# Patient Record
Sex: FEMALE | Race: WHITE | NOT HISPANIC OR LATINO | Employment: OTHER | URBAN - METROPOLITAN AREA
[De-identification: names, ages, dates, MRNs, and addresses within clinical notes are randomized per-mention and may not be internally consistent; named-entity substitution may affect disease eponyms.]

---

## 2018-02-12 ENCOUNTER — TRANSCRIBE ORDERS (OUTPATIENT)
Dept: ADMINISTRATIVE | Facility: HOSPITAL | Age: 73
End: 2018-02-12

## 2018-02-12 DIAGNOSIS — Z87.891 PERSONAL HISTORY OF TOBACCO USE, PRESENTING HAZARDS TO HEALTH: Primary | ICD-10-CM

## 2018-02-19 ENCOUNTER — HOSPITAL ENCOUNTER (OUTPATIENT)
Dept: CT IMAGING | Facility: HOSPITAL | Age: 73
Discharge: HOME | End: 2018-02-19
Admitting: NURSE PRACTITIONER

## 2018-02-19 DIAGNOSIS — Z87.891 PERSONAL HISTORY OF TOBACCO USE, PRESENTING HAZARDS TO HEALTH: ICD-10-CM

## 2018-02-19 PROCEDURE — G0297 LDCT FOR LUNG CA SCREEN: HCPCS

## 2023-08-24 ENCOUNTER — APPOINTMENT (OUTPATIENT)
Dept: GENERAL RADIOLOGY | Facility: HOSPITAL | Age: 78
DRG: 191 | End: 2023-08-24
Payer: MEDICARE

## 2023-08-24 ENCOUNTER — HOSPITAL ENCOUNTER (INPATIENT)
Facility: HOSPITAL | Age: 78
LOS: 3 days | Discharge: HOME | DRG: 191 | End: 2023-08-31
Attending: EMERGENCY MEDICINE | Admitting: INTERNAL MEDICINE
Payer: MEDICARE

## 2023-08-24 DIAGNOSIS — J44.1 ACUTE EXACERBATION OF CHRONIC OBSTRUCTIVE PULMONARY DISEASE (COPD): Primary | ICD-10-CM

## 2023-08-24 DIAGNOSIS — Z86.79 HISTORY OF ATRIAL FIBRILLATION: ICD-10-CM

## 2023-08-24 DIAGNOSIS — J96.21 ACUTE ON CHRONIC RESPIRATORY FAILURE WITH HYPOXIA: ICD-10-CM

## 2023-08-24 DIAGNOSIS — J44.1 COPD EXACERBATION: ICD-10-CM

## 2023-08-24 PROBLEM — I95.9 HYPOTENSION: Status: ACTIVE | Noted: 2023-08-24

## 2023-08-24 PROBLEM — R06.00 DYSPNEA: Status: ACTIVE | Noted: 2023-08-24

## 2023-08-24 PROBLEM — R79.89 ELEVATED SERUM CREATININE: Status: ACTIVE | Noted: 2023-08-24

## 2023-08-24 LAB
ALBUMIN SERPL-MCNC: 3.5 G/DL (ref 3.5–5.2)
ALBUMIN/GLOB SERPL: 1.2 G/DL
ALP SERPL-CCNC: 95 U/L (ref 39–117)
ALT SERPL W P-5'-P-CCNC: 10 U/L (ref 1–33)
ANION GAP SERPL CALCULATED.3IONS-SCNC: 10 MMOL/L (ref 5–15)
AST SERPL-CCNC: 16 U/L (ref 1–32)
B PARAPERT DNA SPEC QL NAA+PROBE: NOT DETECTED
B PERT DNA SPEC QL NAA+PROBE: NOT DETECTED
BASOPHILS # BLD AUTO: 0.04 10*3/MM3 (ref 0–0.2)
BASOPHILS NFR BLD AUTO: 0.5 % (ref 0–1.5)
BILIRUB SERPL-MCNC: 0.6 MG/DL (ref 0–1.2)
BUN SERPL-MCNC: 24 MG/DL (ref 8–23)
BUN/CREAT SERPL: 17.4 (ref 7–25)
C PNEUM DNA NPH QL NAA+NON-PROBE: NOT DETECTED
CALCIUM SPEC-SCNC: 9.1 MG/DL (ref 8.6–10.5)
CHLORIDE SERPL-SCNC: 100 MMOL/L (ref 98–107)
CO2 SERPL-SCNC: 28 MMOL/L (ref 22–29)
CREAT SERPL-MCNC: 1.38 MG/DL (ref 0.57–1)
D DIMER PPP FEU-MCNC: <0.27 MCGFEU/ML (ref 0–0.75)
D-LACTATE SERPL-SCNC: 1.2 MMOL/L (ref 0.5–2)
D-LACTATE SERPL-SCNC: 2.7 MMOL/L (ref 0.5–2)
DEPRECATED RDW RBC AUTO: 61.1 FL (ref 37–54)
EGFRCR SERPLBLD CKD-EPI 2021: 40 ML/MIN/1.73
EOSINOPHIL # BLD AUTO: 0.14 10*3/MM3 (ref 0–0.4)
EOSINOPHIL NFR BLD AUTO: 1.8 % (ref 0.3–6.2)
ERYTHROCYTE [DISTWIDTH] IN BLOOD BY AUTOMATED COUNT: 15.7 % (ref 12.3–15.4)
FLUAV SUBTYP SPEC NAA+PROBE: NOT DETECTED
FLUBV RNA ISLT QL NAA+PROBE: NOT DETECTED
GEN 5 2HR TROPONIN T REFLEX: 24 NG/L
GLOBULIN UR ELPH-MCNC: 3 GM/DL
GLUCOSE SERPL-MCNC: 168 MG/DL (ref 65–99)
HADV DNA SPEC NAA+PROBE: NOT DETECTED
HCOV 229E RNA SPEC QL NAA+PROBE: NOT DETECTED
HCOV HKU1 RNA SPEC QL NAA+PROBE: NOT DETECTED
HCOV NL63 RNA SPEC QL NAA+PROBE: NOT DETECTED
HCOV OC43 RNA SPEC QL NAA+PROBE: NOT DETECTED
HCT VFR BLD AUTO: 47.7 % (ref 34–46.6)
HGB BLD-MCNC: 13.6 G/DL (ref 12–15.9)
HMPV RNA NPH QL NAA+NON-PROBE: NOT DETECTED
HOLD SPECIMEN: NORMAL
HPIV1 RNA ISLT QL NAA+PROBE: NOT DETECTED
HPIV2 RNA SPEC QL NAA+PROBE: NOT DETECTED
HPIV3 RNA NPH QL NAA+PROBE: NOT DETECTED
HPIV4 P GENE NPH QL NAA+PROBE: NOT DETECTED
IMM GRANULOCYTES # BLD AUTO: 0.02 10*3/MM3 (ref 0–0.05)
IMM GRANULOCYTES NFR BLD AUTO: 0.3 % (ref 0–0.5)
LYMPHOCYTES # BLD AUTO: 1.4 10*3/MM3 (ref 0.7–3.1)
LYMPHOCYTES NFR BLD AUTO: 18 % (ref 19.6–45.3)
M PNEUMO IGG SER IA-ACNC: NOT DETECTED
MCH RBC QN AUTO: 30.2 PG (ref 26.6–33)
MCHC RBC AUTO-ENTMCNC: 28.5 G/DL (ref 31.5–35.7)
MCV RBC AUTO: 105.8 FL (ref 79–97)
MONOCYTES # BLD AUTO: 0.51 10*3/MM3 (ref 0.1–0.9)
MONOCYTES NFR BLD AUTO: 6.6 % (ref 5–12)
NEUTROPHILS NFR BLD AUTO: 5.65 10*3/MM3 (ref 1.7–7)
NEUTROPHILS NFR BLD AUTO: 72.8 % (ref 42.7–76)
NRBC BLD AUTO-RTO: 0 /100 WBC (ref 0–0.2)
NT-PROBNP SERPL-MCNC: 8005 PG/ML (ref 0–1800)
PLATELET # BLD AUTO: 229 10*3/MM3 (ref 140–450)
PMV BLD AUTO: 10.1 FL (ref 6–12)
POTASSIUM SERPL-SCNC: 4.5 MMOL/L (ref 3.5–5.2)
PROCALCITONIN SERPL-MCNC: 0.06 NG/ML (ref 0–0.25)
PROT SERPL-MCNC: 6.5 G/DL (ref 6–8.5)
RBC # BLD AUTO: 4.51 10*6/MM3 (ref 3.77–5.28)
RHINOVIRUS RNA SPEC NAA+PROBE: NOT DETECTED
RSV RNA NPH QL NAA+NON-PROBE: NOT DETECTED
SARS-COV-2 RNA NPH QL NAA+NON-PROBE: NOT DETECTED
SODIUM SERPL-SCNC: 138 MMOL/L (ref 136–145)
TROPONIN T DELTA: 2 NG/L
TROPONIN T SERPL HS-MCNC: 22 NG/L
TROPONIN T SERPL HS-MCNC: 24 NG/L
WBC NRBC COR # BLD: 7.76 10*3/MM3 (ref 3.4–10.8)
WHOLE BLOOD HOLD COAG: NORMAL
WHOLE BLOOD HOLD SPECIMEN: NORMAL

## 2023-08-24 PROCEDURE — 83605 ASSAY OF LACTIC ACID: CPT | Performed by: EMERGENCY MEDICINE

## 2023-08-24 PROCEDURE — 85025 COMPLETE CBC W/AUTO DIFF WBC: CPT | Performed by: EMERGENCY MEDICINE

## 2023-08-24 PROCEDURE — 93005 ELECTROCARDIOGRAM TRACING: CPT | Performed by: EMERGENCY MEDICINE

## 2023-08-24 PROCEDURE — 87040 BLOOD CULTURE FOR BACTERIA: CPT | Performed by: EMERGENCY MEDICINE

## 2023-08-24 PROCEDURE — 0202U NFCT DS 22 TRGT SARS-COV-2: CPT | Performed by: INTERNAL MEDICINE

## 2023-08-24 PROCEDURE — 83880 ASSAY OF NATRIURETIC PEPTIDE: CPT | Performed by: EMERGENCY MEDICINE

## 2023-08-24 PROCEDURE — G0378 HOSPITAL OBSERVATION PER HR: HCPCS

## 2023-08-24 PROCEDURE — 99285 EMERGENCY DEPT VISIT HI MDM: CPT

## 2023-08-24 PROCEDURE — 99223 1ST HOSP IP/OBS HIGH 75: CPT | Performed by: INTERNAL MEDICINE

## 2023-08-24 PROCEDURE — 80053 COMPREHEN METABOLIC PANEL: CPT | Performed by: EMERGENCY MEDICINE

## 2023-08-24 PROCEDURE — 94640 AIRWAY INHALATION TREATMENT: CPT

## 2023-08-24 PROCEDURE — 36415 COLL VENOUS BLD VENIPUNCTURE: CPT

## 2023-08-24 PROCEDURE — 84145 PROCALCITONIN (PCT): CPT | Performed by: EMERGENCY MEDICINE

## 2023-08-24 PROCEDURE — 84484 ASSAY OF TROPONIN QUANT: CPT | Performed by: EMERGENCY MEDICINE

## 2023-08-24 PROCEDURE — 85379 FIBRIN DEGRADATION QUANT: CPT | Performed by: EMERGENCY MEDICINE

## 2023-08-24 PROCEDURE — 84484 ASSAY OF TROPONIN QUANT: CPT | Performed by: NURSE PRACTITIONER

## 2023-08-24 PROCEDURE — 71045 X-RAY EXAM CHEST 1 VIEW: CPT

## 2023-08-24 RX ORDER — SODIUM CHLORIDE, SODIUM LACTATE, POTASSIUM CHLORIDE, CALCIUM CHLORIDE 600; 310; 30; 20 MG/100ML; MG/100ML; MG/100ML; MG/100ML
75 INJECTION, SOLUTION INTRAVENOUS CONTINUOUS
Status: ACTIVE | OUTPATIENT
Start: 2023-08-24 | End: 2023-08-25

## 2023-08-24 RX ORDER — SODIUM CHLORIDE 0.9 % (FLUSH) 0.9 %
10 SYRINGE (ML) INJECTION AS NEEDED
Status: DISCONTINUED | OUTPATIENT
Start: 2023-08-24 | End: 2023-08-31 | Stop reason: HOSPADM

## 2023-08-24 RX ORDER — IPRATROPIUM BROMIDE AND ALBUTEROL SULFATE 2.5; .5 MG/3ML; MG/3ML
3 SOLUTION RESPIRATORY (INHALATION) ONCE
Status: COMPLETED | OUTPATIENT
Start: 2023-08-24 | End: 2023-08-24

## 2023-08-24 RX ORDER — ONDANSETRON 4 MG/1
4 TABLET, FILM COATED ORAL EVERY 6 HOURS PRN
Status: DISCONTINUED | OUTPATIENT
Start: 2023-08-24 | End: 2023-08-31 | Stop reason: HOSPADM

## 2023-08-24 RX ORDER — IPRATROPIUM BROMIDE AND ALBUTEROL SULFATE 2.5; .5 MG/3ML; MG/3ML
3 SOLUTION RESPIRATORY (INHALATION)
Status: CANCELLED | OUTPATIENT
Start: 2023-08-24

## 2023-08-24 RX ORDER — DOXYCYCLINE 100 MG/1
100 CAPSULE ORAL EVERY 12 HOURS SCHEDULED
Status: COMPLETED | OUTPATIENT
Start: 2023-08-25 | End: 2023-08-29

## 2023-08-24 RX ORDER — SODIUM CHLORIDE 9 MG/ML
40 INJECTION, SOLUTION INTRAVENOUS AS NEEDED
Status: DISCONTINUED | OUTPATIENT
Start: 2023-08-24 | End: 2023-08-31 | Stop reason: HOSPADM

## 2023-08-24 RX ORDER — PREDNISONE 20 MG/1
40 TABLET ORAL
Status: DISCONTINUED | OUTPATIENT
Start: 2023-08-25 | End: 2023-08-28

## 2023-08-24 RX ORDER — DOXYCYCLINE 100 MG/1
100 CAPSULE ORAL EVERY 12 HOURS SCHEDULED
Status: DISCONTINUED | OUTPATIENT
Start: 2023-08-24 | End: 2023-08-24

## 2023-08-24 RX ORDER — HYDROCODONE POLISTIREX AND CHLORPHENIRAMINE POLISTIREX 10; 8 MG/5ML; MG/5ML
2.5 SUSPENSION, EXTENDED RELEASE ORAL EVERY 12 HOURS PRN
Status: DISCONTINUED | OUTPATIENT
Start: 2023-08-24 | End: 2023-08-31 | Stop reason: HOSPADM

## 2023-08-24 RX ORDER — IPRATROPIUM BROMIDE AND ALBUTEROL SULFATE 2.5; .5 MG/3ML; MG/3ML
3 SOLUTION RESPIRATORY (INHALATION)
Status: DISCONTINUED | OUTPATIENT
Start: 2023-08-24 | End: 2023-08-31 | Stop reason: HOSPADM

## 2023-08-24 RX ORDER — SODIUM CHLORIDE 9 MG/ML
75 INJECTION, SOLUTION INTRAVENOUS CONTINUOUS
Status: DISCONTINUED | OUTPATIENT
Start: 2023-08-24 | End: 2023-08-25 | Stop reason: SDUPTHER

## 2023-08-24 RX ORDER — BISACODYL 5 MG/1
5 TABLET, DELAYED RELEASE ORAL DAILY PRN
Status: DISCONTINUED | OUTPATIENT
Start: 2023-08-24 | End: 2023-08-31 | Stop reason: HOSPADM

## 2023-08-24 RX ORDER — HEPARIN SODIUM 5000 [USP'U]/ML
5000 INJECTION, SOLUTION INTRAVENOUS; SUBCUTANEOUS EVERY 12 HOURS SCHEDULED
Status: DISCONTINUED | OUTPATIENT
Start: 2023-08-24 | End: 2023-08-25

## 2023-08-24 RX ORDER — DOXYCYCLINE 100 MG/1
100 CAPSULE ORAL ONCE
Status: COMPLETED | OUTPATIENT
Start: 2023-08-24 | End: 2023-08-24

## 2023-08-24 RX ORDER — ACETAMINOPHEN 325 MG/1
650 TABLET ORAL EVERY 4 HOURS PRN
Status: DISCONTINUED | OUTPATIENT
Start: 2023-08-24 | End: 2023-08-31 | Stop reason: HOSPADM

## 2023-08-24 RX ORDER — BENZONATATE 100 MG/1
100 CAPSULE ORAL 3 TIMES DAILY PRN
Status: DISCONTINUED | OUTPATIENT
Start: 2023-08-24 | End: 2023-08-31 | Stop reason: HOSPADM

## 2023-08-24 RX ORDER — SODIUM CHLORIDE 0.9 % (FLUSH) 0.9 %
10 SYRINGE (ML) INJECTION EVERY 12 HOURS SCHEDULED
Status: DISCONTINUED | OUTPATIENT
Start: 2023-08-24 | End: 2023-08-31 | Stop reason: HOSPADM

## 2023-08-24 RX ORDER — AMOXICILLIN 250 MG
2 CAPSULE ORAL 2 TIMES DAILY
Status: DISCONTINUED | OUTPATIENT
Start: 2023-08-24 | End: 2023-08-31 | Stop reason: HOSPADM

## 2023-08-24 RX ORDER — BISACODYL 10 MG
10 SUPPOSITORY, RECTAL RECTAL DAILY PRN
Status: DISCONTINUED | OUTPATIENT
Start: 2023-08-24 | End: 2023-08-31 | Stop reason: HOSPADM

## 2023-08-24 RX ORDER — IPRATROPIUM BROMIDE AND ALBUTEROL SULFATE 2.5; .5 MG/3ML; MG/3ML
3 SOLUTION RESPIRATORY (INHALATION) EVERY 4 HOURS PRN
Status: DISCONTINUED | OUTPATIENT
Start: 2023-08-24 | End: 2023-08-31 | Stop reason: HOSPADM

## 2023-08-24 RX ORDER — POLYETHYLENE GLYCOL 3350 17 G/17G
17 POWDER, FOR SOLUTION ORAL DAILY PRN
Status: DISCONTINUED | OUTPATIENT
Start: 2023-08-24 | End: 2023-08-31 | Stop reason: HOSPADM

## 2023-08-24 RX ADMIN — SODIUM CHLORIDE 75 ML/HR: 9 INJECTION, SOLUTION INTRAVENOUS at 23:41

## 2023-08-24 RX ADMIN — Medication 10 ML: at 23:41

## 2023-08-24 RX ADMIN — SODIUM CHLORIDE 1000 ML: 9 INJECTION, SOLUTION INTRAVENOUS at 18:00

## 2023-08-24 RX ADMIN — SENNOSIDES AND DOCUSATE SODIUM 2 TABLET: 8.6; 5 TABLET ORAL at 23:41

## 2023-08-24 RX ADMIN — IPRATROPIUM BROMIDE AND ALBUTEROL SULFATE 3 ML: .5; 3 SOLUTION RESPIRATORY (INHALATION) at 17:58

## 2023-08-24 RX ADMIN — DOXYCYCLINE 100 MG: 100 CAPSULE ORAL at 20:33

## 2023-08-24 NOTE — H&P
[facility] Medicine Services  HISTORY AND PHYSICAL    Patient Name: Tessa Bradley  : 1945  MRN: [id number]  Primary Care Physician: Sunni Santoro MD  Date of admission: 2023      Subjective   Subjective     Chief Complaint:  Dyspnea, hypoxia    HPI:  Tessa Bradley is a 77 y.o. female with history of HLD, HTN, COPD who presents from home due to dyspnea and hypoxia noted initially at PCP office, however patient refused to come to hospital at the time of that visit. Patient notes that she has had ongoing symptoms for a few weeks including malaise, fatigue, dyspnea, fevers (subjective). [place] has not felt well since that time. Patient reports that she saw PCP today, was hypoxic in the office and was advised to come to ED. Family ultimately brought her to hospital today for evaluation and here she is currently requiring 4L NC.    Patient was noted to undergo further w/u in ED with labs- notable include lactate 2.7, Cr 1.4. CXR noting no acute process, D-dimer negative. Patient will be admitted       Review of Systems   Gen- No fevers, chills  CV- No chest pain, palpitations  Resp- as above  GI- No N/V/D, abd pain      Personal History     Past Medical History:   Diagnosis Date    Cancer              Past Surgical History:   Procedure Laterality Date    ABDOMINAL SURGERY      HYSTERECTOMY      OOPHORECTOMY         Family History: family history includes Breast cancer (age of onset: 67) in her mother.     Social History:    Social History     Social History Narrative    Not on file       Medications:  Available home medication information reviewed.  (Not in a hospital admission)      Allergies   Allergen Reactions    Penicillins Anaphylaxis       Objective   Objective     Vital Signs:   Temp:  [98.4 °F (36.9 °C)] 98.4 °F (36.9 °C)  Heart Rate:  [56-99] 99  Resp:  [32] 32  BP: (88-99)/(46-62) 96/62  Flow (L/min):  [3] 3        Physical Exam   GEN: NAD, resting in bed, awake  HEENT: on room air, atraumatic, normocephalic, MM dry   NECK: supple, no masses  RESP: on 4L, slight wheezing heard, good effort   CV: on tele, sinus rhythm  PSYCH: normal affect, appropriate  NEURO: awake, alert, no focal deficits noted  MSK: no edema noted  SKIN: no rashes noted       Result Review:  I have personally reviewed the results from the time of this admission to 8/24/2023 19:52 EDT and agree with these findings:  [x]  Laboratory list / accordion  [x]  Microbiology  [x]  Radiology  [x]  EKG/Telemetry   []  Cardiology/Vascular   []  Pathology  [x]  Old records  []  Other:  Most notable findings include:     Labs imaging        LAB RESULTS:      Lab 08/24/23  1650   WBC 7.76   HEMOGLOBIN 13.6   HEMATOCRIT 47.7*   PLATELETS 229   NEUTROS ABS 5.65   IMMATURE GRANS (ABS) 0.02   LYMPHS ABS 1.40   MONOS ABS 0.51   EOS ABS 0.14   .8*   PROCALCITONIN 0.06   LACTATE 2.7*   D DIMER QUANT <0.27         Lab 08/24/23  1650   SODIUM 138   POTASSIUM 4.5   CHLORIDE 100   CO2 28.0   ANION GAP 10.0   BUN 24*   CREATININE 1.38*   EGFR 40.0*   GLUCOSE 168*   CALCIUM 9.1         Lab 08/24/23  1650   TOTAL PROTEIN 6.5   ALBUMIN 3.5   GLOBULIN 3.0   ALT (SGPT) 10   AST (SGOT) 16   BILIRUBIN 0.6   ALK PHOS 95         Lab 08/24/23  1650   PROBNP 8,005.0*   HSTROP T 24*                     Microbiology Results (last 10 days)       ** No results found for the last 240 hours. **            XR Chest 1 View    Result Date: 8/24/2023  XR CHEST 1 VW Date of Exam: 8/24/2023 4:55 PM EDT Indication: SOA triage protocol Comparison: CT chest without contrast 2/19/2018 Findings: Heart size top normal. Lungs are without consolidation. No pneumothorax or pleural effusion. Osseous structures intact.     Impression: Impression: No acute process. Electronically Signed: Jose G Martinez MD  8/24/2023 5:29 PM EDT  Workstation ID: [id number]         Assessment & Plan   Assessment & Plan     Active  Hospital Problems    Diagnosis  POA    **COPD exacerbation [J44.1]  Yes    Elevated serum creatinine [R79.89]  Yes    Hypotension [I95.9]  Yes    Dyspnea [R06.00]  Yes       Tessa Bradley is a 77 y.o. female with history of HLD, HTN, COPD who presents from home due to dyspnea and hypoxia noted initially at PCP office, however patient refused to come to hospital at the time of that visit. Patient notes that she has had ongoing symptoms for a few weeks including malaise, fatigue, dyspnea, fevers (subjective). [place] has not felt well since that time. Patient reports that she saw PCP today, was hypoxic in the office and was advised to come to ED. Family ultimately brought her to hospital today for evaluation and here she is currently requiring 4L NC.    Patient was noted to undergo further w/u in ED with labs- notable include lactate 2.7, Cr 1.4. CXR noting no acute process, D-dimer negative. Patient will be admitted     COPD exacerbation  Acute hypoxic RF  Dyspnea  --suspect possible viral etiology-- will send resp PCR  --continue treatment for COPD exac- doxy/prednisone ordered , will order nebs as well  --symptomatic tx for cough- tussinex/tessalon pearls  --labs in AM   --low threshold for CT imaging, noting D-dimer negative so CTA not pursued initially     Elevated proBNP  Elevated trop  --will order ECHO for AM    Lactic acidosis  Dehydration  HEATHER  --will give fluids tonight and trend labs for AM  --suspect 2/2 dehydration and poor PO intake       HTN  HLD  --will place pharmacy consult as patient unsure of her meds and none listed in computer, will resume as appropriate     DVT prophylaxis:  heparin given heather      CODE STATUS:    Code Status and Medical Interventions:   Ordered at: 08/24/23 1950     Code Status (Patient has no pulse and is not breathing):    CPR (Attempt to Resuscitate)     Medical Interventions (Patient has pulse or is breathing):    Full  Support       Expected Discharge 1-3 days  Expected discharge date/ time has not been documented.     Melinda Garza MD  08/24/23

## 2023-08-24 NOTE — Clinical Note
Level of Care: Telemetry [5]   Diagnosis: COPD exacerbation [759958]   Admitting Physician: HONORIO MAHAJAN [[id number]]   Attending Physician: HONORIO MAHAJAN [[id number]]

## 2023-08-24 NOTE — ED PROVIDER NOTES
[facility]    EMERGENCY DEPARTMENT ENCOUNTER      Pt Name: Tessa Bradley  MRN: [id number]  YOB: 1945  Date of evaluation: 8/24/2023  Provider: Anup Mantilla MD    CHIEF COMPLAINT       Chief Complaint   Patient presents with    Shortness of Breath         HISTORY OF PRESENT ILLNESS   Tessa Bradley is a 77 y.o. female who presents to the emergency department with progressively worsening productive cough and shortness of breath over the course of the last several weeks.  Patient has history of COPD but denies any cardiac history department or fibrillation for which she is on Eliquis and endorses compliance.  No history of VTE but does report a recent travel history to [place] from which she returned last week.  She denies any chest pain or discomfort as well as any fever or chills.      Nursing notes were reviewed.    REVIEW OF SYSTEMS     ROS:  A chief complaint appropriate review of systems was completed and is negative except as noted in the HPI.      PAST MEDICAL HISTORY     Past Medical History:   Diagnosis Date    Cancer     COPD (chronic obstructive pulmonary disease)     Hyperlipidemia     Hypertension          SURGICAL HISTORY       Past Surgical History:   Procedure Laterality Date    ABDOMINAL SURGERY      HYSTERECTOMY      OOPHORECTOMY           CURRENT MEDICATIONS       Current Facility-Administered Medications:     acetaminophen (TYLENOL) tablet 650 mg, 650 mg, Oral, Q4H PRN, Melinda Garza MD, 650 mg at 08/29/23 2026    apixaban (ELIQUIS) tablet 5 mg, 5 mg, Oral, Q12H, Gabrielle Azul APRN, 5 mg at 08/30/23 2054    atorvastatin (LIPITOR) tablet 80 mg, 80 mg, Oral, Nightly, Gabrielle Azul APRN, 80 mg at 08/30/23 2054    benzonatate (TESSALON) capsule 100 mg, 100 mg, Oral, TID PRN, Melinda Garza MD, 100 mg at 08/29/23 0942    sennosides-docusate (PERICOLACE) 8.6-50 MG per tablet 2 tablet, 2 tablet, Oral, BID, 2 tablet at 08/30/23 2054 **AND** polyethylene glycol (MIRALAX)  packet 17 g, 17 g, Oral, Daily PRN **AND** bisacodyl (DULCOLAX) EC tablet 5 mg, 5 mg, Oral, Daily PRN **AND** bisacodyl (DULCOLAX) suppository 10 mg, 10 mg, Rectal, Daily PRN, Melinda Garza MD    budesonide (PULMICORT) nebulizer solution 0.5 mg, 0.5 mg, Nebulization, Daily - RT, Xena Carroll APRN, 0.5 mg at 08/30/23 0834    Calcium Replacement - Follow Nurse / BPA Driven Protocol, , Does not apply, PRN, Melinda Garza MD    cyclobenzaprine (FLEXERIL) tablet 5 mg, 5 mg, Oral, Q8H PRN, Henny, Elaine G, DO, 5 mg at 08/30/23 2053    fluticasone (FLONASE) 50 MCG/ACT nasal spray 2 spray, 2 spray, Each Nare, Daily, Mayne, Casie M, PA-C, 2 spray at 08/30/23 0919    gabapentin (NEURONTIN) capsule 300 mg, 300 mg, Oral, Nightly, Henny, Elaine G, DO, 300 mg at 08/30/23 2054    guaiFENesin (MUCINEX) 12 hr tablet 1,200 mg, 1,200 mg, Oral, Q12H, Eliezer Miller MD, 1,200 mg at 08/30/23 2054    Hydrocod Juancarlos-Chlorphe Juancarlos ER (TUSSIONEX PENNKINETIC) 10-8 MG/5ML ER suspension 2.5 mL, 2.5 mL, Oral, Q12H PRN, Melinda Garza MD, 2.5 mL at 08/30/23 2152    ipratropium-albuterol (DUO-NEB) nebulizer solution 3 mL, 3 mL, Nebulization, Q4H PRN, Heidy Martinez APRN    ipratropium-albuterol (DUO-NEB) nebulizer solution 3 mL, 3 mL, Nebulization, 4x Daily - RT, Melinda Garza MD, 3 mL at 08/30/23 2006    Magnesium Standard Dose Replacement - Follow Nurse / BPA Driven Protocol, , Does not apply, PRN, Neo Garzaah M, MD    ondansetron (ZOFRAN) tablet 4 mg, 4 mg, Oral, Q6H PRGreg DE LA CRUZ Sarah M, MD    Phosphorus Replacement - Follow Nurse / BPA Driven Protocol, , Does not apply, Greg NOONAN Sarah M, MD    Potassium Replacement - Follow Nurse / BPA Driven Protocol, , Does not apply, Greg NOONAN Sarah M, MD    predniSONE (DELTASONE) tablet 40 mg, 40 mg, Oral, Daily, 40 mg at 08/30/23 0918 **FOLLOWED BY** [START ON 9/2/2023] predniSONE (DELTASONE) tablet 30 mg, 30 mg, Oral, Daily **FOLLOWED BY** [START ON 9/5/2023]  predniSONE (DELTASONE) tablet 20 mg, 20 mg, Oral, Daily **FOLLOWED BY** [START ON 9/8/2023] predniSONE (DELTASONE) tablet 10 mg, 10 mg, Oral, Daily, Artem Zhao MD    sodium chloride 0.9 % flush 10 mL, 10 mL, Intravenous, PRN, Anup Mantilla MD    sodium chloride 0.9 % flush 10 mL, 10 mL, Intravenous, Q12H, Melinda Garza MD, 10 mL at 08/30/23 2054    sodium chloride 0.9 % flush 10 mL, 10 mL, Intravenous, PRN, Melinda Garza MD    sodium chloride 0.9 % infusion 40 mL, 40 mL, Intravenous, PRN, Melinda Garza MD    tiotropium (SPIRIVA RESPIMAT) 2.5 mcg/act aerosol solution inhaler, 2 puff, Inhalation, Daily - RT, Eliezer Miller MD, 2 puff at 08/30/23 0834    ALLERGIES     Penicillins    FAMILY HISTORY       Family History   Problem Relation Age of Onset    Breast cancer Mother 67          SOCIAL HISTORY       Social History     Socioeconomic History    Marital status:    Tobacco Use    Smoking status: Every Day     Packs/day: 1.00     Years: 40.00     Pack years: 40.00     Types: Cigarettes    Smokeless tobacco: Never   Vaping Use    Vaping Use: Unknown   Substance and Sexual Activity    Alcohol use: Not Currently    Drug use: Never    Sexual activity: Defer         PHYSICAL EXAM    (up to 7 for level 4, 8 or more for level 5)     Vitals:    08/30/23 1613 08/30/23 1800 08/30/23 2000 08/30/23 2006   BP:   113/54    BP Location:   Left arm    Patient Position:   Lying    Pulse: 89 97 99 93   Resp: 16  18 16   Temp:   98.1 °F (36.7 °C)    TempSrc:   Oral    SpO2: 96%  98%    Weight:       Height:           General: Awake, alert, no acute distress.  HEENT: Conjunctivae normal.  Neck: Trachea midline.  Cardiac: Heart regular rate, rhythm, no murmurs, rubs, or gallops  Lungs: Moderate diffuse wheezing.  No focal findings.  Chest wall: There is no tenderness to palpation over the chest wall or over ribs  Abdomen: Abdomen is soft, nontender, nondistended. There are no firm or pulsatile masses, no  rebound rigidity or guarding.   Musculoskeletal: No deformity.  Neuro: Alert and oriented x 4.  Dermatology: Skin is warm and dry  Psych: Mentation is grossly normal, cognition is grossly normal. Affect is appropriate.        DIAGNOSTIC RESULTS     EKG: All EKGs are interpreted by the Emergency Department Physician who either signs or Co-signs this chart in the absence of a cardiologist.    ECG 12 Lead ED Triage Standing Order; SOA   Final Result   Test Reason : ED Triage Standing Order~   Blood Pressure :   */*   mmHG   Vent. Rate : 102 BPM     Atrial Rate : 102 BPM      P-R Int : 128 ms          QRS Dur :  90 ms       QT Int : 328 ms       P-R-T Axes :  82 112 -33 degrees      QTc Int : 427 ms      Sinus tachycardia with premature atrial complexes   Right axis deviation   Pulmonary disease pattern   Cannot rule out Inferior infarct , age undetermined   T wave abnormality, consider anterolateral ischemia   Abnormal ECG   No previous ECGs available   Confirmed by EFRAIN PIZARRO ([id number]) on 8/27/2023 11:13:32 PM      Referred By: EDMD           Confirmed By: EFRAIN PIZARRO      SCANNED - TELEMETRY     Final Result      SCANNED - TELEMETRY     Final Result      SCANNED - TELEMETRY     Final Result      SCANNED - TELEMETRY     Final Result      SCANNED - TELEMETRY     Final Result      SCANNED - TELEMETRY     Final Result      SCANNED - TELEMETRY     Final Result            RADIOLOGY:   [x] Radiologist's Report Reviewed:  CT Angiogram Chest   Final Result   Impression:      1. No evidence of pulmonary embolism      2. Borderline size of the heart      3. A 6 mm semisolid nodule in the right upper lobe      Indeterminate part solid pulmonary nodule measuring 6 mm. For a part solid nodule >=6 mm, recommend CT at 3-6 months to confirm persistence. If unchanged and a solid component remains <6 mm, annual CT should be performed for 5 years. A persistent part    solid nodule with a solid component >=6 mm is highly suspicious.       4. Minimal bibasilar atelectasis            Electronically Signed: Yash Herrmann MD     8/28/2023 10:56 PM EDT     Workstation ID: [id number]      XR Chest 1 View   Final Result   Impression:      1. Stable mild pulmonary venous hypertension.      2. Minimal new left effusion or atelectasis.         Electronically Signed: Jamison Rueda MD     8/28/2023 1:43 PM EDT     Workstation ID: [id number]      XR Chest 1 View   Final Result   Impression:   No acute process.         Electronically Signed: Jose G Martinez MD     8/24/2023 5:29 PM EDT     Workstation ID: [id number]          I ordered and independently reviewed the above noted radiographic studies.        LABS:    I have reviewed and interpreted all of the currently available lab results from this visit (if applicable):  Results for orders placed or performed during the hospital encounter of 08/24/23   Blood Culture - Blood, Arm, Right    Specimen: Arm, Right; Blood   Result Value Ref Range    Blood Culture No growth at 5 days    Blood Culture - Blood, Arm, Left    Specimen: Arm, Left; Blood   Result Value Ref Range    Blood Culture No growth at 5 days    Respiratory Panel PCR w/COVID-19(SARS-CoV-2) [facility] In-House, NP Swab in UTM/VTM, 3-4 HR TAT - Swab, Nasopharynx    Specimen: Nasopharynx; Swab   Result Value Ref Range    ADENOVIRUS, PCR Not Detected Not Detected    Coronavirus 229E Not Detected Not Detected    Coronavirus HKU1 Not Detected Not Detected    Coronavirus NL63 Not Detected Not Detected    Coronavirus OC43 Not Detected Not Detected    COVID19 Not Detected Not Detected - Ref. Range    Human Metapneumovirus Not Detected Not Detected    Human Rhinovirus/Enterovirus Not Detected Not Detected    Influenza A PCR Not Detected Not Detected    Influenza B PCR Not Detected Not Detected    Parainfluenza Virus 1 Not Detected Not Detected    Parainfluenza Virus 2 Not Detected Not Detected    Parainfluenza Virus 3 Not Detected Not Detected     Parainfluenza Virus 4 Not Detected Not Detected    RSV, PCR Not Detected Not Detected    Bordetella pertussis pcr Not Detected Not Detected    Bordetella parapertussis PCR Not Detected Not Detected    Chlamydophila pneumoniae PCR Not Detected Not Detected    Mycoplasma pneumo by PCR Not Detected Not Detected   Comprehensive Metabolic Panel    Specimen: Blood   Result Value Ref Range    Glucose 168 (H) 65 - 99 mg/dL    BUN 24 (H) 8 - 23 mg/dL    Creatinine 1.38 (H) 0.57 - 1.00 mg/dL    Sodium 138 136 - 145 mmol/L    Potassium 4.5 3.5 - 5.2 mmol/L    Chloride 100 98 - 107 mmol/L    CO2 28.0 22.0 - 29.0 mmol/L    Calcium 9.1 8.6 - 10.5 mg/dL    Total Protein 6.5 6.0 - 8.5 g/dL    Albumin 3.5 3.5 - 5.2 g/dL    ALT (SGPT) 10 1 - 33 U/L    AST (SGOT) 16 1 - 32 U/L    Alkaline Phosphatase 95 39 - 117 U/L    Total Bilirubin 0.6 0.0 - 1.2 mg/dL    Globulin 3.0 gm/dL    A/G Ratio 1.2 g/dL    BUN/Creatinine Ratio 17.4 7.0 - 25.0    Anion Gap 10.0 5.0 - 15.0 mmol/L    eGFR 40.0 (L) >60.0 mL/min/1.73   BNP    Specimen: Blood   Result Value Ref Range    proBNP 8,005.0 (H) 0.0 - 1,800.0 pg/mL   Single High Sensitivity Troponin T    Specimen: Blood   Result Value Ref Range    HS Troponin T 24 (H) <10 ng/L   CBC Auto Differential    Specimen: Blood   Result Value Ref Range    WBC 7.76 3.40 - 10.80 10*3/mm3    RBC 4.51 3.77 - 5.28 10*6/mm3    Hemoglobin 13.6 12.0 - 15.9 g/dL    Hematocrit 47.7 (H) 34.0 - 46.6 %    .8 (H) 79.0 - 97.0 fL    MCH 30.2 26.6 - 33.0 pg    MCHC 28.5 (L) 31.5 - 35.7 g/dL    RDW 15.7 (H) 12.3 - 15.4 %    RDW-SD 61.1 (H) 37.0 - 54.0 fl    MPV 10.1 6.0 - 12.0 fL    Platelets 229 140 - 450 10*3/mm3    Neutrophil % 72.8 42.7 - 76.0 %    Lymphocyte % 18.0 (L) 19.6 - 45.3 %    Monocyte % 6.6 5.0 - 12.0 %    Eosinophil % 1.8 0.3 - 6.2 %    Basophil % 0.5 0.0 - 1.5 %    Immature Grans % 0.3 0.0 - 0.5 %    Neutrophils, Absolute 5.65 1.70 - 7.00 10*3/mm3    Lymphocytes, Absolute 1.40 0.70 - 3.10 10*3/mm3     Monocytes, Absolute 0.51 0.10 - 0.90 10*3/mm3    Eosinophils, Absolute 0.14 0.00 - 0.40 10*3/mm3    Basophils, Absolute 0.04 0.00 - 0.20 10*3/mm3    Immature Grans, Absolute 0.02 0.00 - 0.05 10*3/mm3    nRBC 0.0 0.0 - 0.2 /100 WBC   Lactic Acid, Plasma    Specimen: Blood   Result Value Ref Range    Lactate 2.7 (C) 0.5 - 2.0 mmol/L   Procalcitonin    Specimen: Blood   Result Value Ref Range    Procalcitonin 0.06 0.00 - 0.25 ng/mL   D-dimer, Quantitative    Specimen: Blood   Result Value Ref Range    D-Dimer, Quantitative <0.27 0.00 - 0.75 MCGFEU/mL   STAT Lactic Acid, Reflex    Specimen: Blood   Result Value Ref Range    Lactate 1.2 0.5 - 2.0 mmol/L   High Sensitivity Troponin T    Specimen: Blood   Result Value Ref Range    HS Troponin T 22 (H) <10 ng/L   High Sensitivity Troponin T 2Hr    Specimen: Blood   Result Value Ref Range    HS Troponin T 24 (H) <10 ng/L    Troponin T Delta 2 >=-4 - <+4 ng/L   CBC Auto Differential    Specimen: Blood   Result Value Ref Range    WBC 7.48 3.40 - 10.80 10*3/mm3    RBC 4.13 3.77 - 5.28 10*6/mm3    Hemoglobin 12.5 12.0 - 15.9 g/dL    Hematocrit 43.6 34.0 - 46.6 %    .6 (H) 79.0 - 97.0 fL    MCH 30.3 26.6 - 33.0 pg    MCHC 28.7 (L) 31.5 - 35.7 g/dL    RDW 15.7 (H) 12.3 - 15.4 %    RDW-SD 61.3 (H) 37.0 - 54.0 fl    MPV 10.1 6.0 - 12.0 fL    Platelets 200 140 - 450 10*3/mm3    Neutrophil % 62.6 42.7 - 76.0 %    Lymphocyte % 23.5 19.6 - 45.3 %    Monocyte % 9.4 5.0 - 12.0 %    Eosinophil % 3.6 0.3 - 6.2 %    Basophil % 0.5 0.0 - 1.5 %    Immature Grans % 0.4 0.0 - 0.5 %    Neutrophils, Absolute 4.68 1.70 - 7.00 10*3/mm3    Lymphocytes, Absolute 1.76 0.70 - 3.10 10*3/mm3    Monocytes, Absolute 0.70 0.10 - 0.90 10*3/mm3    Eosinophils, Absolute 0.27 0.00 - 0.40 10*3/mm3    Basophils, Absolute 0.04 0.00 - 0.20 10*3/mm3    Immature Grans, Absolute 0.03 0.00 - 0.05 10*3/mm3    nRBC 0.0 0.0 - 0.2 /100 WBC   Comprehensive Metabolic Panel    Specimen: Blood   Result Value Ref Range     Glucose 113 (H) 65 - 99 mg/dL    BUN 26 (H) 8 - 23 mg/dL    Creatinine 1.44 (H) 0.57 - 1.00 mg/dL    Sodium 146 (H) 136 - 145 mmol/L    Potassium 5.0 3.5 - 5.2 mmol/L    Chloride 106 98 - 107 mmol/L    CO2 35.0 (H) 22.0 - 29.0 mmol/L    Calcium 8.6 8.6 - 10.5 mg/dL    Total Protein 5.6 (L) 6.0 - 8.5 g/dL    Albumin 3.4 (L) 3.5 - 5.2 g/dL    ALT (SGPT) 11 1 - 33 U/L    AST (SGOT) 18 1 - 32 U/L    Alkaline Phosphatase 92 39 - 117 U/L    Total Bilirubin 0.4 0.0 - 1.2 mg/dL    Globulin 2.2 gm/dL    A/G Ratio 1.5 g/dL    BUN/Creatinine Ratio 18.1 7.0 - 25.0    Anion Gap 5.0 5.0 - 15.0 mmol/L    eGFR 38.0 (L) >60.0 mL/min/1.73   Basic Metabolic Panel    Specimen: Blood   Result Value Ref Range    Glucose 292 (H) 65 - 99 mg/dL    BUN 22 8 - 23 mg/dL    Creatinine 1.06 (H) 0.57 - 1.00 mg/dL    Sodium 144 136 - 145 mmol/L    Potassium 5.4 (H) 3.5 - 5.2 mmol/L    Chloride 105 98 - 107 mmol/L    CO2 33.0 (H) 22.0 - 29.0 mmol/L    Calcium 8.7 8.6 - 10.5 mg/dL    BUN/Creatinine Ratio 20.8 7.0 - 25.0    Anion Gap 6.0 5.0 - 15.0 mmol/L    eGFR 54.9 (L) >60.0 mL/min/1.73   Basic Metabolic Panel    Specimen: Blood   Result Value Ref Range    Glucose 94 65 - 99 mg/dL    BUN 20 8 - 23 mg/dL    Creatinine 0.92 0.57 - 1.00 mg/dL    Sodium 143 136 - 145 mmol/L    Potassium 4.8 3.5 - 5.2 mmol/L    Chloride 106 98 - 107 mmol/L    CO2 33.0 (H) 22.0 - 29.0 mmol/L    Calcium 8.8 8.6 - 10.5 mg/dL    BUN/Creatinine Ratio 21.7 7.0 - 25.0    Anion Gap 4.0 (L) 5.0 - 15.0 mmol/L    eGFR 65.1 >60.0 mL/min/1.73   CBC Auto Differential    Specimen: Blood   Result Value Ref Range    WBC 6.87 3.40 - 10.80 10*3/mm3    RBC 3.92 3.77 - 5.28 10*6/mm3    Hemoglobin 11.8 (L) 12.0 - 15.9 g/dL    Hematocrit 41.0 34.0 - 46.6 %    .6 (H) 79.0 - 97.0 fL    MCH 30.1 26.6 - 33.0 pg    MCHC 28.8 (L) 31.5 - 35.7 g/dL    RDW 15.0 12.3 - 15.4 %    RDW-SD 58.4 (H) 37.0 - 54.0 fl    MPV 9.9 6.0 - 12.0 fL    Platelets 176 140 - 450 10*3/mm3    Neutrophil % 70.5 42.7  - 76.0 %    Lymphocyte % 19.8 19.6 - 45.3 %    Monocyte % 8.6 5.0 - 12.0 %    Eosinophil % 0.4 0.3 - 6.2 %    Basophil % 0.3 0.0 - 1.5 %    Immature Grans % 0.4 0.0 - 0.5 %    Neutrophils, Absolute 4.84 1.70 - 7.00 10*3/mm3    Lymphocytes, Absolute 1.36 0.70 - 3.10 10*3/mm3    Monocytes, Absolute 0.59 0.10 - 0.90 10*3/mm3    Eosinophils, Absolute 0.03 0.00 - 0.40 10*3/mm3    Basophils, Absolute 0.02 0.00 - 0.20 10*3/mm3    Immature Grans, Absolute 0.03 0.00 - 0.05 10*3/mm3    nRBC 0.0 0.0 - 0.2 /100 WBC   Basic Metabolic Panel    Specimen: Blood   Result Value Ref Range    Glucose 84 65 - 99 mg/dL    BUN 23 8 - 23 mg/dL    Creatinine 0.97 0.57 - 1.00 mg/dL    Sodium 142 136 - 145 mmol/L    Potassium 4.8 3.5 - 5.2 mmol/L    Chloride 103 98 - 107 mmol/L    CO2 35.0 (H) 22.0 - 29.0 mmol/L    Calcium 9.2 8.6 - 10.5 mg/dL    BUN/Creatinine Ratio 23.7 7.0 - 25.0    Anion Gap 4.0 (L) 5.0 - 15.0 mmol/L    eGFR 61.1 >60.0 mL/min/1.73   CBC (No Diff)    Specimen: Blood   Result Value Ref Range    WBC 8.62 3.40 - 10.80 10*3/mm3    RBC 3.94 3.77 - 5.28 10*6/mm3    Hemoglobin 12.0 12.0 - 15.9 g/dL    Hematocrit 40.7 34.0 - 46.6 %    .3 (H) 79.0 - 97.0 fL    MCH 30.5 26.6 - 33.0 pg    MCHC 29.5 (L) 31.5 - 35.7 g/dL    RDW 14.9 12.3 - 15.4 %    RDW-SD 57.1 (H) 37.0 - 54.0 fl    MPV 9.4 6.0 - 12.0 fL    Platelets 185 140 - 450 10*3/mm3   Blood Gas, Arterial -With Co-Ox Panel: Yes    Specimen: Arm, Right; Arterial Blood   Result Value Ref Range    Site Right Radial     Sabas's Test Positive     pH, Arterial 7.358 7.350 - 7.450 pH units    pCO2, Arterial 64.5 (H) 35.0 - 45.0 mm Hg    pO2, Arterial 80.7 (L) 83.0 - 108.0 mm Hg    HCO3, Arterial 36.2 (H) 20.0 - 26.0 mmol/L    Base Excess, Arterial 8.5 (H) 0.0 - 2.0 mmol/L    Hemoglobin, Blood Gas 12.9 (L) 14 - 18 g/dL    Hematocrit, Blood Gas 39.5 38.0 - 51.0 %    Oxyhemoglobin 94.4 94 - 99 %    Methemoglobin 0.60 0.00 - 1.50 %    Carboxyhemoglobin 1.6 0 - 2 %    Barometric  Pressure for Blood Gas      Modality HFNC     FIO2 50 %    pH, Temp Corrected      pCO2, Temperature Corrected      pO2, Temperature Corrected     Basic Metabolic Panel    Specimen: Blood   Result Value Ref Range    Glucose 136 (H) 65 - 99 mg/dL    BUN 52 (H) 8 - 23 mg/dL    Creatinine 1.40 (H) 0.57 - 1.00 mg/dL    Sodium 144 136 - 145 mmol/L    Potassium 5.0 3.5 - 5.2 mmol/L    Chloride 99 98 - 107 mmol/L    CO2 42.0 (H) 22.0 - 29.0 mmol/L    Calcium 9.0 8.6 - 10.5 mg/dL    BUN/Creatinine Ratio 37.1 (H) 7.0 - 25.0    Anion Gap 3.0 (L) 5.0 - 15.0 mmol/L    eGFR 39.3 (L) >60.0 mL/min/1.73   Magnesium    Specimen: Blood   Result Value Ref Range    Magnesium 2.0 1.6 - 2.4 mg/dL   CBC Auto Differential    Specimen: Blood   Result Value Ref Range    WBC 9.03 3.40 - 10.80 10*3/mm3    RBC 4.60 3.77 - 5.28 10*6/mm3    Hemoglobin 13.7 12.0 - 15.9 g/dL    Hematocrit 46.0 34.0 - 46.6 %    .0 (H) 79.0 - 97.0 fL    MCH 29.8 26.6 - 33.0 pg    MCHC 29.8 (L) 31.5 - 35.7 g/dL    RDW 14.8 12.3 - 15.4 %    RDW-SD 54.7 (H) 37.0 - 54.0 fl    MPV 10.2 6.0 - 12.0 fL    Platelets 254 140 - 450 10*3/mm3    Neutrophil % 82.9 (H) 42.7 - 76.0 %    Lymphocyte % 11.3 (L) 19.6 - 45.3 %    Monocyte % 5.2 5.0 - 12.0 %    Eosinophil % 0.0 (L) 0.3 - 6.2 %    Basophil % 0.2 0.0 - 1.5 %    Immature Grans % 0.4 0.0 - 0.5 %    Neutrophils, Absolute 7.48 (H) 1.70 - 7.00 10*3/mm3    Lymphocytes, Absolute 1.02 0.70 - 3.10 10*3/mm3    Monocytes, Absolute 0.47 0.10 - 0.90 10*3/mm3    Eosinophils, Absolute 0.00 0.00 - 0.40 10*3/mm3    Basophils, Absolute 0.02 0.00 - 0.20 10*3/mm3    Immature Grans, Absolute 0.04 0.00 - 0.05 10*3/mm3    nRBC 0.0 0.0 - 0.2 /100 WBC   POC Glucose Once    Specimen: Blood   Result Value Ref Range    Glucose 264 (H) 70 - 130 mg/dL   ECG 12 Lead ED Triage Standing Order; SOA   Result Value Ref Range    QT Interval 328 ms    QTC Interval 427 ms   Adult Transthoracic Echo Complete W/ Cont if Necessary Per Protocol   Result Value  Ref Range    LVIDd 3.8 cm    LVIDs 2.30 cm    IVSd 1.10 cm    LVPWd 1.10 cm    FS 39.5 %    IVS/LVPW 1.00 cm    ESV(cubed) 12.2 ml    EDV(cubed) 54.9 ml    LVOT area 2.8 cm2    LV mass(C)d 134.7 grams    LVOT diam 1.90 cm    EDV(MOD-sp2) 51.6 ml    EDV(MOD-sp4) 142.0 ml    ESV(MOD-sp2) 29.2 ml    ESV(MOD-sp4) 60.9 ml    SV(MOD-sp2) 22.4 ml    SV(MOD-sp4) 81.1 ml    EF(MOD-sp2) 43.4 %    EF(MOD-sp4) 57.1 %    MV E max emery 106.0 cm/sec    MV A max emery 94.9 cm/sec    MV E/A 1.12     Med Peak E' Emery 7.9 cm/sec    Lat Peak E' Emery 13.2 cm/sec    Avg E/e' ratio 10.05     SV(LVOT) 52.7 ml    RV Base 3.7 cm    RV Mid 2.9 cm    RV Length 8.0 cm    TAPSE (>1.6) 1.69 cm    RV S' 15.7 cm/sec    LA dimension (2D)  3.1 cm    LV V1 max 111.0 cm/sec    LV V1 max PG 4.9 mmHg    LV V1 mean PG 3.0 mmHg    LV V1 VTI 18.6 cm    Ao pk emery 267.2 cm/sec    Ao max PG 28.6 mmHg    Ao mean PG 15.8 mmHg    Ao V2 VTI 44.0 cm    ELHAM(I,D) 1.20 cm2    MV max PG 5.7 mmHg    MV mean PG 3.0 mmHg    MV V2 VTI 28.5 cm    MV P1/2t 54.2 msec    MVA(P1/2t) 4.1 cm2    MVA(VTI) 1.85 cm2    MV dec slope 713.0 cm/sec2    PA acc time 0.13 sec    Ao root diam 3.2 cm   Green Top (Gel)   Result Value Ref Range    Extra Tube Hold for add-ons.    Lavender Top   Result Value Ref Range    Extra Tube hold for add-on    Gold Top - SST   Result Value Ref Range    Extra Tube Hold for add-ons.    Gray Top   Result Value Ref Range    Extra Tube Hold for add-ons.    Light Blue Top   Result Value Ref Range    Extra Tube Hold for add-ons.         If labs were ordered, I independently reviewed the results and considered them in treating the patient.      EMERGENCY DEPARTMENT COURSE and DIFFERENTIAL DIAGNOSIS/MDM:   Vitals:  AS OF 23:57 EDT    BP - 113/54  HR - 93  TEMP - 98.1 °F (36.7 °C) (Oral)  O2 SATS - 98%        Discussion below represents my analysis of pertinent findings related to patient's condition, differential diagnosis, treatment plan and final  disposition.      Differential diagnosis:  The differential diagnosis associated with the patient's presentation includes: copd, chf, pe, acs, dysrhythmia, pna, ptx      Independent interpretations (ECG/rhythm strip/X-ray/US/CT scan): I indpendently interpreted the pt CXR and cardiac monitor - no infiltrate, NSR.      Patient's care impacted by:   [] Diabetes   [] Hypertension   [] Coronary Artery Disease   [] Cancer   [x] Other: COPD    Care significantly affected by Social Determinants of Health (housing and economic circumstances, unemployment)    [] Yes     [x] No   If yes, Patient's care significantly limited by  Social Determinants of Health including:    [] Inadequate housing    [] Low income    [] Alcoholism and drug addiction in family    [] Problems related to primary support group    [] Unemployment    [] Problems related to employment    [] Other Social Determinants of Health:       Consideration of admission/observation vs discharge: Pt w/ acute on chronic resp failure and requires admission for further magagement      I considered prescription management with:    [] Pain medication:   [] Antiviral:   [x] Antibiotic: Pt started on abx   [] Other:    ED Course:    ED Course as of 08/30/23 2357   Thu Aug 24, 2023   1806 I spoke with hospitalist Dr. Garza.  Discussed patient history, presentation, work-up.  Accepts patient for admission. [NS]   Fri Aug 25, 2023   [id number] This patient presents with likely COPD exacerbation.  No evidence of pneumonia and patient has no significant leukocytosis.  D-dimer is negative in the setting of low risk, ruling out pulmonary embolism.  Remainder of labs are unremarkable. [NS]      ED Course User Index  [NS] Anup Mantilla MD             CRITICAL CARE TIME    Approximately 35 minutes of discontinuous critical care time was provided to this patient by myself absent of any time spent performing procedures.  Patient presents critically ill with acute on chronic  respiratory failure 2/2 copd exacerbation placing the CV, resp, neuro systems at risk requiring the following interventions: supplemental oxygen, ABX, nebulizer therapy, interpretation of labs/ecg/imaging, freq reassessment, coordination of admission with the following response: resolutionof hypoxia.  Patient at high risk of deterioration and possibly death without these interventions.      FINAL IMPRESSION      1. Acute exacerbation of chronic obstructive pulmonary disease (COPD)    2. Acute on chronic respiratory failure with hypoxia    3. History of atrial fibrillation          DISPOSITION/PLAN     ED Disposition       ED Disposition   Decision to Admit    Condition   --    Comment   Level of Care: Telemetry [5]   Diagnosis: COPD exacerbation [862160]   Admitting Physician: HONORIO MAHAJAN [[id number]]   Attending Physician: HONORIO MAHAJAN [[id number]]                   Comment: Please note this report has been produced using speech recognition software.      Anup Mantilla MD  Attending Emergency Physician             Anup Mantilla MD  08/30/23 2619

## 2023-08-25 ENCOUNTER — APPOINTMENT (OUTPATIENT)
Dept: CARDIOLOGY | Facility: HOSPITAL | Age: 78
DRG: 191 | End: 2023-08-25
Payer: MEDICARE

## 2023-08-25 LAB
ALBUMIN SERPL-MCNC: 3.4 G/DL (ref 3.5–5.2)
ALBUMIN/GLOB SERPL: 1.5 G/DL
ALP SERPL-CCNC: 92 U/L (ref 39–117)
ALT SERPL W P-5'-P-CCNC: 11 U/L (ref 1–33)
ANION GAP SERPL CALCULATED.3IONS-SCNC: 5 MMOL/L (ref 5–15)
ANION GAP SERPL CALCULATED.3IONS-SCNC: 6 MMOL/L (ref 5–15)
AST SERPL-CCNC: 18 U/L (ref 1–32)
BASOPHILS # BLD AUTO: 0.04 10*3/MM3 (ref 0–0.2)
BASOPHILS NFR BLD AUTO: 0.5 % (ref 0–1.5)
BH CV ECHO MEAS - AO MAX PG: 28.6 MMHG
BH CV ECHO MEAS - AO MEAN PG: 15.8 MMHG
BH CV ECHO MEAS - AO ROOT DIAM: 3.2 CM
BH CV ECHO MEAS - AO V2 MAX: 267.2 CM/SEC
BH CV ECHO MEAS - AO V2 VTI: 44 CM
BH CV ECHO MEAS - AVA(I,D): 1.2 CM2
BH CV ECHO MEAS - EDV(CUBED): 54.9 ML
BH CV ECHO MEAS - EDV(MOD-SP2): 51.6 ML
BH CV ECHO MEAS - EDV(MOD-SP4): 142 ML
BH CV ECHO MEAS - EF(MOD-SP2): 43.4 %
BH CV ECHO MEAS - EF(MOD-SP4): 57.1 %
BH CV ECHO MEAS - ESV(CUBED): 12.2 ML
BH CV ECHO MEAS - ESV(MOD-SP2): 29.2 ML
BH CV ECHO MEAS - ESV(MOD-SP4): 60.9 ML
BH CV ECHO MEAS - FS: 39.5 %
BH CV ECHO MEAS - IVS/LVPW: 1 CM
BH CV ECHO MEAS - IVSD: 1.1 CM
BH CV ECHO MEAS - LA DIMENSION: 3.1 CM
BH CV ECHO MEAS - LAT PEAK E' VEL: 13.2 CM/SEC
BH CV ECHO MEAS - LV MASS(C)D: 134.7 GRAMS
BH CV ECHO MEAS - LV MAX PG: 4.9 MMHG
BH CV ECHO MEAS - LV MEAN PG: 3 MMHG
BH CV ECHO MEAS - LV V1 MAX: 111 CM/SEC
BH CV ECHO MEAS - LV V1 VTI: 18.6 CM
BH CV ECHO MEAS - LVIDD: 3.8 CM
BH CV ECHO MEAS - LVIDS: 2.3 CM
BH CV ECHO MEAS - LVOT AREA: 2.8 CM2
BH CV ECHO MEAS - LVOT DIAM: 1.9 CM
BH CV ECHO MEAS - LVPWD: 1.1 CM
BH CV ECHO MEAS - MED PEAK E' VEL: 7.9 CM/SEC
BH CV ECHO MEAS - MV A MAX VEL: 94.9 CM/SEC
BH CV ECHO MEAS - MV DEC SLOPE: 713 CM/SEC2
BH CV ECHO MEAS - MV E MAX VEL: 106 CM/SEC
BH CV ECHO MEAS - MV E/A: 1.12
BH CV ECHO MEAS - MV MAX PG: 5.7 MMHG
BH CV ECHO MEAS - MV MEAN PG: 3 MMHG
BH CV ECHO MEAS - MV P1/2T: 54.2 MSEC
BH CV ECHO MEAS - MV V2 VTI: 28.5 CM
BH CV ECHO MEAS - MVA(P1/2T): 4.1 CM2
BH CV ECHO MEAS - MVA(VTI): 1.85 CM2
BH CV ECHO MEAS - PA ACC TIME: 0.13 SEC
BH CV ECHO MEAS - SV(LVOT): 52.7 ML
BH CV ECHO MEAS - SV(MOD-SP2): 22.4 ML
BH CV ECHO MEAS - SV(MOD-SP4): 81.1 ML
BH CV ECHO MEAS - TAPSE (>1.6): 1.69 CM
BH CV ECHO MEASUREMENTS AVERAGE E/E' RATIO: 10.05
BH CV XLRA - RV BASE: 3.7 CM
BH CV XLRA - RV LENGTH: 8 CM
BH CV XLRA - RV MID: 2.9 CM
BH CV XLRA - TDI S': 15.7 CM/SEC
BILIRUB SERPL-MCNC: 0.4 MG/DL (ref 0–1.2)
BUN SERPL-MCNC: 22 MG/DL (ref 8–23)
BUN SERPL-MCNC: 26 MG/DL (ref 8–23)
BUN/CREAT SERPL: 18.1 (ref 7–25)
BUN/CREAT SERPL: 20.8 (ref 7–25)
CALCIUM SPEC-SCNC: 8.6 MG/DL (ref 8.6–10.5)
CALCIUM SPEC-SCNC: 8.7 MG/DL (ref 8.6–10.5)
CHLORIDE SERPL-SCNC: 105 MMOL/L (ref 98–107)
CHLORIDE SERPL-SCNC: 106 MMOL/L (ref 98–107)
CO2 SERPL-SCNC: 33 MMOL/L (ref 22–29)
CO2 SERPL-SCNC: 35 MMOL/L (ref 22–29)
CREAT SERPL-MCNC: 1.06 MG/DL (ref 0.57–1)
CREAT SERPL-MCNC: 1.44 MG/DL (ref 0.57–1)
DEPRECATED RDW RBC AUTO: 61.3 FL (ref 37–54)
EGFRCR SERPLBLD CKD-EPI 2021: 38 ML/MIN/1.73
EGFRCR SERPLBLD CKD-EPI 2021: 54.9 ML/MIN/1.73
EOSINOPHIL # BLD AUTO: 0.27 10*3/MM3 (ref 0–0.4)
EOSINOPHIL NFR BLD AUTO: 3.6 % (ref 0.3–6.2)
ERYTHROCYTE [DISTWIDTH] IN BLOOD BY AUTOMATED COUNT: 15.7 % (ref 12.3–15.4)
GLOBULIN UR ELPH-MCNC: 2.2 GM/DL
GLUCOSE BLDC GLUCOMTR-MCNC: 264 MG/DL (ref 70–130)
GLUCOSE SERPL-MCNC: 113 MG/DL (ref 65–99)
GLUCOSE SERPL-MCNC: 292 MG/DL (ref 65–99)
HCT VFR BLD AUTO: 43.6 % (ref 34–46.6)
HGB BLD-MCNC: 12.5 G/DL (ref 12–15.9)
IMM GRANULOCYTES # BLD AUTO: 0.03 10*3/MM3 (ref 0–0.05)
IMM GRANULOCYTES NFR BLD AUTO: 0.4 % (ref 0–0.5)
LYMPHOCYTES # BLD AUTO: 1.76 10*3/MM3 (ref 0.7–3.1)
LYMPHOCYTES NFR BLD AUTO: 23.5 % (ref 19.6–45.3)
MCH RBC QN AUTO: 30.3 PG (ref 26.6–33)
MCHC RBC AUTO-ENTMCNC: 28.7 G/DL (ref 31.5–35.7)
MCV RBC AUTO: 105.6 FL (ref 79–97)
MONOCYTES # BLD AUTO: 0.7 10*3/MM3 (ref 0.1–0.9)
MONOCYTES NFR BLD AUTO: 9.4 % (ref 5–12)
NEUTROPHILS NFR BLD AUTO: 4.68 10*3/MM3 (ref 1.7–7)
NEUTROPHILS NFR BLD AUTO: 62.6 % (ref 42.7–76)
NRBC BLD AUTO-RTO: 0 /100 WBC (ref 0–0.2)
PLATELET # BLD AUTO: 200 10*3/MM3 (ref 140–450)
PMV BLD AUTO: 10.1 FL (ref 6–12)
POTASSIUM SERPL-SCNC: 5 MMOL/L (ref 3.5–5.2)
POTASSIUM SERPL-SCNC: 5.4 MMOL/L (ref 3.5–5.2)
PROT SERPL-MCNC: 5.6 G/DL (ref 6–8.5)
RBC # BLD AUTO: 4.13 10*6/MM3 (ref 3.77–5.28)
SODIUM SERPL-SCNC: 144 MMOL/L (ref 136–145)
SODIUM SERPL-SCNC: 146 MMOL/L (ref 136–145)
WBC NRBC COR # BLD: 7.48 10*3/MM3 (ref 3.4–10.8)

## 2023-08-25 PROCEDURE — 25010000002 HEPARIN (PORCINE) PER 1000 UNITS: Performed by: NURSE PRACTITIONER

## 2023-08-25 PROCEDURE — 93306 TTE W/DOPPLER COMPLETE: CPT | Performed by: INTERNAL MEDICINE

## 2023-08-25 PROCEDURE — 93306 TTE W/DOPPLER COMPLETE: CPT

## 2023-08-25 PROCEDURE — G0378 HOSPITAL OBSERVATION PER HR: HCPCS

## 2023-08-25 PROCEDURE — 63710000001 PREDNISONE PER 1 MG: Performed by: NURSE PRACTITIONER

## 2023-08-25 PROCEDURE — 99231 SBSQ HOSP IP/OBS SF/LOW 25: CPT | Performed by: NURSE PRACTITIONER

## 2023-08-25 PROCEDURE — 82948 REAGENT STRIP/BLOOD GLUCOSE: CPT

## 2023-08-25 PROCEDURE — 80053 COMPREHEN METABOLIC PANEL: CPT | Performed by: INTERNAL MEDICINE

## 2023-08-25 PROCEDURE — 94799 UNLISTED PULMONARY SVC/PX: CPT

## 2023-08-25 PROCEDURE — 25010000002 SULFUR HEXAFLUORIDE MICROSPH 60.7-25 MG RECONSTITUTED SUSPENSION: Performed by: INTERNAL MEDICINE

## 2023-08-25 PROCEDURE — 85025 COMPLETE CBC W/AUTO DIFF WBC: CPT | Performed by: NURSE PRACTITIONER

## 2023-08-25 PROCEDURE — 94761 N-INVAS EAR/PLS OXIMETRY MLT: CPT

## 2023-08-25 PROCEDURE — 94664 DEMO&/EVAL PT USE INHALER: CPT

## 2023-08-25 RX ORDER — CYCLOBENZAPRINE HCL 10 MG
5 TABLET ORAL EVERY 8 HOURS PRN
Status: DISCONTINUED | OUTPATIENT
Start: 2023-08-25 | End: 2023-08-31 | Stop reason: HOSPADM

## 2023-08-25 RX ORDER — TRAZODONE HYDROCHLORIDE 100 MG/1
100 TABLET ORAL NIGHTLY
COMMUNITY

## 2023-08-25 RX ORDER — LORATADINE 10 MG/1
10 TABLET ORAL DAILY
COMMUNITY

## 2023-08-25 RX ORDER — ALBUTEROL SULFATE 90 UG/1
2 AEROSOL, METERED RESPIRATORY (INHALATION) EVERY 4 HOURS PRN
COMMUNITY

## 2023-08-25 RX ORDER — ATORVASTATIN CALCIUM 80 MG/1
80 TABLET, FILM COATED ORAL NIGHTLY
COMMUNITY

## 2023-08-25 RX ORDER — CYCLOBENZAPRINE HCL 5 MG
5 TABLET ORAL 3 TIMES DAILY PRN
COMMUNITY

## 2023-08-25 RX ORDER — GABAPENTIN 300 MG/1
300 CAPSULE ORAL NIGHTLY
COMMUNITY

## 2023-08-25 RX ORDER — ATORVASTATIN CALCIUM 40 MG/1
80 TABLET, FILM COATED ORAL NIGHTLY
Status: DISCONTINUED | OUTPATIENT
Start: 2023-08-25 | End: 2023-08-31 | Stop reason: HOSPADM

## 2023-08-25 RX ORDER — GABAPENTIN 300 MG/1
300 CAPSULE ORAL NIGHTLY
Status: DISCONTINUED | OUTPATIENT
Start: 2023-08-25 | End: 2023-08-31 | Stop reason: HOSPADM

## 2023-08-25 RX ADMIN — Medication 10 ML: at 09:50

## 2023-08-25 RX ADMIN — GABAPENTIN 300 MG: 300 CAPSULE ORAL at 22:28

## 2023-08-25 RX ADMIN — APIXABAN 5 MG: 5 TABLET, FILM COATED ORAL at 21:08

## 2023-08-25 RX ADMIN — SODIUM CHLORIDE, POTASSIUM CHLORIDE, SODIUM LACTATE AND CALCIUM CHLORIDE 75 ML/HR: 600; 310; 30; 20 INJECTION, SOLUTION INTRAVENOUS at 00:31

## 2023-08-25 RX ADMIN — CYCLOBENZAPRINE 5 MG: 10 TABLET, FILM COATED ORAL at 22:28

## 2023-08-25 RX ADMIN — IPRATROPIUM BROMIDE AND ALBUTEROL SULFATE 3 ML: 2.5; .5 SOLUTION RESPIRATORY (INHALATION) at 13:07

## 2023-08-25 RX ADMIN — ATORVASTATIN CALCIUM 80 MG: 40 TABLET, FILM COATED ORAL at 21:07

## 2023-08-25 RX ADMIN — SODIUM CHLORIDE, POTASSIUM CHLORIDE, SODIUM LACTATE AND CALCIUM CHLORIDE 75 ML/HR: 600; 310; 30; 20 INJECTION, SOLUTION INTRAVENOUS at 14:36

## 2023-08-25 RX ADMIN — SENNOSIDES AND DOCUSATE SODIUM 2 TABLET: 8.6; 5 TABLET ORAL at 21:08

## 2023-08-25 RX ADMIN — IPRATROPIUM BROMIDE AND ALBUTEROL SULFATE 3 ML: 2.5; .5 SOLUTION RESPIRATORY (INHALATION) at 17:03

## 2023-08-25 RX ADMIN — HEPARIN SODIUM 5000 UNITS: 5000 INJECTION INTRAVENOUS; SUBCUTANEOUS at 09:49

## 2023-08-25 RX ADMIN — DOXYCYCLINE 100 MG: 100 CAPSULE ORAL at 09:49

## 2023-08-25 RX ADMIN — SULFUR HEXAFLUORIDE 3 ML: KIT at 11:03

## 2023-08-25 RX ADMIN — IPRATROPIUM BROMIDE AND ALBUTEROL SULFATE 3 ML: 2.5; .5 SOLUTION RESPIRATORY (INHALATION) at 08:18

## 2023-08-25 RX ADMIN — DOXYCYCLINE 100 MG: 100 CAPSULE ORAL at 21:07

## 2023-08-25 RX ADMIN — IPRATROPIUM BROMIDE AND ALBUTEROL SULFATE 3 ML: 2.5; .5 SOLUTION RESPIRATORY (INHALATION) at 20:20

## 2023-08-25 RX ADMIN — SENNOSIDES AND DOCUSATE SODIUM 2 TABLET: 8.6; 5 TABLET ORAL at 09:49

## 2023-08-25 RX ADMIN — Medication 10 ML: at 21:08

## 2023-08-25 RX ADMIN — PREDNISONE 40 MG: 20 TABLET ORAL at 09:49

## 2023-08-25 NOTE — PLAN OF CARE
Goal Outcome Evaluation:              Outcome Evaluation: pt on 4L NC. NSR on tele. alert and oriented X4. No acute changes this shift.

## 2023-08-25 NOTE — CASE MANAGEMENT/SOCIAL WORK
Discharge Planning Assessment  [facility]     Patient Name: Tessa Bradley  MRN: [id number]  Today's Date: 8/25/2023    Admit Date: 8/24/2023    Plan: home   Discharge Needs Assessment       Row Name 08/25/23 1346       Living Environment    People in Home spouse    Name(s) of People in Home spouse Rianna [phone]    Current Living Arrangements home    Potentially Unsafe Housing Conditions none    Primary Care Provided by self    Provides Primary Care For no one    Family Caregiver if Needed spouse    Quality of Family Relationships helpful;involved;supportive    Able to Return to Prior Arrangements yes    Living Arrangement Comments plan home       Resource/Environmental Concerns    Transportation Concerns none       Transition Planning    Patient/Family Anticipates Transition to home with family    Patient/Family Anticipated Services at Transition none    Transportation Anticipated family or friend will provide       Discharge Needs Assessment    Equipment Currently Used at Home cane, straight;walker, rolling    Concerns to be Addressed no discharge needs identified    Anticipated Changes Related to Illness none    Equipment Needed After Discharge cane, straight;walker, rolling    Discharge Coordination/Progress plan is home                   Discharge Plan       Row Name 08/25/23 0945       Plan    Plan home    Patient/Family in Agreement with Plan yes    Plan Comments I was unable to talk with patient so I called her spouse Rianna to complete IDP. Patient lives with Rianna in [place]. Independent, uses a straight cane and RW when needed. No home 02 but on 4L here. No HH or OP PT. Plan is home at time of discharge and has transportation.    Final Discharge Disposition Code 01 - home or self-care                  Continued Care and Services - Admitted Since 8/24/2023    Coordination has not been started for this encounter.       Expected Discharge Date and Time       Expected Discharge Date Expected  Discharge Time    Aug 28, 2023            Demographic Summary       Row Name 08/25/23 1344       General Information    Admission Type observation    Referral Source admission list    Preferred Language English       Contact Information    Permission Granted to Share Info With     Contact Information Obtained for     Contact Information Comments PCP: Sunni Santoro, confirmed                   Functional Status       Row Name 08/25/23 1345       Functional Status    Usual Activity Tolerance moderate    Current Activity Tolerance fair       Functional Status, IADL    Medications independent    Meal Preparation assistive person    Housekeeping assistive person    Laundry assistive person    Shopping assistive person    IADL Comments has coverage for meds       Employment/    Employment Status retired                   Psychosocial    No documentation.                  Abuse/Neglect    No documentation.                  Legal    No documentation.                  Substance Abuse    No documentation.                  Patient Forms    No documentation.                     Isabell Amaya RN

## 2023-08-25 NOTE — PROGRESS NOTES
[facility] Medicine Services  PROGRESS NOTE    Patient Name: Tessa Bradley  : 1945  MRN: [id number]    Date of Admission: 2023  Length of Stay: 0  Primary Care Physician: Sunni Santoro MD    Subjective   Subjective     CC:  Shortness of breath    HPI:  Pt states shortness of breath is much better today. Pt reports hx of Afib that resulted 2 years ago after a procedure. She states she was cardioverted twice but still has PAF. She is on elqiuis BID.     Review of Systems  Gen- No fevers, chills  CV- No chest pain, palpitations  Resp- (+) cough, (+) dyspnea with exertion but improved.   GI- No N/V/D, abd pain      Objective   Objective     Vital Signs:   Temp:  [97.9 °F (36.6 °C)-98.8 °F (37.1 °C)] 98.8 °F (37.1 °C)  Heart Rate:  [] 97  Resp:  [16-32] 18  BP: ()/(46-67) 115/50        Physical Exam:  Constitutional: Awake, alert, NAD  HENT: NCAT, mucous membranes moist  Respiratory: scattered wheezing, nonlabored   Cardiovascular: tachy, no murmurs, rubs, or gallops  Gastrointestinal: Positive bowel sounds, soft, nontender, nondistended  Musculoskeletal: No bilateral ankle edema  Psychiatric: Appropriate affect, cooperative  Neurologic: Oriented x 3, strength symmetric in all extremities, Cranial Nerves grossly intact to confrontation, speech clear  Skin: No rashes      Results Reviewed:    Results from last 7 days   Lab Units 23  03423  1650   WBC 10*3/mm3 7.48 7.76   HEMOGLOBIN g/dL 12.5 13.6   HEMATOCRIT % 43.6 47.7*   PLATELETS 10*3/mm3 200 229   PROCALCITONIN ng/mL  --  0.06     Results from last 7 days   Lab Units 23  03423  1650   SODIUM mmol/L 146*  --   --  138   POTASSIUM mmol/L 5.0  --   --  4.5   CHLORIDE mmol/L 106  --   --  100   CO2 mmol/L 35.0*  --   --  28.0   BUN mg/dL 26*  --   --  24*   CREATININE mg/dL 1.44*  --   --  1.38*   GLUCOSE mg/dL 113*  --   --  168*   CALCIUM mg/dL 8.6  --    --  9.1   ALT (SGPT) U/L 11  --   --  10   AST (SGOT) U/L 18  --   --  16   HSTROP T ng/L  --  24* 22* 24*   PROBNP pg/mL  --   --   --  8,005.0*     Estimated Creatinine Clearance: 32.7 mL/min (A) (by C-G formula based on SCr of 1.44 mg/dL (H)).    Microbiology Results Abnormal       Procedure Component Value - Date/Time    Respiratory Panel PCR w/COVID-19(SARS-CoV-2) PENNY/CLEVE/ALICE/PAD/COR/MAD/ELYSSA In-House, NP Swab in UTM/VTM, 3-4 HR TAT - Swab, Nasopharynx [[id number]]  (Normal) Collected: 08/24/23 2028    Lab Status: Final result Specimen: Swab from Nasopharynx Updated: 08/24/23 2127     ADENOVIRUS, PCR Not Detected     Coronavirus 229E Not Detected     Coronavirus HKU1 Not Detected     Coronavirus NL63 Not Detected     Coronavirus OC43 Not Detected     COVID19 Not Detected     Human Metapneumovirus Not Detected     Human Rhinovirus/Enterovirus Not Detected     Influenza A PCR Not Detected     Influenza B PCR Not Detected     Parainfluenza Virus 1 Not Detected     Parainfluenza Virus 2 Not Detected     Parainfluenza Virus 3 Not Detected     Parainfluenza Virus 4 Not Detected     RSV, PCR Not Detected     Bordetella pertussis pcr Not Detected     Bordetella parapertussis PCR Not Detected     Chlamydophila pneumoniae PCR Not Detected     Mycoplasma pneumo by PCR Not Detected    Narrative:      In the setting of a positive respiratory panel with a viral infection PLUS a negative procalcitonin without other underlying concern for bacterial infection, consider observing off antibiotics or discontinuation of antibiotics and continue supportive care. If the respiratory panel is positive for atypical bacterial infection (Bordetella pertussis, Chlamydophila pneumoniae, or Mycoplasma pneumoniae), consider antibiotic de-escalation to target atypical bacterial infection.            Imaging Results (Last 24 Hours)       Procedure Component Value Units Date/Time    XR Chest 1 View [[id number]] Collected: 08/24/23 1728     Updated:  08/24/23 1732    Narrative:      XR CHEST 1 VW    Date of Exam: 8/24/2023 4:55 PM EDT    Indication: SOA triage protocol    Comparison: CT chest without contrast 2/19/2018    Findings:  Heart size top normal. Lungs are without consolidation. No pneumothorax or pleural effusion. Osseous structures intact.      Impression:      Impression:  No acute process.      Electronically Signed: Jose G Martinez MD    8/24/2023 5:29 PM EDT    Workstation ID: [id number]            Results for orders placed during the hospital encounter of 08/24/23    Adult Transthoracic Echo Complete W/ Cont if Necessary Per Protocol    Interpretation Summary    Left ventricular systolic function is normal. Left ventricular ejection fraction appears to be 56 - 60%.    Left ventricular diastolic function was normal.    Mild aortic valve stenosis is present. Aortic valve area is 1.2 cm2.    Peak velocity of the flow distal to the aortic valve is 267.2 cm/s. Aortic valve mean pressure gradient is 16 mmHg.      I have reviewed the medications:  Scheduled Meds:dexAMETHasone, 10 mg, Intravenous, Once  doxycycline, 100 mg, Oral, Q12H  heparin (porcine), 5,000 Units, Subcutaneous, Q12H  ipratropium-albuterol, 3 mL, Nebulization, 4x Daily - RT  predniSONE, 40 mg, Oral, Daily With Breakfast  senna-docusate sodium, 2 tablet, Oral, BID  sodium chloride, 10 mL, Intravenous, Q12H      Continuous Infusions:lactated ringers, 75 mL/hr, Last Rate: 75 mL/hr (08/25/23 1436)      PRN Meds:.  acetaminophen    benzonatate    senna-docusate sodium **AND** polyethylene glycol **AND** bisacodyl **AND** bisacodyl    Calcium Replacement - Follow Nurse / BPA Driven Protocol    Hydrocod Juancarlos-Chlorphe Juancarlos ER    ipratropium-albuterol    Magnesium Standard Dose Replacement - Follow Nurse / BPA Driven Protocol    ondansetron    Phosphorus Replacement - Follow Nurse / BPA Driven Protocol    Potassium Replacement - Follow Nurse / BPA Driven Protocol    sodium chloride    sodium  chloride    sodium chloride      Assessment & Plan   Assessment / Plan     Active Hospital Problems    Diagnosis  POA    **COPD exacerbation [J44.1]  Yes    Elevated serum creatinine [R79.89]  Yes    Hypotension [I95.9]  Yes    Dyspnea [R06.00]  Yes      Resolved Hospital Problems   No resolved problems to display.        Brief Hospital Course to date:  Tessa Bradley is a 77 y.o. female HTN, HLD, Afib (eliquis), COPD presenting with dyspnea and hypoxia noted at PCP office. Pt refused to come to the hospital at the time but presented later after family brought her wearing 4L NC. Pt received doxy/steroid therapy      Plan:  COPD exacerbation  Acute hypoxic RF  Dyspnea  - Resp PCR neg  - doxy,prednisone  - negs  - D-dimer neg    Elevated Pro BNP  Elevated trop   - ECHO LV 56-60%, diastolic function normal. Mild aortic valve stenosis  - Elevated trop, neg delta: EKG ST with PAC, right axis dev, pulmonary disease pattern. T wave abn, consider anterolateral ischemia. Pt denies chest pain    Lactic acidosis  Dehydration  HEATHER  - IV fluid resuscitation   - avoid nephrotoxic meds    HTN/HLD  - atorvastatin, BP stable off meds    DVT Prophylaxis:  SQH    Disposition: I expect the patient to be discharged 08/28/2023.    CODE STATUS:   Code Status and Medical Interventions:   Ordered at: 08/24/23 1950     Code Status (Patient has no pulse and is not breathing):    CPR (Attempt to Resuscitate)     Medical Interventions (Patient has pulse or is breathing):    Full Support         Electronically signed by ALIZA Maxwell, 08/25/23, 16:15 EDT.

## 2023-08-26 LAB
ANION GAP SERPL CALCULATED.3IONS-SCNC: 4 MMOL/L (ref 5–15)
BASOPHILS # BLD AUTO: 0.02 10*3/MM3 (ref 0–0.2)
BASOPHILS NFR BLD AUTO: 0.3 % (ref 0–1.5)
BUN SERPL-MCNC: 20 MG/DL (ref 8–23)
BUN/CREAT SERPL: 21.7 (ref 7–25)
CALCIUM SPEC-SCNC: 8.8 MG/DL (ref 8.6–10.5)
CHLORIDE SERPL-SCNC: 106 MMOL/L (ref 98–107)
CO2 SERPL-SCNC: 33 MMOL/L (ref 22–29)
CREAT SERPL-MCNC: 0.92 MG/DL (ref 0.57–1)
DEPRECATED RDW RBC AUTO: 58.4 FL (ref 37–54)
EGFRCR SERPLBLD CKD-EPI 2021: 65.1 ML/MIN/1.73
EOSINOPHIL # BLD AUTO: 0.03 10*3/MM3 (ref 0–0.4)
EOSINOPHIL NFR BLD AUTO: 0.4 % (ref 0.3–6.2)
ERYTHROCYTE [DISTWIDTH] IN BLOOD BY AUTOMATED COUNT: 15 % (ref 12.3–15.4)
GLUCOSE SERPL-MCNC: 94 MG/DL (ref 65–99)
HCT VFR BLD AUTO: 41 % (ref 34–46.6)
HGB BLD-MCNC: 11.8 G/DL (ref 12–15.9)
IMM GRANULOCYTES # BLD AUTO: 0.03 10*3/MM3 (ref 0–0.05)
IMM GRANULOCYTES NFR BLD AUTO: 0.4 % (ref 0–0.5)
LYMPHOCYTES # BLD AUTO: 1.36 10*3/MM3 (ref 0.7–3.1)
LYMPHOCYTES NFR BLD AUTO: 19.8 % (ref 19.6–45.3)
MCH RBC QN AUTO: 30.1 PG (ref 26.6–33)
MCHC RBC AUTO-ENTMCNC: 28.8 G/DL (ref 31.5–35.7)
MCV RBC AUTO: 104.6 FL (ref 79–97)
MONOCYTES # BLD AUTO: 0.59 10*3/MM3 (ref 0.1–0.9)
MONOCYTES NFR BLD AUTO: 8.6 % (ref 5–12)
NEUTROPHILS NFR BLD AUTO: 4.84 10*3/MM3 (ref 1.7–7)
NEUTROPHILS NFR BLD AUTO: 70.5 % (ref 42.7–76)
NRBC BLD AUTO-RTO: 0 /100 WBC (ref 0–0.2)
PLATELET # BLD AUTO: 176 10*3/MM3 (ref 140–450)
PMV BLD AUTO: 9.9 FL (ref 6–12)
POTASSIUM SERPL-SCNC: 4.8 MMOL/L (ref 3.5–5.2)
RBC # BLD AUTO: 3.92 10*6/MM3 (ref 3.77–5.28)
SODIUM SERPL-SCNC: 143 MMOL/L (ref 136–145)
WBC NRBC COR # BLD: 6.87 10*3/MM3 (ref 3.4–10.8)

## 2023-08-26 PROCEDURE — 85025 COMPLETE CBC W/AUTO DIFF WBC: CPT | Performed by: NURSE PRACTITIONER

## 2023-08-26 PROCEDURE — 94664 DEMO&/EVAL PT USE INHALER: CPT

## 2023-08-26 PROCEDURE — 63710000001 PREDNISONE PER 1 MG: Performed by: NURSE PRACTITIONER

## 2023-08-26 PROCEDURE — 94799 UNLISTED PULMONARY SVC/PX: CPT

## 2023-08-26 PROCEDURE — G0378 HOSPITAL OBSERVATION PER HR: HCPCS

## 2023-08-26 PROCEDURE — 94761 N-INVAS EAR/PLS OXIMETRY MLT: CPT

## 2023-08-26 PROCEDURE — 99233 SBSQ HOSP IP/OBS HIGH 50: CPT | Performed by: PHYSICIAN ASSISTANT

## 2023-08-26 PROCEDURE — 80048 BASIC METABOLIC PNL TOTAL CA: CPT | Performed by: NURSE PRACTITIONER

## 2023-08-26 RX ORDER — FLUTICASONE PROPIONATE 50 MCG
2 SPRAY, SUSPENSION (ML) NASAL DAILY
Status: DISCONTINUED | OUTPATIENT
Start: 2023-08-26 | End: 2023-08-31 | Stop reason: HOSPADM

## 2023-08-26 RX ORDER — GUAIFENESIN 600 MG/1
600 TABLET, EXTENDED RELEASE ORAL EVERY 12 HOURS SCHEDULED
Status: DISCONTINUED | OUTPATIENT
Start: 2023-08-26 | End: 2023-08-28

## 2023-08-26 RX ADMIN — Medication 10 ML: at 08:44

## 2023-08-26 RX ADMIN — SENNOSIDES AND DOCUSATE SODIUM 2 TABLET: 8.6; 5 TABLET ORAL at 21:49

## 2023-08-26 RX ADMIN — SENNOSIDES AND DOCUSATE SODIUM 2 TABLET: 8.6; 5 TABLET ORAL at 08:43

## 2023-08-26 RX ADMIN — BENZONATATE 100 MG: 100 CAPSULE ORAL at 23:41

## 2023-08-26 RX ADMIN — IPRATROPIUM BROMIDE AND ALBUTEROL SULFATE 3 ML: 2.5; .5 SOLUTION RESPIRATORY (INHALATION) at 16:55

## 2023-08-26 RX ADMIN — APIXABAN 5 MG: 5 TABLET, FILM COATED ORAL at 21:49

## 2023-08-26 RX ADMIN — IPRATROPIUM BROMIDE AND ALBUTEROL SULFATE 3 ML: 2.5; .5 SOLUTION RESPIRATORY (INHALATION) at 12:21

## 2023-08-26 RX ADMIN — GUAIFENESIN 600 MG: 600 TABLET, EXTENDED RELEASE ORAL at 11:10

## 2023-08-26 RX ADMIN — IPRATROPIUM BROMIDE AND ALBUTEROL SULFATE 3 ML: 2.5; .5 SOLUTION RESPIRATORY (INHALATION) at 20:14

## 2023-08-26 RX ADMIN — DOXYCYCLINE 100 MG: 100 CAPSULE ORAL at 21:49

## 2023-08-26 RX ADMIN — ATORVASTATIN CALCIUM 80 MG: 40 TABLET, FILM COATED ORAL at 21:49

## 2023-08-26 RX ADMIN — APIXABAN 5 MG: 5 TABLET, FILM COATED ORAL at 08:43

## 2023-08-26 RX ADMIN — IPRATROPIUM BROMIDE AND ALBUTEROL SULFATE 3 ML: 2.5; .5 SOLUTION RESPIRATORY (INHALATION) at 08:10

## 2023-08-26 RX ADMIN — DOXYCYCLINE 100 MG: 100 CAPSULE ORAL at 08:43

## 2023-08-26 RX ADMIN — GUAIFENESIN 600 MG: 600 TABLET, EXTENDED RELEASE ORAL at 21:49

## 2023-08-26 RX ADMIN — FLUTICASONE PROPIONATE 2 SPRAY: 50 SPRAY, METERED NASAL at 12:17

## 2023-08-26 RX ADMIN — Medication 10 ML: at 21:49

## 2023-08-26 RX ADMIN — GABAPENTIN 300 MG: 300 CAPSULE ORAL at 21:49

## 2023-08-26 RX ADMIN — PREDNISONE 40 MG: 20 TABLET ORAL at 08:43

## 2023-08-26 NOTE — PLAN OF CARE
Goal Outcome Evaluation:  Plan of Care Reviewed With: patient        Progress: no change  Outcome Evaluation: Patient remains on 4L NC. SOA with activity. Patient noted to desaturate to 78-79% with ambulation while on 4L NC. Patient able to recover to over 90% quickly at rest. Patient alert and oriented this shift. Forgetful at times. Patient c/o back pain and cramping in hands and legs. States she take neurotin and flexaril. Patient requested home dose of neurotin, flexaril and trazadone.Education on sedation medications provided. APRN notified of request. Flexaril and neurotin. ordered. medication given and patient able to rest.

## 2023-08-26 NOTE — PROGRESS NOTES
[facility] Medicine Services  PROGRESS NOTE    Patient Name: Tessa Bradley  : 1945  MRN: [id number]    Date of Admission: 2023  Length of Stay: 0  Primary Care Physician: Sunni Santoro MD    Subjective   Subjective     CC:  Shortness of breath    HPI:  Pt states shortness of breath about the same as yesterday, some product white sputum, some pleuritic chest discomfort with cough. Nebs seem to be helping.   Good UOP, no NV, reports good tolerance of dite    Review of Systems  Gen- No fevers, chills  CV- No chest pain, palpitations  Resp- (+) cough, (+) dyspnea with exertion   GI- No N/V/D, abd pain      Objective   Objective     Vital Signs:   Temp:  [97 °F (36.1 °C)-98.8 °F (37.1 °C)] 97.7 °F (36.5 °C)  Heart Rate:  [] 85  Resp:  [16-20] 20  BP: (107-131)/(50-65) 129/65        Physical Exam:  Constitutional: Awake, alert, NAD  HENT: NCAT, mucous membranes moist  Respiratory: scattered expiratory wheezing, nonlabored ,satting in mid 90s on 4L  Cardiovascular:RRR no murmurs, rubs, or gallops  Gastrointestinal: Positive bowel sounds, soft, nontender, nondistended  Musculoskeletal: No bilateral ankle edema  Psychiatric: Appropriate affect, cooperative  Neurologic: Oriented x 3, strength symmetric in all extremities, Cranial Nerves grossly intact to confrontation, speech clear  Skin: No rashes      Results Reviewed:    Results from last 7 days   Lab Units 23  04223  1650   WBC 10*3/mm3 6.87 7.48 7.76   HEMOGLOBIN g/dL 11.8* 12.5 13.6   HEMATOCRIT % 41.0 43.6 47.7*   PLATELETS 10*3/mm3 176 200 229   PROCALCITONIN ng/mL  --   --  0.06       Results from last 7 days   Lab Units 23  0421 23  1742 23  0342 239 23  1650   SODIUM mmol/L 143 144 146*  --   --  138   POTASSIUM mmol/L 4.8 5.4* 5.0  --   --  4.5   CHLORIDE mmol/L 106 105 106  --   --  100   CO2 mmol/L 33.0* 33.0* 35.0*  --   --  28.0    BUN mg/dL 20 22 26*  --   --  24*   CREATININE mg/dL 0.92 1.06* 1.44*  --   --  1.38*   GLUCOSE mg/dL 94 292* 113*  --   --  168*   CALCIUM mg/dL 8.8 8.7 8.6  --   --  9.1   ALT (SGPT) U/L  --   --  11  --   --  10   AST (SGOT) U/L  --   --  18  --   --  16   HSTROP T ng/L  --   --   --  24* 22* 24*   PROBNP pg/mL  --   --   --   --   --  8,005.0*       Estimated Creatinine Clearance: 51.1 mL/min (by C-G formula based on SCr of 0.92 mg/dL).    Microbiology Results Abnormal       Procedure Component Value - Date/Time    Blood Culture - Blood, Arm, Right [[id number]]  (Normal) Collected: 08/24/23 1645    Lab Status: Preliminary result Specimen: Blood from Arm, Right Updated: 08/25/23 1715     Blood Culture No growth at 24 hours    Blood Culture - Blood, Arm, Left [[id number]]  (Normal) Collected: 08/24/23 1645    Lab Status: Preliminary result Specimen: Blood from Arm, Left Updated: 08/25/23 1715     Blood Culture No growth at 24 hours    Respiratory Panel PCR w/COVID-19(SARS-CoV-2) [facility]ENNY/CLEVE/ALICE/PAD/COR/MAD/ELYSSA In-House, NP Swab in UTM/VTM, 3-4 HR TAT - Swab, Nasopharynx [[id number]]  (Normal) Collected: 08/24/23 2028    Lab Status: Final result Specimen: Swab from Nasopharynx Updated: 08/24/23 2127     ADENOVIRUS, PCR Not Detected     Coronavirus 229E Not Detected     Coronavirus HKU1 Not Detected     Coronavirus NL63 Not Detected     Coronavirus OC43 Not Detected     COVID19 Not Detected     Human Metapneumovirus Not Detected     Human Rhinovirus/Enterovirus Not Detected     Influenza A PCR Not Detected     Influenza B PCR Not Detected     Parainfluenza Virus 1 Not Detected     Parainfluenza Virus 2 Not Detected     Parainfluenza Virus 3 Not Detected     Parainfluenza Virus 4 Not Detected     RSV, PCR Not Detected     Bordetella pertussis pcr Not Detected     Bordetella parapertussis PCR Not Detected     Chlamydophila pneumoniae PCR Not Detected     Mycoplasma pneumo by PCR Not Detected    Narrative:      In the  setting of a positive respiratory panel with a viral infection PLUS a negative procalcitonin without other underlying concern for bacterial infection, consider observing off antibiotics or discontinuation of antibiotics and continue supportive care. If the respiratory panel is positive for atypical bacterial infection (Bordetella pertussis, Chlamydophila pneumoniae, or Mycoplasma pneumoniae), consider antibiotic de-escalation to target atypical bacterial infection.            Imaging Results (Last 24 Hours)       ** No results found for the last 24 hours. **            Results for orders placed during the hospital encounter of 08/24/23    Adult Transthoracic Echo Complete W/ Cont if Necessary Per Protocol    Interpretation Summary    Left ventricular systolic function is normal. Left ventricular ejection fraction appears to be 56 - 60%.    Left ventricular diastolic function was normal.    Mild aortic valve stenosis is present. Aortic valve area is 1.2 cm2.    Peak velocity of the flow distal to the aortic valve is 267.2 cm/s. Aortic valve mean pressure gradient is 16 mmHg.      I have reviewed the medications:  Scheduled Meds:apixaban, 5 mg, Oral, Q12H  atorvastatin, 80 mg, Oral, Nightly  dexAMETHasone, 10 mg, Intravenous, Once  doxycycline, 100 mg, Oral, Q12H  gabapentin, 300 mg, Oral, Nightly  ipratropium-albuterol, 3 mL, Nebulization, 4x Daily - RT  predniSONE, 40 mg, Oral, Daily With Breakfast  senna-docusate sodium, 2 tablet, Oral, BID  sodium chloride, 10 mL, Intravenous, Q12H      Continuous Infusions:     PRN Meds:.  acetaminophen    benzonatate    senna-docusate sodium **AND** polyethylene glycol **AND** bisacodyl **AND** bisacodyl    Calcium Replacement - Follow Nurse / BPA Driven Protocol    cyclobenzaprine    Hydrocod Juancarlos-Chlorphe Juancarlos ER    ipratropium-albuterol    Magnesium Standard Dose Replacement - Follow Nurse / BPA Driven Protocol    ondansetron    Phosphorus Replacement - Follow Nurse / BPA  Driven Protocol    Potassium Replacement - Follow Nurse / BPA Driven Protocol    sodium chloride    sodium chloride    sodium chloride      Assessment & Plan   Assessment / Plan     Active Hospital Problems    Diagnosis  POA    **COPD exacerbation [J44.1]  Yes    Elevated serum creatinine [R79.89]  Yes    Hypotension [I95.9]  Yes    Dyspnea [R06.00]  Yes      Resolved Hospital Problems   No resolved problems to display.        Brief Hospital Course to date:  Tessa Bradley is a 77 y.o. 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 Pt refused to come to the hospital at the time but presented later after family brought her wearing 4L NC. Pt received doxy/steroid therapy      Plan:  COPD exacerbation  Acute hypoxic RF  Dyspnea  - Resp PCR neg  - doxy BID,prednisone 40mg daily x 5 starting8/25; duonebs QID/prn, mucinex  - D-dimer neg  -NC O2 on 4L satting in 90s, wean as tolerates  -normal procal. BCX x 2 NGTD  -CXR -no acute process  -start incentive spirometry    Ongoing tobacco use  -rec smoke cessation     Elevated Pro BNP  Elevated trop   - ECHO LV 56-60%, diastolic function normal. Mild aortic valve stenosis  - Elevated trop, neg delta: EKG ST with PAC, right axis dev, pulmonary disease pattern. T wave abn, consider anterolateral ischemia. Pt denies chest pain    Lactic acidosis  Dehydration  HEATHER  - IV fluid resuscitation   - avoid nephrotoxic meds    HTN/HLD  - atorvastatin, BP stable off meds    DVT Prophylaxis:  SQH    Disposition: I expect the patient to be discharged 08/28/2023.    CODE STATUS:   Code Status and Medical Interventions:   Ordered at: 08/24/23 1950     Code Status (Patient has no pulse and is not  breathing):    CPR (Attempt to Resuscitate)     Medical Interventions (Patient has pulse or is breathing):    Full Support         Electronically signed by Casie M Mayne, PA-C, 08/26/23, 08:53 EDT.

## 2023-08-26 NOTE — PLAN OF CARE
Goal Outcome Evaluation:              Outcome Evaluation: Pt on 3L N. SOB with activity but able to recover to 90% at rest. NSR on tele. alert and oriented X 4. No acute changes this shift.

## 2023-08-27 LAB
ANION GAP SERPL CALCULATED.3IONS-SCNC: 4 MMOL/L (ref 5–15)
BUN SERPL-MCNC: 23 MG/DL (ref 8–23)
BUN/CREAT SERPL: 23.7 (ref 7–25)
CALCIUM SPEC-SCNC: 9.2 MG/DL (ref 8.6–10.5)
CHLORIDE SERPL-SCNC: 103 MMOL/L (ref 98–107)
CO2 SERPL-SCNC: 35 MMOL/L (ref 22–29)
CREAT SERPL-MCNC: 0.97 MG/DL (ref 0.57–1)
DEPRECATED RDW RBC AUTO: 57.1 FL (ref 37–54)
EGFRCR SERPLBLD CKD-EPI 2021: 61.1 ML/MIN/1.73
ERYTHROCYTE [DISTWIDTH] IN BLOOD BY AUTOMATED COUNT: 14.9 % (ref 12.3–15.4)
GLUCOSE SERPL-MCNC: 84 MG/DL (ref 65–99)
HCT VFR BLD AUTO: 40.7 % (ref 34–46.6)
HGB BLD-MCNC: 12 G/DL (ref 12–15.9)
MCH RBC QN AUTO: 30.5 PG (ref 26.6–33)
MCHC RBC AUTO-ENTMCNC: 29.5 G/DL (ref 31.5–35.7)
MCV RBC AUTO: 103.3 FL (ref 79–97)
PLATELET # BLD AUTO: 185 10*3/MM3 (ref 140–450)
PMV BLD AUTO: 9.4 FL (ref 6–12)
POTASSIUM SERPL-SCNC: 4.8 MMOL/L (ref 3.5–5.2)
QT INTERVAL: 328 MS
QTC INTERVAL: 427 MS
RBC # BLD AUTO: 3.94 10*6/MM3 (ref 3.77–5.28)
SODIUM SERPL-SCNC: 142 MMOL/L (ref 136–145)
WBC NRBC COR # BLD: 8.62 10*3/MM3 (ref 3.4–10.8)

## 2023-08-27 PROCEDURE — 94799 UNLISTED PULMONARY SVC/PX: CPT

## 2023-08-27 PROCEDURE — 63710000001 PREDNISONE PER 1 MG: Performed by: NURSE PRACTITIONER

## 2023-08-27 PROCEDURE — 94664 DEMO&/EVAL PT USE INHALER: CPT

## 2023-08-27 PROCEDURE — 99232 SBSQ HOSP IP/OBS MODERATE 35: CPT | Performed by: NURSE PRACTITIONER

## 2023-08-27 PROCEDURE — 80048 BASIC METABOLIC PNL TOTAL CA: CPT | Performed by: PHYSICIAN ASSISTANT

## 2023-08-27 PROCEDURE — 85027 COMPLETE CBC AUTOMATED: CPT | Performed by: PHYSICIAN ASSISTANT

## 2023-08-27 PROCEDURE — G0378 HOSPITAL OBSERVATION PER HR: HCPCS

## 2023-08-27 RX ADMIN — GUAIFENESIN 600 MG: 600 TABLET, EXTENDED RELEASE ORAL at 21:24

## 2023-08-27 RX ADMIN — APIXABAN 5 MG: 5 TABLET, FILM COATED ORAL at 09:30

## 2023-08-27 RX ADMIN — IPRATROPIUM BROMIDE AND ALBUTEROL SULFATE 3 ML: 2.5; .5 SOLUTION RESPIRATORY (INHALATION) at 13:52

## 2023-08-27 RX ADMIN — APIXABAN 5 MG: 5 TABLET, FILM COATED ORAL at 21:24

## 2023-08-27 RX ADMIN — DOXYCYCLINE 100 MG: 100 CAPSULE ORAL at 09:30

## 2023-08-27 RX ADMIN — ATORVASTATIN CALCIUM 80 MG: 40 TABLET, FILM COATED ORAL at 21:24

## 2023-08-27 RX ADMIN — FLUTICASONE PROPIONATE 2 SPRAY: 50 SPRAY, METERED NASAL at 09:31

## 2023-08-27 RX ADMIN — PREDNISONE 40 MG: 20 TABLET ORAL at 09:30

## 2023-08-27 RX ADMIN — DOXYCYCLINE 100 MG: 100 CAPSULE ORAL at 21:24

## 2023-08-27 RX ADMIN — GUAIFENESIN 600 MG: 600 TABLET, EXTENDED RELEASE ORAL at 09:30

## 2023-08-27 RX ADMIN — GABAPENTIN 300 MG: 300 CAPSULE ORAL at 21:24

## 2023-08-27 RX ADMIN — IPRATROPIUM BROMIDE AND ALBUTEROL SULFATE 3 ML: 2.5; .5 SOLUTION RESPIRATORY (INHALATION) at 20:14

## 2023-08-27 RX ADMIN — Medication 10 ML: at 21:24

## 2023-08-27 RX ADMIN — Medication 10 ML: at 09:31

## 2023-08-27 RX ADMIN — SENNOSIDES AND DOCUSATE SODIUM 2 TABLET: 8.6; 5 TABLET ORAL at 09:30

## 2023-08-27 RX ADMIN — SENNOSIDES AND DOCUSATE SODIUM 2 TABLET: 8.6; 5 TABLET ORAL at 21:24

## 2023-08-27 RX ADMIN — IPRATROPIUM BROMIDE AND ALBUTEROL SULFATE 3 ML: 2.5; .5 SOLUTION RESPIRATORY (INHALATION) at 08:25

## 2023-08-27 NOTE — PLAN OF CARE
Goal Outcome Evaluation:  Plan of Care Reviewed With: patient        Progress: no change  Outcome Evaluation: Remains on 3L/NC. Attempted to wean O2 but unsuccesful. SR/ ST with activity. Denies CP. No resp distress noted. other VS stable. No concerns at this time. Will cont current POC

## 2023-08-27 NOTE — PROGRESS NOTES
[facility] Medicine Services  PROGRESS NOTE    Patient Name: Tessa Bradley  : 1945  MRN: [id number]    Date of Admission: 2023  Length of Stay: 0  Primary Care Physician: Sunni Santoro MD    Subjective   Subjective     CC:  Shortness of breath    HPI:  Patient seen this morning. Sitting up in bed. States she is using IS some, but it's boring. Multiple questions addressed about COPD diagnosis. Currently on 4LNC, Room air at baseline    Review of Systems  Gen- No fevers, chills  CV- No chest pain, palpitations  Resp- (+) cough, (+) dyspnea with exertion   GI- No N/V/D, abd pain      Objective   Objective     Vital Signs:   Temp:  [96.4 °F (35.8 °C)-99.7 °F (37.6 °C)] 96.4 °F (35.8 °C)  Heart Rate:  [] 88  Resp:  [16-20] 20  BP: (106-131)/(52-70) 115/64        Physical Exam:  Constitutional: Awake, alert, NAD  HENT: NCAT, mucous membranes moist  Respiratory: diffuse expiratory wheezing, nonlabored ,satting in low 90s on 4L  Cardiovascular:RRR   Gastrointestinal: Positive bowel sounds, soft, nontender, nondistended  Musculoskeletal: No bilateral ankle edema  Psychiatric: Appropriate affect, cooperative  Neurologic: Oriented x 3, CASAS, speech clear  Skin: No rashes      Results Reviewed:    Results from last 7 days   Lab Units 23  0353 23  0421 23  03423  1650   WBC 10*3/mm3 8.62 6.87 7.48 7.76   HEMOGLOBIN g/dL 12.0 11.8* 12.5 13.6   HEMATOCRIT % 40.7 41.0 43.6 47.7*   PLATELETS 10*3/mm3 185 176 200 229   PROCALCITONIN ng/mL  --   --   --  0.06       Results from last 7 days   Lab Units 23  0353 23  0421 23  1742 23  0342 23  1650   SODIUM mmol/L 142 143 144 146*  --   --  138   POTASSIUM mmol/L 4.8 4.8 5.4* 5.0  --   --  4.5   CHLORIDE mmol/L 103 106 105 106  --   --  100   CO2 mmol/L 35.0* 33.0* 33.0* 35.0*  --   --  28.0   BUN mg/dL 23 20 22 26*  --   --  24*   CREATININE mg/dL 0.97  0.92 1.06* 1.44*  --   --  1.38*   GLUCOSE mg/dL 84 94 292* 113*  --   --  168*   CALCIUM mg/dL 9.2 8.8 8.7 8.6  --   --  9.1   ALT (SGPT) U/L  --   --   --  11  --   --  10   AST (SGOT) U/L  --   --   --  18  --   --  16   HSTROP T ng/L  --   --   --   --  24* 22* 24*   PROBNP pg/mL  --   --   --   --   --   --  8,005.0*       Estimated Creatinine Clearance: 48.5 mL/min (by C-G formula based on SCr of 0.97 mg/dL).    Microbiology Results Abnormal       Procedure Component Value - Date/Time    Blood Culture - Blood, Arm, Right [[id number]]  (Normal) Collected: 08/24/23 1645    Lab Status: Preliminary result Specimen: Blood from Arm, Right Updated: 08/26/23 1716     Blood Culture No growth at 2 days    Blood Culture - Blood, Arm, Left [[id number]]  (Normal) Collected: 08/24/23 1645    Lab Status: Preliminary result Specimen: Blood from Arm, Left Updated: 08/26/23 1716     Blood Culture No growth at 2 days    Respiratory Panel PCR w/COVID-19(SARS-CoV-2) [facility]/ALICE/PAD/[facility]/M[facility]/ELYSSA In-House, NP Swab in UTM/VTM, 3-4 HR TAT - Swab, Nasopharynx [[id number]]  (Normal) Collected: 08/24/23 2028    Lab Status: Final result Specimen: Swab from Nasopharynx Updated: 08/24/23 2127     ADENOVIRUS, PCR Not Detected     Coronavirus 229E Not Detected     Coronavirus HKU1 Not Detected     Coronavirus NL63 Not Detected     Coronavirus OC43 Not Detected     COVID19 Not Detected     Human Metapneumovirus Not Detected     Human Rhinovirus/Enterovirus Not Detected     Influenza A PCR Not Detected     Influenza B PCR Not Detected     Parainfluenza Virus 1 Not Detected     Parainfluenza Virus 2 Not Detected     Parainfluenza Virus 3 Not Detected     Parainfluenza Virus 4 Not Detected     RSV, PCR Not Detected     Bordetella pertussis pcr Not Detected     Bordetella parapertussis PCR Not Detected     Chlamydophila pneumoniae PCR Not Detected     Mycoplasma pneumo by PCR Not Detected    Narrative:      In the setting of a positive respiratory  panel with a viral infection PLUS a negative procalcitonin without other underlying concern for bacterial infection, consider observing off antibiotics or discontinuation of antibiotics and continue supportive care. If the respiratory panel is positive for atypical bacterial infection (Bordetella pertussis, Chlamydophila pneumoniae, or Mycoplasma pneumoniae), consider antibiotic de-escalation to target atypical bacterial infection.            Imaging Results (Last 24 Hours)       ** No results found for the last 24 hours. **            Results for orders placed during the hospital encounter of 08/24/23    Adult Transthoracic Echo Complete W/ Cont if Necessary Per Protocol    Interpretation Summary    Left ventricular systolic function is normal. Left ventricular ejection fraction appears to be 56 - 60%.    Left ventricular diastolic function was normal.    Mild aortic valve stenosis is present. Aortic valve area is 1.2 cm2.    Peak velocity of the flow distal to the aortic valve is 267.2 cm/s. Aortic valve mean pressure gradient is 16 mmHg.      I have reviewed the medications:  Scheduled Meds:apixaban, 5 mg, Oral, Q12H  atorvastatin, 80 mg, Oral, Nightly  dexAMETHasone, 10 mg, Intravenous, Once  doxycycline, 100 mg, Oral, Q12H  fluticasone, 2 spray, Each Nare, Daily  gabapentin, 300 mg, Oral, Nightly  guaiFENesin, 600 mg, Oral, Q12H  ipratropium-albuterol, 3 mL, Nebulization, 4x Daily - RT  predniSONE, 40 mg, Oral, Daily With Breakfast  senna-docusate sodium, 2 tablet, Oral, BID  sodium chloride, 10 mL, Intravenous, Q12H      Continuous Infusions:     PRN Meds:.  acetaminophen    benzonatate    senna-docusate sodium **AND** polyethylene glycol **AND** bisacodyl **AND** bisacodyl    Calcium Replacement - Follow Nurse / BPA Driven Protocol    cyclobenzaprine    Hydrocod Juancarlos-Chlorphe Juancarlos ER    ipratropium-albuterol    Magnesium Standard Dose Replacement - Follow Nurse / BPA Driven Protocol    ondansetron     Phosphorus Replacement - Follow Nurse / BPA Driven Protocol    Potassium Replacement - Follow Nurse / BPA Driven Protocol    sodium chloride    sodium chloride    sodium chloride      Assessment & Plan   Assessment / Plan     Active Hospital Problems    Diagnosis  POA    **COPD exacerbation [J44.1]  Yes    Elevated serum creatinine [R79.89]  Yes    Hypotension [I95.9]  Yes    Dyspnea [R06.00]  Yes      Resolved Hospital Problems   No resolved problems to display.        Brief Hospital Course to date:  Tessa Bradley is a 77 y.o. 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 Pt refused to come to the hospital at the time but presented later after family brought her wearing 4L NC. Pt received doxy/steroid therapy    This patient's problems and plans were partially entered by my partner and updated as appropriate by me 08/27/23.      Plan:  COPD exacerbation  Acute hypoxic respiratory failure  Dyspnea  - Resp PCR neg  - doxy BID,prednisone 40mg daily x 5 starting 8/25; duonebs QID/prn, mucinex  - D-dimer neg  -NC O2 on 4L satting in 90s, wean as tolerates- encourage ambulation and IS  -normal procal. BCX x 2 NGTD  -CXR -no acute process    Ongoing tobacco use  -rec smoke cessation; advised again today    Elevated Pro BNP  Elevated trop   - ECHO LV 56-60%, diastolic function normal. Mild aortic valve stenosis  - Elevated trop, neg delta: EKG ST with PAC, right axis dev, pulmonary disease pattern. T wave abn, consider anterolateral ischemia. Pt denies chest pain    Lactic acidosis  Dehydration  HEATHER  - IV fluid resuscitation completed  - avoid nephrotoxic meds    HTN/HLD  - atorvastatin, BP stable off meds    DVT Prophylaxis:   [facility]    Disposition: I expect the patient to be discharged 08/28/2023. When requiring 2L or less O2    CODE STATUS:   Code Status and Medical Interventions:   Ordered at: 08/24/23 1950     Code Status (Patient has no pulse and is not breathing):    CPR (Attempt to Resuscitate)     Medical Interventions (Patient has pulse or is breathing):    Full Support         Electronically signed by ALIZA Posada, 08/27/23, 12:07 EDT.

## 2023-08-27 NOTE — PLAN OF CARE
Goal Outcome Evaluation:  Plan of Care Reviewed With: patient        Progress: no change  Outcome Evaluation: Pt remained on 3L NC during the shift. Pt feel alseep and was mouth breathing sdatting 88%. Pt placed on 6L NC during the mean time. Pt remains NSR on the monitor. No pain noted during shift.

## 2023-08-28 ENCOUNTER — APPOINTMENT (OUTPATIENT)
Dept: GENERAL RADIOLOGY | Facility: HOSPITAL | Age: 78
DRG: 191 | End: 2023-08-28
Payer: MEDICARE

## 2023-08-28 ENCOUNTER — APPOINTMENT (OUTPATIENT)
Dept: CT IMAGING | Facility: HOSPITAL | Age: 78
DRG: 191 | End: 2023-08-28
Payer: MEDICARE

## 2023-08-28 PROBLEM — G47.33 OSA (OBSTRUCTIVE SLEEP APNEA): Status: ACTIVE | Noted: 2023-08-28

## 2023-08-28 LAB
ARTERIAL PATENCY WRIST A: POSITIVE
ATMOSPHERIC PRESS: ABNORMAL MM[HG]
BASE EXCESS BLDA CALC-SCNC: 8.5 MMOL/L (ref 0–2)
BDY SITE: ABNORMAL
COHGB MFR BLD: 1.6 % (ref 0–2)
HCO3 BLDA-SCNC: 36.2 MMOL/L (ref 20–26)
HCT VFR BLD CALC: 39.5 % (ref 38–51)
HGB BLDA-MCNC: 12.9 G/DL (ref 14–18)
INHALED O2 CONCENTRATION: 50 %
METHGB BLD QL: 0.6 % (ref 0–1.5)
MODALITY: ABNORMAL
OXYHGB MFR BLDV: 94.4 % (ref 94–99)
PCO2 BLDA: 64.5 MM HG (ref 35–45)
PCO2 TEMP ADJ BLD: ABNORMAL MM[HG]
PH BLDA: 7.36 PH UNITS (ref 7.35–7.45)
PH, TEMP CORRECTED: ABNORMAL
PO2 BLDA: 80.7 MM HG (ref 83–108)
PO2 TEMP ADJ BLD: ABNORMAL MM[HG]

## 2023-08-28 PROCEDURE — 82375 ASSAY CARBOXYHB QUANT: CPT | Performed by: INTERNAL MEDICINE

## 2023-08-28 PROCEDURE — 71045 X-RAY EXAM CHEST 1 VIEW: CPT

## 2023-08-28 PROCEDURE — 94664 DEMO&/EVAL PT USE INHALER: CPT

## 2023-08-28 PROCEDURE — 71275 CT ANGIOGRAPHY CHEST: CPT

## 2023-08-28 PROCEDURE — 94761 N-INVAS EAR/PLS OXIMETRY MLT: CPT

## 2023-08-28 PROCEDURE — 94799 UNLISTED PULMONARY SVC/PX: CPT

## 2023-08-28 PROCEDURE — 25010000002 METHYLPREDNISOLONE PER 125 MG: Performed by: INTERNAL MEDICINE

## 2023-08-28 PROCEDURE — 25510000001 IOPAMIDOL PER 1 ML: Performed by: INTERNAL MEDICINE

## 2023-08-28 PROCEDURE — 99232 SBSQ HOSP IP/OBS MODERATE 35: CPT | Performed by: NURSE PRACTITIONER

## 2023-08-28 PROCEDURE — 36600 WITHDRAWAL OF ARTERIAL BLOOD: CPT | Performed by: INTERNAL MEDICINE

## 2023-08-28 PROCEDURE — 82805 BLOOD GASES W/O2 SATURATION: CPT | Performed by: INTERNAL MEDICINE

## 2023-08-28 PROCEDURE — 25010000002 FUROSEMIDE PER 20 MG: Performed by: NURSE PRACTITIONER

## 2023-08-28 PROCEDURE — 63710000001 PREDNISONE PER 1 MG: Performed by: NURSE PRACTITIONER

## 2023-08-28 PROCEDURE — 83050 HGB METHEMOGLOBIN QUAN: CPT | Performed by: INTERNAL MEDICINE

## 2023-08-28 RX ORDER — BUDESONIDE 0.5 MG/2ML
0.5 INHALANT ORAL
Status: DISCONTINUED | OUTPATIENT
Start: 2023-08-28 | End: 2023-08-31 | Stop reason: HOSPADM

## 2023-08-28 RX ORDER — METHYLPREDNISOLONE SODIUM SUCCINATE 125 MG/2ML
60 INJECTION, POWDER, LYOPHILIZED, FOR SOLUTION INTRAMUSCULAR; INTRAVENOUS EVERY 12 HOURS
Status: DISCONTINUED | OUTPATIENT
Start: 2023-08-28 | End: 2023-08-28

## 2023-08-28 RX ORDER — METHYLPREDNISOLONE SODIUM SUCCINATE 125 MG/2ML
60 INJECTION, POWDER, LYOPHILIZED, FOR SOLUTION INTRAMUSCULAR; INTRAVENOUS EVERY 12 HOURS SCHEDULED
Status: COMPLETED | OUTPATIENT
Start: 2023-08-28 | End: 2023-08-29

## 2023-08-28 RX ORDER — FUROSEMIDE 10 MG/ML
40 INJECTION INTRAMUSCULAR; INTRAVENOUS ONCE
Status: COMPLETED | OUTPATIENT
Start: 2023-08-28 | End: 2023-08-28

## 2023-08-28 RX ORDER — GUAIFENESIN AND DEXTROMETHORPHAN HYDROBROMIDE 600; 30 MG/1; MG/1
2 TABLET, EXTENDED RELEASE ORAL 2 TIMES DAILY PRN
Status: DISCONTINUED | OUTPATIENT
Start: 2023-08-28 | End: 2023-08-29

## 2023-08-28 RX ADMIN — APIXABAN 5 MG: 5 TABLET, FILM COATED ORAL at 20:03

## 2023-08-28 RX ADMIN — METHYLPREDNISOLONE SODIUM SUCCINATE 60 MG: 125 INJECTION INTRAMUSCULAR; INTRAVENOUS at 22:47

## 2023-08-28 RX ADMIN — ATORVASTATIN CALCIUM 80 MG: 40 TABLET, FILM COATED ORAL at 20:02

## 2023-08-28 RX ADMIN — IPRATROPIUM BROMIDE AND ALBUTEROL SULFATE 3 ML: 2.5; .5 SOLUTION RESPIRATORY (INHALATION) at 18:47

## 2023-08-28 RX ADMIN — FLUTICASONE PROPIONATE 2 SPRAY: 50 SPRAY, METERED NASAL at 08:59

## 2023-08-28 RX ADMIN — PREDNISONE 40 MG: 20 TABLET ORAL at 08:58

## 2023-08-28 RX ADMIN — BENZONATATE 100 MG: 100 CAPSULE ORAL at 09:03

## 2023-08-28 RX ADMIN — GUAIFENESIN AND DEXTROMETHORPHAN HYDROBROMIDE 2 TABLET: 30; 600 TABLET, EXTENDED RELEASE ORAL at 13:45

## 2023-08-28 RX ADMIN — IPRATROPIUM BROMIDE AND ALBUTEROL SULFATE 3 ML: 2.5; .5 SOLUTION RESPIRATORY (INHALATION) at 16:43

## 2023-08-28 RX ADMIN — HYDROCODONE POLISTIREX AND CHLORPHENIRAMINE POLISTIREX 2.5 ML: 10; 8 SUSPENSION, EXTENDED RELEASE ORAL at 10:25

## 2023-08-28 RX ADMIN — FUROSEMIDE 40 MG: 10 INJECTION, SOLUTION INTRAMUSCULAR; INTRAVENOUS at 18:31

## 2023-08-28 RX ADMIN — METHYLPREDNISOLONE SODIUM SUCCINATE 60 MG: 125 INJECTION INTRAMUSCULAR; INTRAVENOUS at 13:45

## 2023-08-28 RX ADMIN — IPRATROPIUM BROMIDE AND ALBUTEROL SULFATE 3 ML: 2.5; .5 SOLUTION RESPIRATORY (INHALATION) at 07:37

## 2023-08-28 RX ADMIN — SENNOSIDES AND DOCUSATE SODIUM 2 TABLET: 8.6; 5 TABLET ORAL at 20:03

## 2023-08-28 RX ADMIN — BENZONATATE 100 MG: 100 CAPSULE ORAL at 21:31

## 2023-08-28 RX ADMIN — GABAPENTIN 300 MG: 300 CAPSULE ORAL at 20:02

## 2023-08-28 RX ADMIN — APIXABAN 5 MG: 5 TABLET, FILM COATED ORAL at 08:59

## 2023-08-28 RX ADMIN — Medication 10 ML: at 08:59

## 2023-08-28 RX ADMIN — DOXYCYCLINE 100 MG: 100 CAPSULE ORAL at 08:59

## 2023-08-28 RX ADMIN — GUAIFENESIN 600 MG: 600 TABLET, EXTENDED RELEASE ORAL at 08:58

## 2023-08-28 RX ADMIN — HYDROCODONE POLISTIREX AND CHLORPHENIRAMINE POLISTIREX 2.5 ML: 10; 8 SUSPENSION, EXTENDED RELEASE ORAL at 22:47

## 2023-08-28 RX ADMIN — DOXYCYCLINE 100 MG: 100 CAPSULE ORAL at 20:02

## 2023-08-28 RX ADMIN — Medication 10 ML: at 20:03

## 2023-08-28 RX ADMIN — IOPAMIDOL 100 ML: 755 INJECTION, SOLUTION INTRAVENOUS at 22:39

## 2023-08-28 RX ADMIN — IPRATROPIUM BROMIDE AND ALBUTEROL SULFATE 3 ML: 2.5; .5 SOLUTION RESPIRATORY (INHALATION) at 12:39

## 2023-08-28 RX ADMIN — SENNOSIDES AND DOCUSATE SODIUM 2 TABLET: 8.6; 5 TABLET ORAL at 08:58

## 2023-08-28 RX ADMIN — BUDESONIDE 0.5 MG: 0.5 INHALANT RESPIRATORY (INHALATION) at 12:39

## 2023-08-28 NOTE — PLAN OF CARE
Goal Outcome Evaluation:  Plan of Care Reviewed With: patient        Progress: no change  Outcome Evaluation: Remains on 3LNC. Attempted to wean to 2L this shift but pt desatted. Denies CP. Monitor SR/ST. No c/o pain or discomfort. Slept on and off. VSS. No concerns at this time.

## 2023-08-28 NOTE — CONSULTS
Referring Provider: ALIZA Martinez  Reason for Consultation: AECOPD    Subjective .   Education: NN spoke with pt at BS.  Pt alert and able to answer questions appropriately.  Pt O2 sat 97% on  6 L with two extensions currently, no home O2 use.  Pt reports the ability to ambulate  ~8 stairsteps at baseline before experiencing SOB.  Pt states use of rescue medication 14  times weekly, relief of SOB within ~2 mins.  Patient is up to date on COVID, flu and PNA vaccines.  She is a current smoker.  Cessation encouraged.  Pt reports no issues at this time with medications or transportation for appointments.  Pt reports no previous hx of formal COPD teaching, no understanding of action plan, or MI.  Stop light report, instructions for accessing iT[employer] and list of educational videos given to pt.  1800QUITNOW reference sheet discussed and given to patient at BS.  COPD education beganin the form of explanation, handouts, and videos.  No new concerns or questions voiced at this time.  NN will continue to follow as needed.     Age: 77 y.o.  Sex: female  Smoker Status: current, ~40 pack years  Pulmonologist: NADEGE  FEV1 (PFT): NA  Home O2: RA    Objective     SpO2 SpO2: 91 % (08/28/23 1441)  Device Device (Oxygen Therapy): nasal cannula (08/28/23 1308)  Flow Flow (L/min): 6 (08/28/23 1308)  Incentive Spirometer    IS Predicted Level (mL)     Number of Repetitions     Level Incentive Spirometer (mL)    Patient Tolerance     Inhaler Treatment Status    Treatment Route        Home Medications:  Medications Prior to Admission   Medication Sig Dispense Refill Last Dose    albuterol sulfate  (90 Base) MCG/ACT inhaler Inhale 2 puffs Every 4 (Four) Hours As Needed for Wheezing.       apixaban (ELIQUIS) 5 MG tablet tablet Take 1 tablet by mouth Every 12 (Twelve) Hours.       atorvastatin (LIPITOR) 80 MG tablet Take 1 tablet by mouth Every Night.       gabapentin (NEURONTIN) 300 MG capsule Take 1 capsule by mouth Every Night.        loratadine (CLARITIN) 10 MG tablet Take 1 tablet by mouth Daily.       Mirabegron ER (MYRBETRIQ) 50 MG tablet sustained-release 24 hour 24 hr tablet Take 50 mg by mouth Daily.       traZODone (DESYREL) 100 MG tablet Take 1 tablet by mouth Every Night.       cyclobenzaprine (FLEXERIL) 5 MG tablet Take 1 tablet by mouth 3 (Three) Times a Day As Needed for Muscle Spasms.          Discussion: Per current GOLD Standards, please consider:  No LAMA/LABA/ICS in place, Outpatient PFT, Rehab as appropriate, Palliative Care consult, NRT at [place], Annual LDCT per current screening guidelines (age 50-80 years old, smoking history of 20 pack years or more or has quit within past 15 years)           Mary Jones RN

## 2023-08-28 NOTE — PLAN OF CARE
Goal Outcome Evaluation:  Plan of Care Reviewed With: patient        Progress: declining  Outcome Evaluation: Pt on 2L NC at the beginning of shift dropped to mid 80s placed on 6L NC. Pt coughed a couple of times and O2 sats came up. Pt given tessalon pearls and tussinex. Pt placed back on 3L NC. O2 dropped to the mid 70s around 1300. Pt placed on 6L NC she wouldn't come up above 85%. I had her cough to see if that would fix the problem as it did earlier. After that she dropped to the low 70s and wouldn't come up. Pt place on a non-rebreather to get her sats up. Pt placed back the 6L NC and O2 dropped to the high 80s. IV steroids and flutter value given and a chest x-ray done. Pt was asymptomatic at the time and lungs remained clear. Pt NC down to 4L. Pt remains NSR on the monitor. No pain noted.     Addendum: Pt had to be placed back on 6L NC due to desating. Pt O2 between 85% and 90% on the 6L NC. HFNC, IV lasix x1 dose, and pulmonology consult ordered. HFNC setting 35L 55%.

## 2023-08-28 NOTE — PROGRESS NOTES
[facility] Medicine Services  PROGRESS NOTE    Patient Name: Tessa Bradley  : 1945  MRN: [id number]    Date of Admission: 2023  Length of Stay: 0  Primary Care Physician: Sunni Santoro MD    Subjective   Subjective     CC:  Shortness of breath    HPI:  Patient up in bed, increased to 6LNC today, turned down to 4L and maintains sat in low 90s. Reports productive cough, less wheezing.     Review of Systems  Gen- No fevers, chills  CV- No chest pain, palpitations  Resp- (+) cough, (+) dyspnea with exertion   GI- No N/V/D, abd pain      Objective   Objective     Vital Signs:   Temp:  [97.4 °F (36.3 °C)-99.1 °F (37.3 °C)] 98.2 °F (36.8 °C)  Heart Rate:  [] 73  Resp:  [16-20] 16  BP: ()/(55-61) 116/55        Physical Exam:  Constitutional: Awake, alert, NAD  HENT: NCAT, mucous membranes moist  Respiratory: few scattered wheezes, nonlabored ,satting in low 90s on 4L  Cardiovascular:RRR   Gastrointestinal: Positive bowel sounds, soft, nontender, nondistended  Musculoskeletal: No bilateral ankle edema  Psychiatric: Appropriate affect, cooperative  Neurologic: Oriented x 3, CASAS, speech clear  Skin: No rashes      Results Reviewed:    Results from last 7 days   Lab Units 23  0353 23  04223  03423  1650   WBC 10*3/mm3 8.62 6.87 7.48 7.76   HEMOGLOBIN g/dL 12.0 11.8* 12.5 13.6   HEMATOCRIT % 40.7 41.0 43.6 47.7*   PLATELETS 10*3/mm3 185 176 200 229   PROCALCITONIN ng/mL  --   --   --  0.06     Results from last 7 days   Lab Units 23  0353 23  0421 23  1742 23  0342 23  1650   SODIUM mmol/L 142 143 144 146*  --   --  138   POTASSIUM mmol/L 4.8 4.8 5.4* 5.0  --   --  4.5   CHLORIDE mmol/L 103 106 105 106  --   --  100   CO2 mmol/L 35.0* 33.0* 33.0* 35.0*  --   --  28.0   BUN mg/dL 23 20 22 26*  --   --  24*   CREATININE mg/dL 0.97 0.92 1.06* 1.44*  --   --  1.38*   GLUCOSE mg/dL 84 94  292* 113*  --   --  168*   CALCIUM mg/dL 9.2 8.8 8.7 8.6  --   --  9.1   ALT (SGPT) U/L  --   --   --  11  --   --  10   AST (SGOT) U/L  --   --   --  18  --   --  16   HSTROP T ng/L  --   --   --   --  24* 22* 24*   PROBNP pg/mL  --   --   --   --   --   --  8,005.0*     Estimated Creatinine Clearance: 48.5 mL/min (by C-G formula based on SCr of 0.97 mg/dL).    Microbiology Results Abnormal       Procedure Component Value - Date/Time    Blood Culture - Blood, Arm, Right [[id number]]  (Normal) Collected: 08/24/23 1645    Lab Status: Preliminary result Specimen: Blood from Arm, Right Updated: 08/27/23 1716     Blood Culture No growth at 3 days    Blood Culture - Blood, Arm, Left [[id number]]  (Normal) Collected: 08/24/23 1645    Lab Status: Preliminary result Specimen: Blood from Arm, Left Updated: 08/27/23 1716     Blood Culture No growth at 3 days    Respiratory Panel PCR w/COVID-19(SARS-CoV-2) [facility]/ALICE/[facility]/M[facility]/ELYSSA In-House, NP Swab in UTM/VTM, 3-4 HR TAT - Swab, Nasopharynx [[id number]]  (Normal) Collected: 08/24/23 2028    Lab Status: Final result Specimen: Swab from Nasopharynx Updated: 08/24/23 2127     ADENOVIRUS, PCR Not Detected     Coronavirus 229E Not Detected     Coronavirus HKU1 Not Detected     Coronavirus NL63 Not Detected     Coronavirus OC43 Not Detected     COVID19 Not Detected     Human Metapneumovirus Not Detected     Human Rhinovirus/Enterovirus Not Detected     Influenza A PCR Not Detected     Influenza B PCR Not Detected     Parainfluenza Virus 1 Not Detected     Parainfluenza Virus 2 Not Detected     Parainfluenza Virus 3 Not Detected     Parainfluenza Virus 4 Not Detected     RSV, PCR Not Detected     Bordetella pertussis pcr Not Detected     Bordetella parapertussis PCR Not Detected     Chlamydophila pneumoniae PCR Not Detected     Mycoplasma pneumo by PCR Not Detected    Narrative:      In the setting of a positive respiratory panel with a viral infection PLUS a negative procalcitonin  without other underlying concern for bacterial infection, consider observing off antibiotics or discontinuation of antibiotics and continue supportive care. If the respiratory panel is positive for atypical bacterial infection (Bordetella pertussis, Chlamydophila pneumoniae, or Mycoplasma pneumoniae), consider antibiotic de-escalation to target atypical bacterial infection.            Imaging Results (Last 24 Hours)       ** No results found for the last 24 hours. **            Results for orders placed during the hospital encounter of 08/24/23    Adult Transthoracic Echo Complete W/ Cont if Necessary Per Protocol    Interpretation Summary    Left ventricular systolic function is normal. Left ventricular ejection fraction appears to be 56 - 60%.    Left ventricular diastolic function was normal.    Mild aortic valve stenosis is present. Aortic valve area is 1.2 cm2.    Peak velocity of the flow distal to the aortic valve is 267.2 cm/s. Aortic valve mean pressure gradient is 16 mmHg.      I have reviewed the medications:  Scheduled Meds:apixaban, 5 mg, Oral, Q12H  atorvastatin, 80 mg, Oral, Nightly  budesonide, 0.5 mg, Nebulization, Daily - RT  dexAMETHasone, 10 mg, Intravenous, Once  doxycycline, 100 mg, Oral, Q12H  fluticasone, 2 spray, Each Nare, Daily  gabapentin, 300 mg, Oral, Nightly  ipratropium-albuterol, 3 mL, Nebulization, 4x Daily - RT  predniSONE, 40 mg, Oral, Daily With Breakfast  senna-docusate sodium, 2 tablet, Oral, BID  sodium chloride, 10 mL, Intravenous, Q12H      Continuous Infusions:     PRN Meds:.  acetaminophen    benzonatate    senna-docusate sodium **AND** polyethylene glycol **AND** bisacodyl **AND** bisacodyl    Calcium Replacement - Follow Nurse / BPA Driven Protocol    cyclobenzaprine    guaifenesin-dextromethorphan    Hydrocod Juancarlos-Chlorphe Juancarlos ER    ipratropium-albuterol    Magnesium Standard Dose Replacement - Follow Nurse / BPA Driven Protocol    ondansetron    Phosphorus Replacement  - Follow Nurse / BPA Driven Protocol    Potassium Replacement - Follow Nurse / BPA Driven Protocol    sodium chloride    sodium chloride    sodium chloride      Assessment & Plan   Assessment / Plan     Active Hospital Problems    Diagnosis  POA    **COPD exacerbation [J44.1]  Yes    MAKAYLA (obstructive sleep apnea) [G47.33]  Unknown    Elevated serum creatinine [R79.89]  Yes    Hypotension [I95.9]  Yes    Dyspnea [R06.00]  Yes      Resolved Hospital Problems   No resolved problems to display.        Brief Hospital Course to date:  Tessa Bradley is a 77 y.o. 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 Pt refused to come to the hospital at the time but presented later after family brought her wearing 4L NC. Pt received doxy/steroid therapy    This patient's problems and plans were partially entered by my partner and updated as appropriate by me 08/28/23.      Plan:  COPD exacerbation  Acute hypoxic respiratory failure  Dyspnea  MAKAYLA- noncompliant with CPAP  - Resp PCR neg  - doxy BID,prednisone 40mg daily x 5 starting 8/25; duonebs QID/prn, mucinex DM, tessalon  - add pulmicort and OPEP  - D-dimer neg  -NC O2 on 4L satting in 90s, wean as tolerates- encourage ambulation and IS/ OPEP  -normal procal. BCX x 2 NGTD  -CXR -no acute process  -seems increased O2 needs with rest/ sleep. She is intolerant to CPAP at home and will likely need long term nocturnal oxygen. Plan to DC once daytime O2 reqs 2L or less    Ongoing tobacco use  -rec smoke cessation; advised again today    Elevated Pro BNP  Elevated trop   - ECHO LV 56-60%, diastolic function normal. Mild aortic valve stenosis  - Elevated trop, neg delta: EKG ST with PAC, right  axis dev, pulmonary disease pattern. T wave abn, consider anterolateral ischemia. Pt denies chest pain    Lactic acidosis  Dehydration  HEATHER  - IV fluid resuscitation completed  - avoid nephrotoxic meds    HTN/HLD  - atorvastatin, BP stable off meds    DVT Prophylaxis:  SQH    Disposition: I expect the patient to be discharged 08/29/2023. When requiring 2L or less O2    CODE STATUS:   Code Status and Medical Interventions:   Ordered at: 08/24/23 1950     Code Status (Patient has no pulse and is not breathing):    CPR (Attempt to Resuscitate)     Medical Interventions (Patient has pulse or is breathing):    Full Support         Electronically signed by ALIZA Posada, 08/28/23, 11:22 EDT.

## 2023-08-28 NOTE — CASE MANAGEMENT/SOCIAL WORK
Continued Stay Note  [facility]     Patient Name: Tessa Bradley  MRN: [id number]  Today's Date: 8/28/2023    Admit Date: 8/24/2023    Plan: Home   Discharge Plan       Row Name 08/28/23 1258       Plan    Plan Home    Patient/Family in Agreement with Plan yes    Plan Comments Spoke with patient at bedside. Patient is currently on oxygen here. Will likely need at discharge. Discharge plan is home with private car. CM will follow.    Final Discharge Disposition Code 01 - home or self-care                   Discharge Codes    No documentation.                 Expected Discharge Date and Time       Expected Discharge Date Expected Discharge Time    Aug 28, 2023               Love Armstrong RN

## 2023-08-29 LAB
BACTERIA SPEC AEROBE CULT: NORMAL
BACTERIA SPEC AEROBE CULT: NORMAL

## 2023-08-29 PROCEDURE — 25010000002 METHYLPREDNISOLONE PER 125 MG: Performed by: INTERNAL MEDICINE

## 2023-08-29 PROCEDURE — 94664 DEMO&/EVAL PT USE INHALER: CPT

## 2023-08-29 PROCEDURE — 94761 N-INVAS EAR/PLS OXIMETRY MLT: CPT

## 2023-08-29 PROCEDURE — 99232 SBSQ HOSP IP/OBS MODERATE 35: CPT | Performed by: INTERNAL MEDICINE

## 2023-08-29 PROCEDURE — 94799 UNLISTED PULMONARY SVC/PX: CPT

## 2023-08-29 RX ORDER — SODIUM CHLORIDE FOR INHALATION 7 %
4 VIAL, NEBULIZER (ML) INHALATION ONCE
Status: COMPLETED | OUTPATIENT
Start: 2023-08-29 | End: 2023-08-29

## 2023-08-29 RX ORDER — PREDNISONE 20 MG/1
40 TABLET ORAL DAILY
Status: DISCONTINUED | OUTPATIENT
Start: 2023-08-30 | End: 2023-08-31 | Stop reason: HOSPADM

## 2023-08-29 RX ORDER — PREDNISONE 10 MG/1
10 TABLET ORAL DAILY
Status: DISCONTINUED | OUTPATIENT
Start: 2023-09-08 | End: 2023-08-31 | Stop reason: HOSPADM

## 2023-08-29 RX ORDER — TORSEMIDE 20 MG/1
40 TABLET ORAL ONCE
Status: COMPLETED | OUTPATIENT
Start: 2023-08-29 | End: 2023-08-29

## 2023-08-29 RX ORDER — PREDNISONE 20 MG/1
20 TABLET ORAL DAILY
Status: DISCONTINUED | OUTPATIENT
Start: 2023-09-05 | End: 2023-08-31 | Stop reason: HOSPADM

## 2023-08-29 RX ORDER — GUAIFENESIN 600 MG/1
1200 TABLET, EXTENDED RELEASE ORAL EVERY 12 HOURS SCHEDULED
Status: DISCONTINUED | OUTPATIENT
Start: 2023-08-29 | End: 2023-08-31 | Stop reason: HOSPADM

## 2023-08-29 RX ADMIN — SENNOSIDES AND DOCUSATE SODIUM 2 TABLET: 8.6; 5 TABLET ORAL at 20:21

## 2023-08-29 RX ADMIN — FLUTICASONE PROPIONATE 2 SPRAY: 50 SPRAY, METERED NASAL at 09:42

## 2023-08-29 RX ADMIN — METHYLPREDNISOLONE SODIUM SUCCINATE 60 MG: 125 INJECTION INTRAMUSCULAR; INTRAVENOUS at 09:42

## 2023-08-29 RX ADMIN — BUDESONIDE 0.5 MG: 0.5 INHALANT RESPIRATORY (INHALATION) at 09:29

## 2023-08-29 RX ADMIN — APIXABAN 5 MG: 5 TABLET, FILM COATED ORAL at 09:42

## 2023-08-29 RX ADMIN — DOXYCYCLINE 100 MG: 100 CAPSULE ORAL at 09:42

## 2023-08-29 RX ADMIN — BENZONATATE 100 MG: 100 CAPSULE ORAL at 09:42

## 2023-08-29 RX ADMIN — GABAPENTIN 300 MG: 300 CAPSULE ORAL at 20:21

## 2023-08-29 RX ADMIN — ACETAMINOPHEN 650 MG: 325 TABLET ORAL at 20:26

## 2023-08-29 RX ADMIN — IPRATROPIUM BROMIDE AND ALBUTEROL SULFATE 3 ML: 2.5; .5 SOLUTION RESPIRATORY (INHALATION) at 16:32

## 2023-08-29 RX ADMIN — GUAIFENESIN 1200 MG: 600 TABLET, EXTENDED RELEASE ORAL at 20:25

## 2023-08-29 RX ADMIN — DOXYCYCLINE 100 MG: 100 CAPSULE ORAL at 20:21

## 2023-08-29 RX ADMIN — IPRATROPIUM BROMIDE AND ALBUTEROL SULFATE 3 ML: 2.5; .5 SOLUTION RESPIRATORY (INHALATION) at 20:28

## 2023-08-29 RX ADMIN — APIXABAN 5 MG: 5 TABLET, FILM COATED ORAL at 20:21

## 2023-08-29 RX ADMIN — SODIUM CHLORIDE SOLN NEBU 7% 4 ML: 7 NEBU SOLN at 16:32

## 2023-08-29 RX ADMIN — ATORVASTATIN CALCIUM 80 MG: 40 TABLET, FILM COATED ORAL at 20:21

## 2023-08-29 RX ADMIN — Medication 10 ML: at 20:21

## 2023-08-29 RX ADMIN — TORSEMIDE 40 MG: 20 TABLET ORAL at 14:13

## 2023-08-29 RX ADMIN — GUAIFENESIN 1200 MG: 600 TABLET, EXTENDED RELEASE ORAL at 14:13

## 2023-08-29 RX ADMIN — SENNOSIDES AND DOCUSATE SODIUM 2 TABLET: 8.6; 5 TABLET ORAL at 09:42

## 2023-08-29 RX ADMIN — HYDROCODONE POLISTIREX AND CHLORPHENIRAMINE POLISTIREX 2.5 ML: 10; 8 SUSPENSION, EXTENDED RELEASE ORAL at 14:13

## 2023-08-29 RX ADMIN — IPRATROPIUM BROMIDE AND ALBUTEROL SULFATE 3 ML: 2.5; .5 SOLUTION RESPIRATORY (INHALATION) at 09:29

## 2023-08-29 RX ADMIN — Medication 10 ML: at 09:41

## 2023-08-29 RX ADMIN — METHYLPREDNISOLONE SODIUM SUCCINATE 60 MG: 125 INJECTION INTRAMUSCULAR; INTRAVENOUS at 22:26

## 2023-08-29 NOTE — PLAN OF CARE
Goal Outcome Evaluation:  Plan of Care Reviewed With: patient  Progress: no change  Outcome Evaluation: Patient is A&Ox4. NSR on tele. VSS. Currently on 35L/50% HFNC. No complaints of pain overnight. Good UO. CTA completed. Continue plan of care.

## 2023-08-29 NOTE — PLAN OF CARE
Goal Outcome Evaluation:  Plan of Care Reviewed With: patient        Progress: improving  Outcome Evaluation: VSS. Pt's O2 requirements down this shift and is on 3L NC. No signs of SOA or desats this shift. Pt stated this morning that after everything we did yesterday she is feeling like she is breathing better. Pt was able to cough up some mucus today. Pt remains NSR on the monitor. No pain noted.

## 2023-08-29 NOTE — PROGRESS NOTES
NN spoke with pt at BS.  Pt alert and able to answer questions appropriately. Pt O2 sat  93% on  43% HFNC currently, no home O2 use. Pulmonary consulted.  Deep breathing exercises encouraged. No new concerns or questions voiced at this time.  NN will continue to follow as needed.       Per current GOLD Standards, please consider:  No LAMA/LABA/ICS in place, Outpatient PFT, Rehab as appropriate, Palliative Care consult, NRT at [place], Annual LDCT per current screening guidelines (age 50-80 years old, smoking history of 20 pack years or more or has quit within past 15 years)

## 2023-08-29 NOTE — PROGRESS NOTES
[facility] Medicine Services  PROGRESS NOTE    Patient Name: Tessa Bradley  : 1945  MRN: [id number]    Date of Admission: 2023  Length of Stay: 1  Primary Care Physician: Sunni Santoro MD    Subjective   Subjective     CC:  Shortness of breath    HPI:  Patient is sitting comfortably in the recliner bedside.  Reported that her shortness of breath is markedly improved.  Oxygen requirement is still high at 7 L/min, satting 95%.  Having some dry cough.  She feels that there is a lot of phlegm that is so thick that she cannot expectorate.  Denies any chest pain.  Family at bedside and updated    Review of Systems  Gen- No fevers, chills  CV- No chest pain, palpitations  Resp- (+) cough, (+) dyspnea with exertion   GI- No N/V/D, abd pain      Objective   Objective     Vital Signs:   Temp:  [97.9 °F (36.6 °C)-98.9 °F (37.2 °C)] 98.9 °F (37.2 °C)  Heart Rate:  [] 77  Resp:  [17-20] 18  BP: (104-123)/(44-77) 111/55        Physical Exam:  General: Frail and chronically ill looking.  Comfortable and conversant  Head: Atraumatic and normocephalic  Eyes: No Icterus. No pallor  Ears:  Ears appear intact with no abnormalities noted  Throat: No oral lesions, no thrush  Neck: Supple, trachea midline  Lungs: Globally diminished air entry bilateral lung fields.  No wheezing or crackles  Heart:  Normal S1 and S2, no murmur, no gallop, No JVD, no lower extremity swelling  Abdomen:  Soft, no tenderness, no organomegaly, normal bowel sounds, no organomegaly  Extremities: pulses equal bilaterally  Skin: No bleeding, bruising or rash, normal skin turgor and elasticity  Neurologic: Cranial nerves appear intact with no evidence of facial asymmetry, normal motor and sensory functions in all 4 extremities  Psych: Alert and oriented x 3, normal mood      Results Reviewed:    Results from last 7 days   Lab Units 23  0353 23  0421 23  0342 23  1650   WBC 10*3/mm3 8.62 6.87  7.48 7.76   HEMOGLOBIN g/dL 12.0 11.8* 12.5 13.6   HEMATOCRIT % 40.7 41.0 43.6 47.7*   PLATELETS 10*3/mm3 185 176 200 229   PROCALCITONIN ng/mL  --   --   --  0.06       Results from last 7 days   Lab Units 08/27/23  0353 08/26/23  0421 08/25/23  1742 08/25/23  0342 08/24/23  2239 08/24/23 2023 08/24/23  1650   SODIUM mmol/L 142 143 144 146*  --   --  138   POTASSIUM mmol/L 4.8 4.8 5.4* 5.0  --   --  4.5   CHLORIDE mmol/L 103 106 105 106  --   --  100   CO2 mmol/L 35.0* 33.0* 33.0* 35.0*  --   --  28.0   BUN mg/dL 23 20 22 26*  --   --  24*   CREATININE mg/dL 0.97 0.92 1.06* 1.44*  --   --  1.38*   GLUCOSE mg/dL 84 94 292* 113*  --   --  168*   CALCIUM mg/dL 9.2 8.8 8.7 8.6  --   --  9.1   ALT (SGPT) U/L  --   --   --  11  --   --  10   AST (SGOT) U/L  --   --   --  18  --   --  16   HSTROP T ng/L  --   --   --   --  24* 22* 24*   PROBNP pg/mL  --   --   --   --   --   --  8,005.0*       Estimated Creatinine Clearance: 48.5 mL/min (by C-G formula based on SCr of 0.97 mg/dL).    Microbiology Results Abnormal       Procedure Component Value - Date/Time    Blood Culture - Blood, Arm, Right [[id number]]  (Normal) Collected: 08/24/23 1645    Lab Status: Preliminary result Specimen: Blood from Arm, Right Updated: 08/28/23 1715     Blood Culture No growth at 4 days    Blood Culture - Blood, Arm, Left [[id number]]  (Normal) Collected: 08/24/23 1645    Lab Status: Preliminary result Specimen: Blood from Arm, Left Updated: 08/28/23 1715     Blood Culture No growth at 4 days    Respiratory Panel PCR w/COVID-19(SARS-CoV-2) PENNY/CLEVE/ALICE/PAD/COR/MAD/ELYSSA In-House, NP Swab in UTM/VTM, 3-4 HR TAT - Swab, Nasopharynx [[id number]]  (Normal) Collected: 08/24/23 2028    Lab Status: Final result Specimen: Swab from Nasopharynx Updated: 08/24/23 2127     ADENOVIRUS, PCR Not Detected     Coronavirus 229E Not Detected     Coronavirus HKU1 Not Detected     Coronavirus NL63 Not Detected     Coronavirus OC43 Not Detected     COVID19 Not Detected      Human Metapneumovirus Not Detected     Human Rhinovirus/Enterovirus Not Detected     Influenza A PCR Not Detected     Influenza B PCR Not Detected     Parainfluenza Virus 1 Not Detected     Parainfluenza Virus 2 Not Detected     Parainfluenza Virus 3 Not Detected     Parainfluenza Virus 4 Not Detected     RSV, PCR Not Detected     Bordetella pertussis pcr Not Detected     Bordetella parapertussis PCR Not Detected     Chlamydophila pneumoniae PCR Not Detected     Mycoplasma pneumo by PCR Not Detected    Narrative:      In the setting of a positive respiratory panel with a viral infection PLUS a negative procalcitonin without other underlying concern for bacterial infection, consider observing off antibiotics or discontinuation of antibiotics and continue supportive care. If the respiratory panel is positive for atypical bacterial infection (Bordetella pertussis, Chlamydophila pneumoniae, or Mycoplasma pneumoniae), consider antibiotic de-escalation to target atypical bacterial infection.            Imaging Results (Last 24 Hours)       Procedure Component Value Units Date/Time    CT Angiogram Chest [[id number]] Collected: 08/28/23 2244     Updated: 08/28/23 2259    Narrative:      CT ANGIOGRAM CHEST    Date of Exam: 8/28/2023 10:30 PM EDT    Indication: hypoxia.    Comparison: None available.    Technique: CTA of the chest was performed before and after the uneventful intravenous administration of 75 mL Isovue 370. Reconstructed coronal and sagittal images were also obtained. In addition, a 3-D volume rendered image was created for   interpretation. Automated exposure control and iterative reconstruction methods were used.      Findings:  Pulmonary arteries:    Lungs and Pleura: A 6 mm semisolid nodule in the right upper lobe (image 28 series 5). There is minimal basilar atelectasis. The lungs are otherwise clear. There is a trace right pleural effusion  Mediastinum/Clari: No mediastinal or hilar  lymphadenopathy.    Lymph nodes: No axillary or supraclavicular adenopathy.    Cardiovascular: The heart is borderline in size. The pericardium is normal. The aorta and its arch branch vessels are unremarkable.      Upper Abdomen: The upper abdominal contents are unremarkable.    Bones and Soft Tissue: No suspicious osseous lesion.      Impression:      Impression:    1. No evidence of pulmonary embolism    2. Borderline size of the heart    3. A 6 mm semisolid nodule in the right upper lobe    Indeterminate part solid pulmonary nodule measuring 6 mm. For a part solid nodule >=6 mm, recommend CT at 3-6 months to confirm persistence. If unchanged and a solid component remains <6 mm, annual CT should be performed for 5 years. A persistent part   solid nodule with a solid component >=6 mm is highly suspicious.    4. Minimal bibasilar atelectasis        Electronically Signed: Yash Herrmann MD    8/28/2023 10:56 PM EDT    Workstation ID: [id number]            Results for orders placed during the hospital encounter of 08/24/23    Adult Transthoracic Echo Complete W/ Cont if Necessary Per Protocol    Interpretation Summary    Left ventricular systolic function is normal. Left ventricular ejection fraction appears to be 56 - 60%.    Left ventricular diastolic function was normal.    Mild aortic valve stenosis is present. Aortic valve area is 1.2 cm2.    Peak velocity of the flow distal to the aortic valve is 267.2 cm/s. Aortic valve mean pressure gradient is 16 mmHg.      I have reviewed the medications:  Scheduled Meds:apixaban, 5 mg, Oral, Q12H  atorvastatin, 80 mg, Oral, Nightly  budesonide, 0.5 mg, Nebulization, Daily - RT  doxycycline, 100 mg, Oral, Q12H  fluticasone, 2 spray, Each Nare, Daily  gabapentin, 300 mg, Oral, Nightly  guaiFENesin, 1,200 mg, Oral, Q12H  ipratropium-albuterol, 3 mL, Nebulization, 4x Daily - RT  methylPREDNISolone sodium succinate, 60 mg, Intravenous, Q12H  senna-docusate sodium, 2 tablet,  Oral, BID  sodium chloride, 10 mL, Intravenous, Q12H  sodium chloride, 4 mL, Nebulization, Once  tiotropium bromide monohydrate, 2 puff, Inhalation, Daily - RT  torsemide, 40 mg, Oral, Once      Continuous Infusions:     PRN Meds:.  acetaminophen    benzonatate    senna-docusate sodium **AND** polyethylene glycol **AND** bisacodyl **AND** bisacodyl    Calcium Replacement - Follow Nurse / BPA Driven Protocol    cyclobenzaprine    Hydrocod Juancarlos-Chlorphe Juancarlos ER    ipratropium-albuterol    Magnesium Standard Dose Replacement - Follow Nurse / BPA Driven Protocol    ondansetron    Phosphorus Replacement - Follow Nurse / BPA Driven Protocol    Potassium Replacement - Follow Nurse / BPA Driven Protocol    sodium chloride    sodium chloride    sodium chloride      Assessment & Plan   Assessment / Plan     Active Hospital Problems    Diagnosis  POA    **COPD exacerbation [J44.1]  Yes    MAKAYLA (obstructive sleep apnea) [G47.33]  Unknown    Elevated serum creatinine [R79.89]  Yes    Hypotension [I95.9]  Yes    Dyspnea [R06.00]  Yes      Resolved Hospital Problems   No resolved problems to display.        Brief Hospital Course to date:  Tessa Bradley is a 77 y.o. 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 Pt refused to come to the hospital at the time but presented later after family brought her wearing 4L NC. Pt received doxy/steroid therapy    Assessment and plan:  Acute severe COPD exacerbation  Acute hypoxic respiratory insufficiency  MAKAYLA- noncompliant with CPAP  Patient is known to have COPD/emphysema, not on home oxygen and continues to smoke.  She does not follow with pulmonary service.  Also known to have obstructive sleep  apnea but not compliant with CPAP  Here with acute severe COPD exacerbation with acute hypoxia requiring relatively high oxygen dose at 7 L/min  CTA chest with no evidence of PE or significant pneumonia but does show bilateral atelectasis basally  For now, we will continue doxycycline, IV Solu-Medrol, DuoNebs and scheduled dose Mucinex  Will start/give 1 dose of hypertonic saline nebulizer treatment  Encourage incentive spirometry and flutter valve  Continue Pulmicort.  We will add Spiriva  Gentle diuresis with torsemide  Pulmonary team consulted by 19.  Appreciate the help    Ongoing tobacco use  Counseled    Elevated trop   No chest pain or ischemic changes noted on EKG  Elevated troponin likely demand ischemia  Echo is normal  Defer any ischemic work-up    Essential hypertension  Continue atorvastatin, BP stable off meds    DVT Prophylaxis:  SQH    Disposition: I expect the patient to be discharged 08/29/2023. When requiring 2L or less O2    CODE STATUS:   Code Status and Medical Interventions:   Ordered at: 08/24/23 1950     Code Status (Patient has no pulse and is not breathing):    CPR (Attempt to Resuscitate)     Medical Interventions (Patient has pulse or is breathing):    Full Support         Electronically signed by Elieezr Miller MD, 08/29/23, 14:13 EDT.    Copied text in this note has been reviewed and is accurate as of 08/29/23.

## 2023-08-29 NOTE — CONSULTS
INPATIENT PULMONARY SERVICE   PROGRESS NOTE       Chief complaint: Acute hypoxic respiratory failure    History of Present Illness: Patient Mirna is a 77-year-old female with past medical history of hypertension, hyperlipidemia, atrial fibrillation status post ablation x2 and COPD.  Patient was admitted on 8/24/2023 with acute hypoxic respiratory failure from a presumed COPD exacerbation.  Patient notes chronic dyspnea which worsened 2-3x weeks prior to hospitalization.  Review of systems also positive for fatigue, subjective fevers, malaise and productive cough.  She feels overall improved throughout hospitalization but there was an episode early this morning where oxygen saturations dropped and she was placed on high flow nasal cannula transiently.  Pulmonary team was consulted by medicine team given this acute change.  Patient currently on 3L nasal cannula with oxygen saturations in the mid 90s.    PMH: She  has a past medical history of Cancer, COPD (chronic obstructive pulmonary disease), Hyperlipidemia, and Hypertension.   PSxH: She  has a past surgical history that includes Abdominal surgery; Oophorectomy; and Hysterectomy.     Allergies: She is allergic to penicillins.   FH: Her family history includes Breast cancer (age of onset: 67) in her mother.   SH: She  reports that she has been smoking cigarettes. She has a 40.00 pack-year smoking history. She has never used smokeless tobacco. She reports that she does not currently use alcohol. She reports that she does not use drugs.     The patient's relevant past medical, surgical and social history were reviewed and updated in Epic as appropriate.      Medications:  No current facility-administered medications on file prior to encounter.     Current Outpatient Medications on File Prior to Encounter   Medication Sig    albuterol sulfate  (90 Base) MCG/ACT inhaler Inhale 2 puffs Every 4 (Four) Hours As Needed for Wheezing.    apixaban (ELIQUIS) 5 MG tablet  tablet Take 1 tablet by mouth Every 12 (Twelve) Hours.    atorvastatin (LIPITOR) 80 MG tablet Take 1 tablet by mouth Every Night.    gabapentin (NEURONTIN) 300 MG capsule Take 1 capsule by mouth Every Night.    loratadine (CLARITIN) 10 MG tablet Take 1 tablet by mouth Daily.    Mirabegron ER (MYRBETRIQ) 50 MG tablet sustained-release 24 hour 24 hr tablet Take 50 mg by mouth Daily.    traZODone (DESYREL) 100 MG tablet Take 1 tablet by mouth Every Night.    cyclobenzaprine (FLEXERIL) 5 MG tablet Take 1 tablet by mouth 3 (Three) Times a Day As Needed for Muscle Spasms.      Review of Systems: Fourteen point review of system performed and negative except for that mentioned in HPI.     Exam:  Vitals:    08/29/23 1100   BP: 111/55   Pulse: 77   Resp: 18   Temp: 98.9 °F (37.2 °C)   SpO2: 100%     General: The patient appears in no acute distress. Alert, cooperative and interactive.  Neck: Trachea midline, No masses, No JVD.  Chest: Clear to auscultation bilaterally, No wheezing, rhonchi, or rales. Normal work of breathing. Equal chest rise.  Cardiac: Regular rhythm, normal rate, S1S2 auscultated. No murmurs, rubs or gallops.   Extremities: No lower extremity edema. No clubbing or cyanosis.  Skin: No rashes, open wounds, or bruising. Warm, dry, well-perfused.  Neuro: Motor power grossly intact bilaterally. Speech fluid and fluent. Thought process coherent.  Psych: Alert and oriented x3. Mood stable.    Results Reviewed:  - I reviewed the patient's new laboratory and imaging results.   - I independently reviewed the patient's new images.    CTA Chest (8/28/2023):   Radiology Impression:   1. No evidence of pulmonary embolism   2. Borderline size of the heart  3. A 6 mm semisolid nodule in the right upper lobe. Indeterminate part solid pulmonary nodule measuring 6 mm. For a part solid nodule >=6 mm, recommend CT at 3-6 months to confirm persistence. If unchanged and a solid component remains <6 mm, annual CT should be  performed for 5 years. A persistent part  solid nodule with a solid component >=6 mm is highly suspicious  4. Minimal bibasilar atelectasis     Assessment/Plan:    #Acute hypoxic respiratory failure secondary to exacerbation of underlying COPD    -Unclear etiology of acute decompensation earlier today; however, feel that patient is overall improving and given history of obstructive lung disease will likely just take more time.  At time of evaluation today she was placed on 3 L nasal cannula with appropriate oxygen saturations.  No wheezing, crackles, labored respirations etc. on physical exam. it is very likely that patient will need supplemental oxygen at hospital discharge (as she has needed this in the past following prior hospitalizations for exacerbation of underlying obstructive lung disease)  -Personally reviewed CT chest from 8/28/2023.  No PE.  Small, subcentimeter (6 mm) semisolid nodule.  Some evidence of air trapping  -Agree with continued treatment of COPD exacerbation with empiric antibiotics and steroids.  Tomorrow (8/30) day x7 of antimicrobial therapy and feel that discontinuation after this is appropriate.  Based on clinical symptoms and imaging do not feel that there is a large component of a superimposed, bacterial pneumonia.  Patient currently on IV steroids (Solu-Medrol 60 mg every 12 hours) and de-escalated to prednisone with start date on 8/30.  Wrote prolonged taper which should be continued even after hospital discharge  -Patient previously followed with outpatient pulmonary at the [facility] but wishes to stay within the [facility] system so will need follow-up approximately 4-6x weeks after discharge to assess improvement, need for ongoing oxygen therapy and baseline COPD management.  Aforementioned pulmonary nodule should be evaluated with CT chest without contrast in 6 months    -- Lloyd Zhao MD  Pulmonary/Critical Care    [x] Inpatient Pulmonary Consult Service

## 2023-08-30 LAB
ANION GAP SERPL CALCULATED.3IONS-SCNC: 3 MMOL/L (ref 5–15)
BASOPHILS # BLD AUTO: 0.02 10*3/MM3 (ref 0–0.2)
BASOPHILS NFR BLD AUTO: 0.2 % (ref 0–1.5)
BUN SERPL-MCNC: 52 MG/DL (ref 8–23)
BUN/CREAT SERPL: 37.1 (ref 7–25)
CALCIUM SPEC-SCNC: 9 MG/DL (ref 8.6–10.5)
CHLORIDE SERPL-SCNC: 99 MMOL/L (ref 98–107)
CO2 SERPL-SCNC: 42 MMOL/L (ref 22–29)
CREAT SERPL-MCNC: 1.4 MG/DL (ref 0.57–1)
DEPRECATED RDW RBC AUTO: 54.7 FL (ref 37–54)
EGFRCR SERPLBLD CKD-EPI 2021: 39.3 ML/MIN/1.73
EOSINOPHIL # BLD AUTO: 0 10*3/MM3 (ref 0–0.4)
EOSINOPHIL NFR BLD AUTO: 0 % (ref 0.3–6.2)
ERYTHROCYTE [DISTWIDTH] IN BLOOD BY AUTOMATED COUNT: 14.8 % (ref 12.3–15.4)
GLUCOSE SERPL-MCNC: 136 MG/DL (ref 65–99)
HCT VFR BLD AUTO: 46 % (ref 34–46.6)
HGB BLD-MCNC: 13.7 G/DL (ref 12–15.9)
IMM GRANULOCYTES # BLD AUTO: 0.04 10*3/MM3 (ref 0–0.05)
IMM GRANULOCYTES NFR BLD AUTO: 0.4 % (ref 0–0.5)
LYMPHOCYTES # BLD AUTO: 1.02 10*3/MM3 (ref 0.7–3.1)
LYMPHOCYTES NFR BLD AUTO: 11.3 % (ref 19.6–45.3)
MAGNESIUM SERPL-MCNC: 2 MG/DL (ref 1.6–2.4)
MCH RBC QN AUTO: 29.8 PG (ref 26.6–33)
MCHC RBC AUTO-ENTMCNC: 29.8 G/DL (ref 31.5–35.7)
MCV RBC AUTO: 100 FL (ref 79–97)
MONOCYTES # BLD AUTO: 0.47 10*3/MM3 (ref 0.1–0.9)
MONOCYTES NFR BLD AUTO: 5.2 % (ref 5–12)
NEUTROPHILS NFR BLD AUTO: 7.48 10*3/MM3 (ref 1.7–7)
NEUTROPHILS NFR BLD AUTO: 82.9 % (ref 42.7–76)
NRBC BLD AUTO-RTO: 0 /100 WBC (ref 0–0.2)
PLATELET # BLD AUTO: 254 10*3/MM3 (ref 140–450)
PMV BLD AUTO: 10.2 FL (ref 6–12)
POTASSIUM SERPL-SCNC: 5 MMOL/L (ref 3.5–5.2)
RBC # BLD AUTO: 4.6 10*6/MM3 (ref 3.77–5.28)
SODIUM SERPL-SCNC: 144 MMOL/L (ref 136–145)
WBC NRBC COR # BLD: 9.03 10*3/MM3 (ref 3.4–10.8)

## 2023-08-30 PROCEDURE — 63710000001 PREDNISONE PER 1 MG: Performed by: INTERNAL MEDICINE

## 2023-08-30 PROCEDURE — 94761 N-INVAS EAR/PLS OXIMETRY MLT: CPT

## 2023-08-30 PROCEDURE — 94799 UNLISTED PULMONARY SVC/PX: CPT

## 2023-08-30 PROCEDURE — 83735 ASSAY OF MAGNESIUM: CPT | Performed by: INTERNAL MEDICINE

## 2023-08-30 PROCEDURE — 85025 COMPLETE CBC W/AUTO DIFF WBC: CPT | Performed by: INTERNAL MEDICINE

## 2023-08-30 PROCEDURE — 99232 SBSQ HOSP IP/OBS MODERATE 35: CPT | Performed by: INTERNAL MEDICINE

## 2023-08-30 PROCEDURE — 80048 BASIC METABOLIC PNL TOTAL CA: CPT | Performed by: INTERNAL MEDICINE

## 2023-08-30 PROCEDURE — 94664 DEMO&/EVAL PT USE INHALER: CPT

## 2023-08-30 RX ADMIN — APIXABAN 5 MG: 5 TABLET, FILM COATED ORAL at 09:18

## 2023-08-30 RX ADMIN — IPRATROPIUM BROMIDE AND ALBUTEROL SULFATE 3 ML: 2.5; .5 SOLUTION RESPIRATORY (INHALATION) at 16:13

## 2023-08-30 RX ADMIN — IPRATROPIUM BROMIDE AND ALBUTEROL SULFATE 3 ML: 2.5; .5 SOLUTION RESPIRATORY (INHALATION) at 20:06

## 2023-08-30 RX ADMIN — GABAPENTIN 300 MG: 300 CAPSULE ORAL at 20:54

## 2023-08-30 RX ADMIN — IPRATROPIUM BROMIDE AND ALBUTEROL SULFATE 3 ML: 2.5; .5 SOLUTION RESPIRATORY (INHALATION) at 08:34

## 2023-08-30 RX ADMIN — IPRATROPIUM BROMIDE AND ALBUTEROL SULFATE 3 ML: 2.5; .5 SOLUTION RESPIRATORY (INHALATION) at 13:11

## 2023-08-30 RX ADMIN — TIOTROPIUM BROMIDE INHALATION SPRAY 2 PUFF: 3.12 SPRAY, METERED RESPIRATORY (INHALATION) at 08:34

## 2023-08-30 RX ADMIN — APIXABAN 5 MG: 5 TABLET, FILM COATED ORAL at 20:54

## 2023-08-30 RX ADMIN — Medication 10 ML: at 09:19

## 2023-08-30 RX ADMIN — FLUTICASONE PROPIONATE 2 SPRAY: 50 SPRAY, METERED NASAL at 09:19

## 2023-08-30 RX ADMIN — CYCLOBENZAPRINE 5 MG: 10 TABLET, FILM COATED ORAL at 20:53

## 2023-08-30 RX ADMIN — GUAIFENESIN 1200 MG: 600 TABLET, EXTENDED RELEASE ORAL at 09:18

## 2023-08-30 RX ADMIN — PREDNISONE 40 MG: 20 TABLET ORAL at 09:18

## 2023-08-30 RX ADMIN — Medication 10 ML: at 20:54

## 2023-08-30 RX ADMIN — HYDROCODONE POLISTIREX AND CHLORPHENIRAMINE POLISTIREX 2.5 ML: 10; 8 SUSPENSION, EXTENDED RELEASE ORAL at 21:52

## 2023-08-30 RX ADMIN — ATORVASTATIN CALCIUM 80 MG: 40 TABLET, FILM COATED ORAL at 20:54

## 2023-08-30 RX ADMIN — BUDESONIDE 0.5 MG: 0.5 INHALANT RESPIRATORY (INHALATION) at 08:34

## 2023-08-30 RX ADMIN — GUAIFENESIN 1200 MG: 600 TABLET, EXTENDED RELEASE ORAL at 20:54

## 2023-08-30 RX ADMIN — SENNOSIDES AND DOCUSATE SODIUM 2 TABLET: 8.6; 5 TABLET ORAL at 20:54

## 2023-08-30 NOTE — CASE MANAGEMENT/SOCIAL WORK
Continued Stay Note  [facility]     Patient Name: Tessa Bradley  MRN: [id number]  Today's Date: 8/30/2023    Admit Date: 8/24/2023    Plan: Home w/Home Health   Discharge Plan       Row Name 08/30/23 1336       Plan    Plan Home w/Home Health    Patient/Family in Agreement with Plan yes    Plan Comments Spoke with patient in room.  Her plan is home with [facility] for PT, OT and skilled nursing.  Orders in EPIC.  Patient would like to use [employer] company in [place] if oxygen is need at discharge.  Patient currently wearing O2@1L.  Will call referral to [facility] if needed.  CM will continue to follow and notify home health when patient discharges.    Final Discharge Disposition Code 06 - home with home health care                   Discharge Codes    No documentation.                 Expected Discharge Date and Time       Expected Discharge Date Expected Discharge Time    Aug 31, 2023               Nakia Yin RN

## 2023-08-30 NOTE — PROGRESS NOTES
NN spoke with pt at BS.  Pt alert and able to answer questions appropriately. Pt O2 sat 92% on 3  L currently, no home O2 use. COPD action plan reviewed. Deep breathing exercises encouraged. No new concerns or questions voiced at this time.  NN will continue to follow as needed.         Per current [employer] Standards, please consider:  No LAMA/LABA/ICS in place, Outpatient PFT, Rehab as appropriate, Palliative Care consult, NRT at [place], Annual LDCT per current screening guidelines (age 50-80 years old, smoking history of 20 pack years or more or has quit within past 15 years)        Patient has been scheduled for a hospital follow up with [facility] for 9/27/2023 @ 1 pm with ALIZA Mendoza.  Outpatient PFT scheduled for this date as well.

## 2023-08-30 NOTE — PLAN OF CARE
Goal Outcome Evaluation:  Plan of Care Reviewed With: patient  Progress: improving  Outcome Evaluation: Patient's VSS. On 3L nasal cannula throughout the night. NSR on tele. No complaints overnight. Continue plan of care.

## 2023-08-30 NOTE — PLAN OF CARE
Goal Outcome Evaluation:  Plan of Care Reviewed With: patient        Progress: no change  Outcome Evaluation: VSS. Pt currently on 3LNC. Titrated up to 4-5LNC during shift due to SOB. Respiratory notified. NSR on tele. No other complaints at this time.          [facility]

## 2023-08-30 NOTE — PROGRESS NOTES
[facility] Medicine Services  PROGRESS NOTE    Patient Name: Tessa Bradley  : 1945  MRN: [id number]    Date of Admission: 2023  Length of Stay: 2  Primary Care Physician: Sunni Santoro MD    Subjective   Subjective     CC:  Shortness of breath    HPI:  Patient is sitting comfortably in the recliner bedside.  Feeling much better today.  Shortness of breath much improved.  Ox requirements down to 2 L/min.  Diuresed well yesterday    Review of Systems  Gen- No fevers, chills  CV- No chest pain, palpitations  Resp- (+) cough, (+) dyspnea with exertion   GI- No N/V/D, abd pain      Objective   Objective     Vital Signs:   Temp:  [97.5 °F (36.4 °C)-98.9 °F (37.2 °C)] 97.5 °F (36.4 °C)  Heart Rate:  [] 65  Resp:  [16-20] 16  BP: (100-131)/(55-62) 111/60        Physical Exam:  General: Frail and chronically ill looking.  Comfortable and conversant  Head: Atraumatic and normocephalic  Eyes: No Icterus. No pallor  Ears:  Ears appear intact with no abnormalities noted  Throat: No oral lesions, no thrush  Neck: Supple, trachea midline  Lungs: Globally diminished air entry bilateral lung fields.  No wheezing or crackles  Heart:  Normal S1 and S2, no murmur, no gallop, No JVD, no lower extremity swelling  Abdomen:  Soft, no tenderness, no organomegaly, normal bowel sounds, no organomegaly  Extremities: pulses equal bilaterally  Skin: No bleeding, bruising or rash, normal skin turgor and elasticity  Neurologic: Cranial nerves appear intact with no evidence of facial asymmetry, normal motor and sensory functions in all 4 extremities  Psych: Alert and oriented x 3, normal mood      Results Reviewed:    Results from last 7 days   Lab Units 23  0353 23  0421 23  0342 23  1650   WBC 10*3/mm3 8.62 6.87 7.48 7.76   HEMOGLOBIN g/dL 12.0 11.8* 12.5 13.6   HEMATOCRIT % 40.7 41.0 43.6 47.7*   PLATELETS 10*3/mm3 185 176 200 229   PROCALCITONIN ng/mL  --   --   --  0.06        Results from last 7 days   Lab Units 08/27/23  0353 08/26/23  0421 08/25/23  1742 08/25/23  0342 08/24/23  2239 08/24/23 2023 08/24/23  1650   SODIUM mmol/L 142 143 144 146*  --   --  138   POTASSIUM mmol/L 4.8 4.8 5.4* 5.0  --   --  4.5   CHLORIDE mmol/L 103 106 105 106  --   --  100   CO2 mmol/L 35.0* 33.0* 33.0* 35.0*  --   --  28.0   BUN mg/dL 23 20 22 26*  --   --  24*   CREATININE mg/dL 0.97 0.92 1.06* 1.44*  --   --  1.38*   GLUCOSE mg/dL 84 94 292* 113*  --   --  168*   CALCIUM mg/dL 9.2 8.8 8.7 8.6  --   --  9.1   ALT (SGPT) U/L  --   --   --  11  --   --  10   AST (SGOT) U/L  --   --   --  18  --   --  16   HSTROP T ng/L  --   --   --   --  24* 22* 24*   PROBNP pg/mL  --   --   --   --   --   --  8,005.0*       Estimated Creatinine Clearance: 48.5 mL/min (by C-G formula based on SCr of 0.97 mg/dL).    Microbiology Results Abnormal       Procedure Component Value - Date/Time    Blood Culture - Blood, Arm, Right [[id number]]  (Normal) Collected: 08/24/23 1645    Lab Status: Final result Specimen: Blood from Arm, Right Updated: 08/29/23 1715     Blood Culture No growth at 5 days    Blood Culture - Blood, Arm, Left [[id number]]  (Normal) Collected: 08/24/23 1645    Lab Status: Final result Specimen: Blood from Arm, Left Updated: 08/29/23 1715     Blood Culture No growth at 5 days    Respiratory Panel PCR w/COVID-19(SARS-CoV-2) PENNY/CLEVE/ALICE/PAD/COR/MAD/ELYSSA In-House, NP Swab in U[facility], 3-4 HR TAT - Swab, Nasopharynx [[id number]]  (Normal) Collected: 08/24/23 2028    Lab Status: Final result Specimen: Swab from Nasopharynx Updated: 08/24/23 2127     ADENOVIRUS, PCR Not Detected     Coronavirus 229E Not Detected     Coronavirus HKU1 Not Detected     Coronavirus NL63 Not Detected     Coronavirus OC43 Not Detected     COVID19 Not Detected     Human Metapneumovirus Not Detected     Human Rhinovirus/Enterovirus Not Detected     Influenza A PCR Not Detected     Influenza B PCR Not Detected     Parainfluenza Virus 1  Not Detected     Parainfluenza Virus 2 Not Detected     Parainfluenza Virus 3 Not Detected     Parainfluenza Virus 4 Not Detected     RSV, PCR Not Detected     Bordetella pertussis pcr Not Detected     Bordetella parapertussis PCR Not Detected     Chlamydophila pneumoniae PCR Not Detected     Mycoplasma pneumo by PCR Not Detected    Narrative:      In the setting of a positive respiratory panel with a viral infection PLUS a negative procalcitonin without other underlying concern for bacterial infection, consider observing off antibiotics or discontinuation of antibiotics and continue supportive care. If the respiratory panel is positive for atypical bacterial infection (Bordetella pertussis, Chlamydophila pneumoniae, or Mycoplasma pneumoniae), consider antibiotic de-escalation to target atypical bacterial infection.            Imaging Results (Last 24 Hours)       ** No results found for the last 24 hours. **            Results for orders placed during the hospital encounter of 08/24/23    Adult Transthoracic Echo Complete W/ Cont if Necessary Per Protocol    Interpretation Summary    Left ventricular systolic function is normal. Left ventricular ejection fraction appears to be 56 - 60%.    Left ventricular diastolic function was normal.    Mild aortic valve stenosis is present. Aortic valve area is 1.2 cm2.    Peak velocity of the flow distal to the aortic valve is 267.2 cm/s. Aortic valve mean pressure gradient is 16 mmHg.      I have reviewed the medications:  Scheduled Meds:apixaban, 5 mg, Oral, Q12H  atorvastatin, 80 mg, Oral, Nightly  budesonide, 0.5 mg, Nebulization, Daily - RT  fluticasone, 2 spray, Each Nare, Daily  gabapentin, 300 mg, Oral, Nightly  guaiFENesin, 1,200 mg, Oral, Q12H  ipratropium-albuterol, 3 mL, Nebulization, 4x Daily - RT  predniSONE, 40 mg, Oral, Daily   Followed by  [START ON 9/2/2023] predniSONE, 30 mg, Oral, Daily   Followed by  [START ON 9/5/2023] predniSONE, 20 mg, Oral, Daily    Followed by  [START ON 9/8/2023] predniSONE, 10 mg, Oral, Daily  senna-docusate sodium, 2 tablet, Oral, BID  sodium chloride, 10 mL, Intravenous, Q12H  tiotropium bromide monohydrate, 2 puff, Inhalation, Daily - RT      Continuous Infusions:     PRN Meds:.  acetaminophen    benzonatate    senna-docusate sodium **AND** polyethylene glycol **AND** bisacodyl **AND** bisacodyl    Calcium Replacement - Follow Nurse / BPA Driven Protocol    cyclobenzaprine    Hydrocod Juancarlos-Chlorphe Juancarlos ER    ipratropium-albuterol    Magnesium Standard Dose Replacement - Follow Nurse / BPA Driven Protocol    ondansetron    Phosphorus Replacement - Follow Nurse / BPA Driven Protocol    Potassium Replacement - Follow Nurse / BPA Driven Protocol    sodium chloride    sodium chloride    sodium chloride      Assessment & Plan   Assessment / Plan     Active Hospital Problems    Diagnosis  POA    **COPD exacerbation [J44.1]  Yes    MAKAYLA (obstructive sleep apnea) [G47.33]  Unknown    Elevated serum creatinine [R79.89]  Yes    Hypotension [I95.9]  Yes    Dyspnea [R06.00]  Yes      Resolved Hospital Problems   No resolved problems to display.        Brief Hospital Course to date:  Tessa Bradley is a 77 y.o. 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 Pt refused to come to the hospital at the time but presented later after family brought her wearing 4L NC. Pt received doxy/steroid therapy    Assessment and plan:  Acute severe COPD exacerbation  Acute hypoxic respiratory insufficiency  MAKAYLA- noncompliant with CPAP  Patient is known to have COPD/emphysema, not on home oxygen and continues to smoke.  She does not follow with pulmonary service.  Also known to  have obstructive sleep apnea but not compliant with CPAP  Here with acute severe COPD exacerbation with acute hypoxia requiring relatively high oxygen dose at 7 L/min.  No oxygen requirement improved and currently requiring 2 to 3 L/min.  Might need to go home with oxygen  CTA chest with no evidence of PE or significant pneumonia but does show bilateral atelectasis basally  For now, we will continue doxycycline, steroids, DuoNebs and scheduled dose Mucinex  Continue to encourage incentive spirometry and flutter valve  Continue Pulmicort and Spiriva  As needed diuresis   Pulmonary team consulted following.  Appreciate the help    Ongoing tobacco use  Counseled    Elevated trop   No chest pain or ischemic changes noted on EKG  Elevated troponin likely demand ischemia  Echo is normal  Defer any ischemic work-up    Essential hypertension  Continue atorvastatin, BP stable off meds    DVT Prophylaxis:  SQH    Disposition: I expect the patient to be discharged 08/29/2023. When requiring 2L or less O2    CODE STATUS:   Code Status and Medical Interventions:   Ordered at: 08/24/23 1950     Code Status (Patient has no pulse and is not breathing):    CPR (Attempt to Resuscitate)     Medical Interventions (Patient has pulse or is breathing):    Full Support         Electronically signed by Eliezer Miller MD, 08/30/23, 06:16 EDT.    Copied text in this note has been reviewed and is accurate as of 08/30/23.

## 2023-08-31 ENCOUNTER — READMISSION MANAGEMENT (OUTPATIENT)
Dept: CALL CENTER | Facility: HOSPITAL | Age: 78
End: 2023-08-31
Payer: MEDICARE

## 2023-08-31 VITALS
RESPIRATION RATE: 18 BRPM | BODY MASS INDEX: 26.8 KG/M2 | DIASTOLIC BLOOD PRESSURE: 63 MMHG | TEMPERATURE: 97.2 F | SYSTOLIC BLOOD PRESSURE: 106 MMHG | WEIGHT: 157 LBS | HEART RATE: 87 BPM | HEIGHT: 64 IN | OXYGEN SATURATION: 92 %

## 2023-08-31 PROCEDURE — 99239 HOSP IP/OBS DSCHRG MGMT >30: CPT | Performed by: INTERNAL MEDICINE

## 2023-08-31 PROCEDURE — 63710000001 PREDNISONE PER 1 MG: Performed by: INTERNAL MEDICINE

## 2023-08-31 RX ORDER — BUDESONIDE, GLYCOPYRROLATE, AND FORMOTEROL FUMARATE 160; 9; 4.8 UG/1; UG/1; UG/1
2 AEROSOL, METERED RESPIRATORY (INHALATION) 2 TIMES DAILY
Status: ON HOLD
Start: 2023-08-31 | End: 2023-09-08 | Stop reason: SDUPTHER

## 2023-08-31 RX ORDER — PREDNISONE 20 MG/1
20 TABLET ORAL DAILY
Qty: 3 TABLET | Refills: 0 | Status: SHIPPED | OUTPATIENT
Start: 2023-09-05 | End: 2023-09-08 | Stop reason: HOSPADM

## 2023-08-31 RX ORDER — TORSEMIDE 10 MG/1
10 TABLET ORAL DAILY
Qty: 90 TABLET | Refills: 0 | Status: SHIPPED | OUTPATIENT
Start: 2023-08-31 | End: 2023-09-08 | Stop reason: HOSPADM

## 2023-08-31 RX ORDER — PREDNISONE 10 MG/1
10 TABLET ORAL DAILY
Qty: 3 TABLET | Refills: 0 | Status: SHIPPED | OUTPATIENT
Start: 2023-09-08 | End: 2023-09-08 | Stop reason: HOSPADM

## 2023-08-31 RX ORDER — PREDNISONE 10 MG/1
30 TABLET ORAL DAILY
Qty: 9 TABLET | Refills: 0 | Status: SHIPPED | OUTPATIENT
Start: 2023-09-02 | End: 2023-09-05

## 2023-08-31 RX ORDER — PREDNISONE 20 MG/1
40 TABLET ORAL DAILY
Qty: 4 TABLET | Refills: 0 | Status: SHIPPED | OUTPATIENT
Start: 2023-08-31 | End: 2023-09-02

## 2023-08-31 RX ORDER — GUAIFENESIN 600 MG/1
1200 TABLET, EXTENDED RELEASE ORAL EVERY 12 HOURS SCHEDULED
Qty: 28 TABLET | Refills: 0 | Status: ON HOLD | OUTPATIENT
Start: 2023-08-31 | End: 2023-09-08

## 2023-08-31 RX ADMIN — SENNOSIDES AND DOCUSATE SODIUM 2 TABLET: 8.6; 5 TABLET ORAL at 09:04

## 2023-08-31 RX ADMIN — Medication 10 ML: at 09:04

## 2023-08-31 RX ADMIN — APIXABAN 5 MG: 5 TABLET, FILM COATED ORAL at 09:04

## 2023-08-31 RX ADMIN — FLUTICASONE PROPIONATE 2 SPRAY: 50 SPRAY, METERED NASAL at 09:04

## 2023-08-31 RX ADMIN — PREDNISONE 40 MG: 20 TABLET ORAL at 09:04

## 2023-08-31 RX ADMIN — GUAIFENESIN 1200 MG: 600 TABLET, EXTENDED RELEASE ORAL at 09:04

## 2023-08-31 NOTE — DISCHARGE INSTR - APPOINTMENTS
Follow up with Sunni Santoro MD  Monday September 11, 2023  9:50 AM    [address] [id number]

## 2023-08-31 NOTE — PLAN OF CARE
Goal Outcome Evaluation:         Patient remains on 2.5 liters per minute nasal cannula oxygen and sats are 90's when resting and 89-90% when up doing any activity. Denies pain, states general soreness.

## 2023-08-31 NOTE — PROGRESS NOTES
NN spoke with pt at .  Pt alert and able to answer questions appropriately. Pt O2 on RA currently, no home O2 use. COPD action plan reviewed. Deep breathing exercises encouraged. No new concerns or questions voiced at this time.  NN will continue to follow as needed.       Per current GOLD Standards, please consider:  No LAMA/LABA/ICS in place, Outpatient PFT, Rehab as appropriate, Palliative Care consult, NRT at [place], Annual LDCT per current screening guidelines (age 50-80 years old, smoking history of 20 pack years or more or has quit within past 15 years)          Patient has been scheduled for a hospital follow up with [facility] for 9/27/2023 @ 1 pm with ALIZA Mendoza.  Outpatient PFT scheduled for this date as well.

## 2023-08-31 NOTE — CASE MANAGEMENT/SOCIAL WORK
Case Management Discharge Note      Final Note: Patients plan is home with [facility] for PT/OT/Nursing.  Notified Sindy that patient was discharging.  Patient currently on room air, no other needs noted.         Selected Continued Care - Discharged on 8/31/2023 Admission date: 8/24/2023 - Discharge disposition: Home or Self Care      Destination    No services have been selected for the patient.                Durable Medical Equipment    No services have been selected for the patient.                Dialysis/Infusion    No services have been selected for the patient.                Home Medical Care       Service Provider Selected Services Address Phone Fax Patient Preferred    [facility] [address] [place], [place] [id number] [phone] --              Therapy    No services have been selected for the patient.                Community Resources    No services have been selected for the patient.                Community & DME    No services have been selected for the patient.                         Final Discharge Disposition Code: 06 - home with home health care

## 2023-08-31 NOTE — DISCHARGE SUMMARY
[facility] Medicine Services  DISCHARGE SUMMARY    Patient Name: Tessa Bradley  : 1945  MRN: [id number]    Date of Admission: 2023  4:48 PM  Date of Discharge: 2023  Primary Care Physician: Sunni Santoro MD    Consults       Date and Time Order Name Status Description    2023 12:32 AM Inpatient Pulmonology Consult Completed             Hospital Course     Presenting Problem:     Active Hospital Problems    Diagnosis  POA    **COPD exacerbation [J44.1]  Yes    MAKAYLA (obstructive sleep apnea) [G47.33]  Unknown    Elevated serum creatinine [R79.89]  Yes    Hypotension [I95.9]  Yes    Dyspnea [R06.00]  Yes      Resolved Hospital Problems   No resolved problems to display.          Hospital Course:  Tessa Bradley is a 77 y.o. female HTN, HLD, Afib (eliquis-cardioverted twice), COPD ongoing tobacco use, history of stage IB nodular sclerosing Hodgkins disease diagnosed in 2010  s/p radiation to the left neck and chemo, arthritis, obstructive sleep apnea, intolerant to CPAP who presented to the hospital with acute severe COPD exacerbation with acute hypoxic respiratory insufficiency.  She is not using oxygen at home but required oxygen during this hospitalization.  Treated with IV steroids, DuoNebs, prospective antibiotic therapy and improved.  Seen by pulmonary and was discharged on tapered dose steroids and close follow-up with pulmonary team as outpatient.  On day of discharge, her oxygen saturation was 92% on room air at the end of 6-minute walk test and did not qualify for home O2    Acute severe COPD exacerbation  Acute hypoxic respiratory insufficiency  MAKAYLA- noncompliant with CPAP  Patient is known to have COPD/emphysema, not on home oxygen and continues to smoke.  She does not follow with pulmonary service.  Also known to have obstructive sleep apnea but not compliant with CPAP  Her COPD exacerbation was severe for her to require oxygen supplementation 7  L/min that was gradually weaned off with medical management of her COPD  And day of discharge, her ox requirement was room air, satting 92% at end of 6-minute walk test.  Did not qualify for home O2  CTA chest with no evidence of PE or significant pneumonia but does show bilateral atelectasis basally  For now, we will continue doxycycline, steroids, DuoNebs and scheduled dose Mucinex  Continue to encourage incentive spirometry and flutter valve  She is using Breztri inhaler that was prescribed to her by her family doctor plus rescue inhaler that I kept  She was seen by pulmonary service during this hospitalization and recommended discharge on taper dose steroids  She does not have any outpatient pulmonary follow-up as outpatient.  Given referral to see pulmonary service with [facility] after discharge     Ongoing tobacco use  Counseled     Elevated trop   No chest pain or ischemic changes noted on EKG  Elevated troponin likely demand ischemia  Echo is normal  Defer any ischemic work-up     Essential hypertension  Continue atorvastatin, BP stable off meds       Discharge Follow Up Recommendations for outpatient labs/diagnostics:  PCP in 1 week  Pulmonary referral placed in epic    Day of Discharge     HPI:   I have seen and evaluated the patient this morning.  Comfortable in bed.  Shortness of breath much improved.  Minimal wheezing and minimal cough.  Counseled again about tobacco use.  6-minute walk test revealed that she does not qualify for home O2 with oxygen saturation 92% on room air at end of 6-minute walk test    Review of Systems  General : no fevers, chills  CVS: No chest pain, palpitations  Respiratory: Minimal cough, improved dyspnea  GI: No N/V/D, abd pain  10 point review of systems is negative except for what is mentioned in HPI    Vital Signs:   Temp:  [97.2 °F (36.2 °C)-98.2 °F (36.8 °C)] 97.2 °F (36.2 °C)  Heart Rate:  [66-99] 87  Resp:  [16-18] 18  BP: ()/(53-84) 106/63  Flow (L/min):  [1-4]  2.5      Physical Exam:  General: Comfortable, not in distress, conversant and cooperative  Head: Atraumatic and normocephalic  Eyes: No Icterus. No pallor  Ears:  Ears appear intact with no abnormalities noted  Throat: No oral lesions, no thrush  Neck: Supple, trachea midline  Lungs: Diminished air entry both lung fields with scattered minimal wheeze.  No crackles  Heart:  Normal S1 and S2, no murmur, no gallop, No JVD, no lower extremity swelling  Abdomen:  Soft, no tenderness, no organomegaly, normal bowel sounds, no organomegaly  Extremities: pulses equal bilaterally  Skin: No bleeding, bruising or rash, normal skin turgor and elasticity  Neurologic: Cranial nerves appear intact with no evidence of facial asymmetry, normal motor and sensory functions in all 4 extremities  Psych: Alert and oriented x 3, normal mood    Pertinent  and/or Most Recent Results     LAB RESULTS:      Lab 08/30/23  1039 08/27/23  0353 08/26/23  0421 08/25/23 0342 08/24/23 2023 08/24/23  1650   WBC 9.03 8.62 6.87 7.48  --  7.76   HEMOGLOBIN 13.7 12.0 11.8* 12.5  --  13.6   HEMATOCRIT 46.0 40.7 41.0 43.6  --  47.7*   PLATELETS 254 185 176 200  --  229   NEUTROS ABS 7.48*  --  4.84 4.68  --  5.65   IMMATURE GRANS (ABS) 0.04  --  0.03 0.03  --  0.02   LYMPHS ABS 1.02  --  1.36 1.76  --  1.40   MONOS ABS 0.47  --  0.59 0.70  --  0.51   EOS ABS 0.00  --  0.03 0.27  --  0.14   .0* 103.3* 104.6* 105.6*  --  105.8*   PROCALCITONIN  --   --   --   --   --  0.06   LACTATE  --   --   --   --  1.2 2.7*   D DIMER QUANT  --   --   --   --   --  <0.27         Lab 08/30/23  1151 08/27/23  0353 08/26/23  0421 08/25/23 1742 08/25/23 0342   SODIUM 144 142 143 144 146*   POTASSIUM 5.0 4.8 4.8 5.4* 5.0   CHLORIDE 99 103 106 105 106   CO2 42.0* 35.0* 33.0* 33.0* 35.0*   ANION GAP 3.0* 4.0* 4.0* 6.0 5.0   BUN 52* 23 20 22 26*   CREATININE 1.40* 0.97 0.92 1.06* 1.44*   EGFR 39.3* 61.1 65.1 54.9* 38.0*   GLUCOSE 136* 84 94 292* 113*   CALCIUM 9.0 9.2  8.8 8.7 8.6   MAGNESIUM 2.0  --   --   --   --          Lab 08/25/23  0342 08/24/23  1650   TOTAL PROTEIN 5.6* 6.5   ALBUMIN 3.4* 3.5   GLOBULIN 2.2 3.0   ALT (SGPT) 11 10   AST (SGOT) 18 16   BILIRUBIN 0.4 0.6   ALK PHOS 92 95         Lab 08/24/23  2239 08/24/23 2023 08/24/23  1650   PROBNP  --   --  8,005.0*   HSTROP T 24* 22* 24*                 Lab 08/28/23  1902   PH, ARTERIAL 7.358   PCO2, ARTERIAL 64.5*   PO2 ART 80.7*   FIO2 50   HCO3 ART 36.2*   BASE EXCESS ART 8.5*   CARBOXYHEMOGLOBIN 1.6     Brief Urine Lab Results       None          Microbiology Results (last 10 days)       Procedure Component Value - Date/Time    Respiratory Panel PCR w/COVID-19(SARS-CoV-2) [facility] In-House, NP Swab in UTM/VTM, 3-4 HR TAT - Swab, Nasopharynx [[id number]]  (Normal) Collected: 08/24/23 2028    Lab Status: Final result Specimen: Swab from Nasopharynx Updated: 08/24/23 2127     ADENOVIRUS, PCR Not Detected     Coronavirus 229E Not Detected     Coronavirus HKU1 Not Detected     Coronavirus NL63 Not Detected     Coronavirus OC43 Not Detected     COVID19 Not Detected     Human Metapneumovirus Not Detected     Human Rhinovirus/Enterovirus Not Detected     Influenza A PCR Not Detected     Influenza B PCR Not Detected     Parainfluenza Virus 1 Not Detected     Parainfluenza Virus 2 Not Detected     Parainfluenza Virus 3 Not Detected     Parainfluenza Virus 4 Not Detected     RSV, PCR Not Detected     Bordetella pertussis pcr Not Detected     Bordetella parapertussis PCR Not Detected     Chlamydophila pneumoniae PCR Not Detected     Mycoplasma pneumo by PCR Not Detected    Narrative:      In the setting of a positive respiratory panel with a viral infection PLUS a negative procalcitonin without other underlying concern for bacterial infection, consider observing off antibiotics or discontinuation of antibiotics and continue supportive care. If the respiratory panel is positive for atypical bacterial infection  (Bordetella pertussis, Chlamydophila pneumoniae, or Mycoplasma pneumoniae), consider antibiotic de-escalation to target atypical bacterial infection.    Blood Culture - Blood, Arm, Right [[id number]]  (Normal) Collected: 08/24/23 1645    Lab Status: Final result Specimen: Blood from Arm, Right Updated: 08/29/23 1715     Blood Culture No growth at 5 days    Blood Culture - Blood, Arm, Left [[id number]]  (Normal) Collected: 08/24/23 1645    Lab Status: Final result Specimen: Blood from Arm, Left Updated: 08/29/23 1715     Blood Culture No growth at 5 days            Adult Transthoracic Echo Complete W/ Cont if Necessary Per Protocol    Result Date: 8/25/2023    Left ventricular systolic function is normal. Left ventricular ejection fraction appears to be 56 - 60%.   Left ventricular diastolic function was normal.   Mild aortic valve stenosis is present. Aortic valve area is 1.2 cm2.   Peak velocity of the flow distal to the aortic valve is 267.2 cm/s. Aortic valve mean pressure gradient is 16 mmHg.     CT Angiogram Chest    Result Date: 8/28/2023  CT ANGIOGRAM CHEST Date of Exam: 8/28/2023 10:30 PM EDT Indication: hypoxia. Comparison: None available. Technique: CTA of the chest was performed before and after the uneventful intravenous administration of 75 mL Isovue 370. Reconstructed coronal and sagittal images were also obtained. In addition, a 3-D volume rendered image was created for interpretation. Automated exposure control and iterative reconstruction methods were used. Findings: Pulmonary arteries: Lungs and Pleura: A 6 mm semisolid nodule in the right upper lobe (image 28 series 5). There is minimal basilar atelectasis. The lungs are otherwise clear. There is a trace right pleural effusion Mediastinum/Clari: No mediastinal or hilar lymphadenopathy. Lymph nodes: No axillary or supraclavicular adenopathy. Cardiovascular: The heart is borderline in size. The pericardium is normal. The aorta and its arch branch  vessels are unremarkable.  Upper Abdomen: The upper abdominal contents are unremarkable. Bones and Soft Tissue: No suspicious osseous lesion.     Impression: 1. No evidence of pulmonary embolism 2. Borderline size of the heart 3. A 6 mm semisolid nodule in the right upper lobe Indeterminate part solid pulmonary nodule measuring 6 mm. For a part solid nodule >=6 mm, recommend CT at 3-6 months to confirm persistence. If unchanged and a solid component remains <6 mm, annual CT should be performed for 5 years. A persistent part solid nodule with a solid component >=6 mm is highly suspicious. 4. Minimal bibasilar atelectasis Electronically Signed: Yash Herrmann MD  8/28/2023 10:56 PM EDT  Workstation ID: [id number]    XR Chest 1 View    Result Date: 8/28/2023  XR CHEST 1 VW Date of Exam: 8/28/2023 1:15 PM EDT Indication: hypoxia Comparison: 8/24/2023 Findings: Heart shadow is in the upper range of normal size. Vasculature is mildly cephalized. Mild chronic-appearing interstitial lung changes appear similar to the prior study. There is minimal new left effusion and atelectasis. No pneumothorax is seen.     Impression: 1. Stable mild pulmonary venous hypertension. 2. Minimal new left effusion or atelectasis. Electronically Signed: Jamison Rueda MD  8/28/2023 1:43 PM EDT  Workstation ID: [id number]    XR Chest 1 View    Result Date: 8/24/2023  XR CHEST 1 VW Date of Exam: 8/24/2023 4:55 PM EDT Indication: SOA triage protocol Comparison: CT chest without contrast 2/19/2018 Findings: Heart size top normal. Lungs are without consolidation. No pneumothorax or pleural effusion. Osseous structures intact.     Impression: No acute process. Electronically Signed: Jose G Martinez MD  8/24/2023 5:29 PM EDT  Workstation ID: [id number]             Results for orders placed during the hospital encounter of 08/24/23    Adult Transthoracic Echo Complete W/ Cont if Necessary Per Protocol    Interpretation Summary    Left ventricular systolic function  is normal. Left ventricular ejection fraction appears to be 56 - 60%.    Left ventricular diastolic function was normal.    Mild aortic valve stenosis is present. Aortic valve area is 1.2 cm2.    Peak velocity of the flow distal to the aortic valve is 267.2 cm/s. Aortic valve mean pressure gradient is 16 mmHg.      Plan for Follow-up of Pending Labs/Results:     Discharge Details        Discharge Medications        New Medications        Instructions Start Date   Breztri Aerosphere 160-9-4.8 MCG/ACT aerosol inhaler  Generic drug: Budeson-Glycopyrrol-Formoterol   2 puffs, Inhalation, 2 Times Daily      guaiFENesin 600 MG 12 hr tablet  Commonly known as: MUCINEX   1,200 mg, Oral, Every 12 Hours Scheduled      predniSONE 20 MG tablet  Commonly known as: DELTASONE   40 mg, Oral, Daily      predniSONE 10 MG tablet  Commonly known as: DELTASONE   30 mg, Oral, Daily   Start Date: September 2, 2023     predniSONE 20 MG tablet  Commonly known as: DELTASONE   20 mg, Oral, Daily   Start Date: September 5, 2023     predniSONE 10 MG tablet  Commonly known as: DELTASONE   10 mg, Oral, Daily   Start Date: September 8, 2023     torsemide 10 MG tablet  Commonly known as: DEMADEX   10 mg, Oral, Daily             Continue These Medications        Instructions Start Date   albuterol sulfate  (90 Base) MCG/ACT inhaler  Commonly known as: PROVENTIL HFA;VENTOLIN HFA;PROAIR HFA   2 puffs, Inhalation, Every 4 Hours PRN      apixaban 5 MG tablet tablet  Commonly known as: ELIQUIS   5 mg, Oral, Every 12 Hours Scheduled      atorvastatin 80 MG tablet  Commonly known as: LIPITOR   80 mg, Oral, Nightly      cyclobenzaprine 5 MG tablet  Commonly known as: FLEXERIL   5 mg, Oral, 3 Times Daily PRN      gabapentin 300 MG capsule  Commonly known as: NEURONTIN   300 mg, Oral, Nightly      loratadine 10 MG tablet  Commonly known as: CLARITIN   10 mg, Oral, Daily      Mirabegron ER 50 MG tablet sustained-release 24 hour 24 hr tablet  Commonly  known as: MYRBETRIQ   50 mg, Oral, Daily      traZODone 100 MG tablet  Commonly known as: DESYREL   100 mg, Oral, Nightly               Allergies   Allergen Reactions    Penicillins Anaphylaxis         Discharge Disposition:  Home or Self Care    Diet:  Hospital:  Diet Order   Procedures    Diet: Regular/House Diet; Texture: Regular Texture (IDDSI 7); Fluid Consistency: Thin (IDDSI 0)       Activity:  Activity Instructions       Activity as Tolerated              Restrictions or Other Recommendations:  None       CODE STATUS:    Code Status and Medical Interventions:   Ordered at: 08/24/23 1950     Code Status (Patient has no pulse and is not breathing):    CPR (Attempt to Resuscitate)     Medical Interventions (Patient has pulse or is breathing):    Full Support       Future Appointments   Date Time Provider Department Center   9/27/2023  1:00 PM [facility], PFT LAB 2 [facility]   9/27/2023  1:30 PM Bhavya Delgadillo APRN [facility]       Additional Instructions for the Follow-ups that You Need to Schedule       Ambulatory Referral to Home Health   As directed      Face to Face Visit Date: 8/30/2023   Follow-up provider for Plan of Care?: I treated the patient in an acute care facility and will not continue treatment after discharge.   Follow-up provider: DANIEL CORTEZ [[id number]]   Reason/Clinical Findings: COPD   Describe mobility limitations that make leaving home difficult: impaired functional mobility, balance, gait and endurance   Nursing/Therapeutic Services Requested: Skilled Nursing Physical Therapy Occupational Therapy   Skilled nursing orders: O2 instruction COPD management   PT orders: Gait Training Therapeutic exercise Home safety assessment Strengthening   Weight Bearing Status: As Tolerated   Occupational orders: Activities of daily living Energy conservation Strengthening Home safety assessment   Frequency: 1 Week 1                      Eliezer Miller,  MD  08/31/23      Time Spent on Discharge:  I spent 40 minutes on this discharge activity which included: face-to-face encounter with the patient, reviewing the data in the system, coordination of the care with the nursing staff as well as consultants, documentation, and entering orders.

## 2023-09-01 NOTE — OUTREACH NOTE
Prep Survey      Flowsheet Row Responses   [facility] patient discharged from? [facility]   Is LACE score < 7 ? No   Eligibility Readm Mgmt   Discharge diagnosis Acute severe COPD exacerbation, Acute hypoxic resp insufficiency   Does the patient have one of the following disease processes/diagnoses(primary or secondary)? COPD   Does the patient have Home health ordered? Yes   What is the Home health agency?  [facility]   Is there a DME ordered? No   Prep survey completed? Yes            Ambreen LI - Registered Nurse

## 2023-09-05 ENCOUNTER — READMISSION MANAGEMENT (OUTPATIENT)
Dept: CALL CENTER | Facility: HOSPITAL | Age: 78
End: 2023-09-05
Payer: MEDICARE

## 2023-09-05 NOTE — OUTREACH NOTE
COPD/PN Week 1 Survey      Flowsheet Row Responses   [facility] patient discharged from? [facility]   Does the patient have one of the following disease processes/diagnoses(primary or secondary)? COPD   Week 1 attempt successful? Yes   Call start time 0949   Call end time 1009   Discharge diagnosis Acute severe COPD exacerbation, Acute hypoxic resp insufficiency   Meds reviewed with patient/caregiver? Yes   Is the patient having any side effects they believe may be caused by any medication additions or changes? Yes   Side effects comments  discussed side effects of steroids---pt taking taper dose   Does the patient have all medications ordered at discharge? Yes   Is the patient taking all medications as directed (includes completed medication regime)? Yes   Medication comments Reports addition of torsemide has been beneficial--wt post disharge 157#, 161# and 159# today   Comments regarding appointments PFT 9/27/23@100pm and then appt at 130pm   Does the patient have a primary care provider?  Yes   Does the patient have an appointment with their PCP or specialist within 7 days of discharge? No   Comments regarding PCP 9/11/23@950am   Nursing Interventions Verified appointment date/time/provider   Has the patient kept scheduled appointments due by today? N/A   What is the Home health agency?  [facility]    Has home health visited the patient within 72 hours of discharge? Yes   Home health comments Pt reports she is using her cane as feels off balance,  she plans on discussing concerns with  and P.T. today.  She has been treated for back spasms and takes flexeril for tx   Pulse Ox monitoring Intermittent   Pulse Ox device source Patient   O2 Sat comments Pt uses watch to monitor sats and reports have been 96% on room air   O2 Sat: education provided Sat levels, When to seek care   Psychosocial issues? No   Did the patient receive a copy of their discharge instructions? Yes   Nursing interventions Reviewed  instructions with patient   What is the patient's perception of their health status since discharge? Improving   Nursing Interventions Nurse provided patient education  [Pt has rescue MDI for use and continues to use IS and flutter valve which she says have been very helpful in her recovery]   If the patient is a current smoker, are they able to teach back resources for cessation? Smoking cessation medications  [Pt has decreased to 2 cigarettes a day--encouraged to discuss medication for cravings at f/u appt]   Is the patient/caregiver able to teach back the hierarchy of who to call/visit for symptoms/problems? PCP, Specialist, Home health nurse, Urgent Care, ED, 911 Yes   Additional teach back comments Pt has c/o intermittent dizziness--reports her BP has been without issues, she will discuss with  nurse today, uses DME for stability.   Is the patient able to teach back COPD zones? Yes   Nursing interventions Education provided on various zones   Patient reports what zone on this call? Green Zone   Green Zone Reports doing well, Breathing without shortness of breath, Usual activity and exercise level, Usual amounts of cough and phlegm/mucous, Slept well last night, Appetite is good   Green Zone interventions: Take daily medications, Continue regular exercise/diet plan, At all times avoid cigarette smoking, vaping and inhaled irritants   Week 1 call completed? Yes   Call end time 1009            QASIM OLSON - Registered Nurse

## 2023-09-06 ENCOUNTER — APPOINTMENT (OUTPATIENT)
Dept: GENERAL RADIOLOGY | Facility: HOSPITAL | Age: 78
End: 2023-09-06
Payer: MEDICARE

## 2023-09-06 PROCEDURE — 93005 ELECTROCARDIOGRAM TRACING: CPT

## 2023-09-06 PROCEDURE — 93005 ELECTROCARDIOGRAM TRACING: CPT | Performed by: EMERGENCY MEDICINE

## 2023-09-06 PROCEDURE — 99284 EMERGENCY DEPT VISIT MOD MDM: CPT

## 2023-09-06 PROCEDURE — 71045 X-RAY EXAM CHEST 1 VIEW: CPT

## 2023-09-06 RX ORDER — SODIUM CHLORIDE 0.9 % (FLUSH) 0.9 %
10 SYRINGE (ML) INJECTION AS NEEDED
Status: DISCONTINUED | OUTPATIENT
Start: 2023-09-06 | End: 2023-09-08 | Stop reason: HOSPADM

## 2023-09-07 ENCOUNTER — HOSPITAL ENCOUNTER (OUTPATIENT)
Facility: HOSPITAL | Age: 78
Setting detail: OBSERVATION
LOS: 1 days | Discharge: HOME | End: 2023-09-08
Attending: EMERGENCY MEDICINE | Admitting: FAMILY MEDICINE
Payer: MEDICARE

## 2023-09-07 ENCOUNTER — READMISSION MANAGEMENT (OUTPATIENT)
Dept: CALL CENTER | Facility: HOSPITAL | Age: 78
End: 2023-09-07
Payer: MEDICARE

## 2023-09-07 DIAGNOSIS — G47.33 OSA (OBSTRUCTIVE SLEEP APNEA): ICD-10-CM

## 2023-09-07 DIAGNOSIS — J44.1 COPD EXACERBATION: ICD-10-CM

## 2023-09-07 DIAGNOSIS — Z86.79 HISTORY OF HYPERTENSION: ICD-10-CM

## 2023-09-07 DIAGNOSIS — M19.90 ARTHRITIS: ICD-10-CM

## 2023-09-07 DIAGNOSIS — Z86.39 HISTORY OF HYPERLIPIDEMIA: ICD-10-CM

## 2023-09-07 DIAGNOSIS — J96.21 ACUTE ON CHRONIC RESPIRATORY FAILURE WITH HYPOXIA: Primary | ICD-10-CM

## 2023-09-07 DIAGNOSIS — Z87.09 HISTORY OF COPD: ICD-10-CM

## 2023-09-07 PROBLEM — C81.90 HODGKIN DISEASE: Status: ACTIVE | Noted: 2023-09-07

## 2023-09-07 PROBLEM — D72.829 LEUKOCYTOSIS: Status: ACTIVE | Noted: 2023-09-07

## 2023-09-07 PROBLEM — I10 HTN (HYPERTENSION): Status: ACTIVE | Noted: 2023-09-07

## 2023-09-07 PROBLEM — I48.91 A-FIB: Status: ACTIVE | Noted: 2023-09-07

## 2023-09-07 PROBLEM — E78.5 HLD (HYPERLIPIDEMIA): Status: ACTIVE | Noted: 2023-09-07

## 2023-09-07 PROBLEM — N17.9 AKI (ACUTE KIDNEY INJURY): Status: ACTIVE | Noted: 2023-09-07

## 2023-09-07 LAB
ALBUMIN SERPL-MCNC: 4.1 G/DL (ref 3.5–5.2)
ALBUMIN/GLOB SERPL: 1.5 G/DL
ALP SERPL-CCNC: 92 U/L (ref 39–117)
ALT SERPL W P-5'-P-CCNC: 29 U/L (ref 1–33)
ANION GAP SERPL CALCULATED.3IONS-SCNC: 12 MMOL/L (ref 5–15)
ANION GAP SERPL CALCULATED.3IONS-SCNC: 6 MMOL/L (ref 5–15)
ARTERIAL PATENCY WRIST A: ABNORMAL
AST SERPL-CCNC: 27 U/L (ref 1–32)
ATMOSPHERIC PRESS: ABNORMAL MM[HG]
B PARAPERT DNA SPEC QL NAA+PROBE: NOT DETECTED
B PERT DNA SPEC QL NAA+PROBE: NOT DETECTED
BASE EXCESS BLDA CALC-SCNC: 9.5 MMOL/L (ref 0–2)
BASOPHILS # BLD AUTO: 0.04 10*3/MM3 (ref 0–0.2)
BASOPHILS # BLD AUTO: 0.05 10*3/MM3 (ref 0–0.2)
BASOPHILS NFR BLD AUTO: 0.2 % (ref 0–1.5)
BASOPHILS NFR BLD AUTO: 0.3 % (ref 0–1.5)
BDY SITE: ABNORMAL
BILIRUB SERPL-MCNC: 0.2 MG/DL (ref 0–1.2)
BILIRUB UR QL STRIP: NEGATIVE
BODY TEMPERATURE: 37 C
BUN SERPL-MCNC: 39 MG/DL (ref 8–23)
BUN SERPL-MCNC: 40 MG/DL (ref 8–23)
BUN/CREAT SERPL: 25.8 (ref 7–25)
BUN/CREAT SERPL: 26.7 (ref 7–25)
C PNEUM DNA NPH QL NAA+NON-PROBE: NOT DETECTED
CALCIUM SPEC-SCNC: 9 MG/DL (ref 8.6–10.5)
CALCIUM SPEC-SCNC: 9.8 MG/DL (ref 8.6–10.5)
CHLORIDE SERPL-SCNC: 103 MMOL/L (ref 98–107)
CHLORIDE SERPL-SCNC: 98 MMOL/L (ref 98–107)
CLARITY UR: CLEAR
CO2 BLDA-SCNC: 38 MMOL/L (ref 22–33)
CO2 SERPL-SCNC: 30 MMOL/L (ref 22–29)
CO2 SERPL-SCNC: 36 MMOL/L (ref 22–29)
COHGB MFR BLD: 2.4 % (ref 0–2)
COLOR UR: YELLOW
CREAT SERPL-MCNC: 1.5 MG/DL (ref 0.57–1)
CREAT SERPL-MCNC: 1.51 MG/DL (ref 0.57–1)
D-LACTATE SERPL-SCNC: 1 MMOL/L (ref 0.5–2)
DEPRECATED RDW RBC AUTO: 55.8 FL (ref 37–54)
DEPRECATED RDW RBC AUTO: 55.8 FL (ref 37–54)
EGFRCR SERPLBLD CKD-EPI 2021: 35.9 ML/MIN/1.73
EGFRCR SERPLBLD CKD-EPI 2021: 36.2 ML/MIN/1.73
EOSINOPHIL # BLD AUTO: 0.09 10*3/MM3 (ref 0–0.4)
EOSINOPHIL # BLD AUTO: 0.24 10*3/MM3 (ref 0–0.4)
EOSINOPHIL NFR BLD AUTO: 0.5 % (ref 0.3–6.2)
EOSINOPHIL NFR BLD AUTO: 1.5 % (ref 0.3–6.2)
EPAP: 0
ERYTHROCYTE [DISTWIDTH] IN BLOOD BY AUTOMATED COUNT: 14.9 % (ref 12.3–15.4)
ERYTHROCYTE [DISTWIDTH] IN BLOOD BY AUTOMATED COUNT: 15.1 % (ref 12.3–15.4)
FLUAV SUBTYP SPEC NAA+PROBE: NOT DETECTED
FLUBV RNA ISLT QL NAA+PROBE: NOT DETECTED
GLOBULIN UR ELPH-MCNC: 2.8 GM/DL
GLUCOSE SERPL-MCNC: 91 MG/DL (ref 65–99)
GLUCOSE SERPL-MCNC: 96 MG/DL (ref 65–99)
GLUCOSE UR STRIP-MCNC: NEGATIVE MG/DL
HADV DNA SPEC NAA+PROBE: NOT DETECTED
HCO3 BLDA-SCNC: 36.2 MMOL/L (ref 20–26)
HCOV 229E RNA SPEC QL NAA+PROBE: NOT DETECTED
HCOV HKU1 RNA SPEC QL NAA+PROBE: NOT DETECTED
HCOV NL63 RNA SPEC QL NAA+PROBE: NOT DETECTED
HCOV OC43 RNA SPEC QL NAA+PROBE: NOT DETECTED
HCT VFR BLD AUTO: 44.7 % (ref 34–46.6)
HCT VFR BLD AUTO: 45.8 % (ref 34–46.6)
HCT VFR BLD CALC: 39 % (ref 38–51)
HGB BLD-MCNC: 13.2 G/DL (ref 12–15.9)
HGB BLD-MCNC: 13.7 G/DL (ref 12–15.9)
HGB BLDA-MCNC: 12.7 G/DL (ref 14–18)
HGB UR QL STRIP.AUTO: NEGATIVE
HMPV RNA NPH QL NAA+NON-PROBE: NOT DETECTED
HOLD SPECIMEN: NORMAL
HPIV1 RNA ISLT QL NAA+PROBE: NOT DETECTED
HPIV2 RNA SPEC QL NAA+PROBE: NOT DETECTED
HPIV3 RNA NPH QL NAA+PROBE: NOT DETECTED
HPIV4 P GENE NPH QL NAA+PROBE: NOT DETECTED
IMM GRANULOCYTES # BLD AUTO: 0.24 10*3/MM3 (ref 0–0.05)
IMM GRANULOCYTES # BLD AUTO: 0.26 10*3/MM3 (ref 0–0.05)
IMM GRANULOCYTES NFR BLD AUTO: 1.5 % (ref 0–0.5)
IMM GRANULOCYTES NFR BLD AUTO: 1.5 % (ref 0–0.5)
INHALED O2 CONCENTRATION: 21 %
IPAP: 0
KETONES UR QL STRIP: NEGATIVE
LEUKOCYTE ESTERASE UR QL STRIP.AUTO: NEGATIVE
LYMPHOCYTES # BLD AUTO: 2.83 10*3/MM3 (ref 0.7–3.1)
LYMPHOCYTES # BLD AUTO: 3.26 10*3/MM3 (ref 0.7–3.1)
LYMPHOCYTES NFR BLD AUTO: 15.9 % (ref 19.6–45.3)
LYMPHOCYTES NFR BLD AUTO: 20.9 % (ref 19.6–45.3)
M PNEUMO IGG SER IA-ACNC: NOT DETECTED
MAGNESIUM SERPL-MCNC: 2.5 MG/DL (ref 1.6–2.4)
MAGNESIUM SERPL-MCNC: 2.6 MG/DL (ref 1.6–2.4)
MCH RBC QN AUTO: 30 PG (ref 26.6–33)
MCH RBC QN AUTO: 30.2 PG (ref 26.6–33)
MCHC RBC AUTO-ENTMCNC: 29.5 G/DL (ref 31.5–35.7)
MCHC RBC AUTO-ENTMCNC: 29.9 G/DL (ref 31.5–35.7)
MCV RBC AUTO: 100.9 FL (ref 79–97)
MCV RBC AUTO: 101.6 FL (ref 79–97)
METHGB BLD QL: 0.4 % (ref 0–1.5)
MODALITY: ABNORMAL
MONOCYTES # BLD AUTO: 1.01 10*3/MM3 (ref 0.1–0.9)
MONOCYTES # BLD AUTO: 1.15 10*3/MM3 (ref 0.1–0.9)
MONOCYTES NFR BLD AUTO: 6.5 % (ref 5–12)
MONOCYTES NFR BLD AUTO: 6.5 % (ref 5–12)
NEUTROPHILS NFR BLD AUTO: 10.77 10*3/MM3 (ref 1.7–7)
NEUTROPHILS NFR BLD AUTO: 13.38 10*3/MM3 (ref 1.7–7)
NEUTROPHILS NFR BLD AUTO: 69.3 % (ref 42.7–76)
NEUTROPHILS NFR BLD AUTO: 75.4 % (ref 42.7–76)
NITRITE UR QL STRIP: NEGATIVE
NRBC BLD AUTO-RTO: 0 /100 WBC (ref 0–0.2)
NRBC BLD AUTO-RTO: 0 /100 WBC (ref 0–0.2)
NT-PROBNP SERPL-MCNC: 701 PG/ML (ref 0–1800)
OXYHGB MFR BLDV: 86.3 % (ref 94–99)
PAW @ PEAK INSP FLOW SETTING VENT: 0 CMH2O
PCO2 BLDA: 57.9 MM HG (ref 35–45)
PCO2 TEMP ADJ BLD: 57.9 MM HG (ref 35–45)
PH BLDA: 7.41 PH UNITS (ref 7.35–7.45)
PH UR STRIP.AUTO: 5.5 [PH] (ref 5–8)
PH, TEMP CORRECTED: 7.41 PH UNITS
PLATELET # BLD AUTO: 270 10*3/MM3 (ref 140–450)
PLATELET # BLD AUTO: 278 10*3/MM3 (ref 140–450)
PMV BLD AUTO: 9.7 FL (ref 6–12)
PMV BLD AUTO: 9.8 FL (ref 6–12)
PO2 BLDA: 57.2 MM HG (ref 83–108)
PO2 TEMP ADJ BLD: 57.2 MM HG (ref 83–108)
POTASSIUM SERPL-SCNC: 5.2 MMOL/L (ref 3.5–5.2)
POTASSIUM SERPL-SCNC: 5.5 MMOL/L (ref 3.5–5.2)
PROCALCITONIN SERPL-MCNC: 0.09 NG/ML (ref 0–0.25)
PROT SERPL-MCNC: 6.9 G/DL (ref 6–8.5)
PROT UR QL STRIP: ABNORMAL
RBC # BLD AUTO: 4.4 10*6/MM3 (ref 3.77–5.28)
RBC # BLD AUTO: 4.54 10*6/MM3 (ref 3.77–5.28)
RHINOVIRUS RNA SPEC NAA+PROBE: NOT DETECTED
RSV RNA NPH QL NAA+NON-PROBE: NOT DETECTED
SARS-COV-2 RNA NPH QL NAA+NON-PROBE: NOT DETECTED
SODIUM SERPL-SCNC: 140 MMOL/L (ref 136–145)
SODIUM SERPL-SCNC: 145 MMOL/L (ref 136–145)
SP GR UR STRIP: 1.02 (ref 1–1.03)
TOTAL RATE: 0 BREATHS/MINUTE
TROPONIN T SERPL HS-MCNC: 18 NG/L
UROBILINOGEN UR QL STRIP: ABNORMAL
WBC NRBC COR # BLD: 15.57 10*3/MM3 (ref 3.4–10.8)
WBC NRBC COR # BLD: 17.75 10*3/MM3 (ref 3.4–10.8)
WHOLE BLOOD HOLD COAG: NORMAL
WHOLE BLOOD HOLD SPECIMEN: NORMAL

## 2023-09-07 PROCEDURE — 0202U NFCT DS 22 TRGT SARS-COV-2: CPT

## 2023-09-07 PROCEDURE — 96374 THER/PROPH/DIAG INJ IV PUSH: CPT

## 2023-09-07 PROCEDURE — 94799 UNLISTED PULMONARY SVC/PX: CPT

## 2023-09-07 PROCEDURE — 84484 ASSAY OF TROPONIN QUANT: CPT | Performed by: EMERGENCY MEDICINE

## 2023-09-07 PROCEDURE — G0378 HOSPITAL OBSERVATION PER HR: HCPCS

## 2023-09-07 PROCEDURE — 85025 COMPLETE CBC W/AUTO DIFF WBC: CPT | Performed by: EMERGENCY MEDICINE

## 2023-09-07 PROCEDURE — 94761 N-INVAS EAR/PLS OXIMETRY MLT: CPT

## 2023-09-07 PROCEDURE — 83880 ASSAY OF NATRIURETIC PEPTIDE: CPT | Performed by: PHYSICIAN ASSISTANT

## 2023-09-07 PROCEDURE — 80053 COMPREHEN METABOLIC PANEL: CPT | Performed by: EMERGENCY MEDICINE

## 2023-09-07 PROCEDURE — 84145 PROCALCITONIN (PCT): CPT

## 2023-09-07 PROCEDURE — 82375 ASSAY CARBOXYHB QUANT: CPT

## 2023-09-07 PROCEDURE — 94640 AIRWAY INHALATION TREATMENT: CPT

## 2023-09-07 PROCEDURE — 36600 WITHDRAWAL OF ARTERIAL BLOOD: CPT

## 2023-09-07 PROCEDURE — 81003 URINALYSIS AUTO W/O SCOPE: CPT | Performed by: EMERGENCY MEDICINE

## 2023-09-07 PROCEDURE — 36415 COLL VENOUS BLD VENIPUNCTURE: CPT

## 2023-09-07 PROCEDURE — 83735 ASSAY OF MAGNESIUM: CPT

## 2023-09-07 PROCEDURE — 85025 COMPLETE CBC W/AUTO DIFF WBC: CPT

## 2023-09-07 PROCEDURE — 25010000002 METHYLPREDNISOLONE PER 40 MG

## 2023-09-07 PROCEDURE — 82805 BLOOD GASES W/O2 SATURATION: CPT

## 2023-09-07 PROCEDURE — 83050 HGB METHEMOGLOBIN QUAN: CPT

## 2023-09-07 PROCEDURE — 97535 SELF CARE MNGMENT TRAINING: CPT

## 2023-09-07 PROCEDURE — 83735 ASSAY OF MAGNESIUM: CPT | Performed by: EMERGENCY MEDICINE

## 2023-09-07 PROCEDURE — 97165 OT EVAL LOW COMPLEX 30 MIN: CPT

## 2023-09-07 PROCEDURE — 97161 PT EVAL LOW COMPLEX 20 MIN: CPT

## 2023-09-07 PROCEDURE — 83605 ASSAY OF LACTIC ACID: CPT

## 2023-09-07 RX ORDER — METHYLPREDNISOLONE SODIUM SUCCINATE 40 MG/ML
40 INJECTION, POWDER, LYOPHILIZED, FOR SOLUTION INTRAMUSCULAR; INTRAVENOUS DAILY
Status: DISCONTINUED | OUTPATIENT
Start: 2023-09-07 | End: 2023-09-08 | Stop reason: HOSPADM

## 2023-09-07 RX ORDER — TRAZODONE HYDROCHLORIDE 100 MG/1
100 TABLET ORAL NIGHTLY
Status: DISCONTINUED | OUTPATIENT
Start: 2023-09-07 | End: 2023-09-08 | Stop reason: HOSPADM

## 2023-09-07 RX ORDER — IPRATROPIUM BROMIDE AND ALBUTEROL SULFATE 2.5; .5 MG/3ML; MG/3ML
3 SOLUTION RESPIRATORY (INHALATION) ONCE
Status: COMPLETED | OUTPATIENT
Start: 2023-09-07 | End: 2023-09-07

## 2023-09-07 RX ORDER — BISACODYL 10 MG
10 SUPPOSITORY, RECTAL RECTAL DAILY PRN
Status: DISCONTINUED | OUTPATIENT
Start: 2023-09-07 | End: 2023-09-08 | Stop reason: HOSPADM

## 2023-09-07 RX ORDER — BISACODYL 5 MG/1
5 TABLET, DELAYED RELEASE ORAL DAILY PRN
Status: DISCONTINUED | OUTPATIENT
Start: 2023-09-07 | End: 2023-09-08 | Stop reason: HOSPADM

## 2023-09-07 RX ORDER — SODIUM CHLORIDE 0.9 % (FLUSH) 0.9 %
10 SYRINGE (ML) INJECTION AS NEEDED
Status: DISCONTINUED | OUTPATIENT
Start: 2023-09-07 | End: 2023-09-08 | Stop reason: HOSPADM

## 2023-09-07 RX ORDER — SODIUM CHLORIDE 9 MG/ML
40 INJECTION, SOLUTION INTRAVENOUS AS NEEDED
Status: DISCONTINUED | OUTPATIENT
Start: 2023-09-07 | End: 2023-09-08 | Stop reason: HOSPADM

## 2023-09-07 RX ORDER — ATORVASTATIN CALCIUM 40 MG/1
80 TABLET, FILM COATED ORAL NIGHTLY
Status: DISCONTINUED | OUTPATIENT
Start: 2023-09-07 | End: 2023-09-08 | Stop reason: HOSPADM

## 2023-09-07 RX ORDER — POLYETHYLENE GLYCOL 3350 17 G/17G
17 POWDER, FOR SOLUTION ORAL DAILY PRN
Status: DISCONTINUED | OUTPATIENT
Start: 2023-09-07 | End: 2023-09-08 | Stop reason: HOSPADM

## 2023-09-07 RX ORDER — AMOXICILLIN 250 MG
2 CAPSULE ORAL 2 TIMES DAILY
Status: DISCONTINUED | OUTPATIENT
Start: 2023-09-07 | End: 2023-09-08 | Stop reason: HOSPADM

## 2023-09-07 RX ORDER — SODIUM CHLORIDE 9 MG/ML
50 INJECTION, SOLUTION INTRAVENOUS CONTINUOUS
Status: ACTIVE | OUTPATIENT
Start: 2023-09-07 | End: 2023-09-07

## 2023-09-07 RX ORDER — TORSEMIDE 10 MG/1
10 TABLET ORAL DAILY
Status: DISCONTINUED | OUTPATIENT
Start: 2023-09-07 | End: 2023-09-07

## 2023-09-07 RX ORDER — NICOTINE 21 MG/24HR
1 PATCH, TRANSDERMAL 24 HOURS TRANSDERMAL EVERY 24 HOURS
Status: DISCONTINUED | OUTPATIENT
Start: 2023-09-07 | End: 2023-09-08 | Stop reason: HOSPADM

## 2023-09-07 RX ORDER — GUAIFENESIN 600 MG/1
1200 TABLET, EXTENDED RELEASE ORAL EVERY 12 HOURS SCHEDULED
Status: DISCONTINUED | OUTPATIENT
Start: 2023-09-07 | End: 2023-09-08 | Stop reason: HOSPADM

## 2023-09-07 RX ORDER — SODIUM CHLORIDE 0.9 % (FLUSH) 0.9 %
10 SYRINGE (ML) INJECTION EVERY 12 HOURS SCHEDULED
Status: DISCONTINUED | OUTPATIENT
Start: 2023-09-07 | End: 2023-09-08 | Stop reason: HOSPADM

## 2023-09-07 RX ORDER — IPRATROPIUM BROMIDE AND ALBUTEROL SULFATE 2.5; .5 MG/3ML; MG/3ML
3 SOLUTION RESPIRATORY (INHALATION)
Status: DISCONTINUED | OUTPATIENT
Start: 2023-09-07 | End: 2023-09-08 | Stop reason: HOSPADM

## 2023-09-07 RX ORDER — IPRATROPIUM BROMIDE AND ALBUTEROL SULFATE 2.5; .5 MG/3ML; MG/3ML
3 SOLUTION RESPIRATORY (INHALATION) EVERY 6 HOURS PRN
Status: DISCONTINUED | OUTPATIENT
Start: 2023-09-07 | End: 2023-09-08 | Stop reason: HOSPADM

## 2023-09-07 RX ORDER — ACETAMINOPHEN 160 MG/5ML
650 SOLUTION ORAL EVERY 4 HOURS PRN
Status: DISCONTINUED | OUTPATIENT
Start: 2023-09-07 | End: 2023-09-08 | Stop reason: HOSPADM

## 2023-09-07 RX ORDER — ACETAMINOPHEN 650 MG/1
650 SUPPOSITORY RECTAL EVERY 4 HOURS PRN
Status: DISCONTINUED | OUTPATIENT
Start: 2023-09-07 | End: 2023-09-08 | Stop reason: HOSPADM

## 2023-09-07 RX ORDER — ACETAMINOPHEN 325 MG/1
650 TABLET ORAL EVERY 4 HOURS PRN
Status: DISCONTINUED | OUTPATIENT
Start: 2023-09-07 | End: 2023-09-08 | Stop reason: HOSPADM

## 2023-09-07 RX ADMIN — IPRATROPIUM BROMIDE AND ALBUTEROL SULFATE 3 ML: 2.5; .5 SOLUTION RESPIRATORY (INHALATION) at 07:11

## 2023-09-07 RX ADMIN — ACETAMINOPHEN 650 MG: 325 TABLET ORAL at 20:21

## 2023-09-07 RX ADMIN — SENNOSIDES AND DOCUSATE SODIUM 2 TABLET: 8.6; 5 TABLET ORAL at 20:21

## 2023-09-07 RX ADMIN — GUAIFENESIN 1200 MG: 600 TABLET, EXTENDED RELEASE ORAL at 20:21

## 2023-09-07 RX ADMIN — SODIUM ZIRCONIUM CYCLOSILICATE 10 G: 10 POWDER, FOR SUSPENSION ORAL at 08:53

## 2023-09-07 RX ADMIN — METHYLPREDNISOLONE SODIUM SUCCINATE 40 MG: 40 INJECTION, POWDER, LYOPHILIZED, FOR SOLUTION INTRAMUSCULAR; INTRAVENOUS at 09:05

## 2023-09-07 RX ADMIN — IPRATROPIUM BROMIDE AND ALBUTEROL SULFATE 3 ML: 2.5; .5 SOLUTION RESPIRATORY (INHALATION) at 10:50

## 2023-09-07 RX ADMIN — SODIUM CHLORIDE 50 ML/HR: 9 INJECTION, SOLUTION INTRAVENOUS at 09:06

## 2023-09-07 RX ADMIN — NICOTINE 1 PATCH: 14 PATCH, EXTENDED RELEASE TRANSDERMAL at 06:46

## 2023-09-07 RX ADMIN — IPRATROPIUM BROMIDE AND ALBUTEROL SULFATE 3 ML: 2.5; .5 SOLUTION RESPIRATORY (INHALATION) at 01:57

## 2023-09-07 RX ADMIN — IPRATROPIUM BROMIDE AND ALBUTEROL SULFATE 3 ML: 2.5; .5 SOLUTION RESPIRATORY (INHALATION) at 20:18

## 2023-09-07 RX ADMIN — Medication 10 ML: at 20:22

## 2023-09-07 RX ADMIN — IPRATROPIUM BROMIDE AND ALBUTEROL SULFATE 3 ML: 2.5; .5 SOLUTION RESPIRATORY (INHALATION) at 23:49

## 2023-09-07 RX ADMIN — APIXABAN 5 MG: 5 TABLET, FILM COATED ORAL at 20:21

## 2023-09-07 RX ADMIN — APIXABAN 5 MG: 5 TABLET, FILM COATED ORAL at 08:54

## 2023-09-07 RX ADMIN — TRAZODONE HYDROCHLORIDE 100 MG: 100 TABLET ORAL at 20:21

## 2023-09-07 RX ADMIN — Medication 10 ML: at 09:06

## 2023-09-07 RX ADMIN — IPRATROPIUM BROMIDE AND ALBUTEROL SULFATE 3 ML: 2.5; .5 SOLUTION RESPIRATORY (INHALATION) at 16:10

## 2023-09-07 RX ADMIN — ATORVASTATIN CALCIUM 80 MG: 40 TABLET, FILM COATED ORAL at 20:21

## 2023-09-07 RX ADMIN — SENNOSIDES AND DOCUSATE SODIUM 2 TABLET: 8.6; 5 TABLET ORAL at 08:54

## 2023-09-07 RX ADMIN — GUAIFENESIN 1200 MG: 600 TABLET, EXTENDED RELEASE ORAL at 08:54

## 2023-09-07 NOTE — PLAN OF CARE
Goal Outcome Evaluation:  Plan of Care Reviewed With: patient        Progress: no change  Outcome Evaluation: Pt. presents at baseline with ADLs and functional mobility. Demonstrates good safety and sequencing throughout session. No further skilled OT services warranted at this time. Recommend home with assist at discharge.      Anticipated Discharge Disposition (OT): home with assist

## 2023-09-07 NOTE — ED PROVIDER NOTES
EMERGENCY DEPARTMENT ENCOUNTER    Pt Name: Tessa Bradley  MRN: [id number]  Pt :   1945  Room Number:  S511/1  Date of encounter:  2023  PCP: Sunni Santoro MD  ED Provider: CESAR Tirado    Historian: Patient    HPI:  Chief Complaint: Shortness of breath and dizziness    Context: Tessa Bradley is a 77 y.o. female who presents to the ED c/o shortness of breath and dizziness. Patient shares that she was recently admitted to this facility on 2023 and discharged on 2023 after being treated for an acute exacerbation of COPD. Patient reports that she was doing better with her breathing and was discharged with home health to assist with PT/OT and helping to get her strength back. She has continued to experience some shortness of breath and most recently has felt dizzy when she exerts herself. She was seen today by her home health provider who reported her oxygen on room air to be 83%. Patient does not have home oxygen and has not normally had a supplemental oxygen requirement at baseline.   HPI     REVIEW OF SYSTEMS  A chief complaint appropriate review of systems was completed and is negative except as noted in the HPI.     PAST MEDICAL HISTORY  Past Medical History:   Diagnosis Date    Cancer     COPD (chronic obstructive pulmonary disease)     Hyperlipidemia     Hypertension        PAST SURGICAL HISTORY  Past Surgical History:   Procedure Laterality Date    ABDOMINAL SURGERY      HYSTERECTOMY      OOPHORECTOMY         FAMILY HISTORY  Family History   Problem Relation Age of Onset    Breast cancer Mother 67       SOCIAL HISTORY  Social History     Socioeconomic History    Marital status:    Tobacco Use    Smoking status: Every Day     Packs/day: 0.25     Years: 40.00     Pack years: 10.00     Types: Cigarettes    Smokeless tobacco: Never   Vaping Use    Vaping Use: Unknown   Substance and Sexual Activity    Alcohol use: Not Currently    Drug use: Never    Sexual activity: Defer        ALLERGIES  Penicillins    PHYSICAL EXAM  Physical Exam  GENERAL:   Appears in no acute distress.   HENT: Nares patent.  EYES: No scleral icterus.  CV: Regular rhythm, regular rate.  RESPIRATORY: Normal effort.  Diffuse wheezing present bilaterally  ABDOMEN: Soft, nontender  MUSCULOSKELETAL: No deformities.   NEURO: Alert, moves all extremities, follows commands.  SKIN: Warm, dry, no rash visualized.    I have reviewed the triage vital signs and nursing notes.     LAB RESULTS  Results for orders placed or performed during the hospital encounter of 09/07/23   Respiratory Panel PCR w/COVID-19(SARS-CoV-2) [facility] In-House, NP Swab in UTM/VTM, 3-4 HR TAT - Swab, Nasopharynx    Specimen: Nasopharynx; Swab   Result Value Ref Range    ADENOVIRUS, PCR Not Detected Not Detected    Coronavirus 229E Not Detected Not Detected    Coronavirus HKU1 Not Detected Not Detected    Coronavirus NL63 Not Detected Not Detected    Coronavirus OC43 Not Detected Not Detected    COVID19 Not Detected Not Detected - Ref. Range    Human Metapneumovirus Not Detected Not Detected    Human Rhinovirus/Enterovirus Not Detected Not Detected    Influenza A PCR Not Detected Not Detected    Influenza B PCR Not Detected Not Detected    Parainfluenza Virus 1 Not Detected Not Detected    Parainfluenza Virus 2 Not Detected Not Detected    Parainfluenza Virus 3 Not Detected Not Detected    Parainfluenza Virus 4 Not Detected Not Detected    RSV, PCR Not Detected Not Detected    Bordetella pertussis pcr Not Detected Not Detected    Bordetella parapertussis PCR Not Detected Not Detected    Chlamydophila pneumoniae PCR Not Detected Not Detected    Mycoplasma pneumo by PCR Not Detected Not Detected   Comprehensive Metabolic Panel    Specimen: Blood   Result Value Ref Range    Glucose 96 65 - 99 mg/dL    BUN 40 (H) 8 - 23 mg/dL    Creatinine 1.50 (H) 0.57 - 1.00 mg/dL    Sodium 140 136 - 145 mmol/L    Potassium 5.2 3.5 - 5.2 mmol/L     Chloride 98 98 - 107 mmol/L    CO2 30.0 (H) 22.0 - 29.0 mmol/L    Calcium 9.8 8.6 - 10.5 mg/dL    Total Protein 6.9 6.0 - 8.5 g/dL    Albumin 4.1 3.5 - 5.2 g/dL    ALT (SGPT) 29 1 - 33 U/L    AST (SGOT) 27 1 - 32 U/L    Alkaline Phosphatase 92 39 - 117 U/L    Total Bilirubin 0.2 0.0 - 1.2 mg/dL    Globulin 2.8 gm/dL    A/G Ratio 1.5 g/dL    BUN/Creatinine Ratio 26.7 (H) 7.0 - 25.0    Anion Gap 12.0 5.0 - 15.0 mmol/L    eGFR 36.2 (L) >60.0 mL/min/1.73   Single High Sensitivity Troponin T    Specimen: Blood   Result Value Ref Range    HS Troponin T 18 (H) <10 ng/L   Magnesium    Specimen: Blood   Result Value Ref Range    Magnesium 2.6 (H) 1.6 - 2.4 mg/dL   Urinalysis With Microscopic If Indicated (No Culture) - Urine, Clean Catch    Specimen: Urine, Clean Catch   Result Value Ref Range    Color, UA Yellow Yellow, Straw    Appearance, UA Clear Clear    pH, UA 5.5 5.0 - 8.0    Specific Gravity, UA 1.018 1.001 - 1.030    Glucose, UA Negative Negative    Ketones, UA Negative Negative    Bilirubin, UA Negative Negative    Blood, UA Negative Negative    Protein, UA Trace (A) Negative    Leuk Esterase, UA Negative Negative    Nitrite, UA Negative Negative    Urobilinogen, UA 0.2 E.U./dL 0.2 - 1.0 E.U./dL   CBC Auto Differential    Specimen: Blood   Result Value Ref Range    WBC 17.75 (H) 3.40 - 10.80 10*3/mm3    RBC 4.54 3.77 - 5.28 10*6/mm3    Hemoglobin 13.7 12.0 - 15.9 g/dL    Hematocrit 45.8 34.0 - 46.6 %    .9 (H) 79.0 - 97.0 fL    MCH 30.2 26.6 - 33.0 pg    MCHC 29.9 (L) 31.5 - 35.7 g/dL    RDW 15.1 12.3 - 15.4 %    RDW-SD 55.8 (H) 37.0 - 54.0 fl    MPV 9.7 6.0 - 12.0 fL    Platelets 278 140 - 450 10*3/mm3    Neutrophil % 75.4 42.7 - 76.0 %    Lymphocyte % 15.9 (L) 19.6 - 45.3 %    Monocyte % 6.5 5.0 - 12.0 %    Eosinophil % 0.5 0.3 - 6.2 %    Basophil % 0.2 0.0 - 1.5 %    Immature Grans % 1.5 (H) 0.0 - 0.5 %    Neutrophils, Absolute 13.38 (H) 1.70 - 7.00 10*3/mm3    Lymphocytes, Absolute 2.83 0.70 - 3.10  10*3/mm3    Monocytes, Absolute 1.15 (H) 0.10 - 0.90 10*3/mm3    Eosinophils, Absolute 0.09 0.00 - 0.40 10*3/mm3    Basophils, Absolute 0.04 0.00 - 0.20 10*3/mm3    Immature Grans, Absolute 0.26 (H) 0.00 - 0.05 10*3/mm3    nRBC 0.0 0.0 - 0.2 /100 WBC   BNP    Specimen: Blood   Result Value Ref Range    proBNP 701.0 0.0 - 1,800.0 pg/mL   Blood Gas, Arterial With Co-Ox    Specimen: Arterial Blood   Result Value Ref Range    Site Left Radial     Sabas's Test N/A     pH, Arterial 7.405 7.350 - 7.450 pH units    pCO2, Arterial 57.9 (H) 35.0 - 45.0 mm Hg    pO2, Arterial 57.2 (L) 83.0 - 108.0 mm Hg    HCO3, Arterial 36.2 (H) 20.0 - 26.0 mmol/L    Base Excess, Arterial 9.5 (H) 0.0 - 2.0 mmol/L    Hemoglobin, Blood Gas 12.7 (L) 14 - 18 g/dL    Hematocrit, Blood Gas 39.0 38.0 - 51.0 %    Oxyhemoglobin 86.3 (L) 94 - 99 %    Methemoglobin 0.40 0.00 - 1.50 %    Carboxyhemoglobin 2.4 (H) 0 - 2 %    CO2 Content 38.0 (H) 22 - 33 mmol/L    Temperature 37.0 C    Barometric Pressure for Blood Gas      Modality Room Air     FIO2 21 %    Rate 0 Breaths/minute    PIP 0 cmH2O    IPAP 0     EPAP 0     pH, Temp Corrected 7.405 pH Units    pCO2, Temperature Corrected 57.9 (H) 35 - 45 mm Hg    pO2, Temperature Corrected 57.2 (L) 83 - 108 mm Hg   Procalcitonin    Specimen: Blood   Result Value Ref Range    Procalcitonin 0.09 0.00 - 0.25 ng/mL   Lactic Acid, Plasma    Specimen: Blood   Result Value Ref Range    Lactate 1.0 0.5 - 2.0 mmol/L   Basic Metabolic Panel    Specimen: Blood   Result Value Ref Range    Glucose 91 65 - 99 mg/dL    BUN 39 (H) 8 - 23 mg/dL    Creatinine 1.51 (H) 0.57 - 1.00 mg/dL    Sodium 145 136 - 145 mmol/L    Potassium 5.5 (H) 3.5 - 5.2 mmol/L    Chloride 103 98 - 107 mmol/L    CO2 36.0 (H) 22.0 - 29.0 mmol/L    Calcium 9.0 8.6 - 10.5 mg/dL    BUN/Creatinine Ratio 25.8 (H) 7.0 - 25.0    Anion Gap 6.0 5.0 - 15.0 mmol/L    eGFR 35.9 (L) >60.0 mL/min/1.73   CBC Auto Differential    Specimen: Blood   Result Value Ref Range     WBC 15.57 (H) 3.40 - 10.80 10*3/mm3    RBC 4.40 3.77 - 5.28 10*6/mm3    Hemoglobin 13.2 12.0 - 15.9 g/dL    Hematocrit 44.7 34.0 - 46.6 %    .6 (H) 79.0 - 97.0 fL    MCH 30.0 26.6 - 33.0 pg    MCHC 29.5 (L) 31.5 - 35.7 g/dL    RDW 14.9 12.3 - 15.4 %    RDW-SD 55.8 (H) 37.0 - 54.0 fl    MPV 9.8 6.0 - 12.0 fL    Platelets 270 140 - 450 10*3/mm3    Neutrophil % 69.3 42.7 - 76.0 %    Lymphocyte % 20.9 19.6 - 45.3 %    Monocyte % 6.5 5.0 - 12.0 %    Eosinophil % 1.5 0.3 - 6.2 %    Basophil % 0.3 0.0 - 1.5 %    Immature Grans % 1.5 (H) 0.0 - 0.5 %    Neutrophils, Absolute 10.77 (H) 1.70 - 7.00 10*3/mm3    Lymphocytes, Absolute 3.26 (H) 0.70 - 3.10 10*3/mm3    Monocytes, Absolute 1.01 (H) 0.10 - 0.90 10*3/mm3    Eosinophils, Absolute 0.24 0.00 - 0.40 10*3/mm3    Basophils, Absolute 0.05 0.00 - 0.20 10*3/mm3    Immature Grans, Absolute 0.24 (H) 0.00 - 0.05 10*3/mm3    nRBC 0.0 0.0 - 0.2 /100 WBC   Magnesium    Specimen: Blood   Result Value Ref Range    Magnesium 2.5 (H) 1.6 - 2.4 mg/dL   ECG 12 Lead ED Triage Standing Order; Weak / Dizzy / AMS   Result Value Ref Range    QT Interval 368 ms    QTC Interval 408 ms   Green Top (Gel)   Result Value Ref Range    Extra Tube Hold for add-ons.    Lavender Top   Result Value Ref Range    Extra Tube hold for add-on    Gold Top - SST   Result Value Ref Range    Extra Tube Hold for add-ons.    Gray Top   Result Value Ref Range    Extra Tube Hold for add-ons.    Light Blue Top   Result Value Ref Range    Extra Tube Hold for add-ons.        If labs were ordered, I independently reviewed the results and considered them in treating the patient.    RADIOLOGY  XR Chest 1 View   Final Result   Impression:   No acute cardiopulmonary process.         Electronically Signed: Renetta Duggan MD     9/6/2023 11:51 PM EDT     Workstation ID: [id number]        [x] Radiologist's Report Reviewed:  I ordered and independently reviewed the above noted radiographic studies.  See radiologist's  dictation for official interpretation.      PROCEDURES    Procedures    ECG 12 Lead ED Triage Standing Order; Weak / Dizzy / AMS   Preliminary Result   Test Reason : ED Triage Standing Order~   Blood Pressure :   */*   mmHG   Vent. Rate :  74 BPM     Atrial Rate :  74 BPM      P-R Int : 132 ms          QRS Dur :  88 ms       QT Int : 368 ms       P-R-T Axes :  56  67  11 degrees      QTc Int : 408 ms      Normal sinus rhythm   T wave abnormality, consider anterior ischemia   Abnormal ECG   When compared with ECG of 24-AUG-2023 16:36,   premature atrial complexes are no longer present   QRS axis shifted left   Minimal criteria for Inferior infarct are no longer present   T wave inversion no longer evident in Lateral leads      Referred By:            Confirmed By:           MEDICATIONS GIVEN IN ER    Medications   sodium chloride 0.9 % flush 10 mL (has no administration in time range)   apixaban (ELIQUIS) tablet 5 mg (5 mg Oral Given 9/7/23 0854)   atorvastatin (LIPITOR) tablet 80 mg (has no administration in time range)   guaiFENesin (MUCINEX) 12 hr tablet 1,200 mg (1,200 mg Oral Given 9/7/23 0854)   traZODone (DESYREL) tablet 100 mg (has no administration in time range)   ipratropium-albuterol (DUO-NEB) nebulizer solution 3 mL (has no administration in time range)   ipratropium-albuterol (DUO-NEB) nebulizer solution 3 mL (3 mL Nebulization Given 9/7/23 0711)   methylPREDNISolone sodium succinate (SOLU-Medrol) injection 40 mg (40 mg Intravenous Given 9/7/23 0905)   sodium chloride 0.9 % flush 10 mL (10 mL Intravenous Given 9/7/23 0906)   sodium chloride 0.9 % flush 10 mL (has no administration in time range)   sodium chloride 0.9 % infusion 40 mL (has no administration in time range)   sennosides-docusate (PERICOLACE) 8.6-50 MG per tablet 2 tablet (2 tablets Oral Given 9/7/23 0854)     And   polyethylene glycol (MIRALAX) packet 17 g (has no administration in time range)     And   bisacodyl (DULCOLAX) EC tablet 5 mg  (has no administration in time range)     And   bisacodyl (DULCOLAX) suppository 10 mg (has no administration in time range)   acetaminophen (TYLENOL) tablet 650 mg (has no administration in time range)     Or   acetaminophen (TYLENOL) 160 MG/5ML solution 650 mg (has no administration in time range)     Or   acetaminophen (TYLENOL) suppository 650 mg (has no administration in time range)   nicotine (NICODERM CQ) 14 MG/24HR patch 1 patch (1 patch Transdermal Medication Applied 9/7/23 0674)   sodium chloride 0.9 % infusion (50 mL/hr Intravenous New Bag 9/7/23 0906)   ipratropium-albuterol (DUO-NEB) nebulizer solution 3 mL (3 mL Nebulization Given 9/7/23 0157)   sodium zirconium cyclosilicate (LOKELMA) pack 10 g (10 g Oral Given 9/7/23 6866)       MEDICAL DECISION MAKING, PROGRESS, and CONSULTS   Medical Decision Making  Problems Addressed:  Acute on chronic respiratory failure with hypoxia: complicated acute illness or injury  History of COPD: chronic illness or injury  History of hyperlipidemia: chronic illness or injury  History of hypertension: chronic illness or injury    Amount and/or Complexity of Data Reviewed  Labs: ordered. Decision-making details documented in ED Course.  Radiology: ordered.  ECG/medicine tests: ordered.    Risk  Prescription drug management.  Decision regarding hospitalization.        All labs have been independently reviewed by me.  All radiology studies have been reviewed by me and the radiologist dictating the report.  All EKG's have been independently viewed by me.    [] Discussed with radiology regarding test interpretation:    Discussion below represents my analysis of pertinent findings related to patient's condition, differential diagnosis, treatment plan and final disposition.    Differential diagnosis:  The differential diagnosis associated with the patient's presentation includes: ACS, CHF,  COPD exacerbation, allergic etiologies, infectious etiologies such as PNA.     Additional  sources  Discussed/ obtained information from independent historians:   [] Spouse  [] Parent  [x] Family member  [] Friend  [] EMS   [] Other:  External (non-ED) record review:   [x] Inpatient record: Personally reviewed recent inpatient admission where patient was seen and evaluated and treated for COPD exacerbation   [] Office record:   [] Outpatient record:   [] Prior Outpatient labs:   [] Prior Outpatient radiology:   [] Primary Care record:   [] Outside ED record:   [] Other:   Patient's care impacted by:   [] Diabetes  [x] Hypertension  [x] Hyperlipidemia  [] Hypothyroidism   [] Coronary Artery Disease   [x] COPD   [x] Cancer   [] Obesity  [] GERD   [] Tobacco Abuse   [] Substance Abuse    [] Anxiety   [] Depression   [] Other:   Care significantly affected by Social Determinants of Health (housing and economic circumstances, unemployment)    [] Yes     [x] No   If yes, Patient's care significantly limited by  Social Determinants of Health including:   [] Inadequate housing   [] Low income   [] Alcoholism and drug addiction in family   [] Problems related to primary support group   [] Unemployment   [] Problems related to employment   [] Other Social Determinants of Health:     Shared decision making:  I reviewed workup performed in ED including labs and imaging. Based on findings, recommendation made for admission. Patient is agreeable to plan of care and hospital admission.      Orders placed during this visit:  Orders Placed This Encounter   Procedures    Respiratory Panel PCR w/COVID-19(SARS-CoV-2) [facility] In-House, NP Swab in UTM/VTM, 3-4 HR TAT - Swab, Nasopharynx    XR Chest 1 View    [place] Draw    Comprehensive Metabolic Panel    Single High Sensitivity Troponin T    Magnesium    Urinalysis With Microscopic If Indicated (No Culture) - Urine, Clean Catch    CBC Auto Differential    BNP    Blood Gas, Arterial -With Co-Ox Panel: Yes    Blood Gas, Arterial With Co-Ox    Procalcitonin     Lactic Acid, Plasma    Basic Metabolic Panel    CBC Auto Differential    Magnesium    Diet: Cardiac Diets; Healthy Heart (2-3 Na+); Texture: Regular Texture (IDDSI 7); Fluid Consistency: Thin (IDDSI 0)    Undress & Gown    Vital Signs    Vital Signs    Weigh Patient    Oral Care    Place Sequential Compression Device    Maintain Sequential Compression Device    Telemetry - Maintain IV Access    Telemetry - Place Orders & Notify Provider of Results When Patient Experiences Acute Chest Pain, Dysrhythmia or Respiratory Distress    May Be Off Telemetry for Tests    Pulse Oximetry, Continuous    Up With Assistance    Strict Intake & Output    Tobacco Cessation Education    Code Status and Medical Interventions:    Inpatient Case Management  Consult    OT Consult: Eval & Treat    PT Consult: Eval & Treat Functional Mobility Below Baseline    Oxygen Therapy- Nasal Cannula; Titrate 1-6 LPM Per SpO2; 90 - 95%    ECG 12 Lead ED Triage Standing Order; Weak / Dizzy / AMS    Insert Peripheral IV    Insert Peripheral IV    Inpatient Admission    Fall Precautions    CBC & Differential    Green Top (Gel)    Lavender Top    Gold Top - SST    Gray Top    Light Blue Top       ED Course:    ED Course as of 09/07/23 0954   u Sep 07, 2023   0230 WBC(!): 17.75  Patient has been on steroids since leaving hospital. [JG]   0340 Hospitalist messaged for admission.  [JG]   9675 In summary this is a 77 year old female with history of COPD and recent hospital admission and discharge back to home with home health who presents to the ED with shortness of breath and dizziness and Home Health reporting O2 of 83% on room air. Patient looks generally weak in appearance, vital signs stable and non-toxic in appearance without acute or emergent findings on physical exam. Labs consistent with recent hospitalization. Patient with supplemental O2 required in ED with low O2 sat documented of 88%. Patient without complaints of chest pain.  EKG without evidence of acute ischemic changes.  Chest x-ray demonstrates no acute cardiopulmonary process. Nebulizer treatment provided in ED. hospitalist consulted for admission and agreeable to accept patient.  Patient and family at bedside agreeable to plan of care and hospital admission for further evaluation and treatment of her respiratory failure with hypoxia.    [JG]      ED Course User Index  [JG] Kole Finley PA            DIAGNOSIS  Final diagnoses:   Acute on chronic respiratory failure with hypoxia   History of COPD   History of hypertension   History of hyperlipidemia       DISPOSITION    ED Disposition       ED Disposition   Decision to Admit    Condition   --    Comment   Level of Care: Telemetry [5]   Diagnosis: COPD exacerbation [357786]   Admitting Physician: KYA DEAN [[id number]]   Attending Physician: KAY DEAN [[id number]]   Certification: I Certify That Inpatient Hospital Services Are Medically Necessary For Greater Than 2 Midnights                 Please note that portions of this document were completed with voice recognition software.        Kole Finley PA  09/07/23 0908

## 2023-09-07 NOTE — H&P
[facility] Medicine Services  HISTORY AND PHYSICAL    Patient Name: Tessa Bradley  : 1945  MRN: [id number]  Primary Care Physician: Sunni Santoro MD  Date of admission: 2023    Subjective   Subjective     Chief Complaint:  Shortness of breath    HPI:  Tessa Bradley is a 77 y.o. female with PMH of HTN, HLD, afib on eliquis, Hodgkin's disease s/p radiation and chemo and COPD presents to the ED for hypoxia. Patient was recently admitted from - for COPD exacerbation.  She does not use oxygen at home.  She was treated with steroids, antibiotics and DuoNebs.  She was seen by pulmonary, which she does not follow with outpatient.  On discharge, patient was satting 92% on room air.  Patient did not want to go home on oxygen.    Since discharge, patient has been working with home health PT/OT.  She was working with them today, when she became dizzy.  She was also having intermittent palpitations.  They checked her oxygen saturation which was 83%.  She has been fatigued.  She also has chills.  She has a cough, denies sputum production.    Attending HPI:  78 y/o female w/ hx of COPD who was just here for AECOPD tx'd w/ abx, steroids and nebs.  Does not have home O2 and presents now w/ hypoxia, cough, wheeze.  Cough not productive. +chills. No fever.  Has pt/ot at home.    Review of Systems   Constitutional:  Positive for activity change, chills and fatigue. Negative for appetite change and fever.   HENT:  Negative for congestion, sore throat and trouble swallowing.    Eyes: Negative.    Respiratory:  Positive for cough, shortness of breath and wheezing.    Cardiovascular:  Positive for palpitations. Negative for chest pain and leg swelling.   Gastrointestinal:  Negative for abdominal distention, diarrhea, nausea and vomiting.   Endocrine: Negative.    Genitourinary: Negative.  Negative for decreased urine volume, difficulty urinating and dysuria.   Musculoskeletal: Negative.     Skin: Negative.    Neurological:  Positive for dizziness and weakness. Negative for headaches.   Hematological: Negative.    Psychiatric/Behavioral: Negative.  Negative for agitation and confusion.           Personal History     Past Medical History:   Diagnosis Date    Cancer     COPD (chronic obstructive pulmonary disease)     Hyperlipidemia     Hypertension          Oncology Problem List:  Hodgkin disease (09/07/2023; Status: Active)       Past Surgical History:   Procedure Laterality Date    ABDOMINAL SURGERY      HYSTERECTOMY      OOPHORECTOMY         Family History:  family history includes Breast cancer (age of onset: 67) in her mother.     Social History:  reports that she has been smoking cigarettes. She has a 10.00 pack-year smoking history. She has never used smokeless tobacco. She reports that she does not currently use alcohol. She reports that she does not use drugs.  Social History     Social History Narrative    Not on file       Medications:  Budeson-Glycopyrrol-Formoterol, Mirabegron ER, albuterol sulfate HFA, apixaban, atorvastatin, cyclobenzaprine, gabapentin, guaiFENesin, loratadine, predniSONE, torsemide, and traZODone    Allergies   Allergen Reactions    Penicillins Anaphylaxis       Objective   Objective     Vital Signs:   Temp:  [97.7 °F (36.5 °C)] 97.7 °F (36.5 °C)  Heart Rate:  [57-76] 61  Resp:  [18] 18  BP: (105-144)/(64-74) 144/74  Flow (L/min):  [2] 2    Physical Exam  Vitals reviewed.   Constitutional:       General: She is not in acute distress.     Appearance: She is normal weight.   HENT:      Head: Normocephalic.      Nose: Nose normal. No congestion.      Mouth/Throat:      Mouth: Mucous membranes are moist.   Eyes:      Extraocular Movements: Extraocular movements intact.      Pupils: Pupils are equal, round, and reactive to light.   Cardiovascular:      Rate and Rhythm: Normal rate and regular rhythm.      Pulses: Normal pulses.      Heart sounds: Normal heart sounds. No  murmur heard.  Pulmonary:      Effort: Pulmonary effort is normal. No respiratory distress.      Breath sounds: Wheezing present. No rhonchi.   Abdominal:      General: Bowel sounds are normal. There is no distension.      Palpations: Abdomen is soft.      Tenderness: There is no abdominal tenderness.   Musculoskeletal:         General: No swelling. Normal range of motion.      Cervical back: Normal range of motion. No rigidity.   Skin:     General: Skin is warm.      Capillary Refill: Capillary refill takes less than 2 seconds.   Neurological:      General: No focal deficit present.      Mental Status: She is alert and oriented to person, place, and time. Mental status is at baseline.      Motor: Weakness present.   Psychiatric:         Mood and Affect: Mood normal.         Behavior: Behavior normal.         Thought Content: Thought content normal.         Judgment: Judgment normal.      Separate exam performed by me w/ no changes to the above.    Result Review:  I have personally reviewed the results from the time of this admission to 9/7/2023 04:01 EDT and agree with these findings:  [x]  Laboratory list / accordion  [x]  Microbiology  [x]  Radiology  []  EKG/Telemetry   []  Cardiology/Vascular   []  Pathology  [x]  Old records  []  Other:  Most notable findings include:     LAB RESULTS:      Lab 09/07/23  0046   WBC 17.75*   HEMOGLOBIN 13.7   HEMATOCRIT 45.8   PLATELETS 278   NEUTROS ABS 13.38*   IMMATURE GRANS (ABS) 0.26*   LYMPHS ABS 2.83   MONOS ABS 1.15*   EOS ABS 0.09   .9*         Lab 09/07/23  0046   SODIUM 140   POTASSIUM 5.2   CHLORIDE 98   CO2 30.0*   ANION GAP 12.0   BUN 40*   CREATININE 1.50*   EGFR 36.2*   GLUCOSE 96   CALCIUM 9.8   MAGNESIUM 2.6*         Lab 09/07/23  0046   TOTAL PROTEIN 6.9   ALBUMIN 4.1   GLOBULIN 2.8   ALT (SGPT) 29   AST (SGOT) 27   BILIRUBIN 0.2   ALK PHOS 92         Lab 09/07/23  0046   PROBNP 701.0   HSTROP T 18*                 Lab 09/07/23  0203   PH, ARTERIAL  7.405   PCO2, ARTERIAL 57.9*   PO2 ART 57.2*   FIO2 21   HCO3 ART 36.2*   BASE EXCESS ART 9.5*   CARBOXYHEMOGLOBIN 2.4*     Brief Urine Lab Results  (Last result in the past 365 days)        Color   Clarity   Blood   Leuk Est   Nitrite   Protein   CREAT   Urine HCG        09/07/23 0055 Yellow   Clear   Negative   Negative   Negative   Trace                 Microbiology Results (last 10 days)       ** No results found for the last 240 hours. **            XR Chest 1 View    Result Date: 9/6/2023  XR CHEST 1 VW Date of Exam: 9/6/2023 11:36 PM EDT Indication: Weak/Dizzy/AMS triage protocol Comparison: None available. Findings: There are no airspace consolidations. No pleural fluid. No pneumothorax. The pulmonary vasculature appears within normal limits. The cardiac and mediastinal silhouette appear unremarkable. No acute osseous abnormality identified.     Impression: Impression: No acute cardiopulmonary process. Electronically Signed: Renetta Duggan MD  9/6/2023 11:51 PM EDT  Workstation ID: [id number]     Results for orders placed during the hospital encounter of 08/24/23    Adult Transthoracic Echo Complete W/ Cont if Necessary Per Protocol    Interpretation Summary    Left ventricular systolic function is normal. Left ventricular ejection fraction appears to be 56 - 60%.    Left ventricular diastolic function was normal.    Mild aortic valve stenosis is present. Aortic valve area is 1.2 cm2.    Peak velocity of the flow distal to the aortic valve is 267.2 cm/s. Aortic valve mean pressure gradient is 16 mmHg.      Assessment & Plan   Assessment & Plan       COPD exacerbation    HEATHER (acute kidney injury)    Leukocytosis    HTN (hypertension)    HLD (hyperlipidemia)    A-fib    Hodgkin disease    Arthritis    Tessa Bradley is a 77 y.o. female with PMH of HTN, HLD, afib on eliquis, Hodgkin's disease s/p radiation and chemo and COPD presents to the ED for hypoxia.     Acute on chronic respiratory failure with hypoxia and  hypercarbia  COPD  **no real increased sputum production so hold off on abx; continue steroids/nebs  **prob needs home O2  -CXR shows no acute abnormality  -ABG with Po2 57, CO2 57  -DuoNebs  -Solu-Medrol  -Keep outpatient appt with Pulmonary   -Consult PT/OT  -Consult case management for home O2    Leukocytosis  -Could be from steroids  -Pending respiratory panel   -Pending Procal and lactic    HEATHER  -Cr 1.5, baseline 0.9  -Avoid nephrotoxic agents  -Strict I's and O's  -Trend    HTN  HLD  -Continue home statin    A-fib  -Continue home Eliquis          DVT prophylaxis:  SCDs, eliquis    CODE STATUS:  FULL       Expected Discharge  Expected discharge date/ time has not been documented.      This note has been completed as part of a split-shared workflow.     Signature: Electronically signed by ALIZA Hale, 09/07/23, 4:14 AM EDT.    Total APC Time: 60 minutes  Electronically signed by Cinthia Armstrong MD, 09/07/23, 4:56 AM EDT.

## 2023-09-07 NOTE — PLAN OF CARE
Goal Outcome Evaluation:  Plan of Care Reviewed With: patient        Progress: no change  Outcome Evaluation: PT initial eval completed. Pt presents below her functional baseline with decreased endurance, weakness, and SOA. Pt ambulated 200' with CGA and RW. SOA noted with O2 sat dropping to 89%. Further IPPT is warrented. PT rec d/c home with assist and HHPT when medically appropriate. Pt is interested in options available for obtaining rollator.      Anticipated Discharge Disposition (PT): home with assist, home with home health

## 2023-09-07 NOTE — OUTREACH NOTE
COPD/PN Week 2 Survey      Flowsheet Row Responses   [facility] facility patient discharged from? [place]   Does the patient have one of the following disease processes/diagnoses(primary or secondary)? COPD   Week 2 attempt successful? No   Unsuccessful attempts Attempt 1   Revoke Readmitted            Tamara LI - Registered Nurse

## 2023-09-07 NOTE — THERAPY DISCHARGE NOTE
Acute Care - Occupational Therapy Discharge  [facility]    Patient Name: Tessa Bradley  : 1945    MRN: [id number]                              Today's Date: 2023       Admit Date: 2023    Visit Dx:     ICD-10-CM ICD-9-CM   1. Acute on chronic respiratory failure with hypoxia  J96.21 518.84     799.02   2. History of COPD  Z87.09 V12.69   3. History of hypertension  Z86.79 V12.59   4. History of hyperlipidemia  Z86.39 V12.29     Patient Active Problem List   Diagnosis    COPD exacerbation    Elevated serum creatinine    Hypotension    Dyspnea    MAKAYLA (obstructive sleep apnea)    HEATHER (acute kidney injury)    Leukocytosis    HTN (hypertension)    HLD (hyperlipidemia)    A-fib    Hodgkin disease    Arthritis     Past Medical History:   Diagnosis Date    Cancer     COPD (chronic obstructive pulmonary disease)     Hyperlipidemia     Hypertension      Past Surgical History:   Procedure Laterality Date    ABDOMINAL SURGERY      HYSTERECTOMY      OOPHORECTOMY        General Information       Row Name 23 1452          OT Time and Intention    Document Type discharge evaluation/summary  -     Mode of Treatment occupational therapy  -       Row Name 23 1452          General Information    Patient Profile Reviewed yes  -     Prior Level of Function independent:;all household mobility;transfer;ADL's  Cane at baseline  -     Existing Precautions/Restrictions fall;oxygen therapy device and L/min  -     Barriers to Rehab none identified  -       Row Name 23 1452          Living Environment    People in Home spouse  -       Row Name 23 1452          Home Main Entrance    Number of Stairs, Main Entrance none  -       Row Name 23 1452          Stairs Within Home, Primary    Stairs, Within Home, Primary Pt. reports she can stay on main floor if needed. Bedroom is typically in basement.   -     Number of Stairs, Within Home, Primary other (see comments)  13  -LC     Stair  Railings, Within Home, Primary railings on both sides of stairs  -       Row Name 09/07/23 1452          Cognition    Orientation Status (Cognition) oriented x 4  -       Row Name 09/07/23 1452          Safety Issues, Functional Mobility    Safety Issues Affecting Function (Mobility) insight into deficits/self-awareness;safety precaution awareness;safety precautions follow-through/compliance  -     Impairments Affecting Function (Mobility) endurance/activity tolerance;shortness of breath;strength  -     Comment, Safety Issues/Impairments (Mobility) Demonstrates good safety and sequencing.  -               User Key  (r) = Recorded By, (t) = Taken By, (c) = Cosigned By      Initials Name Provider Type     Josephine Emery OT Occupational Therapist                   Mobility/ADL's       Row Name 09/07/23 1455          Bed Mobility    Bed Mobility scooting/bridging;supine-sit  -     Scooting/Bridging [place] (Bed Mobility) supervision  -     Supine-Sit [place] (Bed Mobility) supervision  -     Comment, (Bed Mobility) Demonstrates good safety and sequencing.  -       Row Name 09/07/23 1455          Transfers    Transfers sit-stand transfer;toilet transfer  -     Comment, (Transfers) Demonstrates good safety and sequencing. Pt. reports being up ad bipin in room.  -       Row Name 09/07/23 1455          Sit-Stand Transfer    Sit-Stand [place] (Transfers) supervision  -       Row Name 09/07/23 1455          Toilet Transfer    Type (Toilet Transfer) sit-stand;stand-sit  -     [place] Level (Toilet Transfer) supervision  -     Assistive Device (Toilet Transfer) commode;grab bars/safety frame  -       Row Name 09/07/23 1455          Functional Mobility    Functional Mobility- Ind. Level standby assist  -     Functional Mobility- Device cane, straight  -     Functional Mobility-Distance (Feet) 30  -     Functional Mobility- Comment Assist to manage IV line and O2. No LOB  and good safety awareness.  -[facility] Name 09/07/23 1455          Activities of Daily Living    BADL Assessment/Intervention grooming;toileting  -[facility] Name 09/07/23 1455          Grooming Assessment/Training    [place] Level (Grooming) wash face, hands;set up  -     Position (Grooming) unsupported standing  -[facility] Name 09/07/23 1455          Toileting Assessment/Training    [place] Level (Toileting) adjust/manage clothing;perform perineal hygiene;modified independence  -     Assistive Devices (Toileting) commode;grab bar/safety frame  -     Position (Toileting) unsupported sitting;unsupported standing  -               User Key  (r) = Recorded By, (t) = Taken By, (c) = Cosigned By      Initials Name Provider Type     Josephine Emery OT Occupational Therapist                   Obj/Interventions       [facility] Name 09/07/23 1458          Sensory Assessment (Somatosensory)    Sensory Assessment (Somatosensory) UE sensation intact  -[facility] Name 09/07/23 1458          Vision Assessment/Intervention    Visual Impairment/Limitations corrective lenses for reading  -[facility] Name 09/07/23 1458          Range of Motion Comprehensive    General Range of Motion bilateral upper extremity ROM WFL  -[facility] Name 09/07/23 1458          Strength Comprehensive (MMT)    General Manual Muscle Testing (MMT) Assessment upper extremity strength deficits identified  -[facility] Name 09/07/23 1458          Upper Extremity (Manual Muscle Testing)    Upper Extremity: Manual Muscle Testing (MMT) left UE strength is WFL;right UE strength is WFL  -[facility] Name 09/07/23 1458          Motor Skills    Motor Skills functional endurance  -     Functional Endurance Decreased activity tolerance  -[facility] Name 09/07/23 1458          Balance    Balance Assessment sitting static balance;sitting dynamic balance;standing static balance;standing dynamic balance  -     Static Sitting Balance  supervision  -     Dynamic Sitting Balance supervision  -     Position, Sitting Balance unsupported;sitting edge of bed  -     Static Standing Balance supervision  -     Dynamic Standing Balance standby assist  -     Position/Device Used, Standing Balance supported;cane, straight  -     Balance Interventions sitting;standing;sit to stand;occupation based/functional task;weight shifting activity  -     Comment, Balance No LOB  -               User Key  (r) = Recorded By, (t) = Taken By, (c) = Cosigned By      Initials Name Provider Type     Josephine Emery OT Occupational Therapist                   Goals/Plan       Row Name 09/07/23 1508          Toileting Goal 1 (OT)    Activity/Device (Toileting Goal 1, OT) adjust/manage clothing;perform perineal hygiene;commode;grab bar/safety frame  -     [place] Level/Cues Needed (Toileting Goal 1, OT) modified independence  -     Time Frame (Toileting Goal 1, OT) 1 day;long term goal (LTG)  -     Progress/Outcome (Toileting Goal 1, OT) goal met  -               User Key  (r) = Recorded By, (t) = Taken By, (c) = Cosigned By      Initials Name Provider Type     Josephine Emery OT Occupational Therapist                   Clinical Impression       Row Name 09/07/23 1500          Pain Assessment    Pretreatment Pain Rating 0/10 - no pain  -     Posttreatment Pain Rating 0/10 - no pain  -     Additional Documentation Pain Scale: Word Pre/Post-Treatment (Group)  -       Row Name 09/07/23 1500          Plan of Care Review    Plan of Care Reviewed With patient  -     Progress no change  -     Outcome Evaluation Pt. presents at baseline with ADLs and functional mobility. Demonstrates good safety and sequencing throughout session. No further skilled OT services warranted at this time. Recommend home with assist at discharge.  -       Row Name 09/07/23 1500          Therapy Assessment/Plan (OT)    Therapy Frequency (OT) evaluation only  -        Row Name 09/07/23 1500          Therapy Plan Review/Discharge Plan (OT)    Anticipated Discharge Disposition (OT) home with assist  -LC       Row Name 09/07/23 1500          Vital Signs    Pre SpO2 (%) 95  -LC     O2 Delivery Pre Treatment nasal cannula  -LC     Post SpO2 (%) 93  -LC     O2 Delivery Post Treatment nasal cannula  -LC       Row Name 09/07/23 1500          Positioning and Restraints    Pre-Treatment Position in bed  -LC     Post Treatment Position bed  -LC     In Bed notified nsg;sitting EOB;with PT  -LC               User Key  (r) = Recorded By, (t) = Taken By, (c) = Cosigned By      Initials Name Provider Type    Josephine Pryor, OT Occupational Therapist                   Outcome Measures       Row Name 09/07/23 1505          How much help from another is currently needed...    Putting on and taking off regular lower body clothing? 4  -LC     Bathing (including washing, rinsing, and drying) 4  -LC     Toileting (which includes using toilet bed pan or urinal) 4  -LC     Putting on and taking off regular upper body clothing 4  -LC     Taking care of personal grooming (such as brushing teeth) 4  -LC     Eating meals 4  -LC     AM-PAC 6 Clicks Score (OT) 24  -LC       Row Name 09/07/23 1443 09/07/23 1042       How much help from another person do you currently need...    Turning from your back to your side while in flat bed without using bedrails? 4  -AB 4  -EK    Moving from lying on back to sitting on the side of a flat bed without bedrails? 4  -AB 4  -EK    Moving to and from a bed to a chair (including a wheelchair)? 3  -AB 4  -EK    Standing up from a chair using your arms (e.g., wheelchair, bedside chair)? 3  -AB 4  -EK    Climbing 3-5 steps with a railing? 3  -AB 4  -EK    To walk in hospital room? 3  -AB 4  -EK    AM-PAC 6 Clicks Score (PT) 20  -AB 24  -EK    Highest level of mobility 6 --> Walked 10 steps or more  -AB 8 --> Walked 250 feet or more  -EK      Row Name 09/07/23 1000           How much help from another person do you currently need...    Turning from your back to your side while in flat bed without using bedrails? 4  -MG     Moving from lying on back to sitting on the side of a flat bed without bedrails? 4  -MG     Moving to and from a bed to a chair (including a wheelchair)? 4  -MG     Standing up from a chair using your arms (e.g., wheelchair, bedside chair)? 4  -MG     Climbing 3-5 steps with a railing? 4  -MG     To walk in hospital room? 4  -MG     AM-PAC 6 Clicks Score (PT) 24  -MG     Highest level of mobility 8 --> Walked 250 feet or more  -MG       Row Name 09/07/23 1505 09/07/23 1443       Functional Assessment    Outcome Measure Options AM-PAC 6 Clicks Daily Activity (OT)  - AM-PAC 6 Clicks Basic Mobility (PT)  -AB              User Key  (r) = Recorded By, (t) = Taken By, (c) = Cosigned By      Initials Name Provider Type    Josephine Pryor OT Occupational Therapist    Masha Orellana, RN Registered Nurse    Earlene Shah RN Registered Nurse    Marry Woodard, PT Physical Therapist                  Occupational Therapy Education       Title: PT OT SLP Therapies (In Progress)       Topic: Occupational Therapy (Done)       Point: ADL training (Done)       Description:   Instruct learner(s) on proper safety adaptation and remediation techniques during self care or transfers.   Instruct in proper use of assistive devices.                  Learning Progress Summary             Patient Acceptance, E, VU,DU by  at 9/7/2023 1346                         Point: Precautions (Done)       Description:   Instruct learner(s) on prescribed precautions during self-care and functional transfers.                  Learning Progress Summary             Patient Acceptance, E, VU,DU by  at 9/7/2023 1346                         Point: Body mechanics (Done)       Description:   Instruct learner(s) on proper positioning and spine alignment during self-care, functional  mobility activities and/or exercises.                  Learning Progress Summary             Patient Acceptance, E, VU,DU by  at 9/7/2023 1346                                         User Key       Initials Effective Dates Name Provider Type [facility] 06/16/21 -  Josephine Emery, MELONY Occupational Therapist OT                  OT Recommendation and Plan  Therapy Frequency (OT): evaluation only  Plan of Care Review  Plan of Care Reviewed With: patient  Progress: no change  Outcome Evaluation: Pt. presents at baseline with ADLs and functional mobility. Demonstrates good safety and sequencing throughout session. No further skilled OT services warranted at this time. Recommend home with assist at discharge.  Plan of Care Reviewed With: patient  Outcome Evaluation: Pt. presents at baseline with ADLs and functional mobility. Demonstrates good safety and sequencing throughout session. No further skilled OT services warranted at this time. Recommend home with assist at discharge.     Time Calculation:   Evaluation Complexity (OT)  Review Occupational Profile/Medical/Therapy History Complexity: brief/low complexity  Assessment, Occupational Performance/Identification of Deficit Complexity: 1-3 performance deficits  Clinical Decision Making Complexity (OT): problem focused assessment/low complexity  Overall Complexity of Evaluation (OT): low complexity     Time Calculation- OT       Row Name 09/07/23 1509             Time Calculation- OT    OT Start Time 1346  -      OT Received On 09/07/23  -      OT Goal Re-Cert Due Date 09/17/23  -         Timed Charges    82322 - OT Self Care/Mgmt Minutes 8  -         Untimed Charges    OT Eval/Re-eval Minutes 48  -         Total Minutes    Timed Charges Total Minutes 8  -      Untimed Charges Total Minutes 48  -       Total Minutes 56  -                User Key  (r) = Recorded By, (t) = Taken By, (c) = Cosigned By      Initials Name Provider Type     Rupert  MELONY Burton Occupational Therapist                  Therapy Charges for Today       Code Description Service Date Service Provider Modifiers Qty    [id number]  OT EVAL LOW COMPLEXITY 4 9/7/2023 Josephine Emery OT GO 1    [id number]  OT SELF CARE/MGMT/TRAIN EA 15 MIN 9/7/2023 Josephine Emery OT GO 1               OT Discharge Summary  Anticipated Discharge Disposition (OT): home with assist  Reason for Discharge: At baseline function  Outcomes Achieved: Able to achieve all goals within established timeline  Discharge Destination: Home with assist    Josephine Emery OT  9/7/2023

## 2023-09-07 NOTE — PROGRESS NOTES
[facility] Medicine Services  ADMISSION FOLLOW-UP NOTE          Patient admitted after midnight, H&P by my partner performed earlier on today's date reviewed.  Interim findings, labs, and charting also reviewed.        The [facility] Problem List has been managed and updated to include any new diagnoses:  Active Hospital Problems    Diagnosis  POA    **COPD exacerbation [J44.1]  Yes    HEATHER (acute kidney injury) [N17.9]  Unknown    Leukocytosis [D72.829]  Unknown    HTN (hypertension) [I10]  Unknown    HLD (hyperlipidemia) [E78.5]  Unknown    A-fib [I48.91]  Unknown    Hodgkin disease [C81.90]  Unknown    Arthritis [M19.90]  Unknown      Resolved Hospital Problems   No resolved problems to display.         ADDITIONAL PLAN:  - detailed assessment and plan from admission reviewed    Tessa Bradley is a 77 y.o. female with PMH of HTN, HLD, afib on eliquis, Hodgkin's disease s/p radiation and chemo and COPD presents to the ED for hypoxia. Patient recently admitted to [facility] 8/24 to 8/31, did not qualify for home O2 at that time on discharge. She was seen by Pulmonary service during that hospitalization and was recommended to be Dc'd on steroid taper.      Acute on chronic respiratory failure with hypoxia and hypercarbia  COPD  -Recently completed course of Doxycyline, defer further ABX at this time  -CXR shows no acute abnormality  -ABG with Po2 57, CO2 57  -DuoNebs  -Solu-Medrol  -Keep outpatient appt with Pulmonary   -Consult PT/OT  -Consult case management for home O2 (if she qualifies)      Leukocytosis  -likely from steroids  -Respiratory panel negative  -Procal and lactic negative      HEATHER  Hyperkalemia   -Cr 1.5, baseline 0.9  -Avoid nephrotoxic agents, hold home Torsemide   -Gentle IVF today x 10 hours   -Strict I's and O's  -1x dose Lokelma  -AM BMP      HTN  HLD  -Continue home statin     A-fib  -Continue home Eliquis    Expected Discharge   Expected Discharge Date: 9/9/2023;  Expected Discharge Time:      Zuri Reyes,   09/07/23

## 2023-09-07 NOTE — THERAPY EVALUATION
Patient Name: Tessa Bradley  : 1945    MRN: [id number]                              Today's Date: 2023       Admit Date: 2023    Visit Dx:     ICD-10-CM ICD-9-CM   1. Acute on chronic respiratory failure with hypoxia  J96.21 518.84     799.02   2. History of COPD  Z87.09 V12.69   3. History of hypertension  Z86.79 V12.59   4. History of hyperlipidemia  Z86.39 V12.29     Patient Active Problem List   Diagnosis    COPD exacerbation    Elevated serum creatinine    Hypotension    Dyspnea    MAKAYLA (obstructive sleep apnea)    HEATHER (acute kidney injury)    Leukocytosis    HTN (hypertension)    HLD (hyperlipidemia)    A-fib    Hodgkin disease    Arthritis     Past Medical History:   Diagnosis Date    Cancer     COPD (chronic obstructive pulmonary disease)     Hyperlipidemia     Hypertension      Past Surgical History:   Procedure Laterality Date    ABDOMINAL SURGERY      HYSTERECTOMY      OOPHORECTOMY        General Information       Row Name 23 1432          Physical Therapy Time and Intention    Document Type evaluation  -AB     Mode of Treatment physical therapy  -AB       Row Name 23 1432          General Information    Patient Profile Reviewed yes  -AB     Prior Level of Function independent:;all household mobility;community mobility;gait;transfer;bed mobility;ADL's  Using cane for mobility. Endorses 2 falls in last 6 months. Current with HHPT.  -AB     Existing Precautions/Restrictions fall;oxygen therapy device and L/min  -AB     Barriers to Rehab none identified  -AB       Row Name 23 1432          Living Environment    People in Home spouse  -AB       Row Name 23 1432          Home Main Entrance    Number of Stairs, Main Entrance none  -AB       Row Name 23 1432          Stairs Within Home, Primary    Stairs, Within Home, Primary Pt with bed/bath in basement but able to stay on main floor if needed.  -AB     Number of Stairs, Within Home, Primary other (see comments)  13   -AB       Row Name 09/07/23 1432          Cognition    Orientation Status (Cognition) oriented x 4  -AB       Row Name 09/07/23 1432          Safety Issues, Functional Mobility    Safety Issues Affecting Function (Mobility) insight into deficits/self-awareness;safety precaution awareness;safety precautions follow-through/compliance  -AB     Impairments Affecting Function (Mobility) endurance/activity tolerance;strength;shortness of breath  -AB               User Key  (r) = Recorded By, (t) = Taken By, (c) = Cosigned By      Initials Name Provider Type    AB Marry Hinton, PT Physical Therapist                   Mobility       Row Name 09/07/23 1434          Bed Mobility    Comment, (Bed Mobility) Received sitting EOB.  -AB       Row Name 09/07/23 1434          Transfers    Comment, (Transfers) Cues for hand placement and walker positioning.  -AB       Row Name 09/07/23 1434          Bed-Chair Transfer    Bed-Chair [place] (Transfers) 1 person assist;standby assist  -AB       Row Name 09/07/23 1434          Sit-Stand Transfer    Sit-Stand [place] (Transfers) standby assist;1 person assist  -AB       Row Name 09/07/23 1434          Gait/Stairs (Locomotion)    [place] Level (Gait) contact guard;1 person assist;verbal cues  -AB     Assistive Device (Gait) walker, front-wheeled  -AB     Distance in Feet (Gait) 200  -AB     Deviations/Abnormal Patterns (Gait) bilateral deviations;johnny decreased;gait speed decreased;stride length decreased  -AB     Bilateral Gait Deviations forward flexed posture  -AB     [place] Level (Stairs) unable to assess  -AB     Comment, (Gait/Stairs) Pt ambulated in [place] with step through gait pattern and decreased stride length. Cues for upright posture and PLB. No overt LOB. Reports improved stability with walker. Further activity limited by SOA.  -AB               User Key  (r) = Recorded By, (t) = Taken By, (c) = Cosigned By      Initials Name Provider Type    AB  Marry Hinton, PT Physical Therapist                   Obj/Interventions       Row Name 09/07/23 1436          Range of Motion Comprehensive    General Range of Motion bilateral lower extremity ROM WNL  -AB       Row Name 09/07/23 1436          Strength Comprehensive (MMT)    General Manual Muscle Testing (MMT) Assessment lower extremity strength deficits identified  -AB     Comment, General Manual Muscle Testing (MMT) Assessment BLE grossly 4/5  -AB       Row Name 09/07/23 1436          Balance    Balance Assessment sitting static balance;sitting dynamic balance;standing static balance;standing dynamic balance  -AB     Static Sitting Balance supervision  -AB     Dynamic Sitting Balance supervision  -AB     Position, Sitting Balance unsupported;sitting edge of bed  -AB     Static Standing Balance standby assist  -AB     Dynamic Standing Balance contact guard;1-person assist;verbal cues  -AB     Position/Device Used, Standing Balance supported;walker, front-wheeled  -AB     Balance Interventions sitting;standing;sit to stand;supported;static;dynamic;occupation based/functional task  -AB     Comment, Balance No overt LOB.  -AB       Row Name 09/07/23 1436          Sensory Assessment (Somatosensory)    Sensory Assessment (Somatosensory) LE sensation intact;other (see comments)  Decreased sensation at baseline.  -AB               User Key  (r) = Recorded By, (t) = Taken By, (c) = Cosigned By      Initials Name Provider Type    AB Marry Hinton, PT Physical Therapist                   Goals/Plan       Row Name 09/07/23 1442          Bed Mobility Goal 1 (PT)    Activity/Assistive Device (Bed Mobility Goal 1, PT) sit to supine;supine to sit  -AB     [place] Level/Cues Needed (Bed Mobility Goal 1, PT) independent  -AB     Time Frame (Bed Mobility Goal 1, PT) long term goal (LTG);10 days  -AB       Row Name 09/07/23 1442          Transfer Goal 1 (PT)    Activity/Assistive Device (Transfer Goal 1, PT)  sit-to-stand/stand-to-sit;bed-to-chair/chair-to-bed;cane, straight  -AB     [place] Level/Cues Needed (Transfer Goal 1, PT) modified independence  -AB     Time Frame (Transfer Goal 1, PT) long term goal (LTG);10 days  -AB       Row Name 09/07/23 1442          Gait Training Goal 1 (PT)    Activity/Assistive Device (Gait Training Goal 1, PT) gait (walking locomotion);assistive device use;walker, rolling  -AB     [place] Level (Gait Training Goal 1, PT) modified independence  -AB     Distance (Gait Training Goal 1, PT) 400  -AB     Time Frame (Gait Training Goal 1, PT) long term goal (LTG);10 days  -AB       Row Name 09/07/23 1442          Therapy Assessment/Plan (PT)    Planned Therapy Interventions (PT) balance training;bed mobility training;gait training;home exercise program;patient/family education;postural re-education;transfer training;stretching;strengthening;ROM (range of motion)  -AB               User Key  (r) = Recorded By, (t) = Taken By, (c) = Cosigned By      Initials Name Provider Type    AB Marry Hinton, PT Physical Therapist                   Clinical Impression       Row Name 09/07/23 1437          Pain    Pretreatment Pain Rating 0/10 - no pain  -AB     Posttreatment Pain Rating 0/10 - no pain  -AB     Additional Documentation Pain Scale: FACES Pre/Post-Treatment (Group)  -AB       Row Name 09/07/23 1437          Pain Scale: FACES Pre/Post-Treatment    Pain: FACES Scale, Pretreatment 0-->no hurt  -AB     Posttreatment Pain Rating 0-->no hurt  -AB       Row Name 09/07/23 1437          Plan of Care Review    Plan of Care Reviewed With patient  -AB     Progress no change  -AB     Outcome Evaluation PT initial eval completed. Pt presents below her functional baseline with decreased endurance, weakness, and SOA. Pt ambulated 200' with CGA and RW. SOA noted with O2 sat dropping to 89%. Further IPPT is warrented. PT rec d/c home with assist and HHPT when medically appropriate. Pt is interested  in options available for obtaining rollator.  -AB       Row Name 09/07/23 1437          Therapy Assessment/Plan (PT)    Rehab Potential (PT) good, to achieve stated therapy goals  -AB     Criteria for Skilled Interventions Met (PT) yes;meets criteria;skilled treatment is necessary  -AB     Therapy Frequency (PT) daily  -AB       Row Name 09/07/23 1437          Vital Signs    Pre Systolic BP Rehab 114  -AB     Pre Treatment Diastolic BP 72  -AB     Post Systolic BP Rehab 120  -AB     Post Treatment Diastolic BP 63  -AB     Pretreatment Heart Rate (beats/min) 85  -AB     Posttreatment Heart Rate (beats/min) 84  -AB     Pre SpO2 (%) 92  -AB     O2 Delivery Pre Treatment supplemental O2  -AB     Intra SpO2 (%) 89  -AB     O2 Delivery Intra Treatment supplemental O2  -AB     Post SpO2 (%) 93  -AB     O2 Delivery Post Treatment supplemental O2  -AB     Pre Patient Position Sitting  -AB     Intra Patient Position Standing  -AB     Post Patient Position Sitting  -AB       Row Name 09/07/23 1437          Positioning and Restraints    Pre-Treatment Position in bed  -AB     Post Treatment Position chair  -AB     In Chair notified nsg;sitting;call light within reach  Pt up ad bipin in room. RN cleared for no alarm on chair.  -AB               User Key  (r) = Recorded By, (t) = Taken By, (c) = Cosigned By      Initials Name Provider Type    AB Marry Hinton, PT Physical Therapist                   Outcome Measures       Row Name 09/07/23 1443 09/07/23 1042       How much help from another person do you currently need...    Turning from your back to your side while in flat bed without using bedrails? 4  -AB 4  -EK    Moving from lying on back to sitting on the side of a flat bed without bedrails? 4  -AB 4  -EK    Moving to and from a bed to a chair (including a wheelchair)? 3  -AB 4  -EK    Standing up from a chair using your arms (e.g., wheelchair, bedside chair)? 3  -AB 4  -EK    Climbing 3-5 steps with a railing? 3  -AB 4  -EK     To walk in hospital room? 3  -AB 4  -EK    AM-PAC 6 Clicks Score (PT) 20  -AB 24  -EK    Highest level of mobility 6 --> Walked 10 steps or more  -AB 8 --> Walked 250 feet or more  -EK      Row Name 09/07/23 1000          How much help from another person do you currently need...    Turning from your back to your side while in flat bed without using bedrails? 4  -MG     Moving from lying on back to sitting on the side of a flat bed without bedrails? 4  -MG     Moving to and from a bed to a chair (including a wheelchair)? 4  -MG     Standing up from a chair using your arms (e.g., wheelchair, bedside chair)? 4  -MG     Climbing 3-5 steps with a railing? 4  -MG     To walk in hospital room? 4  -MG     AM-PAC 6 Clicks Score (PT) 24  -MG     Highest level of mobility 8 --> Walked 250 feet or more  -MG       Row Name 09/07/23 1443          Functional Assessment    Outcome Measure Options AM-PAC 6 Clicks Basic Mobility (PT)  -AB               User Key  (r) = Recorded By, (t) = Taken By, (c) = Cosigned By      Initials Name Provider Type    MG Masha Heart, RN Registered Nurse    Earlene Shah RN Registered Nurse    Marry Woodard, PT Physical Therapist                                 Physical Therapy Education       Title: PT OT SLP Therapies (In Progress)       Topic: Physical Therapy (In Progress)       Point: Mobility training (Done)       Learning Progress Summary             Patient Acceptance, E,D, VU,DU by AB at 9/7/2023 1443                         Point: Home exercise program (Not Started)       Learner Progress:  Not documented in this visit.              Point: Body mechanics (Done)       Learning Progress Summary             Patient Acceptance, E,D, VU,DU by AB at 9/7/2023 1443                         Point: Precautions (Done)       Learning Progress Summary             Patient Acceptance, E,D, VU,DU by AB at 9/7/2023 1443                                         User Key       Initials  Effective Dates Name Provider Type Discipline    AB 09/22/22 -  Marry Hinton, PT Physical Therapist PT                  PT Recommendation and Plan  Planned Therapy Interventions (PT): balance training, bed mobility training, gait training, home exercise program, patient/family education, postural re-education, transfer training, stretching, strengthening, ROM (range of motion)  Plan of Care Reviewed With: patient  Progress: no change  Outcome Evaluation: PT initial eval completed. Pt presents below her functional baseline with decreased endurance, weakness, and SOA. Pt ambulated 200' with CGA and RW. SOA noted with O2 sat dropping to 89%. Further IPPT is warrented. PT rec d/c home with assist and HHPT when medically appropriate. Pt is interested in options available for obtaining rollator.     Time Calculation:   PT Evaluation Complexity  History, PT Evaluation Complexity: 1-2 personal factors and/or comorbidities  Examination of Body Systems (PT Eval Complexity): total of 3 or more elements  Clinical Presentation (PT Evaluation Complexity): stable  Clinical Decision Making (PT Evaluation Complexity): low complexity  Overall Complexity (PT Evaluation Complexity): low complexity     PT Charges       Row Name 09/07/23 1444             Time Calculation    Start Time 1400  -AB      PT Received On 09/07/23  -AB      PT Goal Re-Cert Due Date 09/17/23  -AB         Untimed Charges    PT Eval/Re-eval Minutes 46  -AB         Total Minutes    Untimed Charges Total Minutes 46  -AB       Total Minutes 46  -AB                User Key  (r) = Recorded By, (t) = Taken By, (c) = Cosigned By      Initials Name Provider Type    AB Marry Hinton, PT Physical Therapist                  Therapy Charges for Today       Code Description Service Date Service Provider Modifiers Qty    [id number]  PT EVAL LOW COMPLEXITY 4 9/7/2023 Marry Hinton, PT GP 1            PT G-Codes  Outcome Measure Options: AM-PAC 6 Clicks Basic Mobility  (PT)  AM-PAC 6 Clicks Score (PT): 20  PT Discharge Summary  Anticipated Discharge Disposition (PT): home with assist, home with home health    Marry Hinton, PT  9/7/2023

## 2023-09-07 NOTE — PLAN OF CARE
Problem: Adult Inpatient Plan of Care  Goal: Plan of Care Review  Outcome: Ongoing, Progressing  Goal: Patient-Specific Goal (Individualized)  Outcome: Ongoing, Progressing  Goal: Absence of Hospital-Acquired Illness or Injury  Outcome: Ongoing, Progressing  Intervention: Identify and Manage Fall Risk  Recent Flowsheet Documentation  Taken 9/7/2023 1844 by Earlene Houston RN  Safety Promotion/Fall Prevention:   assistive device/personal items within reach   activity supervised   clutter free environment maintained   fall prevention program maintained   nonskid shoes/slippers when out of bed   safety round/check completed   room organization consistent  Taken 9/7/2023 1640 by Earlene Houston RN  Safety Promotion/Fall Prevention:   clutter free environment maintained   assistive device/personal items within reach   activity supervised   fall prevention program maintained   nonskid shoes/slippers when out of bed   room organization consistent   safety round/check completed  Taken 9/7/2023 1422 by Earlene Houston RN  Safety Promotion/Fall Prevention:   assistive device/personal items within reach   activity supervised   clutter free environment maintained   fall prevention program maintained   nonskid shoes/slippers when out of bed   safety round/check completed   room organization consistent  Taken 9/7/2023 1220 by Earlene Houston RN  Safety Promotion/Fall Prevention:   activity supervised   assistive device/personal items within reach   clutter free environment maintained   fall prevention program maintained   nonskid shoes/slippers when out of bed   room organization consistent   safety round/check completed  Taken 9/7/2023 1042 by Earlene Houston RN  Safety Promotion/Fall Prevention:   activity supervised   assistive device/personal items within reach   clutter free environment maintained   fall prevention program maintained   nonskid shoes/slippers when out of bed   room organization consistent   safety  round/check completed  Intervention: Prevent Skin Injury  Recent Flowsheet Documentation  Taken 9/7/2023 1844 by Earlene Houston RN  Body Position: position changed independently  Skin Protection:   adhesive use limited   incontinence pads utilized   tubing/devices free from skin contact  Taken 9/7/2023 1640 by Earlene Houston RN  Body Position: position changed independently  Skin Protection:   adhesive use limited   incontinence pads utilized   tubing/devices free from skin contact  Taken 9/7/2023 1422 by Earlene Houston RN  Body Position: position changed independently  Skin Protection:   adhesive use limited   incontinence pads utilized   tubing/devices free from skin contact  Taken 9/7/2023 1220 by Earlene Houston RN  Body Position: position changed independently  Skin Protection:   adhesive use limited   incontinence pads utilized   tubing/devices free from skin contact  Taken 9/7/2023 1042 by Earlene Houston RN  Body Position: position changed independently  Skin Protection:   adhesive use limited   incontinence pads utilized   tubing/devices free from skin contact  Intervention: Prevent and Manage VTE (Venous Thromboembolism) Risk  Recent Flowsheet Documentation  Taken 9/7/2023 1844 by Earlene Houston RN  Activity Management: activity encouraged  Taken 9/7/2023 1640 by Earlene Houston RN  Activity Management: activity encouraged  Taken 9/7/2023 1422 by Earlene Houston RN  Activity Management: activity encouraged  Taken 9/7/2023 1220 by Earlene Houston RN  Activity Management: activity encouraged  Taken 9/7/2023 1042 by Earlene Houston RN  Activity Management: activity encouraged  Range of Motion: active ROM (range of motion) encouraged  Intervention: Prevent Infection  Recent Flowsheet Documentation  Taken 9/7/2023 1844 by Earlene Houston RN  Infection Prevention:   environmental surveillance performed   equipment surfaces disinfected   hand hygiene promoted   rest/sleep promoted   single patient room  provided  Taken 9/7/2023 1640 by Earlene Houston RN  Infection Prevention:   environmental surveillance performed   equipment surfaces disinfected   hand hygiene promoted   rest/sleep promoted   single patient room provided  Taken 9/7/2023 1422 by Earlene Houston RN  Infection Prevention:   environmental surveillance performed   equipment surfaces disinfected   hand hygiene promoted   rest/sleep promoted   single patient room provided  Taken 9/7/2023 1220 by Earlene Houston RN  Infection Prevention:   environmental surveillance performed   equipment surfaces disinfected   hand hygiene promoted   rest/sleep promoted   single patient room provided  Taken 9/7/2023 1042 by Earlene Houston RN  Infection Prevention:   single patient room provided   rest/sleep promoted   environmental surveillance performed   equipment surfaces disinfected   hand hygiene promoted  Goal: Optimal Comfort and Wellbeing  Outcome: Ongoing, Progressing  Intervention: Monitor Pain and Promote Comfort  Recent Flowsheet Documentation  Taken 9/7/2023 1042 by Earlene Houston RN  Pain Management Interventions:   quiet environment facilitated   care clustered  Intervention: Provide Person-Centered Care  Recent Flowsheet Documentation  Taken 9/7/2023 1042 by Earlene Houston RN  Trust Relationship/Rapport:   care explained   choices provided   questions answered   empathic listening provided   thoughts/feelings acknowledged  Goal: Readiness for Transition of Care  Outcome: Ongoing, Progressing     Problem: COPD (Chronic Obstructive Pulmonary Disease) Comorbidity  Goal: Maintenance of COPD Symptom Control  Outcome: Ongoing, Progressing  Intervention: Maintain COPD-Symptom Control  Recent Flowsheet Documentation  Taken 9/7/2023 1844 by Earlene Houston RN  Supportive Measures:   active listening utilized   verbalization of feelings encouraged  Medication Review/Management: medications reviewed  Taken 9/7/2023 1640 by Earlene Houston RN  Supportive  Measures:   active listening utilized   verbalization of feelings encouraged  Medication Review/Management: medications reviewed  Taken 9/7/2023 1422 by Earlene Houston RN  Supportive Measures:   active listening utilized   verbalization of feelings encouraged  Medication Review/Management: medications reviewed  Taken 9/7/2023 1220 by Earlene Houston RN  Supportive Measures:   active listening utilized   verbalization of feelings encouraged  Medication Review/Management: medications reviewed  Taken 9/7/2023 1042 by Earlene Houston RN  Supportive Measures:   active listening utilized   verbalization of feelings encouraged  Medication Review/Management: medications reviewed     Problem: Obstructive Sleep Apnea Risk or Actual Comorbidity Management  Goal: Unobstructed Breathing During Sleep  Outcome: Ongoing, Progressing     Problem: Pain Chronic (Persistent) (Comorbidity Management)  Goal: Acceptable Pain Control and Functional Ability  Outcome: Ongoing, Progressing  Intervention: Manage Persistent Pain  Recent Flowsheet Documentation  Taken 9/7/2023 1844 by Earlene Houston RN  Sleep/Rest Enhancement:   awakenings minimized   relaxation techniques promoted   room darkened   regular sleep/rest pattern promoted  Medication Review/Management: medications reviewed  Taken 9/7/2023 1640 by Earlene Houston RN  Sleep/Rest Enhancement:   awakenings minimized   relaxation techniques promoted   room darkened   regular sleep/rest pattern promoted  Medication Review/Management: medications reviewed  Taken 9/7/2023 1422 by Earlene Houston RN  Sleep/Rest Enhancement:   awakenings minimized   room darkened   relaxation techniques promoted   regular sleep/rest pattern promoted  Medication Review/Management: medications reviewed  Taken 9/7/2023 1220 by aErlene Houston RN  Sleep/Rest Enhancement:   awakenings minimized   relaxation techniques promoted   room darkened   regular sleep/rest pattern promoted  Medication Review/Management:  medications reviewed  Taken 9/7/2023 1042 by Earlene Houston RN  Sleep/Rest Enhancement:   awakenings minimized   room darkened   regular sleep/rest pattern promoted   relaxation techniques promoted  Medication Review/Management: medications reviewed  Intervention: Develop Pain Management Plan  Recent Flowsheet Documentation  Taken 9/7/2023 1042 by Earlene Houston RN  Pain Management Interventions:   quiet environment facilitated   care clustered  Intervention: Optimize Psychosocial Wellbeing  Recent Flowsheet Documentation  Taken 9/7/2023 1844 by Earlene Houston RN  Supportive Measures:   active listening utilized   verbalization of feelings encouraged  Taken 9/7/2023 1640 by Earlene Houston RN  Supportive Measures:   active listening utilized   verbalization of feelings encouraged  Taken 9/7/2023 1422 by Earlene Houston RN  Supportive Measures:   active listening utilized   verbalization of feelings encouraged  Taken 9/7/2023 1220 by Earlene Houston RN  Supportive Measures:   active listening utilized   verbalization of feelings encouraged  Taken 9/7/2023 1042 by Earlene Houston RN  Supportive Measures:   active listening utilized   verbalization of feelings encouraged  Diversional Activities:   television   smartphone  Family/Support System Care: self-care encouraged   Goal Outcome Evaluation:

## 2023-09-08 ENCOUNTER — READMISSION MANAGEMENT (OUTPATIENT)
Dept: CALL CENTER | Facility: HOSPITAL | Age: 78
End: 2023-09-08
Payer: MEDICARE

## 2023-09-08 VITALS
BODY MASS INDEX: 27.79 KG/M2 | TEMPERATURE: 98 F | WEIGHT: 162.8 LBS | DIASTOLIC BLOOD PRESSURE: 44 MMHG | SYSTOLIC BLOOD PRESSURE: 97 MMHG | OXYGEN SATURATION: 88 % | RESPIRATION RATE: 16 BRPM | HEART RATE: 64 BPM | HEIGHT: 64 IN

## 2023-09-08 PROBLEM — J96.21 ACUTE ON CHRONIC RESPIRATORY FAILURE WITH HYPOXIA: Status: ACTIVE | Noted: 2023-09-08

## 2023-09-08 LAB
ANION GAP SERPL CALCULATED.3IONS-SCNC: 11 MMOL/L (ref 5–15)
BUN SERPL-MCNC: 37 MG/DL (ref 8–23)
BUN/CREAT SERPL: 28.7 (ref 7–25)
CALCIUM SPEC-SCNC: 9.4 MG/DL (ref 8.6–10.5)
CHLORIDE SERPL-SCNC: 99 MMOL/L (ref 98–107)
CO2 SERPL-SCNC: 31 MMOL/L (ref 22–29)
CREAT SERPL-MCNC: 1.29 MG/DL (ref 0.57–1)
DEPRECATED RDW RBC AUTO: 54.4 FL (ref 37–54)
EGFRCR SERPLBLD CKD-EPI 2021: 43.4 ML/MIN/1.73
ERYTHROCYTE [DISTWIDTH] IN BLOOD BY AUTOMATED COUNT: 14.8 % (ref 12.3–15.4)
GLUCOSE SERPL-MCNC: 84 MG/DL (ref 65–99)
HCT VFR BLD AUTO: 41.3 % (ref 34–46.6)
HGB BLD-MCNC: 12.5 G/DL (ref 12–15.9)
MCH RBC QN AUTO: 30.2 PG (ref 26.6–33)
MCHC RBC AUTO-ENTMCNC: 30.3 G/DL (ref 31.5–35.7)
MCV RBC AUTO: 99.8 FL (ref 79–97)
PLATELET # BLD AUTO: 243 10*3/MM3 (ref 140–450)
PMV BLD AUTO: 9.7 FL (ref 6–12)
POTASSIUM SERPL-SCNC: 4.5 MMOL/L (ref 3.5–5.2)
RBC # BLD AUTO: 4.14 10*6/MM3 (ref 3.77–5.28)
SODIUM SERPL-SCNC: 141 MMOL/L (ref 136–145)
WBC NRBC COR # BLD: 16.97 10*3/MM3 (ref 3.4–10.8)

## 2023-09-08 PROCEDURE — G0378 HOSPITAL OBSERVATION PER HR: HCPCS

## 2023-09-08 PROCEDURE — 25010000002 METHYLPREDNISOLONE PER 40 MG

## 2023-09-08 PROCEDURE — 85027 COMPLETE CBC AUTOMATED: CPT | Performed by: FAMILY MEDICINE

## 2023-09-08 PROCEDURE — 94664 DEMO&/EVAL PT USE INHALER: CPT

## 2023-09-08 PROCEDURE — 80048 BASIC METABOLIC PNL TOTAL CA: CPT | Performed by: FAMILY MEDICINE

## 2023-09-08 PROCEDURE — 94799 UNLISTED PULMONARY SVC/PX: CPT

## 2023-09-08 PROCEDURE — 96376 TX/PRO/DX INJ SAME DRUG ADON: CPT

## 2023-09-08 RX ORDER — PREDNISONE 20 MG/1
TABLET ORAL
Qty: 10 TABLET | Refills: 0 | Status: SHIPPED | OUTPATIENT
Start: 2023-09-09 | End: 2023-09-17

## 2023-09-08 RX ORDER — BUDESONIDE, GLYCOPYRROLATE, AND FORMOTEROL FUMARATE 160; 9; 4.8 UG/1; UG/1; UG/1
2 AEROSOL, METERED RESPIRATORY (INHALATION) 2 TIMES DAILY
Qty: 1 EACH | Refills: 1 | Status: SHIPPED | OUTPATIENT
Start: 2023-09-08 | End: 2023-10-08

## 2023-09-08 RX ORDER — GUAIFENESIN 600 MG/1
1200 TABLET, EXTENDED RELEASE ORAL EVERY 12 HOURS SCHEDULED
Qty: 28 TABLET | Refills: 0 | Status: SHIPPED | OUTPATIENT
Start: 2023-09-08 | End: 2023-09-15

## 2023-09-08 RX ORDER — IPRATROPIUM BROMIDE AND ALBUTEROL SULFATE 2.5; .5 MG/3ML; MG/3ML
3 SOLUTION RESPIRATORY (INHALATION) EVERY 6 HOURS PRN
Qty: 360 ML | Refills: 0 | Status: SHIPPED | OUTPATIENT
Start: 2023-09-08

## 2023-09-08 RX ORDER — NICOTINE 21 MG/24HR
1 PATCH, TRANSDERMAL 24 HOURS TRANSDERMAL EVERY 24 HOURS
Qty: 30 EACH | Refills: 0 | Status: SHIPPED | OUTPATIENT
Start: 2023-09-09

## 2023-09-08 RX ADMIN — Medication 10 ML: at 08:11

## 2023-09-08 RX ADMIN — METHYLPREDNISOLONE SODIUM SUCCINATE 40 MG: 40 INJECTION, POWDER, LYOPHILIZED, FOR SOLUTION INTRAMUSCULAR; INTRAVENOUS at 08:11

## 2023-09-08 RX ADMIN — IPRATROPIUM BROMIDE AND ALBUTEROL SULFATE 3 ML: 2.5; .5 SOLUTION RESPIRATORY (INHALATION) at 03:15

## 2023-09-08 RX ADMIN — ACETAMINOPHEN 650 MG: 325 TABLET ORAL at 05:36

## 2023-09-08 RX ADMIN — GUAIFENESIN 1200 MG: 600 TABLET, EXTENDED RELEASE ORAL at 08:11

## 2023-09-08 RX ADMIN — APIXABAN 5 MG: 5 TABLET, FILM COATED ORAL at 08:11

## 2023-09-08 RX ADMIN — NICOTINE 1 PATCH: 14 PATCH, EXTENDED RELEASE TRANSDERMAL at 05:37

## 2023-09-08 RX ADMIN — SENNOSIDES AND DOCUSATE SODIUM 2 TABLET: 8.6; 5 TABLET ORAL at 08:11

## 2023-09-08 RX ADMIN — IPRATROPIUM BROMIDE AND ALBUTEROL SULFATE 3 ML: 2.5; .5 SOLUTION RESPIRATORY (INHALATION) at 07:32

## 2023-09-08 NOTE — DISCHARGE SUMMARY
[facility]  DISCHARGE SUMMARY    Patient Name: Tessa Bradley  : 1945  MRN: [id number]    Date of Admission: 2023 12:33 AM  Date of Discharge:  2023  Primary Care Physician: Sunni Santoro MD    Consults       Date and Time Order Name Status Description    2023 12:32 AM Inpatient Pulmonology Consult Completed             Hospital Course     Presenting Problem: dyspnea    Active Hospital Problems    Diagnosis  POA    **COPD exacerbation [J44.1]  Yes    Acute on chronic respiratory failure with hypoxia [J96.21]  Yes    HEATHER (acute kidney injury) [N17.9]  Unknown    Leukocytosis [D72.829]  Unknown    HTN (hypertension) [I10]  Unknown    HLD (hyperlipidemia) [E78.5]  Unknown    A-fib [I48.91]  Unknown    Hodgkin disease [C81.90]  Unknown    Arthritis [M19.90]  Unknown      Resolved Hospital Problems   No resolved problems to display.          Hospital Course:  Tessa Bradley is a 77 y.o. female with PMH of HTN, HLD, afib on eliquis, Hodgkin's disease s/p radiation and chemo and COPD presents to the ED for hypoxia. Patient recently admitted to [facility]  to , did not qualify for home O2 at that time on discharge. She was seen by Pulmonary service during that hospitalization and was recommended to be Dc'd on steroid taper. She was finishing up taper when dyspnea/ hypoxia developed presenting back to ED.     Acute on chronic respiratory failure with hypoxia and hypercarbia  COPD  -Recently completed course of Doxycyline, defer further ABX at this time  -CXR shows no acute abnormality  -ABG with Po2 57, CO2 57  -DuoNebs scheduled here- will order for home. Continue home Breztri  -Solu-Medrol here- will change to slow steroid taper (will extend previous)  -Keep outpatient appt with Pulmonary   -home with assist recommended per PT/OT  -Consult case management for home O2, rollator and resume HH at [place]     Leukocytosis  -likely from steroids  -Respiratory  panel negative  -Procal and lactic negative      HEATHER  Hyperkalemia   -Cr 1.5, baseline 0.9, trending down to 1.2 today. Encourage hydration  -Avoid nephrotoxic agents, hold home Torsemide and follow up with PCP for further management on Monday  -Gentle IVF today x 10 hours   -1x dose Lokelma, K+ 4.5 today     HTN  HLD  -Continue home statin     A-fib  -Continue home Eliquis         Discharge Follow Up Recommendations for outpatient labs/diagnostics:   PCP follow up Monday as scheduled- repeat BMP  Follow up 9/27 with B[facility] as scheduled    Day of Discharge     HPI:   Feeling better. Breathing much better. No overnight issues. Hoping to go home    Review of Systems  Gen- No fevers, chills  CV- No chest pain, palpitations  Resp- No cough, dyspnea  GI- No N/V/D, abd pain      Vital Signs:   Temp:  [97.5 °F (36.4 °C)-98.4 °F (36.9 °C)] 98 °F (36.7 °C)  Heart Rate:  [62-92] 64  Resp:  [16-20] 16  BP: ()/(44-89) 97/44  Flow (L/min):  [1-2] 1      Physical Exam:  Constitutional: No acute distress, awake, alert  HENT: NCAT, mucous membranes moist  Respiratory: expiratory wheezes bilaterally, respiratory effort normal on 2L  Cardiovascular: RRR, no murmurs, rubs, or gallops  Gastrointestinal: Positive bowel sounds, soft, nontender, nondistended  Musculoskeletal: No bilateral ankle edema  Psychiatric: Appropriate affect, cooperative  Neurologic: Oriented x 3, strength symmetric in all extremities, Cranial Nerves grossly intact to confrontation, speech clear  Skin: No rashes      Pertinent  and/or Most Recent Results     LAB RESULTS:      Lab 09/08/23  0504 09/07/23  0602 09/07/23  0046   WBC 16.97* 15.57* 17.75*   HEMOGLOBIN 12.5 13.2 13.7   HEMATOCRIT 41.3 44.7 45.8   PLATELETS 243 270 278   NEUTROS ABS  --  10.77* 13.38*   IMMATURE GRANS (ABS)  --  0.24* 0.26*   LYMPHS ABS  --  3.26* 2.83   MONOS ABS  --  1.01* 1.15*   EOS ABS  --  0.24 0.09   MCV 99.8* 101.6* 100.9*   PROCALCITONIN  --   --   0.09   LACTATE  --   --  1.0         Lab 09/08/23  0813 09/07/23  0602 09/07/23  0046   SODIUM 141 145 140   POTASSIUM 4.5 5.5* 5.2   CHLORIDE 99 103 98   CO2 31.0* 36.0* 30.0*   ANION GAP 11.0 6.0 12.0   BUN 37* 39* 40*   CREATININE 1.29* 1.51* 1.50*   EGFR 43.4* 35.9* 36.2*   GLUCOSE 84 91 96   CALCIUM 9.4 9.0 9.8   MAGNESIUM  --  2.5* 2.6*         Lab 09/07/23  0046   TOTAL PROTEIN 6.9   ALBUMIN 4.1   GLOBULIN 2.8   ALT (SGPT) 29   AST (SGOT) 27   BILIRUBIN 0.2   ALK PHOS 92         Lab 09/07/23  0046   PROBNP 701.0   HSTROP T 18*                 Lab 09/07/23  0203   PH, ARTERIAL 7.405   PCO2, ARTERIAL 57.9*   PO2 ART 57.2*   FIO2 21   HCO3 ART 36.2*   BASE EXCESS ART 9.5*   CARBOXYHEMOGLOBIN 2.4*     Brief Urine Lab Results  (Last result in the past 365 days)        Color   Clarity   Blood   Leuk Est   Nitrite   Protein   CREAT   Urine HCG        09/07/23 0055 Yellow   Clear   Negative   Negative   Negative   Trace                 Microbiology Results (last 10 days)       Procedure Component Value - Date/Time    Respiratory Panel PCR w/COVID-19(SARS-CoV-2) [facility] In-House, NP Swab in UTM/VTM, 3-4 HR TAT - Swab, Nasopharynx [[id number]]  (Normal) Collected: 09/07/23 0434    Lab Status: Final result Specimen: Swab from Nasopharynx Updated: 09/07/23 0555     ADENOVIRUS, PCR Not Detected     Coronavirus 229E Not Detected     Coronavirus HKU1 Not Detected     Coronavirus NL63 Not Detected     Coronavirus OC43 Not Detected     COVID19 Not Detected     Human Metapneumovirus Not Detected     Human Rhinovirus/Enterovirus Not Detected     Influenza A PCR Not Detected     Influenza B PCR Not Detected     Parainfluenza Virus 1 Not Detected     Parainfluenza Virus 2 Not Detected     Parainfluenza Virus 3 Not Detected     Parainfluenza Virus 4 Not Detected     RSV, PCR Not Detected     Bordetella pertussis pcr Not Detected     Bordetella parapertussis PCR Not Detected     Chlamydophila pneumoniae PCR Not  Detected     Mycoplasma pneumo by PCR Not Detected    Narrative:      In the setting of a positive respiratory panel with a viral infection PLUS a negative procalcitonin without other underlying concern for bacterial infection, consider observing off antibiotics or discontinuation of antibiotics and continue supportive care. If the respiratory panel is positive for atypical bacterial infection (Bordetella pertussis, Chlamydophila pneumoniae, or Mycoplasma pneumoniae), consider antibiotic de-escalation to target atypical bacterial infection.            CT Angiogram Chest    Result Date: 8/28/2023  CT ANGIOGRAM CHEST Date of Exam: 8/28/2023 10:30 PM EDT Indication: hypoxia. Comparison: None available. Technique: CTA of the chest was performed before and after the uneventful intravenous administration of 75 mL Isovue 370. Reconstructed coronal and sagittal images were also obtained. In addition, a 3-D volume rendered image was created for interpretation. Automated exposure control and iterative reconstruction methods were used. Findings: Pulmonary arteries: Lungs and Pleura: A 6 mm semisolid nodule in the right upper lobe (image 28 series 5). There is minimal basilar atelectasis. The lungs are otherwise clear. There is a trace right pleural effusion Mediastinum/Clari: No mediastinal or hilar lymphadenopathy. Lymph nodes: No axillary or supraclavicular adenopathy. Cardiovascular: The heart is borderline in size. The pericardium is normal. The aorta and its arch branch vessels are unremarkable.  Upper Abdomen: The upper abdominal contents are unremarkable. Bones and Soft Tissue: No suspicious osseous lesion.     Impression: 1. No evidence of pulmonary embolism 2. Borderline size of the heart 3. A 6 mm semisolid nodule in the right upper lobe Indeterminate part solid pulmonary nodule measuring 6 mm. For a part solid nodule >=6 mm, recommend CT at 3-6 months to confirm persistence. If unchanged and a solid component remains  <6 mm, annual CT should be performed for 5 years. A persistent part solid nodule with a solid component >=6 mm is highly suspicious. 4. Minimal bibasilar atelectasis Electronically Signed: Yash Herrmann MD  8/28/2023 10:56 PM EDT  Workstation ID: [id number]    XR Chest 1 View    Result Date: 9/6/2023  XR CHEST 1 VW Date of Exam: 9/6/2023 11:36 PM EDT Indication: Weak/Dizzy/AMS triage protocol Comparison: None available. Findings: There are no airspace consolidations. No pleural fluid. No pneumothorax. The pulmonary vasculature appears within normal limits. The cardiac and mediastinal silhouette appear unremarkable. No acute osseous abnormality identified.     Impression: No acute cardiopulmonary process. Electronically Signed: Renetta Duggan MD  9/6/2023 11:51 PM EDT  Workstation ID: [id number]    XR Chest 1 View    Result Date: 8/28/2023  XR CHEST 1 VW Date of Exam: 8/28/2023 1:15 PM EDT Indication: hypoxia Comparison: 8/24/2023 Findings: Heart shadow is in the upper range of normal size. Vasculature is mildly cephalized. Mild chronic-appearing interstitial lung changes appear similar to the prior study. There is minimal new left effusion and atelectasis. No pneumothorax is seen.     Impression: 1. Stable mild pulmonary venous hypertension. 2. Minimal new left effusion or atelectasis. Electronically Signed: Jamison Rueda MD  8/28/2023 1:43 PM EDT  Workstation ID: [id number]             Results for orders placed during the hospital encounter of 08/24/23    Adult Transthoracic Echo Complete W/ Cont if Necessary Per Protocol    Interpretation Summary    Left ventricular systolic function is normal. Left ventricular ejection fraction appears to be 56 - 60%.    Left ventricular diastolic function was normal.    Mild aortic valve stenosis is present. Aortic valve area is 1.2 cm2.    Peak velocity of the flow distal to the aortic valve is 267.2 cm/s. Aortic valve mean pressure gradient is 16 mmHg.      Plan for Follow-up of  Pending Labs/Results:     Discharge Details        Discharge Medications        New Medications        Instructions Start Date   ipratropium-albuterol 0.5-2.5 mg/3 ml nebulizer  Commonly known as: DUO-NEB   3 mL, Nebulization, Every 6 Hours PRN      nicotine 14 MG/24HR patch  Commonly known as: NICODERM CQ   1 patch, Transdermal, Every 24 Hours   Start Date: September 9, 2023            Changes to Medications        Instructions Start Date   predniSONE 20 MG tablet  Commonly known as: DELTASONE  What changed:   medication strength  See the new instructions.  Another medication with the same name was removed. Continue taking this medication, and follow the directions you see here.   Take 2 tablets by mouth Daily for 2 days, THEN 1.5 tablets Daily for 2 days, THEN 1 tablet Daily for 2 days, THEN 0.5 tablets Daily for 2 days.   Start Date: September 9, 2023            Continue These Medications        Instructions Start Date   albuterol sulfate  (90 Base) MCG/ACT inhaler  Commonly known as: PROVENTIL HFA;VENTOLIN HFA;PROAIR HFA   2 puffs, Inhalation, Every 4 Hours PRN      apixaban 5 MG tablet tablet  Commonly known as: ELIQUIS   5 mg, Oral, Every 12 Hours Scheduled      atorvastatin 80 MG tablet  Commonly known as: LIPITOR   80 mg, Oral, Nightly      Breztri Aerosphere 160-9-4.8 MCG/ACT aerosol inhaler  Generic drug: Budeson-Glycopyrrol-Formoterol   2 puffs, Inhalation, 2 Times Daily      cyclobenzaprine 5 MG tablet  Commonly known as: FLEXERIL   5 mg, Oral, 3 Times Daily PRN      gabapentin 300 MG capsule  Commonly known as: NEURONTIN   300 mg, Oral, Nightly      guaiFENesin 600 MG 12 hr tablet  Commonly known as: MUCINEX   1,200 mg, Oral, Every 12 Hours Scheduled      loratadine 10 MG tablet  Commonly known as: CLARITIN   10 mg, Oral, Daily      Mirabegron ER 50 MG tablet sustained-release 24 hour 24 hr tablet  Commonly known as: MYRBETRIQ   50 mg, Oral, Daily      traZODone 100 MG tablet  Commonly known as:  DESYREL   100 mg, Oral, Nightly             Stop These Medications      torsemide 10 MG tablet  Commonly known as: DEMADEX              Allergies   Allergen Reactions    Penicillins Anaphylaxis         Discharge Disposition:  Home or Self Care    Diet:  Hospital:  Diet Order   Procedures    Diet: Cardiac Diets; Healthy Heart (2-3 Na+); Texture: Regular Texture (IDDSI 7); Fluid Consistency: Thin (IDDSI 0)       Activity:  Activity Instructions       Activity as Tolerated              Restrictions or Other Recommendations:         CODE STATUS:    Code Status and Medical Interventions:   Ordered at: 09/07/23 0417     Code Status (Patient has no pulse and is not breathing):    CPR (Attempt to Resuscitate)     Medical Interventions (Patient has pulse or is breathing):    Full Support       Future Appointments   Date Time Provider Department Center   9/27/2023  1:00 PM [facility]   9/27/2023  1:30 PM Bhavya Delgadillo APRN [facility]       Additional Instructions for the Follow-ups that You Need to Schedule       Discharge Follow-up with PCP   As directed       Currently Documented PCP:    Sunni Santoro MD    PCP Phone Number:    [phone]     Follow Up Details: Monday as scheduled                      ALIZA Posada  09/08/23      Time Spent on Discharge:  I spent  50  minutes on this discharge activity which included: face-to-face encounter with the patient, reviewing the data in the system, coordination of the care with the nursing staff as well as consultants, documentation, and entering orders.        Electronically signed by ALIZA Posada, 09/08/23, 12:39 PM EDT.

## 2023-09-08 NOTE — PLAN OF CARE
Problem: Adult Inpatient Plan of Care  Goal: Plan of Care Review  Outcome: Ongoing, Progressing  Goal: Patient-Specific Goal (Individualized)  Outcome: Ongoing, Progressing  Goal: Absence of Hospital-Acquired Illness or Injury  Outcome: Ongoing, Progressing  Intervention: Identify and Manage Fall Risk  Recent Flowsheet Documentation  Taken 9/8/2023 0400 by Srikanth Villalobos RN  Safety Promotion/Fall Prevention:   assistive device/personal items within reach   clutter free environment maintained   safety round/check completed   nonskid shoes/slippers when out of bed  Taken 9/8/2023 0200 by Srikanth Villalobos RN  Safety Promotion/Fall Prevention:   assistive device/personal items within reach   clutter free environment maintained   safety round/check completed  Taken 9/8/2023 0000 by Srikanth Villalobos RN  Safety Promotion/Fall Prevention:   assistive device/personal items within reach   clutter free environment maintained   safety round/check completed   nonskid shoes/slippers when out of bed  Taken 9/7/2023 2200 by Srikanth Villalobos RN  Safety Promotion/Fall Prevention:   assistive device/personal items within reach   clutter free environment maintained   safety round/check completed   nonskid shoes/slippers when out of bed  Taken 9/7/2023 2000 by Srikanth Villalobos RN  Safety Promotion/Fall Prevention:   assistive device/personal items within reach   clutter free environment maintained   safety round/check completed   nonskid shoes/slippers when out of bed  Intervention: Prevent Skin Injury  Recent Flowsheet Documentation  Taken 9/8/2023 0400 by Srikanth Villalobos RN  Body Position: position changed independently  Skin Protection: adhesive use limited  Taken 9/8/2023 0200 by Srikanth Villalobos RN  Body Position: position changed independently  Skin Protection: adhesive use limited  Taken 9/8/2023 0000 by Srikanth Villalobos RN  Body Position: position changed independently  Skin Protection: adhesive use limited  Taken 9/7/2023  2200 by Srikanth Villalobos RN  Body Position: position changed independently  Skin Protection: adhesive use limited  Taken 9/7/2023 2000 by Srikanth Villalobos RN  Body Position: position changed independently  Skin Protection: adhesive use limited  Intervention: Prevent and Manage VTE (Venous Thromboembolism) Risk  Recent Flowsheet Documentation  Taken 9/8/2023 0400 by Srikanth Villalobos RN  Activity Management: activity encouraged  Taken 9/8/2023 0200 by Srikanth Villalobos RN  Activity Management: activity encouraged  Taken 9/8/2023 0000 by Srikanth Villalobos RN  Activity Management: activity encouraged  Taken 9/7/2023 2200 by Srikanth Villalobos RN  Activity Management: activity encouraged  Taken 9/7/2023 2000 by Srikanth Villalobos RN  Activity Management: activity encouraged  Range of Motion: active ROM (range of motion) encouraged  Intervention: Prevent Infection  Recent Flowsheet Documentation  Taken 9/8/2023 0400 by Srikanth Villalobos RN  Infection Prevention:   environmental surveillance performed   hand hygiene promoted   rest/sleep promoted  Taken 9/8/2023 0200 by Srikanth Villalobos RN  Infection Prevention:   environmental surveillance performed   hand hygiene promoted   rest/sleep promoted  Taken 9/8/2023 0000 by Srikanth Villalobos RN  Infection Prevention:   environmental surveillance performed   hand hygiene promoted   rest/sleep promoted  Taken 9/7/2023 2200 by Srikanth Villalobos RN  Infection Prevention:   environmental surveillance performed   hand hygiene promoted   rest/sleep promoted  Taken 9/7/2023 2000 by Srikanth Villalobos RN  Infection Prevention:   environmental surveillance performed   hand hygiene promoted   rest/sleep promoted  Goal: Optimal Comfort and Wellbeing  Outcome: Ongoing, Progressing  Intervention: Provide Person-Centered Care  Recent Flowsheet Documentation  Taken 9/7/2023 2000 by Srikanth Villalobos RN  Trust Relationship/Rapport: care explained  Goal: Readiness for Transition of Care  Outcome: Ongoing,  Progressing     Problem: COPD (Chronic Obstructive Pulmonary Disease) Comorbidity  Goal: Maintenance of COPD Symptom Control  Outcome: Ongoing, Progressing  Intervention: Maintain COPD-Symptom Control  Recent Flowsheet Documentation  Taken 9/7/2023 2000 by Srikanth Villalobos RN  Supportive Measures: active listening utilized     Problem: Obstructive Sleep Apnea Risk or Actual Comorbidity Management  Goal: Unobstructed Breathing During Sleep  Outcome: Ongoing, Progressing     Problem: Pain Chronic (Persistent) (Comorbidity Management)  Goal: Acceptable Pain Control and Functional Ability  Outcome: Ongoing, Progressing  Intervention: Manage Persistent Pain  Recent Flowsheet Documentation  Taken 9/8/2023 0200 by Srikanth Villalobos RN  Sleep/Rest Enhancement: awakenings minimized  Taken 9/8/2023 0000 by Srikanth Villalobos RN  Sleep/Rest Enhancement: relaxation techniques promoted  Taken 9/7/2023 2200 by Srikanth Villalobos RN  Sleep/Rest Enhancement: relaxation techniques promoted  Taken 9/7/2023 2000 by Srikanth Villalobos RN  Bowel Elimination Promotion: adequate fluid intake promoted  Sleep/Rest Enhancement: relaxation techniques promoted  Intervention: Optimize Psychosocial Wellbeing  Recent Flowsheet Documentation  Taken 9/7/2023 2000 by Srikanth Villalobos RN  Supportive Measures: active listening utilized  Diversional Activities: smartphone   Goal Outcome Evaluation:      No acute changes overnight. Patient report slight headache at beginning of shift. See MAR. Interventions effective per patient. Patient is in bed with call light within reach.

## 2023-09-08 NOTE — CONSULTS
"  Referring Provider: DO Eric  Reason for Consultation: AECOPD    Subjective .   Education: NN spoke with pt at BS.  Pt alert and able to answer questions appropriately.  Pt O2 sat 97 % on  1 L  with two extensions currently, no home O2 use.  Pt reports the ability to ambulate without bhaskar at baseline before experiencing SOB.  She states that her main issue is dizziness.  Pt states use of rescue medication 14-21  times weekly, relief of SOB reported as \"a little bit\".  Patient is up to date on COVID and PNA vaccines.  She reports an allergy to eggs as reason for lack of flu vaccine.  She is a current smoker actively weaning.  She states that she is down to 3 cigarettes per day.  Pt reports no issues at this time with medications or transportation for appointments.  Pt with previous hx of formal COPD teaching, no understanding of action plan, or FL.  COPD education reviewed in the form of explanation, handouts, or videos.  No new concerns or questions voiced at this time.  NN will continue to follow as needed.     Age: 77 y.o.  Sex: female  Smoker Status: current, pack years unclear  Pulmonologist: HERNAN (has not established yet)  FEV1 (PFT): NA  Home O2: RA    Objective     SpO2 SpO2: 100 % (09/08/23 0732)  Device Device (Oxygen Therapy): nasal cannula (09/08/23 1043)  Flow Flow (L/min): 1 (09/08/23 1043)  Incentive Spirometer    IS Predicted Level (mL)     Number of Repetitions     Level Incentive Spirometer (mL)    Patient Tolerance     Inhaler Treatment Status    Treatment Route        Home Medications:  Medications Prior to Admission   Medication Sig Dispense Refill Last Dose    albuterol sulfate  (90 Base) MCG/ACT inhaler Inhale 2 puffs Every 4 (Four) Hours As Needed for Wheezing.   9/6/2023    apixaban (ELIQUIS) 5 MG tablet tablet Take 1 tablet by mouth Every 12 (Twelve) Hours.   9/7/2023 at 1000    atorvastatin (LIPITOR) 80 MG tablet Take 1 tablet by mouth Every Night.   9/6/2023 at 1000    " Budeson-Glycopyrrol-Formoterol (Breztri Aerosphere) 160-9-4.8 MCG/ACT aerosol inhaler Inhale 2 puffs 2 (Two) Times a Day.   2023 at 100    cyclobenzaprine (FLEXERIL) 5 MG tablet Take 1 tablet by mouth 3 (Three) Times a Day As Needed for Muscle Spasms.   2023 at 1000    gabapentin (NEURONTIN) 300 MG capsule Take 1 capsule by mouth Every Night.   2023 at 1000    [] guaiFENesin (MUCINEX) 600 MG 12 hr tablet Take 2 tablets by mouth Every 12 (Twelve) Hours for 7 days. 28 tablet 0 2023 at 1000    loratadine (CLARITIN) 10 MG tablet Take 1 tablet by mouth Daily.   2023 at 1000    Mirabegron ER (MYRBETRIQ) 50 MG tablet sustained-release 24 hour 24 hr tablet Take 50 mg by mouth Daily.   2023 at 1000    predniSONE (DELTASONE) 20 MG tablet Take 1 tablet by mouth Daily for 3 doses. 3 tablet 0 2023 at 1000    torsemide (DEMADEX) 10 MG tablet Take 1 tablet by mouth Daily for 90 days. 90 tablet 0 2023 at 1000    traZODone (DESYREL) 100 MG tablet Take 1 tablet by mouth Every Night.   Past Week    predniSONE (DELTASONE) 10 MG tablet Take 1 tablet by mouth Daily for 3 doses. 3 tablet 0        Discussion: Per current GOLD Standards, please consider:  LAMA/LABA/ICS in place (Breztri), Outpatient PFT, Rehab as appropriate, Palliative Care consult, NRT at [facility], Annual LDCT per current screening guidelines (age 50-80 years old, smoking history of 20 pack years or more or has quit within past 15 years)       Patient has been scheduled for a hospital follow up with [facility] for 2023 @ 1 pm with ALIZA Mendoza.  Outpatient PFT scheduled for this date as well.                   Mary Jones RN

## 2023-09-08 NOTE — CASE MANAGEMENT/SOCIAL WORK
Discharge Planning Assessment  [facility]     Patient Name: Tessa Bradley  MRN: [id number]  Today's Date: 9/8/2023    Admit Date: 9/7/2023    Plan: Home with    Discharge Needs Assessment       Row Name 09/08/23 1223       Living Environment    People in Home spouse    Current Living Arrangements home    Living Arrangement Comments Lives with her spouse. They both share the cooking and cleaning. The pt performs her ADLs. Is mostly on one level of the home.       Discharge Needs Assessment    Equipment Currently Used at Home cane, straight    Equipment Needed After Discharge none    Discharge Coordination/Progress States she is current with [facility]. I left message with CT for Dx and sevices.                   Discharge Plan       Row Name 09/08/23 1227       Plan    Plan Home with     Patient/Family in Agreement with Plan yes    Plan Comments I spoke with the pt. She would like a rolator walker at [place]. No DME preference.    Final Discharge Disposition Code 06 - home with home health care                  Continued Care and Services - Admitted Since 9/7/2023       Home Medical Care       Service Provider Request Status Selected Services Address Phone Fax Patient Preferred    [facility] Pending - No Request Sent N/A [address] [address] [phone] --                  Selected Continued Care - Prior Encounters Includes continued care and service providers with selected services from prior encounters from 6/9/2023 to 9/8/2023      Discharged on 8/31/2023 Admission date: 8/24/2023 - Discharge disposition: Home or Self Care      Home Medical Care       Service Provider Selected Services Address Phone Fax Patient Preferred    [facility] [address] [address] [phone] --                          Expected Discharge Date and Time       Expected Discharge Date Expected Discharge Time     Sep 9, 2023            Demographic Summary    No documentation.                  Functional Status       Row Name 09/08/23 1223       Functional Status    Usual Activity Tolerance moderate       Functional Status, IADL    Medications independent    Meal Preparation assistive person    Housekeeping assistive person    Laundry assistive person    Shopping assistive person                   Psychosocial    No documentation.                  Abuse/Neglect    No documentation.                  Legal    No documentation.                  Substance Abuse    No documentation.                  Patient Forms    No documentation.                     Marcella Méndez RN

## 2023-09-08 NOTE — CASE MANAGEMENT/SOCIAL WORK
Continued Stay Note  [facility]     Patient Name: Tessa Bradley  MRN: [id number]  Today's Date: 9/8/2023    Admit Date: 9/7/2023    Plan: Home with HH   Discharge Plan       Row Name 09/08/23 1359       Plan    Plan Comments RA sat at rest is 88% per nursing. Latoya DOTSON      Row Name 09/08/23 1227       Plan    Plan Home with     Patient/Family in Agreement with Plan yes    Plan Comments I spoke with the pt. She would like a rolator walker at [place]. No DME preference.    Final Discharge Disposition Code 06 - home with home health care                   Discharge Codes    No documentation.                 Expected Discharge Date and Time       Expected Discharge Date Expected Discharge Time    Sep 8, 2023               Marcella Méndez RN

## 2023-09-08 NOTE — CASE MANAGEMENT/SOCIAL WORK
Case Management Discharge Note      Final Note: I notified Sindy with [facility]  of the pts DC. No resume orders needed. I called the referral to [facility]ucho with [facility] for the 02, neb machine and rolator per convience. Pt had no DME preference. [employer] will deliver the rolator and portable 02 system to the hospital room for transport home today before DC and arrange home set up with the pt..         Selected Continued Care - Admitted Since 9/7/2023       Destination    No services have been selected for the patient.                Durable Medical Equipment       Service Provider Selected Services Address Phone Fax Patient Preferred    [facility] [address] [id number] [phone] --              Dialysis/Infusion    No services have been selected for the patient.                Home Medical Care       Service Provider Selected Services Address Phone Fax Patient Preferred    [facility] [address] [place], [place] [address] [phone] --              Therapy    No services have been selected for the patient.                Community Resources    No services have been selected for the patient.                Community & DME    No services have been selected for the patient.                    Selected Continued Care - Prior Encounters Includes continued care and service providers with selected services from prior encounters from 6/9/2023 to 9/8/2023      Discharged on 8/31/2023 Admission date: 8/24/2023 - Discharge disposition: Home or Self Care      Home Medical Care       Service Provider Selected Services Address Phone Fax Patient Preferred    [facility] [address] [place], [place] [address] [phone] --                               Final Discharge Disposition Code: 06 - home with home health care

## 2023-09-09 LAB
QT INTERVAL: 368 MS
QTC INTERVAL: 408 MS

## 2023-09-09 NOTE — OUTREACH NOTE
Prep Survey      Flowsheet Row Responses   [facility]? [facility]   Is LACE score < 7 ? No   Eligibility Readm Mgmt   Discharge diagnosis **COPD exacerbation   Does the patient have one of the following disease processes/diagnoses(primary or secondary)? COPD   Does the patient have Home health ordered? Yes   What is the Home health agency?  [facility]   Is there a DME ordered? Yes   What DME was ordered? [facility]   Prep survey completed? Yes            BELL FREEDMAN - Registered Nurse

## 2023-09-13 ENCOUNTER — READMISSION MANAGEMENT (OUTPATIENT)
Dept: CALL CENTER | Facility: HOSPITAL | Age: 78
End: 2023-09-13
Payer: MEDICARE

## 2023-09-13 NOTE — OUTREACH NOTE
"COPD/PN Week 1 Survey      Flowsheet Row Responses   [facility] patient discharged from? [place]   Does the patient have one of the following disease processes/diagnoses(primary or secondary)? COPD   Week 1 attempt successful? Yes   Call start time 1334   Call end time 1337   Discharge diagnosis **COPD exacerbation   Is patient permission given to speak with other caregiver? Yes   List who call center can speak with SO   Person spoke with today (if not patient) and relationship SO   Does the patient have a primary care provider?  Yes   Has the patient kept scheduled appointments due by today? Yes   What is the Home health agency?  [facility]   Has home health visited the patient within 72 hours of discharge? Yes   DME comments wearing home O2   Pulse Ox monitoring Intermittent   Pulse Ox device source Patient   O2 Sat comments \"stays in 90's when wearing Oxygen\", per SO   O2 Sat: education provided Sat levels, Monitoring frequency, When to seek care   Comments Per SO, pt is doing better, working with HH and improving daily.   Did the patient receive a copy of their discharge instructions? Yes   What is the patient's perception of their health status since discharge? Improving   Nursing Interventions Nurse provided patient education   Patient reports what zone on this call? Yellow Zone   Yellow Zone I have less energy for my daily activities   Yellow interventions Continue taking daily medications, Start an oral corticosteroid if ordered, Use oxygen as prescribed   Week 1 call completed? Yes   Revoked No further contact(revokes)-requires comment   Is the patient interested in additional calls from an ambulatory ? No   Call end time 1337            Helene SHAH - Registered Nurse  "

## 2023-09-18 ENCOUNTER — APPOINTMENT (OUTPATIENT)
Dept: MRI IMAGING | Facility: HOSPITAL | Age: 78
DRG: 189 | End: 2023-09-18
Payer: MEDICARE

## 2023-09-18 ENCOUNTER — APPOINTMENT (OUTPATIENT)
Dept: GENERAL RADIOLOGY | Facility: HOSPITAL | Age: 78
DRG: 189 | End: 2023-09-18
Payer: MEDICARE

## 2023-09-18 ENCOUNTER — HOSPITAL ENCOUNTER (INPATIENT)
Facility: HOSPITAL | Age: 78
LOS: 1 days | Discharge: HOME HEALTH | DRG: 189 | End: 2023-09-20
Attending: EMERGENCY MEDICINE | Admitting: INTERNAL MEDICINE
Payer: MEDICARE

## 2023-09-18 ENCOUNTER — APPOINTMENT (OUTPATIENT)
Dept: CT IMAGING | Facility: HOSPITAL | Age: 78
DRG: 189 | End: 2023-09-18
Payer: MEDICARE

## 2023-09-18 DIAGNOSIS — J96.21 ACUTE ON CHRONIC RESPIRATORY FAILURE WITH HYPOXIA: ICD-10-CM

## 2023-09-18 DIAGNOSIS — J96.11 CHRONIC RESPIRATORY FAILURE WITH HYPOXIA AND HYPERCAPNIA: ICD-10-CM

## 2023-09-18 DIAGNOSIS — R41.82 ALTERED MENTAL STATUS, UNSPECIFIED ALTERED MENTAL STATUS TYPE: ICD-10-CM

## 2023-09-18 DIAGNOSIS — J96.12 CHRONIC RESPIRATORY FAILURE WITH HYPOXIA AND HYPERCAPNIA: ICD-10-CM

## 2023-09-18 DIAGNOSIS — R13.14 PHARYNGOESOPHAGEAL DYSPHAGIA: ICD-10-CM

## 2023-09-18 DIAGNOSIS — R41.841 COGNITIVE COMMUNICATION DEFICIT: ICD-10-CM

## 2023-09-18 DIAGNOSIS — D72.829 LEUKOCYTOSIS, UNSPECIFIED TYPE: ICD-10-CM

## 2023-09-18 DIAGNOSIS — R29.810 FACIAL DROOP: ICD-10-CM

## 2023-09-18 DIAGNOSIS — R41.0 CONFUSION: Primary | ICD-10-CM

## 2023-09-18 DIAGNOSIS — J44.1 COPD EXACERBATION: ICD-10-CM

## 2023-09-18 LAB
ALT SERPL W P-5'-P-CCNC: 19 U/L (ref 1–33)
APTT PPP: 33.2 SECONDS (ref 22–39)
AST SERPL-CCNC: 15 U/L (ref 1–32)
B PARAPERT DNA SPEC QL NAA+PROBE: NOT DETECTED
B PERT DNA SPEC QL NAA+PROBE: NOT DETECTED
BASOPHILS # BLD AUTO: 0.03 10*3/MM3 (ref 0–0.2)
BASOPHILS NFR BLD AUTO: 0.2 % (ref 0–1.5)
BILIRUB UR QL STRIP: NEGATIVE
BUN BLDA-MCNC: 28 MG/DL (ref 8–26)
C PNEUM DNA NPH QL NAA+NON-PROBE: NOT DETECTED
CA-I BLDA-SCNC: 1.27 MMOL/L (ref 1.2–1.32)
CHLORIDE BLDA-SCNC: 101 MMOL/L (ref 98–109)
CLARITY UR: CLEAR
CO2 BLDA-SCNC: 35 MMOL/L (ref 24–29)
COLOR UR: YELLOW
CREAT BLDA-MCNC: 1 MG/DL (ref 0.6–1.3)
D-LACTATE SERPL-SCNC: 0.6 MMOL/L (ref 0.5–2)
DEPRECATED RDW RBC AUTO: 59 FL (ref 37–54)
EGFRCR SERPLBLD CKD-EPI 2021: 58.9 ML/MIN/1.73
EOSINOPHIL # BLD AUTO: 0.23 10*3/MM3 (ref 0–0.4)
EOSINOPHIL NFR BLD AUTO: 1.3 % (ref 0.3–6.2)
ERYTHROCYTE [DISTWIDTH] IN BLOOD BY AUTOMATED COUNT: 15 % (ref 12.3–15.4)
FLUAV SUBTYP SPEC NAA+PROBE: NOT DETECTED
FLUBV RNA ISLT QL NAA+PROBE: NOT DETECTED
GLUCOSE BLDC GLUCOMTR-MCNC: 138 MG/DL (ref 70–130)
GLUCOSE UR STRIP-MCNC: NEGATIVE MG/DL
HADV DNA SPEC NAA+PROBE: NOT DETECTED
HCOV 229E RNA SPEC QL NAA+PROBE: NOT DETECTED
HCOV HKU1 RNA SPEC QL NAA+PROBE: NOT DETECTED
HCOV NL63 RNA SPEC QL NAA+PROBE: NOT DETECTED
HCOV OC43 RNA SPEC QL NAA+PROBE: NOT DETECTED
HCT VFR BLD AUTO: 45.1 % (ref 34–46.6)
HCT VFR BLDA CALC: 45 % (ref 38–51)
HGB BLD-MCNC: 12.8 G/DL (ref 12–15.9)
HGB BLDA-MCNC: 15.3 G/DL (ref 12–17)
HGB UR QL STRIP.AUTO: NEGATIVE
HMPV RNA NPH QL NAA+NON-PROBE: NOT DETECTED
HOLD SPECIMEN: NORMAL
HOLD SPECIMEN: NORMAL
HPIV1 RNA ISLT QL NAA+PROBE: NOT DETECTED
HPIV2 RNA SPEC QL NAA+PROBE: NOT DETECTED
HPIV3 RNA NPH QL NAA+PROBE: NOT DETECTED
HPIV4 P GENE NPH QL NAA+PROBE: NOT DETECTED
IMM GRANULOCYTES # BLD AUTO: 0.11 10*3/MM3 (ref 0–0.05)
IMM GRANULOCYTES NFR BLD AUTO: 0.6 % (ref 0–0.5)
INR PPP: 1.2 (ref 0.8–1.2)
KETONES UR QL STRIP: NEGATIVE
LEUKOCYTE ESTERASE UR QL STRIP.AUTO: NEGATIVE
LYMPHOCYTES # BLD AUTO: 1.02 10*3/MM3 (ref 0.7–3.1)
LYMPHOCYTES NFR BLD AUTO: 5.7 % (ref 19.6–45.3)
M PNEUMO IGG SER IA-ACNC: NOT DETECTED
MCH RBC QN AUTO: 30 PG (ref 26.6–33)
MCHC RBC AUTO-ENTMCNC: 28.4 G/DL (ref 31.5–35.7)
MCV RBC AUTO: 105.6 FL (ref 79–97)
MONOCYTES # BLD AUTO: 0.52 10*3/MM3 (ref 0.1–0.9)
MONOCYTES NFR BLD AUTO: 2.9 % (ref 5–12)
NEUTROPHILS NFR BLD AUTO: 15.93 10*3/MM3 (ref 1.7–7)
NEUTROPHILS NFR BLD AUTO: 89.3 % (ref 42.7–76)
NITRITE UR QL STRIP: NEGATIVE
NRBC BLD AUTO-RTO: 0 /100 WBC (ref 0–0.2)
PH UR STRIP.AUTO: 5.5 [PH] (ref 5–8)
PLATELET # BLD AUTO: 165 10*3/MM3 (ref 140–450)
PMV BLD AUTO: 9.7 FL (ref 6–12)
POTASSIUM BLDA-SCNC: 5.4 MMOL/L (ref 3.5–4.9)
PROCALCITONIN SERPL-MCNC: 0.12 NG/ML (ref 0–0.25)
PROT UR QL STRIP: NEGATIVE
PROTHROMBIN TIME: 13.9 SECONDS (ref 12.8–15.2)
RBC # BLD AUTO: 4.27 10*6/MM3 (ref 3.77–5.28)
RHINOVIRUS RNA SPEC NAA+PROBE: NOT DETECTED
RSV RNA NPH QL NAA+NON-PROBE: NOT DETECTED
SARS-COV-2 RNA NPH QL NAA+NON-PROBE: NOT DETECTED
SODIUM BLD-SCNC: 141 MMOL/L (ref 138–146)
SP GR UR STRIP: 1.02 (ref 1–1.03)
TROPONIN T SERPL HS-MCNC: 21 NG/L
UROBILINOGEN UR QL STRIP: NORMAL
WBC NRBC COR # BLD: 17.84 10*3/MM3 (ref 3.4–10.8)
WHOLE BLOOD HOLD COAG: NORMAL
WHOLE BLOOD HOLD SPECIMEN: NORMAL

## 2023-09-18 PROCEDURE — 85014 HEMATOCRIT: CPT

## 2023-09-18 PROCEDURE — 99222 1ST HOSP IP/OBS MODERATE 55: CPT

## 2023-09-18 PROCEDURE — 25010000002 AZTREONAM PER 500 MG: Performed by: EMERGENCY MEDICINE

## 2023-09-18 PROCEDURE — 83605 ASSAY OF LACTIC ACID: CPT | Performed by: EMERGENCY MEDICINE

## 2023-09-18 PROCEDURE — 70450 CT HEAD/BRAIN W/O DYE: CPT

## 2023-09-18 PROCEDURE — 0042T HC CT CEREBRAL PERFUSION W/WO CONTRAST: CPT

## 2023-09-18 PROCEDURE — 85025 COMPLETE CBC W/AUTO DIFF WBC: CPT | Performed by: EMERGENCY MEDICINE

## 2023-09-18 PROCEDURE — 70551 MRI BRAIN STEM W/O DYE: CPT

## 2023-09-18 PROCEDURE — 71045 X-RAY EXAM CHEST 1 VIEW: CPT

## 2023-09-18 PROCEDURE — 70498 CT ANGIOGRAPHY NECK: CPT

## 2023-09-18 PROCEDURE — 84450 TRANSFERASE (AST) (SGOT): CPT | Performed by: EMERGENCY MEDICINE

## 2023-09-18 PROCEDURE — 80047 BASIC METABLC PNL IONIZED CA: CPT

## 2023-09-18 PROCEDURE — 84484 ASSAY OF TROPONIN QUANT: CPT | Performed by: EMERGENCY MEDICINE

## 2023-09-18 PROCEDURE — 87040 BLOOD CULTURE FOR BACTERIA: CPT | Performed by: EMERGENCY MEDICINE

## 2023-09-18 PROCEDURE — 25510000001 IOPAMIDOL PER 1 ML: Performed by: EMERGENCY MEDICINE

## 2023-09-18 PROCEDURE — P9612 CATHETERIZE FOR URINE SPEC: HCPCS

## 2023-09-18 PROCEDURE — 25010000002 VANCOMYCIN 10 G RECONSTITUTED SOLUTION: Performed by: EMERGENCY MEDICINE

## 2023-09-18 PROCEDURE — 80307 DRUG TEST PRSMV CHEM ANLYZR: CPT | Performed by: INTERNAL MEDICINE

## 2023-09-18 PROCEDURE — 81003 URINALYSIS AUTO W/O SCOPE: CPT | Performed by: EMERGENCY MEDICINE

## 2023-09-18 PROCEDURE — 99285 EMERGENCY DEPT VISIT HI MDM: CPT

## 2023-09-18 PROCEDURE — 0202U NFCT DS 22 TRGT SARS-COV-2: CPT | Performed by: EMERGENCY MEDICINE

## 2023-09-18 PROCEDURE — 70496 CT ANGIOGRAPHY HEAD: CPT

## 2023-09-18 PROCEDURE — 74018 RADEX ABDOMEN 1 VIEW: CPT

## 2023-09-18 PROCEDURE — 93005 ELECTROCARDIOGRAM TRACING: CPT | Performed by: EMERGENCY MEDICINE

## 2023-09-18 PROCEDURE — 85730 THROMBOPLASTIN TIME PARTIAL: CPT | Performed by: EMERGENCY MEDICINE

## 2023-09-18 PROCEDURE — 85610 PROTHROMBIN TIME: CPT

## 2023-09-18 PROCEDURE — 84460 ALANINE AMINO (ALT) (SGPT): CPT | Performed by: EMERGENCY MEDICINE

## 2023-09-18 PROCEDURE — 36415 COLL VENOUS BLD VENIPUNCTURE: CPT

## 2023-09-18 PROCEDURE — 84145 PROCALCITONIN (PCT): CPT | Performed by: EMERGENCY MEDICINE

## 2023-09-18 RX ORDER — ATORVASTATIN CALCIUM 40 MG/1
80 TABLET, FILM COATED ORAL NIGHTLY
Status: DISCONTINUED | OUTPATIENT
Start: 2023-09-18 | End: 2023-09-19

## 2023-09-18 RX ORDER — SODIUM CHLORIDE 0.9 % (FLUSH) 0.9 %
10 SYRINGE (ML) INJECTION AS NEEDED
Status: DISCONTINUED | OUTPATIENT
Start: 2023-09-18 | End: 2023-09-20 | Stop reason: HOSPADM

## 2023-09-18 RX ORDER — VANCOMYCIN/0.9 % SOD CHLORIDE 1.5G/250ML
20 PLASTIC BAG, INJECTION (ML) INTRAVENOUS ONCE
Status: COMPLETED | OUTPATIENT
Start: 2023-09-18 | End: 2023-09-19

## 2023-09-18 RX ADMIN — SODIUM CHLORIDE 2082 ML: 9 INJECTION, SOLUTION INTRAVENOUS at 21:40

## 2023-09-18 RX ADMIN — VANCOMYCIN HYDROCHLORIDE 1500 MG: 10 INJECTION, POWDER, LYOPHILIZED, FOR SOLUTION INTRAVENOUS at 22:42

## 2023-09-18 RX ADMIN — AZTREONAM 2 G: 2 INJECTION, POWDER, LYOPHILIZED, FOR SOLUTION INTRAMUSCULAR; INTRAVENOUS at 21:51

## 2023-09-18 RX ADMIN — IOPAMIDOL 115 ML: 755 INJECTION, SOLUTION INTRAVENOUS at 20:18

## 2023-09-18 NOTE — CONSULTS
Stroke Consult Note    Patient Name: Tessa Bradley   MRN: [id number]  Age: 77 y.o.  Sex: female  : 1945    Primary Care Physician: Sunni Santoro MD  Referring Physician:  Dr. Patel    TIME STROKE TEAM CALLED:  EST     TIME PATIENT SEEN:  EST    Handedness: Right  Race: White     Chief Complaint/Reason for Consultation: Altered mental status, left facial droop, left-sided weakness    HPI: Ms. Bradley is a 77-year-old female with PMH of arthritis, HTN, HLD, A-fib (on Eliquis), Hodgkin's disease s/p radiation and chemo and COPD.  She presents to [facility] ED with new onset of altered mental status, left facial droop and left-sided weakness.  Patient is unable to give details of the events leading up to ED visit.  EMS reports they were initially called out this morning for somnolence and patient refused medical attention.  EMS was called out again at approximately 4 PM for presenting symptoms and patient was taken to [facility] ED for further evaluation and stroke work-up.    On arrival [facility] ED NIH 10.  Blood pressure 131/66.  On exam patient answers 1 question appropriately and is able to follow one-step commands.  She is drowsy and requires repeated noxious stimulation.  Left facial droop with expressive aphasia and mild dysarthria noted.  Strength in all extremities 4/5.  Neuro exam limited due to aphasia and acuity of condition.  Current everyday smoker and no EtOH use.    Last Known Normal Date/Time: 2023 EST     Review of Systems   Unable to perform ROS: Mental status change    Past Medical History:   Diagnosis Date    Cancer     COPD (chronic obstructive pulmonary disease)     Hyperlipidemia     Hypertension      Past Surgical History:   Procedure Laterality Date    ABDOMINAL SURGERY      HYSTERECTOMY      OOPHORECTOMY       Family History   Problem Relation Age of Onset    Breast cancer Mother 67     Social History     Socioeconomic History    Marital status:    Tobacco Use    Smoking status:  Every Day     Packs/day: 0.25     Years: 40.00     Pack years: 10.00     Types: Cigarettes    Smokeless tobacco: Never   Vaping Use    Vaping Use: Unknown   Substance and Sexual Activity    Alcohol use: Not Currently    Drug use: Never    Sexual activity: Defer     Allergies   Allergen Reactions    Penicillins Anaphylaxis     Prior to Admission medications    Medication Sig Start Date End Date Taking? Authorizing Provider   albuterol sulfate  (90 Base) MCG/ACT inhaler Inhale 2 puffs Every 4 (Four) Hours As Needed for Wheezing.    Marcella Vu MD   apixaban (ELIQUIS) 5 MG tablet tablet Take 1 tablet by mouth Every 12 (Twelve) Hours.    Marcella Vu MD   atorvastatin (LIPITOR) 80 MG tablet Take 1 tablet by mouth Every Night.    Marcella Vu MD   Budeson-Glycopyrrol-Formoterol (Breztri Aerosphere) 160-9-4.8 MCG/ACT aerosol inhaler Inhale 2 puffs 2 (Two) Times a Day for 30 days. 9/8/23 10/8/23  Xena Carroll APRN   cyclobenzaprine (FLEXERIL) 5 MG tablet Take 1 tablet by mouth 3 (Three) Times a Day As Needed for Muscle Spasms.    ProviderMarcella MD   gabapentin (NEURONTIN) 300 MG capsule Take 1 capsule by mouth Every Night.    Marcella Vu MD   ipratropium-albuterol (DUO-NEB) 0.5-2.5 mg/3 ml nebulizer Take 3 mL by nebulization Every 6 (Six) Hours As Needed for Shortness of Air. 9/8/23   Xena Carroll APRN   loratadine (CLARITIN) 10 MG tablet Take 1 tablet by mouth Daily.    Marcella Vu MD   Mirabegron ER (MYRBETRIQ) 50 MG tablet sustained-release 24 hour 24 hr tablet Take 50 mg by mouth Daily.    Marcella Vu MD   nicotine (NICODERM CQ) 14 MG/24HR patch Place 1 patch on the skin as directed by provider Daily. 9/9/23   Xena Carroll APRN   predniSONE (DELTASONE) 20 MG tablet Take 2 tablets by mouth Daily for 2 days, THEN 1.5 tablets Daily for 2 days, THEN 1 tablet Daily for 2 days, THEN 0.5 tablets Daily for 2 days. 9/9/23 9/17/23  Glen  ALIZA Butcher   traZODone (DESYREL) 100 MG tablet Take 1 tablet by mouth Every Night.    Provider, MD Marcella            Neurological Exam  Mental Status  Arousable to tactile stimuli. Oriented only to person. Mild dysarthria present. Expressive aphasia present.    Cranial Nerves  CN II: Blinks to threat bilaterally and will track around room.  CN III, IV, VI: Pupils equal round and reactive to light bilaterally.  CN VII:  Left: There is central facial weakness.  CN VIII: Hard of hearing.  Neuro exam limited due to aphasia and acuity of condition..    Motor  Normal muscle bulk throughout. Normal muscle tone. Strength is 5/5 in all four extremities except as noted.  Strength in all extremities 4/5..    Sensory  Sensation: Moves all extremities to noxious stimuli.     Coordination    Unable to assess.    Gait    Unable to assess.    Physical Exam  Constitutional:       Comments: Drowsy, requires repeated noxious stimulation   HENT:      Head: Normocephalic and atraumatic.      Nose: Nose normal.      Mouth/Throat:      Mouth: Mucous membranes are dry.   Eyes:      Pupils: Pupils are equal, round, and reactive to light.   Cardiovascular:      Pulses: Normal pulses.   Pulmonary:      Effort: Pulmonary effort is normal.   Abdominal:      Palpations: Abdomen is soft.   Musculoskeletal:         General: Normal range of motion.      Cervical back: Normal range of motion.   Skin:     General: Skin is warm and dry.   Neurological:      Mental Status: She is disoriented.      Cranial Nerves: Cranial nerve deficit and dysarthria present.      Motor: Weakness present.   Psychiatric:         Attention and Perception: She is inattentive.         Speech: Speech is slurred.         Cognition and Memory: Cognition is impaired.       Acute Stroke Data    Thrombolytic Inclusion / Exclusion Criteria    Time: 19:59 EDT  Person Administering Scale: ALIZA Enriquez    Inclusion Criteria  [x]   18 years of age or greater   []    Onset of symptoms < 4.5 hours before beginning treatment (stroke onset = time patient was last seen well or without symptoms).   []   Diagnosis of acute ischemic stroke causing measurable disabling deficit (Complete Hemianopia, Any Aphasia, Visual or Sensory Extinction, Any weakness limiting sustained effort against gravity)   []   Any remaining deficit considered potentially disabling in view of patient and practitioner   Exclusion criteria (Do not proceed with Alteplase if any are checked under exclusion criteria)  [x]   Onset unknown or GREATER than 4.5 hours   []   ICH on CT/MRI   []   CT demonstrates hypodensity representing acute or subacute infarct   []   Significant head trauma or prior stroke in the previous 3 months   []   Symptoms suggestive of subarachnoid hemorrhage   []   History of un-ruptured intracranial aneurysm GREATER than 10 mm   []   Recent intracranial or intraspinal surgery within the last 3 months   []   Arterial puncture at a non-compressible site in the previous 7 days   []   Active internal bleeding   []   Acute bleeding tendency   []   Platelet count LESS than 100,000 for known hematological diseases such as leukemia, thrombocytopenia or chronic cirrhosis   []   Current use of anticoagulant with INR GREATER than 1.7 or PT GREATER than 15 seconds, aPTT GREATER than 40 seconds   []   Heparin received within 48 hours, resulting in abnormally elevated aPTT GREATER than upper limit of normal   [x]   Current use of direct thrombin inhibitors or direct factor Xa inhibitors in the past 48 hours   []   Elevated blood pressure refractory to treatment (systolic GREATER than 185 mm/Hg or diastolic  GREATER than 110 mm/Hg   []   Suspected infective endocarditis and aortic arch dissection   []   Current use of therapeutic treatment dose of low-molecular-weight heparin (LMWH) within the previous 24 hours   []   Structural GI malignancy or bleed   Relative exclusion for all patients  []   Only minor  non-disabling symptoms   []   Pregnancy   []   Seizure at onset with postictal residual neurological impairments   []   Major surgery or previous trauma within past 14 days   []   History of previous spontaneous ICH, intracranial neoplasm, or AV malformation   []   Postpartum (within previous 14 days)   []   Recent GI or urinary tract hemorrhage (within previous 21 days)   []   Recent acute MI (within previous 3 months)   []   History of un-ruptured intracranial aneurysm LESS than 10 mm   []   History of ruptured intracranial aneurysm   []   Blood glucose LESS than 50 mg/dL (2.7 mmol/L)   []   Dural puncture within the last 7 days   []   Known GREATER than 10 cerebral microbleeds   Additional exclusions for patients with symptoms onset between 3 and 4.5 hours.  []   Age > 80.   []   On any anticoagulants regardless of INR  >>> Warfarin (Coumadin), Heparin, Enoxaparin (Lovenox), fondaparinux (Arixtra), bivalirudin (Angiomax), Argatroban, dabigatran (Pradaxa), rivaroxaban (Xarelto), or apixaban (Eliquis)   []   Severe stroke (NIHSS > 25).   []   History of BOTH diabetes and previous ischemic stroke.   []   The risks and benefits have been discussed with the patient or family related to the administration of IV thrombolytic therapy for stroke symptoms.   []   I have discussed and reviewed the patient's case and imaging with the attending prior to IV thrombolytic therapy.   NA Time IV thrombolytic administered       Hospital Meds:  Scheduled-    Infusions-     PRNs-   sodium chloride    Functional Status Prior to Current Stroke/Joy Score:   MODIFIED OJY SCALE (to be assessed for each patient having history of stroke) []Stroke history but not assessed  []0: No symptoms at all  [x]1: No significant disability despite symptoms  []2: Slight disability  []3: Moderate disability  []4: Moderately severe disability  []5: Severe disability  []6: Death        NIH Stroke Scale  Time: 19:59 EDT  Person Administering Scale:  ALIZA Enriquez  Interval: baseline  1a. Level of Consciousness: 2-->Not alert, requires repeated stimulation to attend, or is obtunded and requires strong or painful stimulation to make movements (not stereotyped)  1b. LOC Questions: 1-->Answers one question correctly  1c. LOC Commands: 0-->Performs both tasks correctly  2. Best Gaze: 0-->Normal  3. Visual: 0-->No visual loss  4. Facial Palsy: 1-->Minor paralysis (flattened nasolabial fold, asymmetry on smiling)  5a. Motor Arm, Left: 1-->Drift, limb holds 90 (or 45) degrees, but drifts down before full 10 seconds, does not hit bed or other support  5b. Motor Arm, Right: 1-->Drift, limb holds 90 (or 45) degrees, but drifts down before full 10 secs, does not hit bed or other support  6a. Motor Leg, Left: 1-->Drift, leg falls by the end of the 5-sec period but does not hit bed  6b. Motor Leg, Right: 1-->Drift, leg falls by the end of the 5-sec period but does not hit bed  7. Limb Ataxia: 0-->Absent  8. Sensory: 0-->Normal, no sensory loss  9. Best Language: 1-->Mild-to-moderate aphasia, some obvious loss of fluency or facility of comprehension, without significant limitation on ideas expressed or form of expression. Reduction of speech and/or comprehension, however, makes conversation. . . (see row details)  10. Dysarthria: 1-->Mild-to-moderate dysarthria, patient slurs at least some words and, at worst, can be understood with some difficulty  11. Extinction and Inattention (formerly Neglect): 0-->No abnormality    Total (NIH Stroke Scale): 10       Results Reviewed:  I have personally reviewed current lab, radiology, and data and agree with results.    Results for orders placed during the hospital encounter of 08/24/23    Adult Transthoracic Echo Complete W/ Cont if Necessary Per Protocol    Interpretation Summary    Left ventricular systolic function is normal. Left ventricular ejection fraction appears to be 56 - 60%.    Left ventricular diastolic function was  normal.    Mild aortic valve stenosis is present. Aortic valve area is 1.2 cm2.    Peak velocity of the flow distal to the aortic valve is 267.2 cm/s. Aortic valve mean pressure gradient is 16 mmHg.     WBC   Date Value Ref Range Status   09/18/2023 17.84 (H) 3.40 - 10.80 10*3/mm3 Final     RBC   Date Value Ref Range Status   09/18/2023 4.27 3.77 - 5.28 10*6/mm3 Final     Hemoglobin   Date Value Ref Range Status   09/18/2023 12.8 12.0 - 15.9 g/dL Final   09/18/2023 15.3 12.0 - 17.0 g/dL Final     Comment:     Serial Number: [id number]:  [id number]     Hematocrit   Date Value Ref Range Status   09/18/2023 45.1 34.0 - 46.6 % Final   09/18/2023 45 38 - 51 % Final     MCV   Date Value Ref Range Status   09/18/2023 105.6 (H) 79.0 - 97.0 fL Final     MCH   Date Value Ref Range Status   09/18/2023 30.0 26.6 - 33.0 pg Final     MCHC   Date Value Ref Range Status   09/18/2023 28.4 (L) 31.5 - 35.7 g/dL Final     RDW   Date Value Ref Range Status   09/18/2023 15.0 12.3 - 15.4 % Final     RDW-SD   Date Value Ref Range Status   09/18/2023 59.0 (H) 37.0 - 54.0 fl Final     MPV   Date Value Ref Range Status   09/18/2023 9.7 6.0 - 12.0 fL Final     Platelets   Date Value Ref Range Status   09/18/2023 165 140 - 450 10*3/mm3 Final     Neutrophil %   Date Value Ref Range Status   09/18/2023 89.3 (H) 42.7 - 76.0 % Final     Lymphocyte %   Date Value Ref Range Status   09/18/2023 5.7 (L) 19.6 - 45.3 % Final     Monocyte %   Date Value Ref Range Status   09/18/2023 2.9 (L) 5.0 - 12.0 % Final     Eosinophil %   Date Value Ref Range Status   09/18/2023 1.3 0.3 - 6.2 % Final     Basophil %   Date Value Ref Range Status   09/18/2023 0.2 0.0 - 1.5 % Final     Immature Grans %   Date Value Ref Range Status   09/18/2023 0.6 (H) 0.0 - 0.5 % Final     Neutrophils, Absolute   Date Value Ref Range Status   09/18/2023 15.93 (H) 1.70 - 7.00 10*3/mm3 Final     Lymphocytes, Absolute   Date Value Ref Range Status   09/18/2023 1.02 0.70 - 3.10 10*3/mm3  Final     Monocytes, Absolute   Date Value Ref Range Status   09/18/2023 0.52 0.10 - 0.90 10*3/mm3 Final     Eosinophils, Absolute   Date Value Ref Range Status   09/18/2023 0.23 0.00 - 0.40 10*3/mm3 Final     Basophils, Absolute   Date Value Ref Range Status   09/18/2023 0.03 0.00 - 0.20 10*3/mm3 Final     Immature Grans, Absolute   Date Value Ref Range Status   09/18/2023 0.11 (H) 0.00 - 0.05 10*3/mm3 Final     nRBC   Date Value Ref Range Status   09/18/2023 0.0 0.0 - 0.2 /100 WBC Final      Lab Results   Component Value Date    GLUCOSE 84 09/08/2023    BUN 37 (H) 09/08/2023    CREATININE 1.00 09/18/2023    EGFR 58.9 (L) 09/18/2023    BCR 28.7 (H) 09/08/2023    K 4.5 09/08/2023    CO2 31.0 (H) 09/08/2023    CALCIUM 9.4 09/08/2023    ALBUMIN 4.1 09/07/2023    BILITOT 0.2 09/07/2023    AST 15 09/18/2023    ALT 19 09/18/2023    CT Angiogram Neck    Result Date: 9/18/2023  1.No hemodynamically significant stenosis, large vessel cut or aneurysms in intracranial circulation 2.No hemodynamically significant stenosis, dissection or aneurysms in extracranial circulation. Electronically Signed: Umberto Hutchinson MD  9/18/2023 8:25 PM EDT  Workstation ID: [id number]    XR Chest 1 View    Result Date: 9/18/2023  Impression: 1. Borderline heart size with mild pulmonary vascular congestion. No other acute cardiopulmonary disease. Electronically Signed: Umberto Kulkarni MD  9/18/2023 9:10 PM EDT  Workstation ID: [id number]    CT Head Without Contrast Stroke Protocol    Result Date: 9/18/2023  1.No acute intracranial hemorrhage. Calvarium is intact. Electronically Signed: Umberto Hutchinson MD  9/18/2023 7:50 PM EDT  Workstation ID: [id number]    XR Abdomen KUB    Result Date: 9/18/2023  Impression: 1. No radiopaque foreign body overlying the abdomen or pelvis. 2. Normal bowel gas pattern. Electronically Signed: Umberto Kulkarni MD  9/18/2023 10:39 PM EDT  Workstation ID: [id number]    CT Angiogram Head w AI Analysis of LVO    Result Date:  9/18/2023  1.No hemodynamically significant stenosis, large vessel cut or aneurysms in intracranial circulation 2.No hemodynamically significant stenosis, dissection or aneurysms in extracranial circulation. Electronically Signed: Umberto Hutchinson MD  9/18/2023 8:25 PM EDT  Workstation ID: [id number]    CT CEREBRAL PERFUSION WITH & WITHOUT CONTRAST    Result Date: 9/18/2023  Impression: No acute core infarct or ischemia is identified. Electronically Signed: Umberto Hutchinson MD  9/18/2023 8:27 PM EDT  Workstation ID: [id number]      Assessment/Plan:  Ms. Bradley is a 77-year-old female with PMH of arthritis, HTN, HLD, A-fib (on Eliquis), Hodgkin's disease s/p radiation and chemo and COPD.  She presents to [facility] ED with new onset of altered mental status, left facial droop and left-sided weakness.  Patient is unable to give details of the events leading up to ED visit.  EMS reports they were initially called out this morning for somnolence and patient refused medical attention.  EMS was called out again at approximately 4 PM for presenting symptoms and patient was taken to [facility] ED for further evaluation and stroke work-up.  Patient is not a candidate for IV thrombolytic therapy due to consistent use of Eliquis.  Patient is not a candidate for endovascular therapy due to no LVO on CT scans.    Antiplatelet PTA: None  Anticoagulant PTA: Eliquis        Altered mental status, left facial droop, left-sided weakness  Differentials to include TIA, CVA, metabolic encephalopathy  Initiate TIA/CVA without IV thrombolytic therapy order set  N.p.o. until bedside dysphagia screening completed by RN  Activity as tolerated  Hold Eliquis for now, will review in a.m.  Atorvastatin 80 mg p.o. nightly  MRI brain without contrast routine  A1C, lipid panel routine  Blood pressure goals, SBP less than 220  Diabetes educator to see if appropriate  PT/OT/SLP eval and treat  Case management to follow    Plan of care discussed with Dr. Patel, Dr. Wells and  bedside RN.  Stroke neurology will continue to follow.  Thank you for this consult.  Call with any questions or concerns.    Mihir De La Rosa, ALIZA  September 18, 2023  19:59 EDT

## 2023-09-18 NOTE — Clinical Note
Level of Care: Telemetry [5]   Diagnosis: AMS (altered mental status) [[id number]]   Admitting Physician: JENNIFER HUERTAS [[id number]]   Attending Physician: JENNIFER HUERTAS [[id number]]   Certification: I Certify That Inpatient Hospital Services Are Medically Necessary For Greater Than 2 Midnights

## 2023-09-19 ENCOUNTER — ANCILLARY PROCEDURE (OUTPATIENT)
Dept: SPEECH THERAPY | Facility: HOSPITAL | Age: 78
DRG: 189 | End: 2023-09-19
Payer: MEDICARE

## 2023-09-19 PROBLEM — R41.82 AMS (ALTERED MENTAL STATUS): Status: ACTIVE | Noted: 2023-09-19

## 2023-09-19 LAB
AMMONIA BLD-SCNC: 33 UMOL/L (ref 11–51)
AMPHET+METHAMPHET UR QL: NEGATIVE
AMPHETAMINES UR QL: NEGATIVE
APAP SERPL-MCNC: <5 MCG/ML (ref 0–30)
ARTERIAL PATENCY WRIST A: ABNORMAL
ARTERIAL PATENCY WRIST A: ABNORMAL
ATMOSPHERIC PRESS: ABNORMAL MM[HG]
ATMOSPHERIC PRESS: ABNORMAL MM[HG]
BARBITURATES UR QL SCN: NEGATIVE
BASE EXCESS BLDA CALC-SCNC: 3.5 MMOL/L (ref 0–2)
BASE EXCESS BLDA CALC-SCNC: 4.6 MMOL/L (ref 0–2)
BDY SITE: ABNORMAL
BDY SITE: ABNORMAL
BENZODIAZ UR QL SCN: NEGATIVE
BODY TEMPERATURE: 37 C
BODY TEMPERATURE: 37 C
BUPRENORPHINE SERPL-MCNC: NEGATIVE NG/ML
CANNABINOIDS SERPL QL: NEGATIVE
CHOLEST SERPL-MCNC: 180 MG/DL (ref 0–200)
CO2 BLDA-SCNC: 34.4 MMOL/L (ref 22–33)
CO2 BLDA-SCNC: 35.6 MMOL/L (ref 22–33)
COCAINE UR QL: NEGATIVE
COHGB MFR BLD: 1.7 % (ref 0–2)
COHGB MFR BLD: 1.8 % (ref 0–2)
EPAP: 0
EPAP: 8
ETHANOL BLD-MCNC: <10 MG/DL (ref 0–10)
FENTANYL UR-MCNC: NEGATIVE NG/ML
GLUCOSE BLDC GLUCOMTR-MCNC: 103 MG/DL (ref 70–130)
GLUCOSE BLDC GLUCOMTR-MCNC: 185 MG/DL (ref 70–130)
GLUCOSE BLDC GLUCOMTR-MCNC: 390 MG/DL (ref 70–130)
GLUCOSE BLDC GLUCOMTR-MCNC: 85 MG/DL (ref 70–130)
HBA1C MFR BLD: 7.3 % (ref 4.8–5.6)
HCO3 BLDA-SCNC: 32.5 MMOL/L (ref 20–26)
HCO3 BLDA-SCNC: 33.2 MMOL/L (ref 20–26)
HCT VFR BLD CALC: 36.5 % (ref 38–51)
HCT VFR BLD CALC: 37.9 % (ref 38–51)
HDLC SERPL-MCNC: 119 MG/DL (ref 40–60)
HGB BLDA-MCNC: 11.9 G/DL (ref 14–18)
HGB BLDA-MCNC: 12.4 G/DL (ref 14–18)
HOLD SPECIMEN: NORMAL
INHALED O2 CONCENTRATION: 28 %
INHALED O2 CONCENTRATION: 30 %
IPAP: 0
IPAP: 16
LDLC SERPL CALC-MCNC: 49 MG/DL (ref 0–100)
LDLC/HDLC SERPL: 0.41 {RATIO}
Lab: ABNORMAL
METHADONE UR QL SCN: NEGATIVE
METHGB BLD QL: 0.6 % (ref 0–1.5)
METHGB BLD QL: 0.7 % (ref 0–1.5)
MODALITY: ABNORMAL
MODALITY: ABNORMAL
NOTIFIED BY: ABNORMAL
NOTIFIED WHO: ABNORMAL
OPIATES UR QL: POSITIVE
OXYCODONE UR QL SCN: NEGATIVE
OXYHGB MFR BLDV: 91.5 % (ref 94–99)
OXYHGB MFR BLDV: 94.6 % (ref 94–99)
PAW @ PEAK INSP FLOW SETTING VENT: 0 CMH2O
PAW @ PEAK INSP FLOW SETTING VENT: 0 CMH2O
PCO2 BLDA: 64.1 MM HG (ref 35–45)
PCO2 BLDA: 78.4 MM HG (ref 35–45)
PCO2 TEMP ADJ BLD: 64.1 MM HG (ref 35–45)
PCO2 TEMP ADJ BLD: 78.4 MM HG (ref 35–45)
PCP UR QL SCN: NEGATIVE
PH BLDA: 7.24 PH UNITS (ref 7.35–7.45)
PH BLDA: 7.31 PH UNITS (ref 7.35–7.45)
PH, TEMP CORRECTED: 7.24 PH UNITS
PH, TEMP CORRECTED: 7.31 PH UNITS
PO2 BLDA: 72.9 MM HG (ref 83–108)
PO2 BLDA: 83.4 MM HG (ref 83–108)
PO2 TEMP ADJ BLD: 72.9 MM HG (ref 83–108)
PO2 TEMP ADJ BLD: 83.4 MM HG (ref 83–108)
PROPOXYPH UR QL: NEGATIVE
SALICYLATES SERPL-MCNC: <0.3 MG/DL
TOTAL RATE: 0 BREATHS/MINUTE
TOTAL RATE: 16 BREATHS/MINUTE
TRICYCLICS UR QL SCN: NEGATIVE
TRIGL SERPL-MCNC: 62 MG/DL (ref 0–150)
VLDLC SERPL-MCNC: 12 MG/DL (ref 5–40)

## 2023-09-19 PROCEDURE — 83036 HEMOGLOBIN GLYCOSYLATED A1C: CPT | Performed by: INTERNAL MEDICINE

## 2023-09-19 PROCEDURE — 80061 LIPID PANEL: CPT | Performed by: INTERNAL MEDICINE

## 2023-09-19 PROCEDURE — 25010000002 NALOXONE PER 1 MG: Performed by: INTERNAL MEDICINE

## 2023-09-19 PROCEDURE — 80143 DRUG ASSAY ACETAMINOPHEN: CPT | Performed by: INTERNAL MEDICINE

## 2023-09-19 PROCEDURE — 36600 WITHDRAWAL OF ARTERIAL BLOOD: CPT

## 2023-09-19 PROCEDURE — 97165 OT EVAL LOW COMPLEX 30 MIN: CPT

## 2023-09-19 PROCEDURE — 92610 EVALUATE SWALLOWING FUNCTION: CPT

## 2023-09-19 PROCEDURE — 97161 PT EVAL LOW COMPLEX 20 MIN: CPT

## 2023-09-19 PROCEDURE — 82805 BLOOD GASES W/O2 SATURATION: CPT

## 2023-09-19 PROCEDURE — 94799 UNLISTED PULMONARY SVC/PX: CPT

## 2023-09-19 PROCEDURE — 25010000002 AZITHROMYCIN PER 500 MG: Performed by: INTERNAL MEDICINE

## 2023-09-19 PROCEDURE — 82375 ASSAY CARBOXYHB QUANT: CPT

## 2023-09-19 PROCEDURE — 82948 REAGENT STRIP/BLOOD GLUCOSE: CPT

## 2023-09-19 PROCEDURE — 87641 MR-STAPH DNA AMP PROBE: CPT | Performed by: INTERNAL MEDICINE

## 2023-09-19 PROCEDURE — 25010000002 METHYLPREDNISOLONE PER 125 MG: Performed by: INTERNAL MEDICINE

## 2023-09-19 PROCEDURE — 94640 AIRWAY INHALATION TREATMENT: CPT

## 2023-09-19 PROCEDURE — 63710000001 INSULIN REGULAR HUMAN PER 5 UNITS: Performed by: INTERNAL MEDICINE

## 2023-09-19 PROCEDURE — 92612 ENDOSCOPY SWALLOW (FEES) VID: CPT

## 2023-09-19 PROCEDURE — 82077 ASSAY SPEC XCP UR&BREATH IA: CPT | Performed by: EMERGENCY MEDICINE

## 2023-09-19 PROCEDURE — 83050 HGB METHEMOGLOBIN QUAN: CPT

## 2023-09-19 PROCEDURE — 82140 ASSAY OF AMMONIA: CPT | Performed by: INTERNAL MEDICINE

## 2023-09-19 PROCEDURE — 94664 DEMO&/EVAL PT USE INHALER: CPT

## 2023-09-19 PROCEDURE — 97110 THERAPEUTIC EXERCISES: CPT

## 2023-09-19 PROCEDURE — 80179 DRUG ASSAY SALICYLATE: CPT | Performed by: INTERNAL MEDICINE

## 2023-09-19 PROCEDURE — 92523 SPEECH SOUND LANG COMPREHEN: CPT

## 2023-09-19 PROCEDURE — 94660 CPAP INITIATION&MGMT: CPT

## 2023-09-19 RX ORDER — POLYETHYLENE GLYCOL 3350 17 G/17G
17 POWDER, FOR SOLUTION ORAL DAILY PRN
Status: DISCONTINUED | OUTPATIENT
Start: 2023-09-19 | End: 2023-09-20 | Stop reason: HOSPADM

## 2023-09-19 RX ORDER — SODIUM CHLORIDE 9 MG/ML
40 INJECTION, SOLUTION INTRAVENOUS AS NEEDED
Status: DISCONTINUED | OUTPATIENT
Start: 2023-09-19 | End: 2023-09-20 | Stop reason: HOSPADM

## 2023-09-19 RX ORDER — NICOTINE POLACRILEX 4 MG
15 LOZENGE BUCCAL
Status: DISCONTINUED | OUTPATIENT
Start: 2023-09-19 | End: 2023-09-20 | Stop reason: HOSPADM

## 2023-09-19 RX ORDER — CYCLOBENZAPRINE HCL 10 MG
5 TABLET ORAL 3 TIMES DAILY PRN
Status: DISCONTINUED | OUTPATIENT
Start: 2023-09-19 | End: 2023-09-20 | Stop reason: HOSPADM

## 2023-09-19 RX ORDER — SODIUM CHLORIDE 0.9 % (FLUSH) 0.9 %
10 SYRINGE (ML) INJECTION EVERY 12 HOURS SCHEDULED
Status: DISCONTINUED | OUTPATIENT
Start: 2023-09-19 | End: 2023-09-20 | Stop reason: HOSPADM

## 2023-09-19 RX ORDER — NICOTINE 21 MG/24HR
1 PATCH, TRANSDERMAL 24 HOURS TRANSDERMAL
Status: DISCONTINUED | OUTPATIENT
Start: 2023-09-19 | End: 2023-09-20 | Stop reason: HOSPADM

## 2023-09-19 RX ORDER — IPRATROPIUM BROMIDE AND ALBUTEROL SULFATE 2.5; .5 MG/3ML; MG/3ML
3 SOLUTION RESPIRATORY (INHALATION) EVERY 6 HOURS PRN
Status: DISCONTINUED | OUTPATIENT
Start: 2023-09-19 | End: 2023-09-20 | Stop reason: HOSPADM

## 2023-09-19 RX ORDER — BISACODYL 5 MG/1
5 TABLET, DELAYED RELEASE ORAL DAILY PRN
Status: DISCONTINUED | OUTPATIENT
Start: 2023-09-19 | End: 2023-09-20 | Stop reason: HOSPADM

## 2023-09-19 RX ORDER — SODIUM CHLORIDE 0.9 % (FLUSH) 0.9 %
10 SYRINGE (ML) INJECTION AS NEEDED
Status: DISCONTINUED | OUTPATIENT
Start: 2023-09-19 | End: 2023-09-20 | Stop reason: HOSPADM

## 2023-09-19 RX ORDER — ONDANSETRON 2 MG/ML
4 INJECTION INTRAMUSCULAR; INTRAVENOUS EVERY 6 HOURS PRN
Status: DISCONTINUED | OUTPATIENT
Start: 2023-09-19 | End: 2023-09-20 | Stop reason: HOSPADM

## 2023-09-19 RX ORDER — ATORVASTATIN CALCIUM 40 MG/1
40 TABLET, FILM COATED ORAL NIGHTLY
Status: DISCONTINUED | OUTPATIENT
Start: 2023-09-19 | End: 2023-09-20 | Stop reason: HOSPADM

## 2023-09-19 RX ORDER — NALOXONE HCL 0.4 MG/ML
0.4 VIAL (ML) INJECTION ONCE
Status: COMPLETED | OUTPATIENT
Start: 2023-09-19 | End: 2023-09-19

## 2023-09-19 RX ORDER — AMOXICILLIN 250 MG
2 CAPSULE ORAL 2 TIMES DAILY
Status: DISCONTINUED | OUTPATIENT
Start: 2023-09-19 | End: 2023-09-20 | Stop reason: HOSPADM

## 2023-09-19 RX ORDER — IPRATROPIUM BROMIDE AND ALBUTEROL SULFATE 2.5; .5 MG/3ML; MG/3ML
3 SOLUTION RESPIRATORY (INHALATION)
Status: DISCONTINUED | OUTPATIENT
Start: 2023-09-19 | End: 2023-09-20 | Stop reason: HOSPADM

## 2023-09-19 RX ORDER — ACETAMINOPHEN 160 MG/5ML
650 SOLUTION ORAL EVERY 4 HOURS PRN
Status: DISCONTINUED | OUTPATIENT
Start: 2023-09-19 | End: 2023-09-20 | Stop reason: HOSPADM

## 2023-09-19 RX ORDER — BISACODYL 10 MG
10 SUPPOSITORY, RECTAL RECTAL DAILY PRN
Status: DISCONTINUED | OUTPATIENT
Start: 2023-09-19 | End: 2023-09-20 | Stop reason: HOSPADM

## 2023-09-19 RX ORDER — ACETAMINOPHEN 325 MG/1
650 TABLET ORAL EVERY 4 HOURS PRN
Status: DISCONTINUED | OUTPATIENT
Start: 2023-09-19 | End: 2023-09-20 | Stop reason: HOSPADM

## 2023-09-19 RX ORDER — DEXTROSE MONOHYDRATE 25 G/50ML
25 INJECTION, SOLUTION INTRAVENOUS
Status: DISCONTINUED | OUTPATIENT
Start: 2023-09-19 | End: 2023-09-20 | Stop reason: HOSPADM

## 2023-09-19 RX ORDER — METHYLPREDNISOLONE SODIUM SUCCINATE 40 MG/ML
40 INJECTION, POWDER, LYOPHILIZED, FOR SOLUTION INTRAMUSCULAR; INTRAVENOUS DAILY
Status: DISCONTINUED | OUTPATIENT
Start: 2023-09-20 | End: 2023-09-20 | Stop reason: HOSPADM

## 2023-09-19 RX ORDER — IBUPROFEN 600 MG/1
1 TABLET ORAL
Status: DISCONTINUED | OUTPATIENT
Start: 2023-09-19 | End: 2023-09-20 | Stop reason: HOSPADM

## 2023-09-19 RX ORDER — ACETAMINOPHEN 650 MG/1
650 SUPPOSITORY RECTAL EVERY 4 HOURS PRN
Status: DISCONTINUED | OUTPATIENT
Start: 2023-09-19 | End: 2023-09-20 | Stop reason: HOSPADM

## 2023-09-19 RX ORDER — METHYLPREDNISOLONE SODIUM SUCCINATE 125 MG/2ML
125 INJECTION, POWDER, LYOPHILIZED, FOR SOLUTION INTRAMUSCULAR; INTRAVENOUS ONCE
Status: COMPLETED | OUTPATIENT
Start: 2023-09-19 | End: 2023-09-19

## 2023-09-19 RX ORDER — CETIRIZINE HYDROCHLORIDE 10 MG/1
10 TABLET ORAL DAILY
Status: DISCONTINUED | OUTPATIENT
Start: 2023-09-19 | End: 2023-09-20 | Stop reason: HOSPADM

## 2023-09-19 RX ADMIN — ATORVASTATIN CALCIUM 40 MG: 40 TABLET, FILM COATED ORAL at 22:44

## 2023-09-19 RX ADMIN — CETIRIZINE HYDROCHLORIDE TABLETS 10 MG: 10 TABLET, FILM COATED ORAL at 10:26

## 2023-09-19 RX ADMIN — IPRATROPIUM BROMIDE AND ALBUTEROL SULFATE 3 ML: 2.5; .5 SOLUTION RESPIRATORY (INHALATION) at 15:47

## 2023-09-19 RX ADMIN — IPRATROPIUM BROMIDE AND ALBUTEROL SULFATE 3 ML: 2.5; .5 SOLUTION RESPIRATORY (INHALATION) at 11:01

## 2023-09-19 RX ADMIN — Medication 10 ML: at 10:24

## 2023-09-19 RX ADMIN — SENNOSIDES AND DOCUSATE SODIUM 2 TABLET: 50; 8.6 TABLET ORAL at 10:27

## 2023-09-19 RX ADMIN — NALXONE HYDROCHLORIDE 0.4 MG: 0.4 INJECTION INTRAMUSCULAR; INTRAVENOUS; SUBCUTANEOUS at 03:57

## 2023-09-19 RX ADMIN — Medication 10 ML: at 22:45

## 2023-09-19 RX ADMIN — APIXABAN 5 MG: 5 TABLET, FILM COATED ORAL at 10:26

## 2023-09-19 RX ADMIN — METHYLPREDNISOLONE SODIUM SUCCINATE 125 MG: 125 INJECTION, POWDER, FOR SOLUTION INTRAMUSCULAR; INTRAVENOUS at 03:03

## 2023-09-19 RX ADMIN — APIXABAN 5 MG: 5 TABLET, FILM COATED ORAL at 22:45

## 2023-09-19 RX ADMIN — SENNOSIDES AND DOCUSATE SODIUM 2 TABLET: 50; 8.6 TABLET ORAL at 22:44

## 2023-09-19 RX ADMIN — IPRATROPIUM BROMIDE AND ALBUTEROL SULFATE 3 ML: 2.5; .5 SOLUTION RESPIRATORY (INHALATION) at 19:05

## 2023-09-19 RX ADMIN — IPRATROPIUM BROMIDE AND ALBUTEROL SULFATE 3 ML: 2.5; .5 SOLUTION RESPIRATORY (INHALATION) at 02:40

## 2023-09-19 RX ADMIN — NICOTINE 1 PATCH: 14 PATCH, EXTENDED RELEASE TRANSDERMAL at 10:24

## 2023-09-19 RX ADMIN — AZITHROMYCIN 500 MG: 500 INJECTION, POWDER, LYOPHILIZED, FOR SOLUTION INTRAVENOUS at 03:08

## 2023-09-19 RX ADMIN — INSULIN HUMAN 6 UNITS: 100 INJECTION, SOLUTION PARENTERAL at 17:59

## 2023-09-19 RX ADMIN — IPRATROPIUM BROMIDE AND ALBUTEROL SULFATE 3 ML: 2.5; .5 SOLUTION RESPIRATORY (INHALATION) at 07:03

## 2023-09-19 NOTE — H&P
"    [facility] Medicine Services  HISTORY AND PHYSICAL    Patient Name: Tessa Bradley  : 1945  MRN: [id number]  Primary Care Physician: Sunni Santoro MD  Date of admission: 2023      Subjective   Subjective     Chief Complaint:  Somnolence. Left facial droop, left sided weakness    HPI:  Tessa Bradley is a 78 y.o. female with past medical history of chronic respiratory failure on chronic 2 L nasal cannula, COPD, MAKAYLA not on CPAP, Hodgkin's disease, atrial fibrillation, essential hypertension who was recently admitted to our facility for acute on chronic hyper And hypoxic respiratory failure who returns to the ER tonight with altered mental status with increased somnolence and reports of possible left-sided facial droop and left-sided weakness.    Patient brought to the ER tonight with altered mental status with his increased somnolence, reports of left-sided facial droop and left-sided weakness.  Initially unable to provide any history as patient was too somnolent.  Reportedly, EMS had been called out sometime in the morning of 2023 due to increased somnolence.  Patient apparently refused to come to the hospital at that time.  EMS called out again around 4 PM and reportedly noted possible left-sided facial droop and left-sided weakness.  Patient brought to the ER as a stroke alert.  Initial NIH noted to be 10.  Patient reportedly repeating herself over and over and appeared drowsy.  Reportedly had left-sided facial droop, expressive aphasia, and mild dysarthria.  Facial droop nor left-sided weakness was appreciated by the ER provider at time of evaluation.    Patient is now more alert.  She is oriented x3.  She states that she started feeling more short of breath this a.m. and turned her oxygen up from 2 to 3 L.  She reports difficulty waking up and felt \"really sleepy\" the rest of the day.  She reports she has had a cough over the last several days.  Mostly " nonproductive.  Reports subjective fever and chills.  Denies any known sick contacts she is aware of.  Patient has history of AMKAYLA but does not wear CPAP.      Gen- reports fevers, chills  CV- No chest pain, palpitations  Resp-reports cough, dyspnea  GI- No N/V/D, abd pain      Personal History     Past Medical History:   Diagnosis Date    Cancer     COPD (chronic obstructive pulmonary disease)     Hyperlipidemia     Hypertension          Oncology Problem List:  Hodgkin disease (09/07/2023; Status: Active)       Past Surgical History:   Procedure Laterality Date    ABDOMINAL SURGERY      HYSTERECTOMY      OOPHORECTOMY         Family History: family history includes Breast cancer (age of onset: 67) in her mother.     Social History:  reports that she has been smoking cigarettes. She has a 10.00 pack-year smoking history. She has never used smokeless tobacco. She reports that she does not currently use alcohol. She reports that she does not use drugs.  Social History     Social History Narrative    Not on file       Medications:  Available home medication information reviewed.  (Not in a hospital admission)      Allergies   Allergen Reactions    Penicillins Anaphylaxis       Objective   Objective     Vital Signs:   Temp:  [97.2 °F (36.2 °C)-98.6 °F (37 °C)] 97.2 °F (36.2 °C)  Heart Rate:  [] 92  Resp:  [23-27] 23  BP: (131-165)/() 165/87  Flow (L/min):  [3] 3  Total (NIH Stroke Scale): 11    Physical Exam   Constitutional: somnolent, wakes to voice  Eyes: PERRLA, sclerae anicteric, no conjunctival injection  HENT: NCAT, mucous membranes moist  Neck: Supple, no thyromegaly, no lymphadenopathy, trachea midline  Respiratory: occasionally wheeze bilaterally, poor airflow throughout, mod labored respirations   Cardiovascular: RRR, no murmurs, rubs, or gallops, palpable pedal pulses bilaterally  Gastrointestinal: Positive bowel sounds, soft, nontender, nondistended  Musculoskeletal: No bilateral ankle edema, no  clubbing or cyanosis to extremities  Psychiatric: Appropriate affect, cooperative  Neurologic: Oriented x 3, strength symmetric in all extremities, Cranial Nerves grossly intact to confrontation, slurred speech, slowed speech  Skin: No rashes      Result Review:  I have personally reviewed the results from the time of this admission to 9/19/2023 02:34 EDT and agree with these findings:  [x]  Laboratory list / accordion  []  Microbiology  [x]  Radiology  []  EKG/Telemetry   []  Cardiology/Vascular   []  Pathology  [x]  Old records  []  Other:  Most notable findings include: wbc of 17, up  from recent, mcv 105, pH 7.23,         LAB RESULTS:      Lab 09/18/23 2037 09/18/23 2000 09/18/23 1958 09/18/23 1952   WBC  --  17.84*  --   --    HEMOGLOBIN  --  12.8  --   --    HEMOGLOBIN, POC  --   --  15.3  --    HEMATOCRIT  --  45.1  --   --    HEMATOCRIT POC  --   --  45  --    PLATELETS  --  165  --   --    NEUTROS ABS  --  15.93*  --   --    IMMATURE GRANS (ABS)  --  0.11*  --   --    LYMPHS ABS  --  1.02  --   --    MONOS ABS  --  0.52  --   --    EOS ABS  --  0.23  --   --    MCV  --  105.6*  --   --    PROCALCITONIN 0.12  --   --   --    LACTATE  --  0.6  --   --    PROTIME  --   --   --  13.9   INR  --   --   --  1.2   APTT  --  33.2  --   --          Lab 09/18/23 1958   CREATININE 1.00   EGFR 58.9*         Lab 09/18/23 2037   ALT (SGPT) 19   AST (SGOT) 15         Lab 09/18/23 2037   HSTROP T 21*                 Lab 09/19/23  0233   PH, ARTERIAL 7.235*   PCO2, ARTERIAL 78.4*   PO2 ART 72.9*   FIO2 28   HCO3 ART 33.2*   BASE EXCESS ART 3.5*   CARBOXYHEMOGLOBIN 1.8     UA          9/7/2023    00:55 9/18/2023    20:48   Urinalysis   Specific Gravity, UA 1.018  1.023    Ketones, UA Negative  Negative    Blood, UA Negative  Negative    Leukocytes, UA Negative  Negative    Nitrite, UA Negative  Negative        Microbiology Results (last 10 days)       Procedure Component Value - Date/Time    COVID PRE-OP /  PRE-PROCEDURE SCREENING ORDER (NO ISOLATION) - Swab, Nasopharynx [[id number]]  (Normal) Collected: 09/18/23 2050    Lab Status: Final result Specimen: Swab from Nasopharynx Updated: 09/18/23 2155    Narrative:      The following orders were created for panel order COVID PRE-OP / PRE-PROCEDURE SCREENING ORDER (NO ISOLATION) - Swab, Nasopharynx.  Procedure                               Abnormality         Status                     ---------                               -----------         ------                     Respiratory Panel PCR w/...[[id number]]  Normal              Final result                 Please view results for these tests on the individual orders.    Respiratory Panel PCR w/COVID-19(SARS-CoV-2) P[facility]NY/CLEVE/ALICE/PAD/[facility]/M[facility]/ELYSSA In-House, NP Swab in UTM/VTM, 3-4 HR TAT - Swab, Nasopharynx [[id number]]  (Normal) Collected: 09/18/23 2050    Lab Status: Final result Specimen: Swab from Nasopharynx Updated: 09/18/23 2155     ADENOVIRUS, PCR Not Detected     Coronavirus 229E Not Detected     Coronavirus HKU1 Not Detected     Coronavirus NL63 Not Detected     Coronavirus OC43 Not Detected     COVID19 Not Detected     Human Metapneumovirus Not Detected     Human Rhinovirus/Enterovirus Not Detected     Influenza A PCR Not Detected     Influenza B PCR Not Detected     Parainfluenza Virus 1 Not Detected     Parainfluenza Virus 2 Not Detected     Parainfluenza Virus 3 Not Detected     Parainfluenza Virus 4 Not Detected     RSV, PCR Not Detected     Bordetella pertussis pcr Not Detected     Bordetella parapertussis PCR Not Detected     Chlamydophila pneumoniae PCR Not Detected     Mycoplasma pneumo by PCR Not Detected    Narrative:      In the setting of a positive respiratory panel with a viral infection PLUS a negative procalcitonin without other underlying concern for bacterial infection, consider observing off antibiotics or discontinuation of antibiotics and continue supportive care. If the respiratory panel is  positive for atypical bacterial infection (Bordetella pertussis, Chlamydophila pneumoniae, or Mycoplasma pneumoniae), consider antibiotic de-escalation to target atypical bacterial infection.            CT Angiogram Neck    Result Date: 9/18/2023  CT ANGIOGRAM HEAD W AI ANALYSIS OF LVO, CT ANGIOGRAM NECK Date of Exam: 9/18/2023 7:53 PM EDT Indication: Neuro deficit, acute stroke suspected Neuro deficit, acute stroke suspected. Comparison: CT head without contrast 9/18/2023 Technique: CTA of the head and neck was performed after the uneventful intravenous administration of 115 mL Isovue-370 . Reconstructed coronal and sagittal images were also obtained. In addition, a 3-D volume rendered image was created for interpretation. Automated exposure control and iterative reconstruction methods were used. Findings: CTA NECK: *Aortic arch: No aneurysm. No significant stenosis or occlusion of the major arch vessels. *Left carotid system: No aneurysm, significant stenosis or occlusion. *Right carotid system: No aneurysm, significant stenosis or occlusion. *Vertebrobasilar system: The vertebral arteries arise from their respective subclavian arteries. No aneurysm, significant stenosis or occlusion. CTA HEAD: *Anterior circulation: No aneurysm, significant stenosis or occlusion. *Posterior circulation: No aneurysm, significant stenosis or occlusion. *Anatomic variants: None of significance. *Venous sinuses: Patent.     Impression: 1.No hemodynamically significant stenosis, large vessel cut or aneurysms in intracranial circulation 2.No hemodynamically significant stenosis, dissection or aneurysms in extracranial circulation. Electronically Signed: Umberto Hutchinson MD  9/18/2023 8:25 PM EDT  Workstation ID: [id number]    MRI Brain Without Contrast    Result Date: 9/19/2023  MRI BRAIN WO CONTRAST Date of Exam: 9/18/2023 11:45 PM EDT Indication: Stroke, follow up.  Comparison: CT head, angiography and perfusion 9/18/2023 at 2000 hours.  Technique:  Routine multiplanar/multisequence sequence images of the brain were obtained without contrast administration. Findings: There is no diffusion restriction to suggest acute infarct. There is no evidence of acute or chronic intracranial hemorrhage. There are several scattered foci of T2 hyperintensity within the cerebral white matter and within the central avinash. No mass effect or midline shift. No abnormal extra-axial collections.  The major vascular flow voids appear intact. The basal ganglia, brainstem and cerebellum appear within normal limits.  Calvarial and superficial soft tissue signal is within normal limits. There is thinning of the orbital lenses bilaterally. There is mild multifocal paranasal sinus mucosal disease and there are bilateral mastoid effusions. 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 Midline structures are intact.     Impression: Impression: 1.No acute intracranial abnormality. 2.Mild chronic small vessel ischemic change. 3.Mild paranasal sinus mucosal disease and bilateral mastoid effusions. Electronically Signed: Curt Ng MD  9/19/2023 12:07 AM EDT  Workstation ID: [id number]    XR Chest 1 View    Result Date: 9/18/2023  XR CHEST 1 VW Date of Exam: 9/18/2023 7:47 PM EDT Indication: Acute Stroke Protocol (onset < 12 hrs) Comparison: 9/7/2023 Findings: Heart size is at the upper limits of normal. Upper lobe pulmonary vessels are prominent consistent with pulmonary vascular congestion. No focal airspace consolidation. No pleural effusion or pneumothorax.     Impression: Impression: 1. Borderline heart size with mild pulmonary vascular congestion. No other acute cardiopulmonary disease. Electronically Signed: Umberto Kulkarni MD  9/18/2023 9:10 PM EDT  Workstation ID: [id number]    CT Head Without Contrast Stroke Protocol    Result Date: 9/18/2023  CT HEAD WO CONTRAST STROKE PROTOCOL Date of Exam: 9/18/2023 7:41 PM EDT Indication: Neuro deficit, acute, stroke suspected Neuro deficit,  acute stroke suspected. Comparison: None available. Technique: Axial CT images were obtained of the head without contrast administration.  Reconstructed coronal images were also obtained. Automated exposure control and iterative construction methods were used. Scan Time: 1937 hours Results discussed with Mihir De La Rosa at 1947 hours. Findings: *No acute intracranial hemorrhage. *No masses, mass effect, midline shift or hydrocephalus. *White matter is intact. *Calvarium is intact. *Visualized orbits and globes are unremarkable without radiopaque foreign bodies. *Visualized paranasal sinuses are clear. *Visualized mastoid air cells are clear.     Impression: 1.No acute intracranial hemorrhage. Calvarium is intact. Electronically Signed: Umberto Hutchinson MD  9/18/2023 7:50 PM EDT  Workstation ID: [id number]    XR Abdomen KUB    Result Date: 9/18/2023  XR ABDOMEN KUB Date of Exam: 9/18/2023 9:36 PM EDT Indication: MRI clearance Comparison: None available. Findings: The abdominal bowel gas pattern is within normal limits. No radiopaque foreign body seen within the abdomen or pelvis. There is contrast material within the renal collecting systems and urinary bladder from prior CT angiogram of the head and neck. Visualized lung bases appear grossly clear. Bony structures are unremarkable.     Impression: Impression: 1. No radiopaque foreign body overlying the abdomen or pelvis. 2. Normal bowel gas pattern. Electronically Signed: Umberto Kulkarni MD  9/18/2023 10:39 PM EDT  Workstation ID: [id number]    CT Angiogram Head w AI Analysis of LVO    Result Date: 9/18/2023  CT ANGIOGRAM HEAD W AI ANALYSIS OF LVO, CT ANGIOGRAM NECK Date of Exam: 9/18/2023 7:53 PM EDT Indication: Neuro deficit, acute stroke suspected Neuro deficit, acute stroke suspected. Comparison: CT head without contrast 9/18/2023 Technique: CTA of the head and neck was performed after the uneventful intravenous administration of 115 mL Isovue-370 . Reconstructed coronal  and sagittal images were also obtained. In addition, a 3-D volume rendered image was created for interpretation. Automated exposure control and iterative reconstruction methods were used. Findings: CTA NECK: *Aortic arch: No aneurysm. No significant stenosis or occlusion of the major arch vessels. *Left carotid system: No aneurysm, significant stenosis or occlusion. *Right carotid system: No aneurysm, significant stenosis or occlusion. *Vertebrobasilar system: The vertebral arteries arise from their respective subclavian arteries. No aneurysm, significant stenosis or occlusion. CTA HEAD: *Anterior circulation: No aneurysm, significant stenosis or occlusion. *Posterior circulation: No aneurysm, significant stenosis or occlusion. *Anatomic variants: None of significance. *Venous sinuses: Patent.     Impression: 1.No hemodynamically significant stenosis, large vessel cut or aneurysms in intracranial circulation 2.No hemodynamically significant stenosis, dissection or aneurysms in extracranial circulation. Electronically Signed: Umberto Hutchinson MD  9/18/2023 8:25 PM EDT  Workstation ID: [id number]    CT CEREBRAL PERFUSION WITH & WITHOUT CONTRAST    Result Date: 9/18/2023  CT CEREBRAL PERFUSION W WO CONTRAST Date of Exam: 9/18/2023 7:53 PM EDT Indication: Neuro deficit, acute stroke suspected.  Comparison: CT head and CTA head and neck 9/18/2023 Technique: Axial CT images of the brain were obtained prior to and after the administration of 150 Isovue 370 . Core blood volume, core blood flow, mean transit time, and Tmax images were obtained utilizing the Rapid software protocol. A limited CT angiogram of the head was also performed to measure the blood vessel density. The radiation dose reduction device was turned on for each scan per the ALARA (As Low as Reasonably Achievable) protocol. Findings: *CBF less than 30%: 0 mL. *Tmax greater than 6 seconds: 0 mL. *Mismatch volume: 0 mL. *Mismatch ratio: None.     Impression:  Impression: No acute core infarct or ischemia is identified. Electronically Signed: Umberto Hutchinson MD  9/18/2023 8:27 PM EDT  Workstation ID: [id number]     Results for orders placed during the hospital encounter of 08/24/23    Adult Transthoracic Echo Complete W/ Cont if Necessary Per Protocol    Interpretation Summary    Left ventricular systolic function is normal. Left ventricular ejection fraction appears to be 56 - 60%.    Left ventricular diastolic function was normal.    Mild aortic valve stenosis is present. Aortic valve area is 1.2 cm2.    Peak velocity of the flow distal to the aortic valve is 267.2 cm/s. Aortic valve mean pressure gradient is 16 mmHg.      Assessment & Plan   Assessment & Plan     Active Hospital Problems    Diagnosis  POA    **AMS (altered mental status) [R41.82]  Yes    HTN (hypertension) [I10]  Yes    Hodgkin disease [C81.90]  Yes    Leukocytosis [D72.829]  Yes    A-fib [I48.91]  Yes    HLD (hyperlipidemia) [E78.5]  Yes    MAKAYLA (obstructive sleep apnea) [G47.33]  Yes    COPD exacerbation [J44.1]  Yes       Patient is a 78-year-old female with past medical history of chronic respiratory failure on chronic 2 L nasal cannula, COPD, MAKAYLA not on CPAP, Hodgkin's disease, atrial fibrillation, essential hypertension who was recently admitted to our facility for acute on chronic hyper And hypoxic respiratory failure who returns to the ER tonight with altered mental status with increased somnolence and reports of possible left-sided facial droop and left-sided weakness.    Altered mental status  Somnolence  --suspect secondary to hypercarbic resp failure  --abg obtained once called from the ER with critical co2 of 78 and pH is 7.23  --bipap ordered 16/6 with backup rate 18, repeat abg in 1 hours  --tx for copd exacerbation    Acute hypercapneic respiratory failure  COPD with acute exacerbation  MAKAYLA not on cpap at home  --pco2 was 78  --place on bipap  --considering this is a recurrent admission,  consider pulmonology consult in am  --duoneb q4h scheduled and with with prns  --suspect leukocytosis from recent steroids  --does report fever and chills at home, add azithromycin for possible early pna.  --mrsa given recent admission  --resp panel pcr negative  --? Need for home bipap/cpap    Reported left facial droop and left-sided weakness  Rule out acute ischemic stroke  --stroke maynor, neurology consulted  --serial NIH/neurochecks  --continue aspirin 81mg oral/ 300 rectal daily, eliquis  --high intensity statin  --lipid panel, a1c, tsh  --echo per stroke team  --CT head, CTA head and neck, CT perfusion without significant findings  --MRI  per stroke team    Hodgkins disease  --continue outpatient followup    Afib  --continue eliquis    Essential htn  --cont home meds    Total time spent: 75  Time spent includes time reviewing chart, face-to-face time, counseling patient/family/caregiver, ordering medications/tests/procedures, communicating with other health care professionals, documenting clinical information in the electronic health record, and coordination of care.      DVT prophylaxis: eliquis      CODE STATUS: full code  Code Status and Medical Interventions:   Ordered at: 09/19/23 0233     Code Status (Patient has no pulse and is not breathing):    CPR (Attempt to Resuscitate)     Medical Interventions (Patient has pulse or is breathing):    Full Support       Expected Discharge   Expected Discharge Date: 9/22/2023; Expected Discharge Time:      Trino Peterson DO  09/19/23

## 2023-09-19 NOTE — PLAN OF CARE
Goal Outcome Evaluation:  Plan of Care Reviewed With: patient, spouse         SLP evaluation completed. Will address dysphagia and cognitive-linguistic disorder in tx. Please see note for further details and recommendations.

## 2023-09-19 NOTE — PROGRESS NOTES
[facility]  ADMISSION FOLLOW-UP NOTE          Patient admitted after midnight, H&P by my partner performed earlier on today's date reviewed.  Interim findings, labs, and charting also reviewed.        The [facility] Problem List has been managed and updated to include any new diagnoses:  Active Hospital Problems    Diagnosis  POA    **AMS (altered mental status) [R41.82]  Yes    HTN (hypertension) [I10]  Yes    Hodgkin disease [C81.90]  Yes    Leukocytosis [D72.829]  Yes    A-fib [I48.91]  Yes    HLD (hyperlipidemia) [E78.5]  Yes    MAKAYLA (obstructive sleep apnea) [G47.33]  Yes    COPD exacerbation [J44.1]  Yes      Resolved Hospital Problems   No resolved problems to display.         ADDITIONAL PLAN:  - detailed assessment and plan from admission reviewed  -   Expand All Collapse All         [facility]  HISTORY AND PHYSICAL     Patient Name: Tessa Bradley  : 1945  MRN: [id number]  Primary Care Physician: Sunni Santoro MD  Date of admission: 2023           Subjective      Subjective      Chief Complaint:  Somnolence. Left facial droop, left sided weakness     HPI:  Tsesa Bradley is a 78 y.o. female with past medical history of chronic respiratory failure on chronic 2 L nasal cannula, COPD, MAKAYLA not on CPAP, Hodgkin's disease, atrial fibrillation, essential hypertension who was recently admitted to our facility for acute on chronic hyper And hypoxic respiratory failure who returns to the ER tonight with altered mental status with increased somnolence and reports of possible left-sided facial droop and left-sided weakness.     Patient brought to the ER tonight with altered mental status with his increased somnolence, reports of left-sided facial droop and left-sided weakness.  Initially unable to provide any history as patient was too somnolent.  Reportedly, EMS had been called out sometime in the morning of  "9/18/2023 due to increased somnolence.  Patient apparently refused to come to the hospital at that time.  EMS called out again around 4 PM and reportedly noted possible left-sided facial droop and left-sided weakness.  Patient brought to the ER as a stroke alert.  Initial NIH noted to be 10.  Patient reportedly repeating herself over and over and appeared drowsy.  Reportedly had left-sided facial droop, expressive aphasia, and mild dysarthria.  Facial droop nor left-sided weakness was appreciated by the ER provider at time of evaluation.     Patient is now more alert.  She is oriented x3.  She states that she started feeling more short of breath this a.m. and turned her oxygen up from 2 to 3 L.  She reports difficulty waking up and felt \"really sleepy\" the rest of the day.  She reports she has had a cough over the last several days.  Mostly nonproductive.  Reports subjective fever and chills.  Denies any known sick contacts she is aware of.  Patient has history of MAKAYLA but does not wear CPAP.        Gen- reports fevers, chills  CV- No chest pain, palpitations  Resp-reports cough, dyspnea  GI- No N/V/D, abd pain        Personal History      Medical History        Past Medical History:   Diagnosis Date    Cancer      COPD (chronic obstructive pulmonary disease)      Hyperlipidemia      Hypertension                 Oncology Problem List:  Hodgkin disease (09/07/2023; Status: Active)     Surgical History         Past Surgical History:   Procedure Laterality Date    ABDOMINAL SURGERY        HYSTERECTOMY        OOPHORECTOMY                Family History: family history includes Breast cancer (age of onset: 67) in her mother.      Social History:  reports that she has been smoking cigarettes. She has a 10.00 pack-year smoking history. She has never used smokeless tobacco. She reports that she does not currently use alcohol. She reports that she does not use drugs.  Social History          Social History Narrative    Not on " file         Medications:  Available home medication information reviewed.    Prescriptions Prior to Admission   (Not in a hospital admission)              Allergies   Allergen Reactions    Penicillins Anaphylaxis               Objective      Objective      Vital Signs:   Temp:  [97.2 °F (36.2 °C)-98.6 °F (37 °C)] 97.2 °F (36.2 °C)  Heart Rate:  [] 92  Resp:  [23-27] 23  BP: (131-165)/() 165/87  Flow (L/min):  [3] 3  Total (NIH Stroke Scale): 11     Physical Exam   Constitutional: somnolent, wakes to voice  Eyes: PERRLA, sclerae anicteric, no conjunctival injection  HENT: NCAT, mucous membranes moist  Neck: Supple, no thyromegaly, no lymphadenopathy, trachea midline  Respiratory: occasionally wheeze bilaterally, poor airflow throughout, mod labored respirations   Cardiovascular: RRR, no murmurs, rubs, or gallops, palpable pedal pulses bilaterally  Gastrointestinal: Positive bowel sounds, soft, nontender, nondistended  Musculoskeletal: No bilateral ankle edema, no clubbing or cyanosis to extremities  Psychiatric: Appropriate affect, cooperative  Neurologic: Oriented x 3, strength symmetric in all extremities, Cranial Nerves grossly intact to confrontation, slurred speech, slowed speech  Skin: No rashes        Result Review:  I have personally reviewed the results from the time of this admission to 9/19/2023 02:34 EDT and agree with these findings:  [x]  Laboratory list / accordion  []  Microbiology  [x]  Radiology  []  EKG/Telemetry   []  Cardiology/Vascular   []  Pathology  [x]  Old records  []  Other:  Most notable findings include: wbc of 17, up  from recent, mcv 105, pH 7.23,            LAB RESULTS:             Lab 09/18/23 2037 09/18/23 2000 09/18/23 1958 09/18/23 1952   WBC  --  17.84*  --   --    HEMOGLOBIN  --  12.8  --   --    HEMOGLOBIN, POC  --   --  15.3  --    HEMATOCRIT  --  45.1  --   --    HEMATOCRIT POC  --   --  45  --    PLATELETS  --  165  --   --    NEUTROS ABS  --  15.93*  --    --    IMMATURE GRANS (ABS)  --  0.11*  --   --    LYMPHS ABS  --  1.02  --   --    MONOS ABS  --  0.52  --   --    EOS ABS  --  0.23  --   --    MCV  --  105.6*  --   --    PROCALCITONIN 0.12  --   --   --    LACTATE  --  0.6  --   --    PROTIME  --   --   --  13.9   INR  --   --   --  1.2   APTT  --  33.2  --   --               Lab 09/18/23 1958   CREATININE 1.00   EGFR 58.9*              Lab 09/18/23 2037   ALT (SGPT) 19   AST (SGOT) 15              Lab 09/18/23 2037   HSTROP T 21*                      Lab 09/19/23 0233   PH, ARTERIAL 7.235*   PCO2, ARTERIAL 78.4*   PO2 ART 72.9*   FIO2 28   HCO3 ART 33.2*   BASE EXCESS ART 3.5*   CARBOXYHEMOGLOBIN 1.8      Urinalysis Results:   UA            9/7/2023    00:55 9/18/2023    20:48   Urinalysis   Specific Gravity, UA 1.018  1.023    Ketones, UA Negative  Negative    Blood, UA Negative  Negative    Leukocytes, UA Negative  Negative    Nitrite, UA Negative  Negative             Microbiology Results (last 10 days)         Procedure Component Value - Date/Time     COVID PRE-OP / PRE-PROCEDURE SCREENING ORDER (NO ISOLATION) - Swab, Nasopharynx [[id number]]  (Normal) Collected: 09/18/23 2050     Lab Status: Final result Specimen: Swab from Nasopharynx Updated: 09/18/23 2155     Narrative:       The following orders were created for panel order COVID PRE-OP / PRE-PROCEDURE SCREENING ORDER (NO ISOLATION) - Swab, Nasopharynx.  Procedure                               Abnormality         Status                     ---------                               -----------         ------                     Respiratory Panel PCR w/...[[id number]]  Normal              Final result                  Please view results for these tests on the individual orders.     Respiratory Panel PCR w/COVID-19(SARS-CoV-2) [facility]/ALICE/[facility]SSA In-House, NP Swab in [place], 3-4 HR TAT - Swab, Nasopharynx [[id number]]  (Normal) Collected: 09/18/23 2050     Lab Status: Final result Specimen: Swab  from Nasopharynx Updated: 09/18/23 2157       ADENOVIRUS, PCR Not Detected       Coronavirus 229E Not Detected       Coronavirus HKU1 Not Detected       Coronavirus NL63 Not Detected       Coronavirus OC43 Not Detected       COVID19 Not Detected       Human Metapneumovirus Not Detected       Human Rhinovirus/Enterovirus Not Detected       Influenza A PCR Not Detected       Influenza B PCR Not Detected       Parainfluenza Virus 1 Not Detected       Parainfluenza Virus 2 Not Detected       Parainfluenza Virus 3 Not Detected       Parainfluenza Virus 4 Not Detected       RSV, PCR Not Detected       Bordetella pertussis pcr Not Detected       Bordetella parapertussis PCR Not Detected       Chlamydophila pneumoniae PCR Not Detected       Mycoplasma pneumo by PCR Not Detected     Narrative:       In the setting of a positive respiratory panel with a viral infection PLUS a negative procalcitonin without other underlying concern for bacterial infection, consider observing off antibiotics or discontinuation of antibiotics and continue supportive care. If the respiratory panel is positive for atypical bacterial infection (Bordetella pertussis, Chlamydophila pneumoniae, or Mycoplasma pneumoniae), consider antibiotic de-escalation to target atypical bacterial infection.                  Radiology results from the last 24 hours:   CT Angiogram Neck     Result Date: 9/18/2023  CT ANGIOGRAM HEAD W AI ANALYSIS OF LVO, CT ANGIOGRAM NECK Date of Exam: 9/18/2023 7:53 PM EDT Indication: Neuro deficit, acute stroke suspected Neuro deficit, acute stroke suspected. Comparison: CT head without contrast 9/18/2023 Technique: CTA of the head and neck was performed after the uneventful intravenous administration of 115 mL Isovue-370 . Reconstructed coronal and sagittal images were also obtained. In addition, a 3-D volume rendered image was created for interpretation. Automated exposure control and iterative reconstruction methods were used.  Findings: CTA NECK: *Aortic arch: No aneurysm. No significant stenosis or occlusion of the major arch vessels. *Left carotid system: No aneurysm, significant stenosis or occlusion. *Right carotid system: No aneurysm, significant stenosis or occlusion. *Vertebrobasilar system: The vertebral arteries arise from their respective subclavian arteries. No aneurysm, significant stenosis or occlusion. CTA HEAD: *Anterior circulation: No aneurysm, significant stenosis or occlusion. *Posterior circulation: No aneurysm, significant stenosis or occlusion. *Anatomic variants: None of significance. *Venous sinuses: Patent.      Impression: 1.No hemodynamically significant stenosis, large vessel cut or aneurysms in intracranial circulation 2.No hemodynamically significant stenosis, dissection or aneurysms in extracranial circulation. Electronically Signed: Umberto Hutchinson MD  9/18/2023 8:25 PM EDT  Workstation ID: [id number]     MRI Brain Without Contrast     Result Date: 9/19/2023  MRI BRAIN WO CONTRAST Date of Exam: 9/18/2023 11:45 PM EDT Indication: Stroke, follow up.  Comparison: CT head, angiography and perfusion 9/18/2023 at 2000 hours. Technique:  Routine multiplanar/multisequence sequence images of the brain were obtained without contrast administration. Findings: There is no diffusion restriction to suggest acute infarct. There is no evidence of acute or chronic intracranial hemorrhage. There are several scattered foci of T2 hyperintensity within the cerebral white matter and within the central avinash. No mass effect or midline shift. No abnormal extra-axial collections.  The major vascular flow voids appear intact. The basal ganglia, brainstem and cerebellum appear within normal limits.  Calvarial and superficial soft tissue signal is within normal limits. There is thinning of the orbital lenses bilaterally. There is mild multifocal paranasal sinus mucosal disease and there are bilateral mastoid effusions. There is mild  bilateral TMJ arthritis. Midline structures are intact.      Impression: Impression: 1.No acute intracranial abnormality. 2.Mild chronic small vessel ischemic change. 3.Mild paranasal sinus mucosal disease and bilateral mastoid effusions. Electronically Signed: Curt Ng MD  9/19/2023 12:07 AM EDT  Workstation ID: [id number]     XR Chest 1 View     Result Date: 9/18/2023  XR CHEST 1 VW Date of Exam: 9/18/2023 7:47 PM EDT Indication: Acute Stroke Protocol (onset < 12 hrs) Comparison: 9/7/2023 Findings: Heart size is at the upper limits of normal. Upper lobe pulmonary vessels are prominent consistent with pulmonary vascular congestion. No focal airspace consolidation. No pleural effusion or pneumothorax.      Impression: Impression: 1. Borderline heart size with mild pulmonary vascular congestion. No other acute cardiopulmonary disease. Electronically Signed: Umberto Kulkarni MD  9/18/2023 9:10 PM EDT  Workstation ID: [id number]     CT Head Without Contrast Stroke Protocol     Result Date: 9/18/2023  CT HEAD WO CONTRAST STROKE PROTOCOL Date of Exam: 9/18/2023 7:41 PM EDT Indication: Neuro deficit, acute, stroke suspected Neuro deficit, acute stroke suspected. Comparison: None available. Technique: Axial CT images were obtained of the head without contrast administration.  Reconstructed coronal images were also obtained. Automated exposure control and iterative construction methods were used. Scan Time: 1937 hours Results discussed with Mihir De La Rosa at 1947 hours. Findings: *No acute intracranial hemorrhage. *No masses, mass effect, midline shift or hydrocephalus. *White matter is intact. *Calvarium is intact. *Visualized orbits and globes are unremarkable without radiopaque foreign bodies. *Visualized paranasal sinuses are clear. *Visualized mastoid air cells are clear.      Impression: 1.No acute intracranial hemorrhage. Calvarium is intact. Electronically Signed: Umberto Hutchinson MD  9/18/2023 7:50 PM EDT  Workstation  ID: [id number]     XR Abdomen KUB     Result Date: 9/18/2023  XR ABDOMEN KUB Date of Exam: 9/18/2023 9:36 PM EDT Indication: MRI clearance Comparison: None available. Findings: The abdominal bowel gas pattern is within normal limits. No radiopaque foreign body seen within the abdomen or pelvis. There is contrast material within the renal collecting systems and urinary bladder from prior CT angiogram of the head and neck. Visualized lung bases appear grossly clear. Bony structures are unremarkable.      Impression: Impression: 1. No radiopaque foreign body overlying the abdomen or pelvis. 2. Normal bowel gas pattern. Electronically Signed: Umberto Kulkarni MD  9/18/2023 10:39 PM EDT  Workstation ID: [id number]     CT Angiogram Head w AI Analysis of LVO     Result Date: 9/18/2023  CT ANGIOGRAM HEAD W AI ANALYSIS OF LVO, CT ANGIOGRAM NECK Date of Exam: 9/18/2023 7:53 PM EDT Indication: Neuro deficit, acute stroke suspected Neuro deficit, acute stroke suspected. Comparison: CT head without contrast 9/18/2023 Technique: CTA of the head and neck was performed after the uneventful intravenous administration of 115 mL Isovue-370 . Reconstructed coronal and sagittal images were also obtained. In addition, a 3-D volume rendered image was created for interpretation. Automated exposure control and iterative reconstruction methods were used. Findings: CTA NECK: *Aortic arch: No aneurysm. No significant stenosis or occlusion of the major arch vessels. *Left carotid system: No aneurysm, significant stenosis or occlusion. *Right carotid system: No aneurysm, significant stenosis or occlusion. *Vertebrobasilar system: The vertebral arteries arise from their respective subclavian arteries. No aneurysm, significant stenosis or occlusion. CTA HEAD: *Anterior circulation: No aneurysm, significant stenosis or occlusion. *Posterior circulation: No aneurysm, significant stenosis or occlusion. *Anatomic variants: None of significance. *Venous  sinuses: Patent.      Impression: 1.No hemodynamically significant stenosis, large vessel cut or aneurysms in intracranial circulation 2.No hemodynamically significant stenosis, dissection or aneurysms in extracranial circulation. Electronically Signed: Umberto Hutchinson MD  9/18/2023 8:25 PM EDT  Workstation ID: [id number]     CT CEREBRAL PERFUSION WITH & WITHOUT CONTRAST     Result Date: 9/18/2023  CT CEREBRAL PERFUSION W WO CONTRAST Date of Exam: 9/18/2023 7:53 PM EDT Indication: Neuro deficit, acute stroke suspected.  Comparison: CT head and CTA head and neck 9/18/2023 Technique: Axial CT images of the brain were obtained prior to and after the administration of 150 Isovue 370 . Core blood volume, core blood flow, mean transit time, and Tmax images were obtained utilizing the Rapid software protocol. A limited CT angiogram of the head was also performed to measure the blood vessel density. The radiation dose reduction device was turned on for each scan per the ALARA (As Low as Reasonably Achievable) protocol. Findings: *CBF less than 30%: 0 mL. *Tmax greater than 6 seconds: 0 mL. *Mismatch volume: 0 mL. *Mismatch ratio: None.      Impression: Impression: No acute core infarct or ischemia is identified. Electronically Signed: Umberto Hutchinson MD  9/18/2023 8:27 PM EDT  Workstation ID: [id number]         Results for orders placed during the hospital encounter of 08/24/23     Adult Transthoracic Echo Complete W/ Cont if Necessary Per Protocol     Interpretation Summary    Left ventricular systolic function is normal. Left ventricular ejection fraction appears to be 56 - 60%.    Left ventricular diastolic function was normal.    Mild aortic valve stenosis is present. Aortic valve area is 1.2 cm2.    Peak velocity of the flow distal to the aortic valve is 267.2 cm/s. Aortic valve mean pressure gradient is 16 mmHg.              Assessment & Plan     Assessment & Plan            Active Hospital Problems     Diagnosis   POA     **AMS (altered mental status) [R41.82]   Yes    HTN (hypertension) [I10]   Yes    Hodgkin disease [C81.90]   Yes    Leukocytosis [D72.829]   Yes    A-fib [I48.91]   Yes    HLD (hyperlipidemia) [E78.5]   Yes    MAKAYLA (obstructive sleep apnea) [G47.33]   Yes    COPD exacerbation [J44.1]   Yes         Patient is a 78-year-old female with past medical history of chronic respiratory failure on chronic 2 L nasal cannula, COPD, MAKAYLA not on CPAP, Hodgkin's disease, atrial fibrillation, essential hypertension who was recently admitted to our facility for acute on chronic hyper And hypoxic respiratory failure who returns to the ER tonight with altered mental status with increased somnolence and reports of possible left-sided facial droop and left-sided weakness.     Altered mental status  Somnolence  --suspect secondary to hypercarbic resp failure  --abg obtained once called from the ER with critical co2 of 78 and pH is 7.23  --bipap ordered 16/6 with backup rate 18, repeat abg in 1 hours  --tx for copd exacerbation  --?took her wife's meds??? UDS +for opiates and not prescribed to patient     Acute hypercapneic respiratory failure  COPD with acute exacerbation  MAKAYLA not on cpap at home  --pco2 was 78  --better with bipap  --considering this is a recurrent admission, consider pulmonology consult  --duoneb q4h scheduled and with with prns, continue IV solumedrol 40mg daily  --suspect leukocytosis from recent steroids  --does report fever and chills at home, continueazithromycin for possible early pna.  --mrsa given recent admission  --resp panel pcr negative  --? Need for home bipap/cpap     Reported left facial droop and left-sided weakness  Rule out acute ischemic stroke  --stroke maynor, neurology consulted--d/w Dr. Robison who recs continue home eliquis and added high dose statin.  F/u stroke neuro.  --serial NIH/neurochecks  -- A1C 7.30  --tot chol 180 LDL 49   --echo shows EF 56-60%  --CT head, CTA head and neck, CT  perfusion without significant findings  --MRI  showed no acute     Hodgkins disease  --continue outpatient followup     Afib  --continue eliquis     Essential htn  --cont home meds    T2DM  --A1C 7.30            Expected Discharge   Expected Discharge Date: 9/22/2023; Expected Discharge Time:      Vasile Theodore MD  09/19/23

## 2023-09-19 NOTE — MBS/VFSS/FEES
Acute Care - Speech Language Pathology Initial Evaluation  [facility]  Cognitive-Communication Evaluation  Clinical Swallow Evaluation  & Fiberoptic Endoscopic Evaluation of Swallowing (FEES)     Patient Name: Tessa Bradley  : 1945  MRN: [id number]  Today's Date: 2023               Admit Date: 2023     Visit Dx:    ICD-10-CM ICD-9-CM   1. Confusion  R41.0 298.9   2. Altered mental status, unspecified altered mental status type  R41.82 780.97   3. Leukocytosis, unspecified type  D72.829 288.60   4. Facial droop  R29.810 781.94   5. Pharyngoesophageal dysphagia  R13.14 787.24   6. Cognitive communication deficit  R41.841 799.52     Patient Active Problem List   Diagnosis    COPD exacerbation    Elevated serum creatinine    Hypotension    Dyspnea    MAKAYLA (obstructive sleep apnea)    HEATHER (acute kidney injury)    Leukocytosis    HTN (hypertension)    HLD (hyperlipidemia)    A-fib    Hodgkin disease    Arthritis    Acute on chronic respiratory failure with hypoxia    AMS (altered mental status)     Past Medical History:   Diagnosis Date    Cancer     COPD (chronic obstructive pulmonary disease)     Hyperlipidemia     Hypertension      Past Surgical History:   Procedure Laterality Date    ABDOMINAL SURGERY      HYSTERECTOMY      OOPHORECTOMY         SLP Recommendation and Plan  SLP Diagnosis: moderate, cognitive-linguistic disorder (23 0915)           Swallow Criteria for Skilled Therapeutic Interventions Met: demonstrates skilled criteria (23)  SLC Criteria for Skilled Therapy Interventions Met: yes (23)  Anticipated Discharge Disposition (SLP): inpatient rehabilitation facility (23 1100)     Therapy Frequency (Swallow): 5 days per week (23 1100)  Predicted Duration Therapy Intervention (Days): until discharge (23 1100)  Oral Care Recommendations: Oral Care BID/PRN, Toothbrush (23 1100)              Communication Strategy Suggestions: eliminate  distractions when speaking with patient, avoid open-ended questions, avoid multi-step instructions (09/19/23 0915)           Plan of Care Reviewed With: patient, spouse (09/19/23 1340)      SLP EVALUATION (last 72 hours)       SLP SLC Evaluation       Row Name 09/19/23 0915                   Communication Assessment/Intervention    Document Type evaluation  -AC        Subjective Information no complaints  -AC        Patient Observations alert;cooperative  -AC        Patient/Family/Caregiver Comments/Observations No family present.  -AC        Patient Effort good  -AC           General Information    Patient Profile Reviewed yes  -AC        Pertinent History Of Current Problem AMS, somnolence, L weakness/droop. MRI negative for acute stroke. Neuro suspects TIA vs CVA vs metabolic encephalopathy. Concern for COPD AE. Hx Hodgkin's dz, chronic bronchitis.  -AC        Precautions/Limitations, Vision WFL;for purposes of eval  -AC        Precautions/Limitations, Hearing WFL;for purposes of eval  -AC        Prior Level of Function-Communication unknown  -AC        Plans/Goals Discussed with patient;agreed upon  -AC        Barriers to Rehab none identified  -AC        Patient's Goals for Discharge patient did not state  -AC           Pain    Additional Documentation Pain Scale: FACES Pre/Post-Treatment (Group)  -AC           Pain Scale: FACES Pre/Post-Treatment    Pain: FACES Scale, Pretreatment 0-->no hurt  -AC        Posttreatment Pain Rating 0-->no hurt  -AC           Comprehension Assessment/Intervention    Comprehension Assessment/Intervention Auditory Comprehension  -AC           Auditory Comprehension Assessment/Intervention    Auditory Comprehension (Communication) mild impairment;other (see comments)  at least  -AC        Answers Questions (Communication) mild impairment;simple;wh questions  -AC        Able to Follow Commands (Communication) mild impairment;2-step  -AC           Expression Assessment/Intervention     Expression Assessment/Intervention verbal expression  -AC           Verbal Expression Assessment/Intervention    Verbal Expression moderate impairment  -AC        Automatic Speech (Communication) WFL;response to greeting  -AC        Spontaneous/Functional Words moderate impairment;simple;delayed responses;circumlocution;phonemic paraphasias  -AC           Oral Motor Structure and Function    Mucosal Quality moist, healthy  -           Oral Musculature and Cranial Nerve Assessment    Oral Motor General Assessment oral labial or buccal impairment  -AC        Oral Labial or Buccal Impairment, Detail, Cranial Nerve VII (Facial): right labial droop  -AC           Motor Speech Assessment/Intervention    Motor Speech Function WFL;unfamiliar listener  -AC        Conversational Speech (Communication) WFL;simple  -AC        Speech intelligibility 100%;in quiet environment;in connected speech;with unfamiliar listener  -AC           Cognitive Assessment Intervention- SLP    Cognitive Function (Cognition) moderate impairment  -AC        Orientation Status (Cognition) moderate impairment;situation;time;other (see comments)  ? affected by language deficits  -AC        Attention (Cognitive) moderate impairment;sustained  -AC           SLP Evaluation Clinical Impressions    SLP Diagnosis moderate;cognitive-linguistic disorder  -AC        Rehab Potential/Prognosis good  -AC        SLC Criteria for Skilled Therapy Interventions Met yes  -AC        Functional Impact functional impact in ADLs;difficulty in expressing complex messages  -AC           Recommendations    Therapy Frequency (SLP SLC) 5 days per week  -AC        Predicted Duration Therapy Intervention (Days) until discharge  -AC        Anticipated Discharge Disposition (SLP) inpatient rehabilitation facility  -        Communication Strategy Suggestions eliminate distractions when speaking with patient;avoid open-ended questions;avoid multi-step instructions  -AC                   User Key  (r) = Recorded By, (t) = Taken By, (c) = Cosigned By      Initials Name Effective Dates    AC Meera Pickens MS [facility]-SLP 02/03/23 -                        EDUCATION  The patient has been educated in the following areas:     Cognitive Impairment Communication Impairment.           SLP GOALS       Row Name 09/19/23 1100 09/19/23 0915          (LTG) Patient will demonstrate functional swallow for    Diet Texture (Demonstrate functional swallow) regular textures  -AC --     Liquid viscosity (Demonstrate functional swallow) thin liquids  -AC --     [place] (Demonstrate functional swallow) independently (over 90% accuracy)  -AC --     Time Frame (Demonstrate functional swallow) by discharge  -AC --        (STG) Patient will tolerate therapeutic trials of    Consistencies Trialed (Tolerate therapeutic trials) thin liquids  -AC --     Desired Outcome (Tolerate therapeutic trials) without signs/symptoms of aspiration  inconsistent throat clearing/wet vocal quality noted during CSE/FEES  -AC --     [place] (Tolerate therapeutic trials) with 1:1 assist/ supervision  -AC --     Time Frame (Tolerate therapeutic trials) by discharge  -AC --        (STG) Pharyngeal Strengthening Exercise Goal 1 (SLP)    Activity (Pharyngeal Strengthening Goal 1, SLP) increase superior movement of the hyolaryngeal complex;increase anterior movement of the hyolaryngeal complex;increase squeeze/positive pressure generation;increase tongue base retraction  -AC --     Increase Superior Movement of the Hyolaryngeal Complex effortful pitch glide (falsetto + pharyngeal squeeze)  -AC --     Increase Anterior Movement of the Hyolaryngeal Complex chin tuck against resistance (CTAR)  -AC --     Increase Squeeze/Positive Pressure Generation jo  -AC --     Increase Tongue Base Retraction hard effortful swallow  -AC --     [place]/Accuracy (Pharyngeal Strengthening Goal 1, SLP) with moderate cues (50-74% accuracy)  -AC --      Time Frame (Pharyngeal Strengthening Goal 1, SLP) short term goal (STG)  -AC --     Comment (Pharyngeal Strengthening Goal 1, SLP) Provided handout for pt/spouse  -AC --        Patient will demonstrate functional cognitive-linguistic skills for return to discharge environment    [place] -- with minimal cues  -AC     Time frame -- by discharge  -AC        Comprehend Questions Goal 1 (SLP)    Improve Ability to Comprehend Questions Goal 1 (SLP) -- simple wh questions;90%;with minimal cues (75-90%)  -AC     Time Frame (Comprehend Questions Goal 1, SLP) -- short term goal (STG)  -AC        Follow Directions Goal 2 (SLP)    Improve Ability to Follow Directions Goal 1 (SLP) -- 2 step commands;80%;with minimal cues (75-90%)  -AC     Time Frame (Follow Directions Goal 1, SLP) -- short term goal (STG)  -AC        Ability to Construct Phrase and Sentence Level Response Goal 1 (SLP)    Improve Ability to Construct Phrase and Sentence Level Responses By Goal 1 (SLP) -- answering question with phrase;80%;with minimal cues (75-90%)  -AC     Time Frame (Phrase and Sentence Level Response Goal 1, SLP) -- short term goal (STG)  -AC        Attention Goal 1 (SLP)    Improve Attention by Goal 1 (SLP) -- complete sustained attention task;80%;with minimal cues (75-90%)  -AC     Time Frame (Attention Goal 1, SLP) -- short term goal (STG)  -AC        Orientation Goal 1 (SLP)    Improve Orientation Through Goal 1 (SLP) -- demonstrating orientation to disease/impairment;demonstrating orientation to month;demonstrating orientation to day;100%;with minimal cues (75-90%)  -AC     Time Frame (Orientation Goal 1, SLP) -- short term goal (STG)  -AC               User Key  (r) = Recorded By, (t) = Taken By, (c) = Cosigned By      Initials Name Provider Type    Meera Carver MS CCC-SLP Speech and Language Pathologist                            Time Calculation:      Time Calculation- SLP       Row Name 09/19/23 1340             Time  Calculation- SLP    SLP Start Time 0915  -AC      SLP Received On 23  -AC         Untimed Charges    59756-XE Eval Speech and Production w/ Language Minutes 17  -AC      02969-LY Eval Oral Pharyng Swallow Minutes 42  -AC      04607-UB Fiberoptic Endo Eval Swallow Minutes 96  -AC         Total Minutes    Untimed Charges Total Minutes 155  -AC       Total Minutes 155  -AC                User Key  (r) = Recorded By, (t) = Taken By, (c) = Cosigned By      Initials Name Provider Type    Meera Carver, MS CCC-SLP Speech and Language Pathologist                    Therapy Charges for Today       Code Description Service Date Service Provider Modifiers Qty    55081843840 HC ST EVAL ORAL PHARYNG SWALLOW 3 2023 Meera Pickens, MS CCC-SLP GN 1    95243196068 HC ST EVAL SPEECH AND PROD W LANG  1 2023 Meera Pickens MS CCC-SLP GN 1    [id number] HC ST FIBEROPTIC ENDO EVAL SWALL 6 2023 Meera Pickens MS CCC-SLP GN 1                       Meera Pickens MS CCC-SLP  2023   and Acute Care - Speech Language Pathology   Swallow Initial Evaluation [facility]     Patient Name: Tessa Bradley  : 1945  MRN: [id number]  Today's Date: 2023               Admit Date: 2023    Visit Dx:     ICD-10-CM ICD-9-CM   1. Confusion  R41.0 298.9   2. Altered mental status, unspecified altered mental status type  R41.82 780.97   3. Leukocytosis, unspecified type  D72.829 288.60   4. Facial droop  R29.810 781.94   5. Pharyngoesophageal dysphagia  R13.14 787.24   6. Cognitive communication deficit  R41.841 799.52     Patient Active Problem List   Diagnosis    COPD exacerbation    Elevated serum creatinine    Hypotension    Dyspnea    MAKAYLA (obstructive sleep apnea)    HEATHER (acute kidney injury)    Leukocytosis    HTN (hypertension)    HLD (hyperlipidemia)    A-fib    Hodgkin disease    Arthritis    Acute on chronic respiratory failure with hypoxia    AMS (altered mental status)     Past Medical History:    Diagnosis Date    Cancer     COPD (chronic obstructive pulmonary disease)     Hyperlipidemia     Hypertension      Past Surgical History:   Procedure Laterality Date    ABDOMINAL SURGERY      HYSTERECTOMY      OOPHORECTOMY         SLP Recommendation and Plan  SLP Swallowing Diagnosis: moderate, pharyngeal dysphagia, esophageal dysphagia (09/19/23 1100)  SLP Diet Recommendation: soft to chew textures, whole, honey-thick liquids, other (see comments) (soft/whole textures recommended as fatigue precaution) (09/19/23 1100)  Recommended Precautions and Strategies: upright posture during/after eating, small bites of food and sips of liquid, general aspiration precautions, reflux precautions, fatigue precautions (09/19/23 1100)  SLP Rec. for Method of Medication Administration: meds whole, with thick liquids, with puree, as tolerated (09/19/23 1100)     Monitor for Signs of Aspiration: yes, notify SLP if any concerns (09/19/23 1100)  Recommended Diagnostics: reassess via VFSS ([facility]), with esophageal screen (09/19/23 1100)  Swallow Criteria for Skilled Therapeutic Interventions Met: demonstrates skilled criteria (09/19/23 1100)  Anticipated Discharge Disposition (SLP): inpatient rehabilitation facility (09/19/23 1100)  Rehab Potential/Prognosis, Swallowing: good, to achieve stated therapy goals (09/19/23 1100)  Therapy Frequency (Swallow): 5 days per week (09/19/23 1100)  Predicted Duration Therapy Intervention (Days): until discharge (09/19/23 1100)  Oral Care Recommendations: Oral Care BID/PRN, Toothbrush (09/19/23 1100)              Communication Strategy Suggestions: eliminate distractions when speaking with patient, avoid open-ended questions, avoid multi-step instructions (09/19/23 0915)                Oral Care Recommendations: Oral Care BID/PRN, Toothbrush (09/19/23 1100)    Plan of Care Reviewed With: patient, spouse      SWALLOW EVALUATION (last 72 hours)       SLP Adult Swallow Evaluation       Row Name 09/19/23  1100 09/19/23 0930                Rehab Evaluation    Document Type evaluation  -AC evaluation  -AC       Subjective Information no complaints  -AC --       Patient Observations alert;cooperative  -AC --       Patient/Family/Caregiver Comments/Observations Spouse present.  -AC --       Patient Effort good  -AC good  -AC          General Information    Patient Profile Reviewed yes  -AC yes  -AC       Pertinent History Of Current Problem See previous eval.  -AC --       Current Method of Nutrition NPO  -AC NPO  -AC       Prior Level of Function-Swallowing -- no diet consistency restrictions  -AC       Plans/Goals Discussed with patient;spouse/S.O.;agreed upon  -AC patient;agreed upon  -AC       Barriers to Rehab none identified  -AC none identified  -AC       Patient's Goals for Discharge return to PO diet  -AC return to PO diet  -AC       Family Goals for Discharge family did not state  -AC --          Pain Scale: Numbers Pre/Post-Treatment    Pretreatment Pain Rating 0/10 - no pain  -AC --       Posttreatment Pain Rating 0/10 - no pain  -AC --          Pain Scale: FACES Pre/Post-Treatment    Pain: FACES Scale, Pretreatment -- 0-->no hurt  -AC       Posttreatment Pain Rating -- 0-->no hurt  -AC          Oral Motor Structure and Function    Secretion Management -- WNL/WFL  -AC       Volitional Swallow -- WFL  -AC       Volitional Cough -- WFL  -AC          General Eating/Swallowing Observations    Respiratory Support Currently in Use nasal cannula  -AC nasal cannula  -AC       O2 Liters -- 2L  -AC       Eating/Swallowing Skills -- fed by SLP;self-fed  -AC       Positioning During Eating -- upright 90 degree;upright in chair  -       Utensils Used -- spoon;cup;straw  -AC       Consistencies Trialed -- thin liquids;nectar/syrup-thick liquids;pureed  -AC          Respiratory    Respiratory Status -- increase in respiratory rate;during swallowing/eating  -AC          Clinical Swallow Eval    Oral Prep Phase -- WFL  -AC        Oral Transit -- WFL  -AC       Oral Residue -- WFL  -AC       Pharyngeal Phase -- suspected pharyngeal impairment  -AC          Pharyngeal Phase Concerns    Pharyngeal Phase Concerns -- throat clear  -AC       Throat Clear -- thin;nectar  -AC       Pharyngeal Phase Concerns, Comment -- Consistent throat clearing w/ thin liquid--intermittent w/ nectar-thick liquids. No overt clinical s/sxs aspiration w/ puree.  -AC          Fiberoptic Endoscopic Evaluation of Swallowing (FEES)    Risks/Benefits Reviewed risks/benefits explained;patient;family;agreed to eval  -AC --       Nasal Entry right:  -AC --       Scope serial number/identification 338  -AC --          Anatomy and Physiology    Anatomic Considerations no anatomic structural deviation  -AC --       Velopharyngeal WFL  -AC --       Base of Tongue symmetrical;range reduced  -AC --       Epiglottis WFL  -AC --       Laryngeal Function Breathing symmetrical  -AC --       Laryngeal Function Phonation symmetrical  -AC --       Laryngeal Function to Breath Hold TVF contact;sustained closure  -AC --       Secretion Rating Scale (Edgar et al. 1996) 1- secretions present around the laryngeal vestibule  -AC --       Secretion Description thin;white;clear  -AC --       Ice Chips elicited swallow;fully cleared secretions  -AC --       Spontaneous Swallow frequency reduced  -AC --       Sensory reduced sensation  -AC --       Utensils Used Spoon;Cup;Straw  -AC --       Consistencies Trialed thin liquids;nectar-thick liquids;honey-thick liquids;pudding/puree;mixed consistency;regular textures  -AC --          FEES Interpretation    Oral Phase WFL  -AC --          Initiation of Pharyngeal Swallow    Initiation of Pharyngeal Swallow WFL  -AC --       Pharyngeal Phase impaired pharyngeal phase of swallowing  -AC --       Penetration After the Swallow thin liquids;nectar-thick liquids;secondary to residue;in pyriform sinuses  -AC --       Aspiration After the Swallow thin  liquids;secondary to residue;in laryngeal vestibule  -AC --       Depth of Penetration deep  -AC --       Response to Penetration Yes  -AC --       Responsed to penetration with inconsistent;throat clear;non-effective  -AC --       Response to Aspiration Yes  -AC --       Responsed to aspiration with inconsistent;throat clear;non-effective  also noted wet vocal quality  -AC --       Rosenbek's Scale thin:;7-->Level 7;nectar:;5-->Level 5  -AC --       Residue all consistencies tested;diffuse within pharynx;secondary to reduced base of tongue retraction;secondary to reduced posterior pharyngeal wall stripping;secondary to reduced laryngeal elevation;secondary to reduced hyolaryngeal excursion;other (see comments)  + retrograde flow through UES to pyriforms (most notable w/ thin & nectar-thick liquids)  -AC --       Response to Residue partial residue clearance;with spontaneous subsequent swallow;other (see comments)  spontaneous swallow was inconsistent/delayed at times  -AC --       Attempted Compensatory Maneuvers bolus size;additional subsequent swallow;throat clear after swallow  -AC --       Response to Attempted Compensatory Maneuvers reduced residue;other (see comments)  may have eliminated aspiration w/ consistent use, but pt had difficulty adequately using this combo of compensations 2' cognitive-communication deficits  -AC --       Successful Compensatory Maneuver Competency patient unable to adequately demonstrate/teach back compensations  -AC --       FEES Summary Dysphagia appeared to be c/b combination of pharyngeal weakness + proximal upper esophageal dysfunction (retrograde flow of material through UES appeared to occur almost instantaneously). No penetration/aspiration appreciated w/ honey-thick liquid via cup/straw, pudding, mixed consistency, or solid.  -AC --          Swallowing Quality of Life Assessment    Education and counseling provided Silent aspiration;Risks of aspiration;Safest diet  options;Compensatory strategy recommendations and rationale  - --          SLP Evaluation Clinical Impression    SLP Swallowing Diagnosis moderate;pharyngeal dysphagia;esophageal dysphagia  - suspected pharyngeal dysphagia  -       Functional Impact risk of aspiration/pneumonia  - risk of aspiration/pneumonia  -       Rehab Potential/Prognosis, Swallowing good, to achieve stated therapy goals  - good, to achieve stated therapy goals  -       Swallow Criteria for Skilled Therapeutic Interventions Met demonstrates skilled criteria  - demonstrates skilled criteria  -          Recommendations    Therapy Frequency (Swallow) 5 days per week  -AC 5 days per week  -       Predicted Duration Therapy Intervention (Days) until discharge  - --       SLP Diet Recommendation soft to chew textures;whole;honey-thick liquids;other (see comments)  soft/whole textures recommended as fatigue precaution  - NPO  -       Recommended Diagnostics reassess via VFSS ([facility]);with esophageal screen  - FEES  -       Recommended Precautions and Strategies upright posture during/after eating;small bites of food and sips of liquid;general aspiration precautions;reflux precautions;fatigue precautions  - --       Oral Care Recommendations Oral Care BID/PRN;Toothbrush  - Oral Care BID/PRN;Suction toothbrush  -       SLP Rec. for Method of Medication Administration meds whole;with thick liquids;with puree;as tolerated  - meds whole;meds crushed;with puree;as tolerated  -       Monitor for Signs of Aspiration yes;notify SLP if any concerns  - --       Anticipated Discharge Disposition (SLP) inpatient rehabilitation facility  - inpatient rehabilitation facility  -                 User Key  (r) = Recorded By, (t) = Taken By, (c) = Cosigned By      Initials Name Effective Dates     Meera Pickens MS [facility]-SLP 02/03/23 -                     EDUCATION  The patient has been educated in the following areas:    Dysphagia (Swallowing Impairment) Modified Diet Instruction.        SLP GOALS       Row Name 09/19/23 1100 09/19/23 0915          (LTG) Patient will demonstrate functional swallow for    Diet Texture (Demonstrate functional swallow) regular textures  -AC --     Liquid viscosity (Demonstrate functional swallow) thin liquids  -AC --     [place] (Demonstrate functional swallow) independently (over 90% accuracy)  -AC --     Time Frame (Demonstrate functional swallow) by discharge  -AC --        (STG) Patient will tolerate therapeutic trials of    Consistencies Trialed (Tolerate therapeutic trials) thin liquids  -AC --     Desired Outcome (Tolerate therapeutic trials) without signs/symptoms of aspiration  inconsistent throat clearing/wet vocal quality noted during CSE/FEES  -AC --     [place] (Tolerate therapeutic trials) with 1:1 assist/ supervision  -AC --     Time Frame (Tolerate therapeutic trials) by discharge  -AC --        (STG) Pharyngeal Strengthening Exercise Goal 1 (SLP)    Activity (Pharyngeal Strengthening Goal 1, SLP) increase superior movement of the hyolaryngeal complex;increase anterior movement of the hyolaryngeal complex;increase squeeze/positive pressure generation;increase tongue base retraction  -AC --     Increase Superior Movement of the Hyolaryngeal Complex effortful pitch glide (falsetto + pharyngeal squeeze)  -AC --     Increase Anterior Movement of the Hyolaryngeal Complex chin tuck against resistance (CTAR)  -AC --     Increase Squeeze/Positive Pressure Generation jo  -AC --     Increase Tongue Base Retraction hard effortful swallow  -AC --     [place]/Accuracy (Pharyngeal Strengthening Goal 1, SLP) with moderate cues (50-74% accuracy)  -AC --     Time Frame (Pharyngeal Strengthening Goal 1, SLP) short term goal (STG)  -AC --     Comment (Pharyngeal Strengthening Goal 1, SLP) Provided handout for pt/spouse  -AC --        Patient will demonstrate functional  cognitive-linguistic skills for return to discharge environment    [place] -- with minimal cues  -AC     Time frame -- by discharge  -AC        Comprehend Questions Goal 1 (SLP)    Improve Ability to Comprehend Questions Goal 1 (SLP) -- simple wh questions;90%;with minimal cues (75-90%)  -AC     Time Frame (Comprehend Questions Goal 1, SLP) -- short term goal (STG)  -AC        Follow Directions Goal 2 (SLP)    Improve Ability to Follow Directions Goal 1 (SLP) -- 2 step commands;80%;with minimal cues (75-90%)  -AC     Time Frame (Follow Directions Goal 1, SLP) -- short term goal (STG)  -AC        Ability to Construct Phrase and Sentence Level Response Goal 1 (SLP)    Improve Ability to Construct Phrase and Sentence Level Responses By Goal 1 (SLP) -- answering question with phrase;80%;with minimal cues (75-90%)  -AC     Time Frame (Phrase and Sentence Level Response Goal 1, SLP) -- short term goal (STG)  -AC        Attention Goal 1 (SLP)    Improve Attention by Goal 1 (SLP) -- complete sustained attention task;80%;with minimal cues (75-90%)  -AC     Time Frame (Attention Goal 1, SLP) -- short term goal (STG)  -AC        Orientation Goal 1 (SLP)    Improve Orientation Through Goal 1 (SLP) -- demonstrating orientation to disease/impairment;demonstrating orientation to month;demonstrating orientation to day;100%;with minimal cues (75-90%)  -AC     Time Frame (Orientation Goal 1, SLP) -- short term goal (STG)  -AC               User Key  (r) = Recorded By, (t) = Taken By, (c) = Cosigned By      Initials Name Provider Type    Meera Carver MS CCC-SLP Speech and Language Pathologist                       Time Calculation:    Time Calculation- SLP       Row Name 09/19/23 1340             Time Calculation- SLP    SLP Start Time 0915  -      SLP Received On 09/19/23  -         Untimed Charges    83797-TK Eval Speech and Production w/ Language Minutes 17  -AC      12429-AS Eval Oral Pharyng Swallow Minutes 42  -       95746-GC Fiberoptic Endo Eval Swallow Minutes 96  -AC         Total Minutes    Untimed Charges Total Minutes 155  -AC       Total Minutes 155  -AC                User Key  (r) = Recorded By, (t) = Taken By, (c) = Cosigned By      Initials Name Provider Type    Meera Carver MS CCC-SLP Speech and Language Pathologist                    Therapy Charges for Today       Code Description Service Date Service Provider Modifiers Qty    64873164044 HC ST EVAL ORAL PHARYNG SWALLOW 3 9/19/2023 Meera Pickens, MS CCC-SLP GN 1    40474445466 HC ST EVAL SPEECH AND PROD W LANG  1 9/19/2023 Meera Pickens, MS CCC-SLP GN 1    81815202943 HC ST FIBEROPTIC ENDO EVAL SWALL 6 9/19/2023 Meera Pickens, MS CCC-SLP GN 1                 Meera Pickens MS CCC-SLP  9/19/2023

## 2023-09-19 NOTE — PLAN OF CARE
Goal Outcome Evaluation:  Plan of Care Reviewed With: patient        Progress: no change (Initial Eval)  Outcome Evaluation: Pt presenting below her functional baseline w/ transfers, mobility and balance limiting independence w/ ADLs. Pt demonstrating situational confusion throuhgout session requiring v/c for safety and sequencing. Pt requiring supplemental O2, w/ brief drop to 89% noted. IP OT warranted to address deficits. Recommend home w/ HH OT/PT when medically appropriate for d/c.      Anticipated Discharge Disposition (OT): home with assist, home with home health

## 2023-09-19 NOTE — PROGRESS NOTES
"                  Clinical Nutrition   Nutrition Support Assessment  Reason for Visit: MST score 2+, Unintentional weight loss, Reduced oral intake      Patient Name: Tessa Bradley  YOB: 1945  MRN: [id number]  Date of Encounter: 09/19/23 12:57 EDT  Admission date: 9/18/2023    Comments:    Pt declines ONS at this time, encourage fluids while on HTL.     Nutrition Assessment   Admission Diagnosis:  AMS (altered mental status) [R41.82]      Problem List:    AMS (altered mental status)    COPD exacerbation    MAKAYLA (obstructive sleep apnea)    Leukocytosis    HTN (hypertension)    HLD (hyperlipidemia)    A-fib    Hodgkin disease        PMH:   She  has a past medical history of Cancer, COPD (chronic obstructive pulmonary disease), Hyperlipidemia, and Hypertension.    PSH:  She  has a past surgical history that includes Abdominal surgery; Oophorectomy; and Hysterectomy.    Applicable Nutrition Concerns:   Skin:  Oral:  GI:    Applicable Interval History:         Reported/Observed/Food/Nutrition Related History:     RD spoke w/pt at bedside, pt reports she is very hungry. Pt denies changes in po intake PTA stating she hasn't eaten since admit. Pt anticipates a good appetite once seen by SLP. At time of note, diet order changed and pt okay for po diet, honey thick liquids. RD will monitor po intake and order ONS if warranted. Pt denies recent wt loss, states [id number]. No measured wts in wt hx, will request standing from nsg as able. NKFA.     Anthropometrics     Flowsheet Rows      Flowsheet Row First Filed Value   Admission Height 160 cm (63\") Documented at 09/18/2023 2030   Admission Weight 69.4 kg (153 lb) Documented at 09/18/2023 2030              Height: Height: 160 cm (63\")  Last Filed Weight: Weight: 69.4 kg (153 lb) (09/18/23 2030)  Method: Weight Method: Stated  BMI: BMI (Calculated): 27.1  BMI classification: Overweight: 25.0-29.9kg/m2     UBW:  153lbs per pt report  Weight change:      Weight      " Weight (kg) Weight (lbs) Weight Method   8/24/2023 71.5 kg  157 lb 10.1 oz      70.308 kg  155 lb     8/25/2023 71.215 kg  157 lb     9/6/2023 70.761 kg  156 lb  Stated    9/7/2023 73.846 kg  162 lb 12.8 oz     9/18/2023 69.4 kg  153 lb  Stated        Nutrition Focused Physical Exam     Date:     9/19    Unable to perform exam due to: Defer pending indication    Current Nutrition Prescription   PO: Diet: Diabetic Diets, Cardiac Diets; Healthy Heart (2-3 Na+); Consistent Carbohydrate; Texture: Soft to Chew (NDD 3); Soft to Chew: Whole Meat; Fluid Consistency: Honey Thick  Oral Nutrition Supplement:   Intake: Insufficient data      Nutrition Diagnosis   Date:  9/19            Updated:    Problem Swallowing difficulty   Etiology dysphagia   Signs/Symptoms Soft to chew/whole, HTL   Status:     Goal:   General: Maintain nutrition  PO: Establish PO  EN/PN: N/A    Nutrition Intervention      Follow treatment progress, Care plan reviewed, Encourage intake        Monitoring/Evaluation:   Per protocol, PO intake, Swallow function      Earlene Sweet RD  Time Spent: 20

## 2023-09-19 NOTE — CONSULTS
"Diabetes Education  Assessment/Teaching    Patient Name:  Tessa Bradley  YOB: 1945  MRN: [id number]  Admit Date:  9/18/2023      Assessment Date:  9/19/2023  Flowsheet Row Most Recent Value   General Information     Referral From: MD Donna order   Height 160 cm (63\")   Height Method Stated   Weight 69.4 kg (153 lb)   Weight Method Stated   Pregnancy Assessment    Diabetes History    What type of diabetes do you have? Type 2   Length of Diabetes Diagnosis Newly diagnosed <6 months   Current DM knowledge poor   Have you had diabetes education/teaching in the past? no   Do you test your blood sugar at home? no   Education Preferences    What areas of diabetes would you like to learn about? understanding diabetes   Nutrition Information    Assessment Topics    Healthy Eating - Assessment Needs education   Problem Solving - Assessment Needs education   Reducing Risk - Assessment Needs education   Healthy Coping - Assessment Needs education   Monitoring - Assessment Needs education   DM Goals             Flowsheet Row Most Recent Value   DM Education Needs    Meter Needs meter   Frequency of Testing 3 times a week   Blood Glucose Target Range Ranges per ADA guidelines   Problem Solving Hypoglycemia, Hyperglycemia, Signs, Symptoms, Treatment   Reducing Risks A1C testing, Other (comment)  [Monitor BG]   Healthy Coping Appropriate   Motivation Moderate   Teaching Method Explanation, Discussion, Handouts, Teach back   Patient Response Needs reinforcement          Ms. Bradley gave permission for diabetes education. Current A1c 7.3%, newly diagnosed with diabetes. She reports she was told last week per her PCP that she had pre diabetes with, she thinks, an A1c of 6.1% Encouraged her to discuss these results with her PCP.   Discussed and taught about T2DM self-management, risk factors, and importance of blood glucose control to reduce complications. Target blood glucose readings and A1c goals per ADA were reviewed. " Reviewed current A1c and discussed its significance. Per the ADA an A1c of 6.5% or greater is in the diabetes range.Signs, symptoms and treatment of hyperglycemia and hypoglycemia were discussed. Lifestyle changes such as physical activity with MD approval and healthy eating were encouraged. Discussed the ADA recommends a person with any type of diabetes regularly check their BG levels. She is agreeable to being taught a meter. Notified our retail pharmacy that patient needs a meter and pharmacy with contact her hospital provider for an order. Discussed with  and her nurse that an educator will instruct on meter use once it is delivered via meds per beds to her room. Handouts provided: [facility] What is Diabetes?, A1c goals, BG goals, What Do I Do Now? Per the ADA.  She does not qualify for an OP diabetes/stroke class due to exclusion criteria of a negative MRI. Thank you for thi          Electronically signed by:  Masha Varela RN [facility]  09/19/23 11:55 EDT

## 2023-09-19 NOTE — PLAN OF CARE
Goal Outcome Evaluation:  Plan of Care Reviewed With: patient        Progress: no change  Outcome Evaluation: PT eval performed: Pt presenting with decline in functional mobility.  Pt demonstrating confusion, supplemental O2, brief drop to 88%.  Able to ambulate 15', CGA, FWW and limited by confusion, safety awareness.  Pending progress, recommend home with assist and HHPT      Anticipated Discharge Disposition (PT): home with home health, home with assist

## 2023-09-19 NOTE — THERAPY EVALUATION
Patient Name: Tessa Bradley  : 1945    MRN: [id number]                              Today's Date: 2023       Admit Date: 2023    Visit Dx:     ICD-10-CM ICD-9-CM   1. Confusion  R41.0 298.9   2. Altered mental status, unspecified altered mental status type  R41.82 780.97   3. Leukocytosis, unspecified type  D72.829 288.60   4. Facial droop  R29.810 781.94     Patient Active Problem List   Diagnosis    COPD exacerbation    Elevated serum creatinine    Hypotension    Dyspnea    MAKAYLA (obstructive sleep apnea)    HEATHER (acute kidney injury)    Leukocytosis    HTN (hypertension)    HLD (hyperlipidemia)    A-fib    Hodgkin disease    Arthritis    Acute on chronic respiratory failure with hypoxia    AMS (altered mental status)     Past Medical History:   Diagnosis Date    Cancer     COPD (chronic obstructive pulmonary disease)     Hyperlipidemia     Hypertension      Past Surgical History:   Procedure Laterality Date    ABDOMINAL SURGERY      HYSTERECTOMY      OOPHORECTOMY        General Information       Row Name 23 1015          OT Time and Intention    Document Type evaluation  -MR     Mode of Treatment occupational therapy  -MR       Row Name 23 1015          General Information    Patient Profile Reviewed yes  -MR     Prior Level of Function independent:;all household mobility;gait;community mobility;transfer;bed mobility;ADL's  PTA pt was utilizing quad cane, has RW, does not drive, spouse assists with home management. I w/ ADLs requiring assist w/ IADLs.  -MR     Existing Precautions/Restrictions fall;oxygen therapy device and L/min;other (see comments)  Situational confusion  -MR     Barriers to Rehab medically complex;cognitive status  -MR       Row Name 23 1015          Living Environment    People in Home spouse  -MR       Row Name 23 1015          Home Main Entrance    Number of Stairs, Main Entrance none  -MR     Stair Railings, Main Entrance none  -MR       Row Name  09/19/23 1015          Stairs Within Home, Primary    Number of Stairs, Within Home, Primary other (see comments)  13, pt reports she can avoid  -MR Guevara Name 09/19/23 1015          Cognition    Orientation Status (Cognition) oriented to;person;place;time;disoriented to;situation  -MR       Row Name 09/19/23 1015          Safety Issues, Functional Mobility    Safety Issues Affecting Function (Mobility) awareness of need for assistance;impulsivity;judgment;positioning of assistive device;safety precautions follow-through/compliance;safety precaution awareness  -MR     Impairments Affecting Function (Mobility) balance;endurance/activity tolerance;cognition;strength  -MR     Cognitive Impairments, Mobility Safety/Performance awareness, need for assistance;insight into deficits/self-awareness;problem-solving/reasoning;safety precaution awareness;safety precaution follow-through  -MR               User Key  (r) = Recorded By, (t) = Taken By, (c) = Cosigned By      Initials Name Provider Type     Suresh Masha, OT Occupational Therapist                     Mobility/ADL's       Row Name 09/19/23 1017          Bed Mobility    Comment, (Bed Mobility) Received on BSC w/ PT present.  -MR Guevara Name 09/19/23 1017          Transfers    Transfers toilet transfer;sit-stand transfer  -MR     Comment, (Transfers) v/c for hand placement, sequencing and safety.  -       Row Name 09/19/23 1017          Toilet Transfer    Type (Toilet Transfer) sit-stand  -MR     [place] Level (Toilet Transfer) contact guard;verbal cues  -MR     Assistive Device (Toilet Transfer) commode, bedside without drop arms;walker, front-wheeled  -MR       Ismael Name 09/19/23 1017          Activities of Daily Living    BADL Assessment/Intervention lower body dressing;toileting;grooming  -MR       Row Name 09/19/23 1017          Lower Body Dressing Assessment/Training    [place] Level (Lower Body Dressing) don;doff;socks;supervision  -MR      Position (Lower Body Dressing) supported sitting  -MR       Row Name 09/19/23 1017          Toileting Assessment/Training    [place] (Toileting) adjust/manage clothing;contact guard assist;perform perineal hygiene;dependent (less than 25% patient effort)  -MR     Assistive Devices (Toileting) commode, bedside without drop arms  -MR     Position (Toileting) supported sitting;supported standing  -MR       Row Name 09/19/23 1017          Grooming Assessment/Training    [place] (Grooming) wash face, hands;hair care, combing/brushing;set up  -MR     Position (Grooming) supported sitting  -MR               User Key  (r) = Recorded By, (t) = Taken By, (c) = Cosigned By      Initials Name Provider Type    MR Masha Prince, OT Occupational Therapist                   Obj/Interventions       Row Name 09/19/23 1018          Sensory Assessment (Somatosensory)    Sensory Assessment (Somatosensory) UE sensation intact  -MR       Row Name 09/19/23 1018          Vision Assessment/Intervention    Visual Impairment/Limitations WFL  -MR       Row Name 09/19/23 1018          Range of Motion Comprehensive    General Range of Motion bilateral upper extremity ROM WFL  -MR       Row Name 09/19/23 1018          Strength Comprehensive (MMT)    Comment, General Manual Muscle Testing (MMT) Assessment BUE grossly 4-/5 in all functional planes.  -MR       Row Name 09/19/23 1018          Balance    Balance Assessment sitting static balance;sitting dynamic balance;standing static balance;standing dynamic balance  -MR     Static Sitting Balance independent  -MR     Dynamic Sitting Balance supervision  -MR     Position, Sitting Balance unsupported;sitting in chair;other (see comments)  BSC  -MR     Static Standing Balance contact guard  -MR     Dynamic Standing Balance contact guard  -MR     Position/Device Used, Standing Balance supported;walker, front-wheeled  -MR     Balance Interventions sitting;sit to  stand;standing;supported;static;dynamic;occupation based/functional task  -MR     Comment, Balance No vert LOB noted w/ transfers or ADL based tasks.  -MR               User Key  (r) = Recorded By, (t) = Taken By, (c) = Cosigned By      Initials Name Provider Type     Masha Prince OT Occupational Therapist                   Goals/Plan       Row Name 09/19/23 1022          Transfer Goal 1 (OT)    Activity/Assistive Device (Transfer Goal 1, OT) sit-to-stand/stand-to-sit;toilet  -MR     [place] Level/Cues Needed (Transfer Goal 1, OT) contact guard required  -MR     Time Frame (Transfer Goal 1, OT) by discharge;long term goal (LTG)  -MR     Progress/Outcome (Transfer Goal 1, OT) new goal  -MR       Row Name 09/19/23 1022          Toileting Goal 1 (OT)    Activity/Device (Toileting Goal 1, OT) toileting skills, all  -MR     [place] Level/Cues Needed (Toileting Goal 1, OT) supervision required  -MR     Time Frame (Toileting Goal 1, OT) long term goal (LTG);by discharge  -MR     Progress/Outcome (Toileting Goal 1, OT) new goal  -MR       Row Name 09/19/23 1022          Grooming Goal 1 (OT)    Activity/Device (Grooming Goal 1, OT) grooming skills, all;other (see comments)  standing at sink side  -MR     [place] (Grooming Goal 1, OT) standby assist  -MR     Time Frame (Grooming Goal 1, OT) long term goal (LTG);by discharge  -MR     Progress/Outcome (Grooming Goal 1, OT) new goal  -MR       Row Name 09/19/23 1022          Therapy Assessment/Plan (OT)    Planned Therapy Interventions (OT) activity tolerance training;adaptive equipment training;BADL retraining;functional balance retraining;IADL retraining;occupation/activity based interventions;patient/caregiver education/training;transfer/mobility retraining;strengthening exercise;ROM/therapeutic exercise  -MR               User Key  (r) = Recorded By, (t) = Taken By, (c) = Cosigned By      Initials Name Provider Type     Masha Prince OT Occupational  Therapist                   Clinical Impression       Ismael Name 09/19/23 1019          Pain Assessment    Pretreatment Pain Rating 0/10 - no pain  -MR     Posttreatment Pain Rating 0/10 - no pain  -MR     Pain Intervention(s) Ambulation/increased activity;Repositioned  -MR Guevara Name 09/19/23 1019          Plan of Care Review    Plan of Care Reviewed With patient  -MR     Progress no change  Initial Eval  -MR     Outcome Evaluation Pt presenting below her functional baseline w/ transfers, mobility and balance limiting independence w/ ADLs. Pt demonstrating situational confusion throuhgout session requiring v/c for safety and sequencing. Pt requiring supplemental O2, w/ brief drop to 89% noted. IP OT warranted to address deficits. Recommend home w/ HH OT/PT when medically appropriate for d/c.  -MR Guevara Name 09/19/23 1019          Therapy Assessment/Plan (OT)    Patient/Family Therapy Goal Statement (OT) Return to PLOF  -MR     Rehab Potential (OT) good, to achieve stated therapy goals  -MR     Criteria for Skilled Therapeutic Interventions Met (OT) yes;meets criteria;skilled treatment is necessary  -MR     Therapy Frequency (OT) daily  -MR Guevara Name 09/19/23 1019          Therapy Plan Review/Discharge Plan (OT)    Anticipated Discharge Disposition (OT) home with assist;home with home health  -MR Guevara Name 09/19/23 1019          Vital Signs    Pre Systolic BP Rehab 130  -MR     Pre Treatment Diastolic BP 74  -MR     Post Systolic BP Rehab 133  -MR     Post Treatment Diastolic BP 75  -MR     Pretreatment Heart Rate (beats/min) 95  -MR     Posttreatment Heart Rate (beats/min) 91  -MR     Pre SpO2 (%) 93  -MR     O2 Delivery Pre Treatment nasal cannula  -MR     Intra SpO2 (%) 89  -MR     O2 Delivery Intra Treatment nasal cannula  -MR     Post SpO2 (%) 92  -MR     O2 Delivery Post Treatment nasal cannula  -MR     Pre Patient Position Sitting  -MR     Intra Patient Position Standing  -MR     Post Patient  Position Sitting  -MR       Row Name 09/19/23 1019          Positioning and Restraints    Pre-Treatment Position bedside commode  -MR     Post Treatment Position chair  -MR     In Chair notified nsg;reclined;sitting;call light within reach;encouraged to call for assist;exit alarm on;with family/caregiver;legs elevated;waffle cushion  -MR               User Key  (r) = Recorded By, (t) = Taken By, (c) = Cosigned By      Initials Name Provider Type    MR Prince Masha, OT Occupational Therapist                   Outcome Measures       Row Name 09/19/23 1022          How much help from another is currently needed...    Putting on and taking off regular lower body clothing? 3  -MR     Bathing (including washing, rinsing, and drying) 2  -MR     Toileting (which includes using toilet bed pan or urinal) 2  -MR     Putting on and taking off regular upper body clothing 3  -MR     Taking care of personal grooming (such as brushing teeth) 3  -MR     Eating meals 4  -MR     AM-PAC 6 Clicks Score (OT) 17  -MR       Row Name 09/19/23 0908 09/19/23 0730       How much help from another person do you currently need...    Turning from your back to your side while in flat bed without using bedrails? 3  -LM 2  -LM    Moving from lying on back to sitting on the side of a flat bed without bedrails? 3  -LM 2  -LM    Moving to and from a bed to a chair (including a wheelchair)? 2  -LM 2  -LM    Standing up from a chair using your arms (e.g., wheelchair, bedside chair)? 3  -LM 2  -LM    Climbing 3-5 steps with a railing? 2  -LM 2  -LM    To walk in hospital room? 2  -LM 2  -LM    AM-PAC 6 Clicks Score (PT) 15  -LM 12  -LM    Highest level of mobility 4 --> Transferred to chair/commode  -LM 4 --> Transferred to chair/commode  -LM      Row Name 09/19/23 0407          How much help from another person do you currently need...    Turning from your back to your side while in flat bed without using bedrails? 3  -KG     Moving from lying on back  to sitting on the side of a flat bed without bedrails? 3  -KG     Moving to and from a bed to a chair (including a wheelchair)? 2  -KG     Standing up from a chair using your arms (e.g., wheelchair, bedside chair)? 3  -KG     Climbing 3-5 steps with a railing? 2  -KG     To walk in hospital room? 2  -KG     AM-PAC 6 Clicks Score (PT) 15  -KG     Highest level of mobility 4 --> Transferred to chair/commode  -KG       Row Name 09/19/23 1022          Modified [place] Scale    Pre-Stroke Modified [place] Scale 6 - Unable to determine (UTD) from the medical record documentation  -MR     Modified [place] Scale 3 - Moderate disability.  Requiring some help, but able to walk without assistance.  -       Row Name 09/19/23 1022          Functional Assessment    Outcome Measure Options AM-PAC 6 Clicks Daily Activity (OT);Modified Joy  -MR               User Key  (r) = Recorded By, (t) = Taken By, (c) = Cosigned By      Initials Name Provider Type    LM Manda Sargent, RN Registered Nurse    MR Masha Prince, OT Occupational Therapist    KG David Gómez, RN Registered Nurse                    Occupational Therapy Education       Title: PT OT SLP Therapies (In Progress)       Topic: Occupational Therapy (Done)       Point: ADL training (Done)       Description:   Instruct learner(s) on proper safety adaptation and remediation techniques during self care or transfers.   Instruct in proper use of assistive devices.                  Learning Progress Summary             Patient Acceptance, E, VU by MR at 9/19/2023 1026                         Point: Precautions (Done)       Description:   Instruct learner(s) on prescribed precautions during self-care and functional transfers.                  Learning Progress Summary             Patient Acceptance, E, VU by MR at 9/19/2023 1026                         Point: Body mechanics (Done)       Description:   Instruct learner(s) on proper positioning and spine alignment during  self-care, functional mobility activities and/or exercises.                  Learning Progress Summary             Patient Acceptance, E, VU by MR at 9/19/2023 1026                                         User Key       Initials Effective Dates Name Provider Type Discipline    MR 09/22/22 -  Masha Prince OT Occupational Therapist OT                  OT Recommendation and Plan  Planned Therapy Interventions (OT): activity tolerance training, adaptive equipment training, BADL retraining, functional balance retraining, IADL retraining, occupation/activity based interventions, patient/caregiver education/training, transfer/mobility retraining, strengthening exercise, ROM/therapeutic exercise  Therapy Frequency (OT): daily  Plan of Care Review  Plan of Care Reviewed With: patient  Progress: no change (Initial Eval)  Outcome Evaluation: Pt presenting below her functional baseline w/ transfers, mobility and balance limiting independence w/ ADLs. Pt demonstrating situational confusion throuhgout session requiring v/c for safety and sequencing. Pt requiring supplemental O2, w/ brief drop to 89% noted. IP OT warranted to address deficits. Recommend home w/ HH OT/PT when medically appropriate for d/c.     Time Calculation:   Evaluation Complexity (OT)  Review Occupational Profile/Medical/Therapy History Complexity: brief/low complexity  Assessment, Occupational Performance/Identification of Deficit Complexity: 1-3 performance deficits  Clinical Decision Making Complexity (OT): problem focused assessment/low complexity  Overall Complexity of Evaluation (OT): low complexity     Time Calculation- OT       Row Name 09/19/23 1026             Time Calculation- OT    OT Start Time 0914  -MR      OT Received On 09/19/23  -MR      OT Goal Re-Cert Due Date 09/29/23  -MR         Untimed Charges    OT Eval/Re-eval Minutes 46  -MR         Total Minutes    Untimed Charges Total Minutes 46  -MR       Total Minutes 46  -MR                 User Key  (r) = Recorded By, (t) = Taken By, (c) = Cosigned By      Initials Name Provider Type    Masha Bhagat, MELONY Occupational Therapist                  Therapy Charges for Today       Code Description Service Date Service Provider Modifiers Qty    [id number]  OT EVAL LOW COMPLEXITY 4 9/19/2023 Masha Prince OT GO 1                 Masha Prince OT  9/19/2023

## 2023-09-19 NOTE — CASE MANAGEMENT/SOCIAL WORK
Discharge Planning Assessment  [facility]     Patient Name: Tessa Bradley  MRN: [id number]  Today's Date: 9/19/2023    Admit Date: 9/18/2023    Plan: Home with Spouse   Discharge Needs Assessment       Row Name 09/19/23 1315       Living Environment    People in Home spouse    Current Living Arrangements home    Potentially Unsafe Housing Conditions none    Primary Care Provided by self    Provides Primary Care For no one    Family Caregiver if Needed spouse    Quality of Family Relationships supportive    Able to Return to Prior Arrangements yes       Resource/Environmental Concerns    Resource/Environmental Concerns none    Transportation Concerns none       Transition Planning    Patient/Family Anticipates Transition to home with family    Transportation Anticipated family or friend will provide       Discharge Needs Assessment    Readmission Within the Last 30 Days current reason for admission unrelated to previous admission    Equipment Currently Used at Home cane, quad tip                   Discharge Plan       Row Name 09/19/23 5088       Plan    Plan Home with Spouse    Patient/Family in Agreement with Plan yes    Plan Comments Spoke with Mrs. Bradley at the bedside. She lives with her Spouse in [facility]. She is independent with ADL's. She has a cane. She uses 3L oxygen continuous through [employer]. She has used [employer]. Her Spouse is her POA. PCP is Sunni Santoro and she has Medicare and  for Life insurance. CM will continue to follow for discharge needs.    Final Discharge Disposition Code 01 - home or self-care                  Continued Care and Services - Admitted Since 9/18/2023    Coordination has not been started for this encounter.       Selected Continued Care - Prior Encounters Includes continued care and service providers with selected services from prior encounters from 6/20/2023 to 9/19/2023      Discharged on 9/8/2023 Admission date: 9/7/2023 - Discharge  disposition: Home or Self Care      Durable Medical Equipment       Service Provider Selected Services Address Phone Fax Patient Preferred    [facility] [address] [address] [phone] --              Home Medical Care       Service Provider Selected Services Address Phone Fax Patient Preferred    [facility] [address] [facility] [address] [address] [phone] --                      Discharged on 8/31/2023 Admission date: 8/24/2023 - Discharge disposition: Home or Self Care      Home Medical Care       Service Provider Selected Services Address Phone Fax Patient Preferred    [facility] [address] [place] [id number] [phone] --                          Expected Discharge Date and Time       Expected Discharge Date Expected Discharge Time    Sep 22, 2023            Demographic Summary       Row Name 09/19/23 1314       General Information    Admission Type inpatient    Arrived From home    Referral Source admission list    Reason for Consult discharge planning    Preferred Language English       Contact Information    Permission Granted to Share Info With                    Functional Status       Row Name 09/19/23 1315       Functional Status, IADL    Medications independent    Meal Preparation independent    Housekeeping independent    Laundry independent    Shopping independent       Mental Status    General Appearance WDL WDL                   Psychosocial    No documentation.                  Abuse/Neglect    No documentation.                  Legal    No documentation.                  Substance Abuse    No documentation.                  Patient Forms    No documentation.                     Shakira Oneill, RN

## 2023-09-19 NOTE — THERAPY EVALUATION
Patient Name: Tessa Bradley  : 1945    MRN: [id number]                              Today's Date: 2023       Admit Date: 2023    Visit Dx:     ICD-10-CM ICD-9-CM   1. Confusion  R41.0 298.9   2. Altered mental status, unspecified altered mental status type  R41.82 780.97   3. Leukocytosis, unspecified type  D72.829 288.60   4. Facial droop  R29.810 781.94     Patient Active Problem List   Diagnosis    COPD exacerbation    Elevated serum creatinine    Hypotension    Dyspnea    MAKAYLA (obstructive sleep apnea)    HEATHER (acute kidney injury)    Leukocytosis    HTN (hypertension)    HLD (hyperlipidemia)    A-fib    Hodgkin disease    Arthritis    Acute on chronic respiratory failure with hypoxia    AMS (altered mental status)     Past Medical History:   Diagnosis Date    Cancer     COPD (chronic obstructive pulmonary disease)     Hyperlipidemia     Hypertension      Past Surgical History:   Procedure Laterality Date    ABDOMINAL SURGERY      HYSTERECTOMY      OOPHORECTOMY        General Information       Row Name 23          Physical Therapy Time and Intention    Document Type evaluation  -KG     Mode of Treatment physical therapy  -KG       Row Name 23          General Information    Patient Profile Reviewed yes  -KG     Prior Level of Function independent:;all household mobility;ADL's  uses quad cane, has RW, does not drive, spouse assists with home management  -KG     Existing Precautions/Restrictions fall;oxygen therapy device and L/min;other (see comments)  situational confusion  -KG     Barriers to Rehab medically complex  -KG       Row Name 23          Living Environment    People in Home spouse  -KG       Row Name 23          Home Main Entrance    Number of Stairs, Main Entrance one  -KG     Stair Railings, Main Entrance none  -KG       Row Name 23          Stairs Within Home, Primary    Number of Stairs, Within Home, Primary other (see  comments)  13, pt reports she can avoid  -KG       Row Name 09/19/23 0941          Cognition    Orientation Status (Cognition) oriented to;person;place;time;disoriented to;situation  -KG       Row Name 09/19/23 0941          Safety Issues, Functional Mobility    Safety Issues Affecting Function (Mobility) awareness of need for assistance;impulsivity;insight into deficits/self-awareness;judgment;positioning of assistive device;safety precautions follow-through/compliance  -KG     Impairments Affecting Function (Mobility) balance;endurance/activity tolerance;cognition;strength  -KG     Cognitive Impairments, Mobility Safety/Performance problem-solving/reasoning  -KG               User Key  (r) = Recorded By, (t) = Taken By, (c) = Cosigned By      Initials Name Provider Type    KG Comfort Bui Physical Therapist                   Mobility       Row Name 09/19/23 0948          Bed Mobility    Bed Mobility supine-sit  -KG     Supine-Sit [place] (Bed Mobility) moderate assist (50% patient effort);1 person assist  -KG     Assistive Device (Bed Mobility) head of bed elevated  -KG       Row Name 09/19/23 0948          Transfers    Comment, (Transfers) cues HP, sequencing, initially min-A, unsteady, progressed to CGA, FWW, t/f to bsc and chair  -KG       Row Name 09/19/23 0948          Sit-Stand Transfer    Sit-Stand [place] (Transfers) contact guard;minimum assist (75% patient effort);1 person assist  -KG     Assistive Device (Sit-Stand Transfers) walker, front-wheeled  -KG       Row Name 09/19/23 0948          Gait/Stairs (Locomotion)    [place] Level (Gait) contact guard  -KG     Assistive Device (Gait) walker, front-wheeled  -KG     Distance in Feet (Gait) 15  -KG     Deviations/Abnormal Patterns (Gait) gait speed decreased  -KG     Bilateral Gait Deviations forward flexed posture  -KG               User Key  (r) = Recorded By, (t) = Taken By, (c) = Cosigned By      Initials Name Provider Type    KG  Comfort Bui Physical Therapist                   Obj/Interventions       Row Name 09/19/23 1026          Strength Comprehensive (MMT)    General Manual Muscle Testing (MMT) Assessment lower extremity strength deficits identified  -KG     Comment, General Manual Muscle Testing (MMT) Assessment 5-/5 BLE  -KG       Row Name 09/19/23 1026          Motor Skills    Therapeutic Exercise other (see comments)  LAQ, SLR, heel slides x10  -KG       Row Name 09/19/23 1026          Balance    Dynamic Standing Balance contact guard  -KG     Position/Device Used, Standing Balance supported;walker, front-wheeled  -KG     Balance Interventions standing;sit to stand  -KG               User Key  (r) = Recorded By, (t) = Taken By, (c) = Cosigned By      Initials Name Provider Type    KG Comfort Bui Physical Therapist                   Goals/Plan       Row Name 09/19/23 1036          Bed Mobility Goal 1 (PT)    Activity/Assistive Device (Bed Mobility Goal 1, PT) sit to supine/supine to sit  -KG     [place] Level/Cues Needed (Bed Mobility Goal 1, PT) contact guard required  -KG     Time Frame (Bed Mobility Goal 1, PT) short term goal (STG);3 days  -KG     Progress/Outcomes (Bed Mobility Goal 1, PT) continuing progress toward goal  -KG       Row Name 09/19/23 1036          Transfer Goal 1 (PT)    Activity/Assistive Device (Transfer Goal 1, PT) sit-to-stand/stand-to-sit;bed-to-chair/chair-to-bed  -KG     [place] Level/Cues Needed (Transfer Goal 1, PT) standby assist  -KG     Time Frame (Transfer Goal 1, PT) short term goal (STG);3 days  -KG     Progress/Outcome (Transfer Goal 1, PT) continuing progress toward goal  -KG       Row Name 09/19/23 1036          Gait Training Goal 1 (PT)    Activity/Assistive Device (Gait Training Goal 1, PT) gait (walking locomotion);walker, rolling  -KG     [place] Level (Gait Training Goal 1, PT) independent  -KG     Distance (Gait Training Goal 1, PT) 300  -KG     Time Frame (Gait  Training Goal 1, PT) long term goal (LTG);10 days  -KG     Progress/Outcome (Gait Training Goal 1, PT) continuing progress toward goal  -KG               User Key  (r) = Recorded By, (t) = Taken By, (c) = Cosigned By      Initials Name Provider Type    GINA Comfort Bui Physical Therapist                   Clinical Impression       Row Name 09/19/23 1031          Pain    Pretreatment Pain Rating 0/10 - no pain  -KG     Posttreatment Pain Rating 0/10 - no pain  -KG       Row Name 09/19/23 1031          Plan of Care Review    Plan of Care Reviewed With patient  -KG     Progress no change  -KG     Outcome Evaluation PT eval performed: Pt presenting with decline in functional mobility.  Pt demonstrating confusion, supplemental O2, brief drop to 88%.  Able to ambulate 15', CGA, FWW and limited by confusion, safety awareness.  Pending progress, recommend home with assist and HHPT  -KG       [facility] Name 09/19/23 1031          Therapy Assessment/Plan (PT)    Rehab Potential (PT) good, to achieve stated therapy goals  -KG     Criteria for Skilled Interventions Met (PT) yes;meets criteria;skilled treatment is necessary  -KG     Therapy Frequency (PT) daily  -KG       [facility] Name 09/19/23 1031          Vital Signs    Pre Systolic BP Rehab 130  -KG     Pre Treatment Diastolic BP 74  -KG     Post Systolic BP Rehab 133  -KG     Post Treatment Diastolic BP 75  -KG     Pre SpO2 (%) 93  -KG     O2 Delivery Pre Treatment nasal cannula  -KG     Intra SpO2 (%) 88  -KG     O2 Delivery Intra Treatment nasal cannula  -KG     Post SpO2 (%) 92  -KG     O2 Delivery Post Treatment nasal cannula  -KG       Row Name 09/19/23 1031          Positioning and Restraints    Pre-Treatment Position in bed  -KG     In Chair notified nsg;call light within reach;encouraged to call for assist;exit alarm on;waffle cushion;legs elevated  -KG               User Key  (r) = Recorded By, (t) = Taken By, (c) = Cosigned By      Initials Name Provider Type    GINA  Comfotr Bui Physical Therapist                   Outcome Measures       Row Name 09/19/23 1038 09/19/23 0908       How much help from another person do you currently need...    Turning from your back to your side while in flat bed without using bedrails? 3  -KG 3  -LM    Moving from lying on back to sitting on the side of a flat bed without bedrails? 2  -KG 3  -LM    Moving to and from a bed to a chair (including a wheelchair)? 3  -KG 2  -LM    Standing up from a chair using your arms (e.g., wheelchair, bedside chair)? 3  -KG 3  -LM    Climbing 3-5 steps with a railing? 2  -KG 2  -LM    To walk in hospital room? 3  -KG 2  -LM    AM-PAC 6 Clicks Score (PT) 16  -KG 15  -LM    Highest level of mobility 5 --> Static standing  -KG 4 --> Transferred to chair/commode  -LM      Row Name 09/19/23 0730 09/19/23 0407       How much help from another person do you currently need...    Turning from your back to your side while in flat bed without using bedrails? 2  -LM 3  -KGA    Moving from lying on back to sitting on the side of a flat bed without bedrails? 2  -LM 3  -KGA    Moving to and from a bed to a chair (including a wheelchair)? 2  -LM 2  -KGA    Standing up from a chair using your arms (e.g., wheelchair, bedside chair)? 2  -LM 3  -KGA    Climbing 3-5 steps with a railing? 2  -LM 2  -KGA    To walk in hospital room? 2  -LM 2  -KGA    AM-PAC 6 Clicks Score (PT) 12  -LM 15  -KGA    Highest level of mobility 4 --> Transferred to chair/commode  -LM 4 --> Transferred to chair/commode  -KGA      Row Name 09/19/23 1022          Modified [place] Scale    Pre-Stroke Modified [place] Scale 6 - Unable to determine (UTD) from the medical record documentation  -MR     Modified [place] Scale 3 - Moderate disability.  Requiring some help, but able to walk without assistance.  -MR       Row Name 09/19/23 1038 09/19/23 1022       Functional Assessment    Outcome Measure Options AM-PAC 6 Clicks Basic Mobility (PT)  -KG AM-PAC 6 Clicks  Daily Activity (OT);Modified [place]  -MR              User Key  (r) = Recorded By, (t) = Taken By, (c) = Cosigned By      Initials Name Provider Type    Manda Silvestre, RN Registered Nurse    KG Comfort Bui Physical Therapist    MR Masha Prince, OT Occupational Therapist    David Barron RN Registered Nurse                                 Physical Therapy Education       Title: PT OT SLP Therapies (In Progress)       Topic: Physical Therapy (In Progress)       Point: Mobility training (In Progress)       Learning Progress Summary             Patient Acceptance, E, NR by KG at 9/19/2023 1038                         Point: Home exercise program (In Progress)       Learning Progress Summary             Patient Acceptance, E, NR by KG at 9/19/2023 1038                         Point: Body mechanics (In Progress)       Learning Progress Summary             Patient Acceptance, E, NR by KG at 9/19/2023 1038                         Point: Precautions (In Progress)       Learning Progress Summary             Patient Acceptance, E, NR by KG at 9/19/2023 1038                                         User Key       Initials Effective Dates Name Provider Type Discipline    KG 01/04/23 -  Comfort Bui Physical Therapist PT                  PT Recommendation and Plan     Plan of Care Reviewed With: patient  Progress: no change  Outcome Evaluation: PT eval performed: Pt presenting with decline in functional mobility.  Pt demonstrating confusion, supplemental O2, brief drop to 88%.  Able to ambulate 15', CGA, FWW and limited by confusion, safety awareness.  Pending progress, recommend home with assist and HHPT     Time Calculation:         PT Charges       Row Name 09/19/23 1039             Time Calculation    Start Time 0835  -KG      PT Received On 09/19/23  -KG      PT Goal Re-Cert Due Date 09/29/23  -KG         Timed Charges    67182 - PT Therapeutic Exercise Minutes 10  -KG         Total Minutes    Timed  Charges Total Minutes 10  -KG       Total Minutes 10  -KG                User Key  (r) = Recorded By, (t) = Taken By, (c) = Cosigned By      Initials Name Provider Type    GINA Comfort Bui Physical Therapist                  Therapy Charges for Today       Code Description Service Date Service Provider Modifiers Qty    [id number]  PT THER PROC EA 15 MIN 9/19/2023 Comfort uBi GP 1    [id number]  PT EVAL LOW COMPLEXITY 4 9/19/2023 Comfort Bui GP 1            PT G-Codes  Outcome Measure Options: AM-PAC 6 Clicks Basic Mobility (PT)  AM-PAC 6 Clicks Score (PT): 16  AM-PAC 6 Clicks Score (OT): 17  Modified [place] Scale: 3 - Moderate disability.  Requiring some help, but able to walk without assistance.  PT Discharge Summary  Anticipated Discharge Disposition (PT): home with home health, home with assist    Comfort Bui  9/19/2023

## 2023-09-20 ENCOUNTER — READMISSION MANAGEMENT (OUTPATIENT)
Dept: CALL CENTER | Facility: HOSPITAL | Age: 78
End: 2023-09-20
Payer: MEDICARE

## 2023-09-20 VITALS
OXYGEN SATURATION: 92 % | RESPIRATION RATE: 18 BRPM | HEART RATE: 95 BPM | HEIGHT: 63 IN | BODY MASS INDEX: 29.77 KG/M2 | WEIGHT: 168 LBS | TEMPERATURE: 98 F | SYSTOLIC BLOOD PRESSURE: 117 MMHG | DIASTOLIC BLOOD PRESSURE: 62 MMHG

## 2023-09-20 PROBLEM — J96.12 CHRONIC RESPIRATORY FAILURE WITH HYPOXIA AND HYPERCAPNIA: Status: ACTIVE | Noted: 2023-09-20

## 2023-09-20 PROBLEM — J96.11 CHRONIC RESPIRATORY FAILURE WITH HYPOXIA AND HYPERCAPNIA: Status: ACTIVE | Noted: 2023-09-20

## 2023-09-20 LAB
ANION GAP SERPL CALCULATED.3IONS-SCNC: 10 MMOL/L (ref 5–15)
BASOPHILS # BLD AUTO: 0.02 10*3/MM3 (ref 0–0.2)
BASOPHILS NFR BLD AUTO: 0.2 % (ref 0–1.5)
BUN SERPL-MCNC: 27 MG/DL (ref 8–23)
BUN/CREAT SERPL: 27 (ref 7–25)
CALCIUM SPEC-SCNC: 8.4 MG/DL (ref 8.6–10.5)
CHLORIDE SERPL-SCNC: 106 MMOL/L (ref 98–107)
CO2 SERPL-SCNC: 26 MMOL/L (ref 22–29)
CREAT SERPL-MCNC: 1 MG/DL (ref 0.57–1)
DEPRECATED RDW RBC AUTO: 54.4 FL (ref 37–54)
EGFRCR SERPLBLD CKD-EPI 2021: 58.5 ML/MIN/1.73
EOSINOPHIL # BLD AUTO: 0.18 10*3/MM3 (ref 0–0.4)
EOSINOPHIL NFR BLD AUTO: 1.7 % (ref 0.3–6.2)
ERYTHROCYTE [DISTWIDTH] IN BLOOD BY AUTOMATED COUNT: 14.6 % (ref 12.3–15.4)
GLUCOSE BLDC GLUCOMTR-MCNC: 103 MG/DL (ref 70–130)
GLUCOSE BLDC GLUCOMTR-MCNC: 115 MG/DL (ref 70–130)
GLUCOSE BLDC GLUCOMTR-MCNC: 197 MG/DL (ref 70–130)
GLUCOSE BLDC GLUCOMTR-MCNC: 86 MG/DL (ref 70–130)
GLUCOSE SERPL-MCNC: 100 MG/DL (ref 65–99)
HCT VFR BLD AUTO: 36.1 % (ref 34–46.6)
HGB BLD-MCNC: 10.8 G/DL (ref 12–15.9)
IMM GRANULOCYTES # BLD AUTO: 0.05 10*3/MM3 (ref 0–0.05)
IMM GRANULOCYTES NFR BLD AUTO: 0.5 % (ref 0–0.5)
LYMPHOCYTES # BLD AUTO: 2.26 10*3/MM3 (ref 0.7–3.1)
LYMPHOCYTES NFR BLD AUTO: 20.9 % (ref 19.6–45.3)
MCH RBC QN AUTO: 30.2 PG (ref 26.6–33)
MCHC RBC AUTO-ENTMCNC: 29.9 G/DL (ref 31.5–35.7)
MCV RBC AUTO: 100.8 FL (ref 79–97)
MONOCYTES # BLD AUTO: 0.72 10*3/MM3 (ref 0.1–0.9)
MONOCYTES NFR BLD AUTO: 6.7 % (ref 5–12)
MRSA DNA SPEC QL NAA+PROBE: NEGATIVE
NEUTROPHILS NFR BLD AUTO: 7.56 10*3/MM3 (ref 1.7–7)
NEUTROPHILS NFR BLD AUTO: 70 % (ref 42.7–76)
NRBC BLD AUTO-RTO: 0 /100 WBC (ref 0–0.2)
PLATELET # BLD AUTO: 149 10*3/MM3 (ref 140–450)
PMV BLD AUTO: 10.5 FL (ref 6–12)
POTASSIUM SERPL-SCNC: 4.7 MMOL/L (ref 3.5–5.2)
QT INTERVAL: 322 MS
QTC INTERVAL: 413 MS
RBC # BLD AUTO: 3.58 10*6/MM3 (ref 3.77–5.28)
SODIUM SERPL-SCNC: 142 MMOL/L (ref 136–145)
WBC NRBC COR # BLD: 10.79 10*3/MM3 (ref 3.4–10.8)

## 2023-09-20 PROCEDURE — 94799 UNLISTED PULMONARY SVC/PX: CPT

## 2023-09-20 PROCEDURE — 82948 REAGENT STRIP/BLOOD GLUCOSE: CPT

## 2023-09-20 PROCEDURE — 94660 CPAP INITIATION&MGMT: CPT

## 2023-09-20 PROCEDURE — 80048 BASIC METABOLIC PNL TOTAL CA: CPT | Performed by: INTERNAL MEDICINE

## 2023-09-20 PROCEDURE — 25010000002 METHYLPREDNISOLONE PER 40 MG: Performed by: INTERNAL MEDICINE

## 2023-09-20 PROCEDURE — 63710000001 INSULIN REGULAR HUMAN PER 5 UNITS: Performed by: INTERNAL MEDICINE

## 2023-09-20 PROCEDURE — 94664 DEMO&/EVAL PT USE INHALER: CPT

## 2023-09-20 PROCEDURE — 85025 COMPLETE CBC W/AUTO DIFF WBC: CPT | Performed by: INTERNAL MEDICINE

## 2023-09-20 PROCEDURE — 92507 TX SP LANG VOICE COMM INDIV: CPT

## 2023-09-20 PROCEDURE — 25010000002 AZITHROMYCIN PER 500 MG: Performed by: INTERNAL MEDICINE

## 2023-09-20 RX ORDER — AZITHROMYCIN 250 MG/1
250 TABLET, FILM COATED ORAL DAILY
Qty: 5 TABLET | Refills: 0 | Status: SHIPPED | OUTPATIENT
Start: 2023-09-20

## 2023-09-20 RX ORDER — PREDNISONE 10 MG/1
TABLET ORAL
Qty: 63 TABLET | Refills: 0 | Status: SHIPPED | OUTPATIENT
Start: 2023-09-20 | End: 2023-10-08

## 2023-09-20 RX ORDER — AZITHROMYCIN 250 MG/1
250 TABLET, FILM COATED ORAL DAILY
Qty: 5 TABLET | Refills: 0 | Status: SHIPPED | OUTPATIENT
Start: 2023-09-20 | End: 2023-09-20 | Stop reason: SDUPTHER

## 2023-09-20 RX ORDER — ATORVASTATIN CALCIUM 40 MG/1
40 TABLET, FILM COATED ORAL NIGHTLY
Qty: 30 TABLET | Refills: 0 | Status: SHIPPED | OUTPATIENT
Start: 2023-09-20

## 2023-09-20 RX ORDER — PREDNISONE 10 MG/1
TABLET ORAL
Qty: 63 TABLET | Refills: 0 | Status: SHIPPED | OUTPATIENT
Start: 2023-09-20 | End: 2023-09-20 | Stop reason: SDUPTHER

## 2023-09-20 RX ORDER — ATORVASTATIN CALCIUM 40 MG/1
40 TABLET, FILM COATED ORAL NIGHTLY
Qty: 30 TABLET | Refills: 0 | Status: SHIPPED | OUTPATIENT
Start: 2023-09-20 | End: 2023-09-20 | Stop reason: SDUPTHER

## 2023-09-20 RX ADMIN — Medication 10 ML: at 08:12

## 2023-09-20 RX ADMIN — APIXABAN 5 MG: 5 TABLET, FILM COATED ORAL at 08:12

## 2023-09-20 RX ADMIN — INSULIN HUMAN 2 UNITS: 100 INJECTION, SOLUTION PARENTERAL at 12:13

## 2023-09-20 RX ADMIN — CETIRIZINE HYDROCHLORIDE TABLETS 10 MG: 10 TABLET, FILM COATED ORAL at 08:12

## 2023-09-20 RX ADMIN — SENNOSIDES AND DOCUSATE SODIUM 2 TABLET: 50; 8.6 TABLET ORAL at 08:12

## 2023-09-20 RX ADMIN — IPRATROPIUM BROMIDE AND ALBUTEROL SULFATE 3 ML: 2.5; .5 SOLUTION RESPIRATORY (INHALATION) at 00:05

## 2023-09-20 RX ADMIN — IPRATROPIUM BROMIDE AND ALBUTEROL SULFATE 3 ML: 2.5; .5 SOLUTION RESPIRATORY (INHALATION) at 10:46

## 2023-09-20 RX ADMIN — METHYLPREDNISOLONE SODIUM SUCCINATE 40 MG: 40 INJECTION, POWDER, LYOPHILIZED, FOR SOLUTION INTRAMUSCULAR; INTRAVENOUS at 08:13

## 2023-09-20 RX ADMIN — AZITHROMYCIN 500 MG: 500 INJECTION, POWDER, LYOPHILIZED, FOR SOLUTION INTRAVENOUS at 02:26

## 2023-09-20 RX ADMIN — NICOTINE 1 PATCH: 14 PATCH, EXTENDED RELEASE TRANSDERMAL at 08:13

## 2023-09-20 RX ADMIN — IPRATROPIUM BROMIDE AND ALBUTEROL SULFATE 3 ML: 2.5; .5 SOLUTION RESPIRATORY (INHALATION) at 03:57

## 2023-09-20 RX ADMIN — IPRATROPIUM BROMIDE AND ALBUTEROL SULFATE 3 ML: 2.5; .5 SOLUTION RESPIRATORY (INHALATION) at 06:51

## 2023-09-20 NOTE — CASE MANAGEMENT/SOCIAL WORK
Case Management Discharge Note    Final Note: Spoke with Ms. Bradley at the bedside. She is being discharged home with home health PT/OT/ST today if medicallly ready. Spoke to Stacie with [facility] to resume care at discharge. Orders placed and Discharge summary faxed to [phone]. Patient will be contacted with an appointment. Family is agreeable with discharge plan and will be transporting her home.           Selected Continued Care - Admitted Since 9/18/2023           Home Medical Care Coordination complete.      Service Provider Selected Services Address Phone Fax Patient Preferred    [facility][1] [facility] [address] [place], [place] [id number] [phone] --                                                 Final Discharge Disposition Code: 06 - home with home health care

## 2023-09-20 NOTE — DISCHARGE SUMMARY
[facility] Medicine Services  DISCHARGE SUMMARY    Patient Name: Tessa Bradley  : 1945  MRN: [id number]    Date of Admission: 2023  7:39 PM  Date of Discharge:  2023  Primary Care Physician: Sunni Santoro MD    Consults       Date and Time Order Name Status Description    2023  7:41 PM Inpatient Neurology Consult Stroke Completed     2023 12:32 AM Inpatient Pulmonology Consult Completed             Hospital Course     Presenting Problem:     Active Hospital Problems    Diagnosis  POA    **AMS (altered mental status) [R41.82]  Yes    Chronic respiratory failure with hypoxia and hypercapnia [J96.11, J96.12]  Yes    HTN (hypertension) [I10]  Yes    Hodgkin disease [C81.90]  Yes    Leukocytosis [D72.829]  Yes    A-fib [I48.91]  Yes    HLD (hyperlipidemia) [E78.5]  Yes    MAKAYLA (obstructive sleep apnea) [G47.33]  Yes    COPD exacerbation [J44.1]  Yes      Resolved Hospital Problems   No resolved problems to display.          Hospital Course:  Tessa Bradley is a 78 y.o. female  with past medical history of chronic respiratory failure on chronic 2 L nasal cannula, COPD, MAKAYLA not on CPAP, Hodgkin's disease, atrial fibrillation, essential hypertension who was recently admitted to our facility for acute on chronic hyper And hypoxic respiratory failure who returns to the ER tonight with altered mental status with increased somnolence and reports of possible left-sided facial droop and left-sided weakness.     Altered mental status  Somnolence  --suspect secondary to hypercarbic resp failure  --abg obtained once called from the ER with critical co2 of 78 and pH is 7.23  --resolved with bipap and improvement in ABG  --tx for copd exacerbation.  Continue prednisone taper, zmax, and nebs/inhalers at home  --?took her wife's meds??? UDS +for opiates and not prescribed to patient     Acute hypercapneic respiratory failure  COPD with acute exacerbation  MAKAYLA not on cpap at  home  --pco2 was 78  --better with bipap  --suspect leukocytosis from recent steroids  --azithromycin for possible early pna.  --resp panel pcr negative  --reports has home cpap but has not been using.  Counseled patient on importance of compliance and not taking meds like opiods that will suppress respiratory drive further    Dysphagia  --FEES on 9/19/ showed silent aspiration.  SLP recs NDD3 soft to chew texture with whole meat with honey thick liquids.  SLP recommends Modified Barium Swallow as soon as can be scheduled for possible diet/liquid upgrade     Reported left facial droop and left-sided weakness  Rule out acute ischemic stroke  --stroke [facility]nor, neurology consulted--d/w Dr. Robison who recs continue home eliquis and added high dose statin.  F/u stroke neuro.  --serial NIH/neurochecks  -- A1C 7.30  --tot chol 180 LDL 49   --echo shows EF 56-60%  --CT head, CTA head and neck, CT perfusion without significant findings  --MRI  showed no acute     Hodgkins disease  --continue outpatient followup     Afib  --continue eliquis     Essential htn  --cont home meds     T2DM  --A1C 7.30      Discharge Follow Up Recommendations for outpatient labs/diagnostics:  F/u with PCP in 1 week.  SLP recommends Modified Barium Swallow as soon as can be scheduled for possible diet/liquid upgrade  F/U with pulmonary for hypercarbia  F/u with stroke neuro in 3mos    Day of Discharge     HPI:   Feels much better.  Ready to go home.  States that she only took her wife's opiate accidentally.  Reports that she was not trying to hurt herself.     Review of Systems  Gen- No fevers, chills  CV- No chest pain, palpitations  Resp- No cough, dyspnea  GI- No N/V/D, abd pain      Vital Signs:   Temp:  [97.8 °F (36.6 °C)-98.2 °F (36.8 °C)] 98.2 °F (36.8 °C)  Heart Rate:  [] 74  Resp:  [18-20] 18  BP: (100-122)/(56-71) 103/56  Flow (L/min):  [2] 2      Physical Exam:  Constitutional: No acute distress, awake, alert  HENT: NCAT, mucous  membranes moist  Respiratory: Clear to auscultation bilaterally, respiratory effort normal on 2LNC  Cardiovascular: RRR, no murmurs, rubs, or gallops  Gastrointestinal: Positive bowel sounds, soft, nontender, nondistended  Musculoskeletal: No bilateral ankle edema  Psychiatric: Appropriate affect, cooperative  Neurologic: Oriented x 3, strength symmetric in all extremities, Cranial Nerves grossly intact to confrontation, speech clear  Skin: No rashes      Pertinent  and/or Most Recent Results     LAB RESULTS:      Lab 09/20/23 0503 09/18/23 2037 09/18/23 2000 09/18/23 1958 09/18/23 1952   WBC 10.79  --  17.84*  --   --    HEMOGLOBIN 10.8*  --  12.8  --   --    HEMOGLOBIN, POC  --   --   --  15.3  --    HEMATOCRIT 36.1  --  45.1  --   --    HEMATOCRIT POC  --   --   --  45  --    PLATELETS 149  --  165  --   --    NEUTROS ABS 7.56*  --  15.93*  --   --    IMMATURE GRANS (ABS) 0.05  --  0.11*  --   --    LYMPHS ABS 2.26  --  1.02  --   --    MONOS ABS 0.72  --  0.52  --   --    EOS ABS 0.18  --  0.23  --   --    .8*  --  105.6*  --   --    PROCALCITONIN  --  0.12  --   --   --    LACTATE  --   --  0.6  --   --    PROTIME  --   --   --   --  13.9   APTT  --   --  33.2  --   --          Lab 09/20/23 0503 09/19/23 0700 09/18/23 1958   SODIUM 142  --   --    POTASSIUM 4.7  --   --    CHLORIDE 106  --   --    CO2 26.0  --   --    ANION GAP 10.0  --   --    BUN 27*  --   --    CREATININE 1.00  --  1.00   EGFR 58.5*  --  58.9*   GLUCOSE 100*  --   --    CALCIUM 8.4*  --   --    HEMOGLOBIN A1C  --  7.30*  --          Lab 09/18/23 2037   ALT (SGPT) 19   AST (SGOT) 15         Lab 09/18/23  2037 09/18/23  1952   HSTROP T 21*  --    PROTIME  --  13.9   INR  --  1.2         Lab 09/19/23  0607   CHOLESTEROL 180   LDL CHOL 49   HDL CHOL 119*   TRIGLYCERIDES 62             Lab 09/19/23  0409 09/19/23  0233   PH, ARTERIAL 7.313* 7.235*   PCO2, ARTERIAL 64.1* 78.4*   PO2 ART 83.4 72.9*   FIO2 30 28   HCO3 ART 32.5*  33.2*   BASE EXCESS ART 4.6* 3.5*   CARBOXYHEMOGLOBIN 1.7 1.8     Brief Urine Lab Results  (Last result in the past 365 days)        Color   Clarity   Blood   Leuk Est   Nitrite   Protein   CREAT   Urine HCG        09/18/23 2048 Yellow   Clear   Negative   Negative   Negative   Negative                 Microbiology Results (last 10 days)       Procedure Component Value - Date/Time    MRSA Screen, PCR (Inpatient) - Swab, Nares [[id number]]  (Normal) Collected: 09/19/23 2249    Lab Status: Final result Specimen: Swab from Nares Updated: 09/20/23 0822     MRSA PCR Negative    Narrative:      The negative predictive value of this diagnostic test is high and should only be used to consider de-escalating anti-MRSA therapy. A positive result may indicate colonization with MRSA and must be correlated clinically.  MRSA Negative    Blood Culture - Blood, Arm, Right [[id number]]  (Normal) Collected: 09/18/23 2107    Lab Status: Preliminary result Specimen: Blood from Arm, Right Updated: 09/19/23 2130     Blood Culture No growth at 24 hours    COVID PRE-OP / PRE-PROCEDURE SCREENING ORDER (NO ISOLATION) - Swab, Nasopharynx [[id number]]  (Normal) Collected: 09/18/23 2050    Lab Status: Final result Specimen: Swab from Nasopharynx Updated: 09/18/23 2155    Narrative:      The following orders were created for panel order COVID PRE-OP / PRE-PROCEDURE SCREENING ORDER (NO ISOLATION) - Swab, Nasopharynx.  Procedure                               Abnormality         Status                     ---------                               -----------         ------                     Respiratory Panel PCR w/...[[id number]]  Normal              Final result                 Please view results for these tests on the individual orders.    Respiratory Panel PCR w/COVID-19(SARS-CoV-2) [facility] In-House, NP Swab in UTM/VTM, 3-4 HR TAT - Swab, Nasopharynx [[id number]]  (Normal) Collected: 09/18/23 2050    Lab Status: Final result  Specimen: Swab from Nasopharynx Updated: 09/18/23 2155     ADENOVIRUS, PCR Not Detected     Coronavirus 229E Not Detected     Coronavirus HKU1 Not Detected     Coronavirus NL63 Not Detected     Coronavirus OC43 Not Detected     COVID19 Not Detected     Human Metapneumovirus Not Detected     Human Rhinovirus/Enterovirus Not Detected     Influenza A PCR Not Detected     Influenza B PCR Not Detected     Parainfluenza Virus 1 Not Detected     Parainfluenza Virus 2 Not Detected     Parainfluenza Virus 3 Not Detected     Parainfluenza Virus 4 Not Detected     RSV, PCR Not Detected     Bordetella pertussis pcr Not Detected     Bordetella parapertussis PCR Not Detected     Chlamydophila pneumoniae PCR Not Detected     Mycoplasma pneumo by PCR Not Detected    Narrative:      In the setting of a positive respiratory panel with a viral infection PLUS a negative procalcitonin without other underlying concern for bacterial infection, consider observing off antibiotics or discontinuation of antibiotics and continue supportive care. If the respiratory panel is positive for atypical bacterial infection (Bordetella pertussis, Chlamydophila pneumoniae, or Mycoplasma pneumoniae), consider antibiotic de-escalation to target atypical bacterial infection.    Blood Culture - Blood, Arm, Left [[id number]]  (Normal) Collected: 09/18/23 2037    Lab Status: Preliminary result Specimen: Blood from Arm, Left Updated: 09/19/23 2130     Blood Culture No growth at 24 hours            CT Angiogram Neck    Result Date: 9/18/2023  CT ANGIOGRAM HEAD W AI ANALYSIS OF LVO, CT ANGIOGRAM NECK Date of Exam: 9/18/2023 7:53 PM EDT Indication: Neuro deficit, acute stroke suspected Neuro deficit, acute stroke suspected. Comparison: CT head without contrast 9/18/2023 Technique: CTA of the head and neck was performed after the uneventful intravenous administration of 115 mL Isovue-370 . Reconstructed coronal and sagittal images were also obtained. In addition, a  3-D volume rendered image was created for interpretation. Automated exposure control and iterative reconstruction methods were used. Findings: CTA NECK: *Aortic arch: No aneurysm. No significant stenosis or occlusion of the major arch vessels. *Left carotid system: No aneurysm, significant stenosis or occlusion. *Right carotid system: No aneurysm, significant stenosis or occlusion. *Vertebrobasilar system: The vertebral arteries arise from their respective subclavian arteries. No aneurysm, significant stenosis or occlusion. CTA HEAD: *Anterior circulation: No aneurysm, significant stenosis or occlusion. *Posterior circulation: No aneurysm, significant stenosis or occlusion. *Anatomic variants: None of significance. *Venous sinuses: Patent.     1.No hemodynamically significant stenosis, large vessel cut or aneurysms in intracranial circulation 2.No hemodynamically significant stenosis, dissection or aneurysms in extracranial circulation. Electronically Signed: Umberto Hutchinson MD  9/18/2023 8:25 PM EDT  Workstation ID: [id number]    MRI Brain Without Contrast    Result Date: 9/19/2023  MRI BRAIN WO CONTRAST Date of Exam: 9/18/2023 11:45 PM EDT Indication: Stroke, follow up.  Comparison: CT head, angiography and perfusion 9/18/2023 at 2000 hours. Technique:  Routine multiplanar/multisequence sequence images of the brain were obtained without contrast administration. Findings: There is no diffusion restriction to suggest acute infarct. There is no evidence of acute or chronic intracranial hemorrhage. There are several scattered foci of T2 hyperintensity within the cerebral white matter and within the central avinash. No mass effect or midline shift. No abnormal extra-axial collections.  The major vascular flow voids appear intact. The basal ganglia, brainstem and cerebellum appear within normal limits.  Calvarial and superficial soft tissue signal is within normal limits. There is thinning of the orbital lenses bilaterally.  There is mild multifocal paranasal sinus mucosal disease and there are bilateral mastoid effusions. 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 Midline structures are intact.     Impression: 1.No acute intracranial abnormality. 2.Mild chronic small vessel ischemic change. 3.Mild paranasal sinus mucosal disease and bilateral mastoid effusions. Electronically Signed: Curt Ng MD  9/19/2023 12:07 AM EDT  Workstation ID: [id number]    SLP FEES - Fiberoptic Endo Eval Swallow    Result Date: 9/19/2023  This procedure was auto-finalized with no dictation required.    XR Chest 1 View    Result Date: 9/18/2023  XR CHEST 1 VW Date of Exam: 9/18/2023 7:47 PM EDT Indication: Acute Stroke Protocol (onset < 12 hrs) Comparison: 9/7/2023 Findings: Heart size is at the upper limits of normal. Upper lobe pulmonary vessels are prominent consistent with pulmonary vascular congestion. No focal airspace consolidation. No pleural effusion or pneumothorax.     Impression: 1. Borderline heart size with mild pulmonary vascular congestion. No other acute cardiopulmonary disease. Electronically Signed: Umberto Kulkarni MD  9/18/2023 9:10 PM EDT  Workstation ID: [id number]    CT Head Without Contrast Stroke Protocol    Result Date: 9/18/2023  CT HEAD WO CONTRAST STROKE PROTOCOL Date of Exam: 9/18/2023 7:41 PM EDT Indication: Neuro deficit, acute, stroke suspected Neuro deficit, acute stroke suspected. Comparison: None available. Technique: Axial CT images were obtained of the head without contrast administration.  Reconstructed coronal images were also obtained. Automated exposure control and iterative construction methods were used. Scan Time: 1937 hours Results discussed with Mihir De La Rosa at 1947 hours. Findings: *No acute intracranial hemorrhage. *No masses, mass effect, midline shift or hydrocephalus. *White matter is intact. *Calvarium is intact. *Visualized orbits and globes are unremarkable without radiopaque foreign bodies. *Visualized  paranasal sinuses are clear. *Visualized mastoid air cells are clear.     1.No acute intracranial hemorrhage. Calvarium is intact. Electronically Signed: Umberto Hutchinson MD  9/18/2023 7:50 PM EDT  Workstation ID: [id number]    XR Abdomen KUB    Result Date: 9/18/2023  XR ABDOMEN KUB Date of Exam: 9/18/2023 9:36 PM EDT Indication: MRI clearance Comparison: None available. Findings: The abdominal bowel gas pattern is within normal limits. No radiopaque foreign body seen within the abdomen or pelvis. There is contrast material within the renal collecting systems and urinary bladder from prior CT angiogram of the head and neck. Visualized lung bases appear grossly clear. Bony structures are unremarkable.     Impression: 1. No radiopaque foreign body overlying the abdomen or pelvis. 2. Normal bowel gas pattern. Electronically Signed: Umberto Kulkarni MD  9/18/2023 10:39 PM EDT  Workstation ID: [id number]    CT Angiogram Head w AI Analysis of LVO    Result Date: 9/18/2023  CT ANGIOGRAM HEAD W AI ANALYSIS OF LVO, CT ANGIOGRAM NECK Date of Exam: 9/18/2023 7:53 PM EDT Indication: Neuro deficit, acute stroke suspected Neuro deficit, acute stroke suspected. Comparison: CT head without contrast 9/18/2023 Technique: CTA of the head and neck was performed after the uneventful intravenous administration of 115 mL Isovue-370 . Reconstructed coronal and sagittal images were also obtained. In addition, a 3-D volume rendered image was created for interpretation. Automated exposure control and iterative reconstruction methods were used. Findings: CTA NECK: *Aortic arch: No aneurysm. No significant stenosis or occlusion of the major arch vessels. *Left carotid system: No aneurysm, significant stenosis or occlusion. *Right carotid system: No aneurysm, significant stenosis or occlusion. *Vertebrobasilar system: The vertebral arteries arise from their respective subclavian arteries. No aneurysm, significant stenosis or occlusion. CTA HEAD:  *Anterior circulation: No aneurysm, significant stenosis or occlusion. *Posterior circulation: No aneurysm, significant stenosis or occlusion. *Anatomic variants: None of significance. *Venous sinuses: Patent.     1.No hemodynamically significant stenosis, large vessel cut or aneurysms in intracranial circulation 2.No hemodynamically significant stenosis, dissection or aneurysms in extracranial circulation. Electronically Signed: Umberto Hutchinson MD  9/18/2023 8:25 PM EDT  Workstation ID: [id number]    CT CEREBRAL PERFUSION WITH & WITHOUT CONTRAST    Result Date: 9/18/2023  CT CEREBRAL PERFUSION W WO CONTRAST Date of Exam: 9/18/2023 7:53 PM EDT Indication: Neuro deficit, acute stroke suspected.  Comparison: CT head and CTA head and neck 9/18/2023 Technique: Axial CT images of the brain were obtained prior to and after the administration of 150 Isovue 370 . Core blood volume, core blood flow, mean transit time, and Tmax images were obtained utilizing the Rapid software protocol. A limited CT angiogram of the head was also performed to measure the blood vessel density. The radiation dose reduction device was turned on for each scan per the ALARA (As Low as Reasonably Achievable) protocol. Findings: *CBF less than 30%: 0 mL. *Tmax greater than 6 seconds: 0 mL. *Mismatch volume: 0 mL. *Mismatch ratio: None.     Impression: No acute core infarct or ischemia is identified. Electronically Signed: Umberto Hutchinson MD  9/18/2023 8:27 PM EDT  Workstation ID: [id number]             Results for orders placed during the hospital encounter of 08/24/23    Adult Transthoracic Echo Complete W/ Cont if Necessary Per Protocol    Interpretation Summary    Left ventricular systolic function is normal. Left ventricular ejection fraction appears to be 56 - 60%.    Left ventricular diastolic function was normal.    Mild aortic valve stenosis is present. Aortic valve area is 1.2 cm2.    Peak velocity of the flow distal to the aortic valve is 267.2  cm/s. Aortic valve mean pressure gradient is 16 mmHg.      Plan for Follow-up of Pending Labs/Results:   Pending Labs       Order Current Status    Blood Culture - Blood, Arm, Left Preliminary result    Blood Culture - Blood, Arm, Right Preliminary result          Discharge Details        Discharge Medications        New Medications        Instructions Start Date   azithromycin 250 MG tablet  Commonly known as: Zithromax   250 mg, Oral, Daily             Changes to Medications        Instructions Start Date   atorvastatin 80 MG tablet  Commonly known as: LIPITOR  What changed: Another medication with the same name was added. Make sure you understand how and when to take each.   80 mg, Oral, Nightly      atorvastatin 40 MG tablet  Commonly known as: LIPITOR  What changed: You were already taking a medication with the same name, and this prescription was added. Make sure you understand how and when to take each.   40 mg, Oral, Nightly      predniSONE 10 MG tablet  Commonly known as: DELTASONE  What changed:   medication strength  See the new instructions.   Take 6 tablets by mouth Daily for 3 days, THEN 5 tablets Daily for 3 days, THEN 4 tablets Daily for 3 days, THEN 3 tablets Daily for 3 days, THEN 2 tablets Daily for 3 days, THEN 1 tablet Daily for 3 days.   Start Date: September 20, 2023            Continue These Medications        Instructions Start Date   albuterol sulfate  (90 Base) MCG/ACT inhaler  Commonly known as: PROVENTIL HFA;VENTOLIN HFA;PROAIR HFA   2 puffs, Inhalation, Every 4 Hours PRN      apixaban 5 MG tablet tablet  Commonly known as: ELIQUIS   5 mg, Oral, Every 12 Hours Scheduled      Breztri Aerosphere 160-9-4.8 MCG/ACT aerosol inhaler  Generic drug: Budeson-Glycopyrrol-Formoterol   2 puffs, Inhalation, 2 Times Daily      cyclobenzaprine 5 MG tablet  Commonly known as: FLEXERIL   5 mg, Oral, 3 Times Daily PRN      ipratropium-albuterol 0.5-2.5 mg/3 ml nebulizer  Commonly known as:  DUO-NEB   3 mL, Nebulization, Every 6 Hours PRN      loratadine 10 MG tablet  Commonly known as: CLARITIN   10 mg, Oral, Daily      Mirabegron ER 50 MG tablet sustained-release 24 hour 24 hr tablet  Commonly known as: MYRBETRIQ   50 mg, Oral, Daily      nicotine 14 MG/24HR patch  Commonly known as: NICODERM CQ   1 patch, Transdermal, Every 24 Hours             Stop These Medications      gabapentin 300 MG capsule  Commonly known as: NEURONTIN     traZODone 100 MG tablet  Commonly known as: DESYREL              Allergies   Allergen Reactions    Penicillins Anaphylaxis         Discharge Disposition:  [place] [facility] [place]    Diet:  Hospital:  Diet Order   Procedures    Diet: Diabetic Diets, Cardiac Diets; Healthy Heart (2-3 Na+); Consistent Carbohydrate; Texture: Soft to Chew (NDD 3); Soft to Chew: Whole Meat; Fluid Consistency: Honey Thick       Activity:      Restrictions or Other Recommendations:         CODE STATUS:    Code Status and Medical Interventions:   Ordered at: 09/19/23 0233     Code Status (Patient has no pulse and is not breathing):    CPR (Attempt to Resuscitate)     Medical Interventions (Patient has pulse or is breathing):    Full Support       Future Appointments   Date Time Provider Department Center   9/27/2023  1:00 PM [facility]   9/27/2023  1:30 PM Bhavya Delgadillo APRN [facility]       Additional Instructions for the Follow-ups that You Need to Schedule       Discharge Follow-up with PCP   As directed       Currently Documented PCP:    Sunni Santoro MD    PCP Phone Number:    [phone]     Follow Up Details: with PCP in 1 week        Discharge Follow-up with Specified Provider: f/u with pulmonary   As directed      To: f/u with pulmonary   Follow Up Details: for sleep medicine/CPAP/hypercarbia        Discharge Follow-up with Specified Provider: stroke neuro; 3 Months   As directed      To: stroke neuro   Follow Up: 3 Months                       Vasile Theodore MD  09/20/23      Time Spent on Discharge:  I spent  42  minutes on this discharge activity which included: face-to-face encounter with the patient, reviewing the data in the system, coordination of the care with the nursing staff as well as consultants, documentation, and entering orders.

## 2023-09-20 NOTE — CONSULTS
Referring Provider: MD Ewelina  Reason for Consultation: COPD    Subjective .   Education: NN spoke with pt and spouse at BS.  Pt alert and able to answer questions appropriately.  Pt O2 sat  97% on   2L currently, home O2 use  2L.  Patient is up to date on COVID and PNA vaccines.  Former smoker, quit date 9/2023.  Discussed with patient the need for O2 and target of 92% O2 sat.  Discussed upcoming appointment and discussion of equipment needs.  COPD action plan reviewed.  Pt reports no issues at this time with medications or transportation for appointments.  COPD education reviewed in the form of explanation, handouts, and videos.  No new concerns or questions voiced at this time.  NN will continue to follow as needed.     Age: 78 y.o.  Sex: female  Smoker Status: former, pack years unclear  Pulmonologist: HERNAN (not established at this time)  FEV1 (PFT): NA  Home O2: 2L    Objective     SpO2 SpO2: 92 % (09/20/23 1140)  Device Device (Oxygen Therapy): room air (09/20/23 1140)  Flow Flow (L/min): 2 (09/20/23 1046)  Incentive Spirometer    IS Predicted Level (mL)     Number of Repetitions     Level Incentive Spirometer (mL)    Patient Tolerance     Inhaler Treatment Status    Treatment Route        Home Medications:  Medications Prior to Admission   Medication Sig Dispense Refill Last Dose    albuterol sulfate  (90 Base) MCG/ACT inhaler Inhale 2 puffs Every 4 (Four) Hours As Needed for Wheezing.   9/18/2023    apixaban (ELIQUIS) 5 MG tablet tablet Take 1 tablet by mouth Every 12 (Twelve) Hours.   9/18/2023    atorvastatin (LIPITOR) 80 MG tablet Take 1 tablet by mouth Every Night.   9/18/2023    Budeson-Glycopyrrol-Formoterol (Breztri Aerosphere) 160-9-4.8 MCG/ACT aerosol inhaler Inhale 2 puffs 2 (Two) Times a Day for 30 days. 1 each 1 9/18/2023    cyclobenzaprine (FLEXERIL) 5 MG tablet Take 1 tablet by mouth 3 (Three) Times a Day As Needed for Muscle Spasms.   9/18/2023    gabapentin (NEURONTIN) 300 MG capsule  Take 1 capsule by mouth Every Night.   9/18/2023    ipratropium-albuterol (DUO-NEB) 0.5-2.5 mg/3 ml nebulizer Take 3 mL by nebulization Every 6 (Six) Hours As Needed for Shortness of Air. 360 mL 0 9/18/2023    loratadine (CLARITIN) 10 MG tablet Take 1 tablet by mouth Daily.   9/18/2023    Mirabegron ER (MYRBETRIQ) 50 MG tablet sustained-release 24 hour 24 hr tablet Take 50 mg by mouth Daily.   Past Week    nicotine (NICODERM CQ) 14 MG/24HR patch Place 1 patch on the skin as directed by provider Daily. 30 each 0 9/18/2023    traZODone (DESYREL) 100 MG tablet Take 1 tablet by mouth Every Night.   9/18/2023       Discussion: Per current GOLD Standards, please consider: LAMA/LABA/ICS in place (Breztri), Outpatient PFT, Rehab as appropriate, Palliative Care consult, NRT at [place], Annual LDCT per current screening guidelines (age 50-80 years old, smoking history of 20 pack years or more or has quit within past 15 years)         Patient has been scheduled for a hospital follow up with [facility] for 9/27/2023 @ 1 pm with ALIZA Mendoza.  Outpatient PFT scheduled for this date as well.                    Discussed with JAEL Jones RN

## 2023-09-20 NOTE — THERAPY TREATMENT NOTE
Acute Care - Speech Language Pathology   Swallow Treatment Note [facility]     Patient Name: Tessa Bradley  : 1945  MRN: [id number]  Today's Date: 2023               Admit Date: 2023    Visit Dx:     ICD-10-CM ICD-9-CM   1. Confusion  R41.0 298.9   2. Altered mental status, unspecified altered mental status type  R41.82 780.97   3. Leukocytosis, unspecified type  D72.829 288.60   4. Facial droop  R29.810 781.94   5. Pharyngoesophageal dysphagia  R13.14 787.24   6. Cognitive communication deficit  R41.841 799.52   7. COPD exacerbation  J44.1 491.21   8. Acute on chronic respiratory failure with hypoxia  J96.21 518.84     799.02   9. Chronic respiratory failure with hypoxia and hypercapnia  J96.11 518.83    J96.12 799.02     786.09     Patient Active Problem List   Diagnosis    COPD exacerbation    Elevated serum creatinine    Hypotension    Dyspnea    MAKAYLA (obstructive sleep apnea)    HEATHER (acute kidney injury)    Leukocytosis    HTN (hypertension)    HLD (hyperlipidemia)    A-fib    Hodgkin disease    Arthritis    Acute on chronic respiratory failure with hypoxia    AMS (altered mental status)    Chronic respiratory failure with hypoxia and hypercapnia     Past Medical History:   Diagnosis Date    Cancer     COPD (chronic obstructive pulmonary disease)     Hyperlipidemia     Hypertension      Past Surgical History:   Procedure Laterality Date    ABDOMINAL SURGERY      HYSTERECTOMY      OOPHORECTOMY         SLP Recommendation and Plan     SLP Diet Recommendation: soft to chew textures, whole, honey thick liquids, other (see comments) (soft textures as fatigue precaution) (23 1100)  Recommended Precautions and Strategies: upright posture during/after eating, small bites of food and sips of liquid, general aspiration precautions, reflux precautions, fatigue precautions (23 1100)  SLP Rec. for Method of Medication Administration: meds whole, with thick liquids, with puree, as tolerated  (09/20/23 1100)              Anticipated Discharge Disposition (SLP): home with home health, other (see comments) (pt would benefit from SLP services for dysphagia and aphasia/cognitive-communication deficits) (09/20/23 1100) Discussed w/ .           Oral Care Recommendations: Oral Care BID/PRN, Toothbrush (09/20/23 1100)        Daily Summary of Progress (SLP): progress toward functional goals as expected (09/20/23 1100)               Treatment Assessment (SLP): continued, pharyngeal dysphagia, suspected (09/20/23 1100)  Treatment Assessment Comments (SLP): Pt discharging this date. Would benefit from outpatient MBS later this week or next week, as able, for further assessment of pharyngoesophageal swallow and to determine if safe for liquid upgrade as sensation to aspiration was inconsistent per FEES on 9/19. Discussed all of this, as well as thick liquid mgt for home, dysphagia/risks, and tx w/ pt and spouse. Provided handouts. They stated understanding. (09/20/23 1100)  Plan for Continued Treatment (SLP): continue treatment per plan of care, further assessment needed (see comments), other (see comments) (outpatient MBS--discussed w/ Dr. Theodore) (09/20/23 1100)       Oral Care Recommendations: Oral Care BID/PRN, Toothbrush (09/20/23 1100)    Plan of Care Reviewed With: patient, spouse  Progress: no change      SWALLOW EVALUATION (last 72 hours)       SLP Adult Swallow Evaluation       Row Name 09/20/23 1100 09/19/23 1100 09/19/23 0930             Rehab Evaluation    Document Type therapy note (daily note)  -AC evaluation  -AC evaluation  -AC      Subjective Information no complaints  -AC no complaints  -AC --      Patient Observations alert;cooperative  -AC alert;cooperative  -AC --      Patient/Family/Caregiver Comments/Observations Spouse present.  -AC Spouse present.  -AC --      Patient Effort good  -AC good  -AC good  -AC         General Information    Patient Profile Reviewed -- yes  -AC yes  -AC       Pertinent History Of Current Problem -- See previous eval.  -AC --      Current Method of Nutrition -- NPO  -AC NPO  -AC      Prior Level of Function-Swallowing -- -- no diet consistency restrictions  -AC      Plans/Goals Discussed with -- patient;spouse/S.O.;agreed upon  -AC patient;agreed upon  -AC      Barriers to Rehab -- none identified  -AC none identified  -AC      Patient's Goals for Discharge -- return to PO diet  -AC return to PO diet  -AC      Family Goals for Discharge -- family did not state  - --         Pain Scale: Numbers Pre/Post-Treatment    Pretreatment Pain Rating -- 0/10 - no pain  -AC --      Posttreatment Pain Rating -- 0/10 - no pain  -AC --         Pain Scale: FACES Pre/Post-Treatment    Pain: FACES Scale, Pretreatment 0-->no hurt  -AC -- 0-->no hurt  -AC      Posttreatment Pain Rating 0-->no hurt  -AC -- 0-->no hurt  -AC         Oral Motor Structure and Function    Secretion Management -- -- WNL/WFL  -AC      Volitional Swallow -- -- WFL  -AC      Volitional Cough -- -- WFL  -AC         General Eating/Swallowing Observations    Respiratory Support Currently in Use -- nasal cannula  -AC nasal cannula  -AC      O2 Liters -- -- 2L  -AC      Eating/Swallowing Skills -- -- fed by SLP;self-fed  -      Positioning During Eating -- -- upright 90 degree;upright in chair  -      Utensils Used -- -- spoon;cup;straw  -      Consistencies Trialed -- -- thin liquids;nectar/syrup-thick liquids;pureed  -AC         Respiratory    Respiratory Status -- -- increase in respiratory rate;during swallowing/eating  -         Clinical Swallow Eval    Oral Prep Phase -- -- WFL  -AC      Oral Transit -- -- WFL  -AC      Oral Residue -- -- WFL  -AC      Pharyngeal Phase -- -- suspected pharyngeal impairment  -AC         Pharyngeal Phase Concerns    Pharyngeal Phase Concerns -- -- throat clear  -      Throat Clear -- -- thin;nectar  -AC      Pharyngeal Phase Concerns, Comment -- -- Consistent throat  clearing w/ thin liquid--intermittent w/ nectar-thick liquids. No overt clinical s/sxs aspiration w/ puree.  -AC         Fiberoptic Endoscopic Evaluation of Swallowing (FEES)    Risks/Benefits Reviewed -- risks/benefits explained;patient;family;agreed to eval  -AC --      Nasal Entry -- right:  -AC --      Scope serial number/identification -- 338  -AC --         Anatomy and Physiology    Anatomic Considerations -- no anatomic structural deviation  -AC --      Velopharyngeal -- WFL  -AC --      Base of Tongue -- symmetrical;range reduced  -AC --      Epiglottis -- WFL  -AC --      Laryngeal Function Breathing -- symmetrical  -AC --      Laryngeal Function Phonation -- symmetrical  -AC --      Laryngeal Function to Breath Hold -- TVF contact;sustained closure  -AC --      Secretion Rating Scale (Edgar olson alAntony 1996) -- 1- secretions present around the laryngeal vestibule  -AC --      Secretion Description -- thin;white;clear  -AC --      Ice Chips -- elicited swallow;fully cleared secretions  -AC --      Spontaneous Swallow -- frequency reduced  - --      Sensory -- reduced sensation  - --      Utensils Used -- Spoon;Cup;Straw  - --      Consistencies Trialed -- thin liquids;nectar-thick liquids;honey-thick liquids;pudding/puree;mixed consistency;regular textures  -AC --         FEES Interpretation    Oral Phase -- WFL  -AC --         Initiation of Pharyngeal Swallow    Initiation of Pharyngeal Swallow -- WFL  -AC --      Pharyngeal Phase -- impaired pharyngeal phase of swallowing  -AC --      Penetration After the Swallow -- thin liquids;nectar-thick liquids;secondary to residue;in pyriform sinuses  -AC --      Aspiration After the Swallow -- thin liquids;secondary to residue;in laryngeal vestibule  -AC --      Depth of Penetration -- deep  -AC --      Response to Penetration -- Yes  -AC --      Responsed to penetration with -- inconsistent;throat clear;non-effective  -AC --      Response to Aspiration -- Yes   -AC --      Responsed to aspiration with -- inconsistent;throat clear;non-effective  also noted wet vocal quality  -AC --      Rosenbek's Scale -- thin:;7-->Level 7;nectar:;5-->Level 5  -AC --      Residue -- all consistencies tested;diffuse within pharynx;secondary to reduced base of tongue retraction;secondary to reduced posterior pharyngeal wall stripping;secondary to reduced laryngeal elevation;secondary to reduced hyolaryngeal excursion;other (see comments)  + retrograde flow through UES to pyriforms (most notable w/ thin & nectar-thick liquids)  -AC --      Response to Residue -- partial residue clearance;with spontaneous subsequent swallow;other (see comments)  spontaneous swallow was inconsistent/delayed at times  -AC --      Attempted Compensatory Maneuvers -- bolus size;additional subsequent swallow;throat clear after swallow  -AC --      Response to Attempted Compensatory Maneuvers -- reduced residue;other (see comments)  may have eliminated aspiration w/ consistent use, but pt had difficulty adequately using this combo of compensations 2' cognitive-communication deficits  -AC --      Successful Compensatory Maneuver Competency -- patient unable to adequately demonstrate/teach back compensations  -AC --      FEES Summary -- Dysphagia appeared to be c/b combination of pharyngeal weakness + proximal upper esophageal dysfunction (retrograde flow of material through UES appeared to occur almost instantaneously). No penetration/aspiration appreciated w/ honey-thick liquid via cup/straw, pudding, mixed consistency, or solid.  -AC --         Swallowing Quality of Life Assessment    Education and counseling provided -- Silent aspiration;Risks of aspiration;Safest diet options;Compensatory strategy recommendations and rationale  -AC --         SLP Evaluation Clinical Impression    SLP Swallowing Diagnosis -- moderate;pharyngeal dysphagia;esophageal dysphagia  -AC suspected pharyngeal dysphagia  -AC      Functional  Impact -- risk of aspiration/pneumonia  -AC risk of aspiration/pneumonia  -AC      Rehab Potential/Prognosis, Swallowing -- good, to achieve stated therapy goals  -AC good, to achieve stated therapy goals  -AC      Swallow Criteria for Skilled Therapeutic Interventions Met -- demonstrates skilled criteria  -AC demonstrates skilled criteria  -AC         SLP Treatment Clinical Impressions    Treatment Assessment (SLP) continued;pharyngeal dysphagia;suspected  -AC -- --      Treatment Assessment Comments (SLP) Pt discharging this date. Would benefit from outpatient MBS later this week or next week, as able, for further assessment of pharyngoesophageal swallow and to determine if safe for liquid upgrade as sensation to aspiration was inconsistent per FEES on 9/19. Discussed all of this, as well as thick liquid mgt for home, dysphagia/risks, and tx w/ pt and spouse. Provided handouts. They stated understanding.  -AC -- --      Daily Summary of Progress (SLP) progress toward functional goals as expected  - -- --      Plan for Continued Treatment (SLP) continue treatment per plan of care;further assessment needed (see comments);other (see comments)  outpatient MBS--discussed w/ Dr. Theodore  - -- --      Care Plan Review evaluation/treatment results reviewed;care plan/treatment goals reviewed;risks/benefits reviewed;current/potential barriers reviewed;patient/other agree to care plan  - -- --      Care Plan Review, Other Participant(s) spouse  - -- --         Recommendations    Therapy Frequency (Swallow) -- 5 days per week  -AC 5 days per week  -      Predicted Duration Therapy Intervention (Days) -- until discharge  - --      SLP Diet Recommendation soft to chew textures;whole;honey thick liquids;other (see comments)  soft textures as fatigue precaution  - soft to chew textures;whole;honey thick liquids;other (see comments)  soft/whole textures recommended as fatigue precaution  -AC NPO  -      Recommended  Diagnostics -- reassess via [facility] ([facility]);with esophageal screen  -AC FEES  -      Recommended Precautions and Strategies upright posture during/after eating;small bites of food and sips of liquid;general aspiration precautions;reflux precautions;fatigue precautions  - upright posture during/after eating;small bites of food and sips of liquid;general aspiration precautions;reflux precautions;fatigue precautions  - --      Oral Care Recommendations Oral Care BID/PRN;Toothbrush  - Oral Care BID/PRN;Toothbrush  -AC Oral Care BID/PRN;Suction toothbrush  -      SLP Rec. for Method of Medication Administration meds whole;with thick liquids;with puree;as tolerated  - meds whole;with thick liquids;with puree;as tolerated  -AC meds whole;meds crushed;with puree;as tolerated  -      Monitor for Signs of Aspiration -- yes;notify SLP if any concerns  - --      Anticipated Discharge Disposition (SLP) home with home health;other (see comments)  pt would benefit from SLP services for dysphagia and aphasia/cognitive-communication deficits  - inpatient rehabilitation facility  - inpatient rehabilitation facility  -                User Key  (r) = Recorded By, (t) = Taken By, (c) = Cosigned By      Initials Name Effective Dates     Meera Pickens MS [facility]-SLP 02/03/23 -                     EDUCATION  The patient has been educated in the following areas:   Home Exercise Program (HEP) Dysphagia (Swallowing Impairment) Modified Diet Instruction.        SLP GOALS       Row Name 09/20/23 1100 09/19/23 1100 09/19/23 0915       (LTG) Patient will demonstrate functional swallow for    Diet Texture (Demonstrate functional swallow) regular textures  - regular textures  - --    Liquid viscosity (Demonstrate functional swallow) thin liquids  - thin liquids  - --    [place] (Demonstrate functional swallow) independently (over 90% accuracy)  - independently (over 90% accuracy)  - --    Time Frame (Demonstrate  functional swallow) by discharge  -AC by discharge  -AC --    Progress/Outcomes (Demonstrate functional swallow) continuing progress toward goal  -AC -- --    Comment (Demonstrate functional swallow) No overt clinical s/sxs aspiration w/ honey via cup or soft solid. Provided extensive education/demonstration re: thickening liquids for home mgt.  -AC -- --       (STG) Patient will tolerate therapeutic trials of    Consistencies Trialed (Tolerate therapeutic trials) thin liquids  -AC thin liquids  -AC --    Desired Outcome (Tolerate therapeutic trials) without signs/symptoms of aspiration  -AC without signs/symptoms of aspiration  inconsistent throat clearing/wet vocal quality noted during CSE/FEES  -AC --    [place] (Tolerate therapeutic trials) with 1:1 assist/ supervision  -AC with 1:1 assist/ supervision  -AC --    Time Frame (Tolerate therapeutic trials) by discharge  -AC by discharge  -AC --    Progress/Outcomes (Tolerate therapeutic trials) continuing progress toward goal  -AC -- --    Comment (Tolerate therapeutic trials) Pt noted to be drinking thin H2O from a bottle upon entry. Delayed throat clear noted. Provided cont'd education re: aspiration, risks, recommendations.  -AC -- --       (STG) Pharyngeal Strengthening Exercise Goal 1 (SLP)    Activity (Pharyngeal Strengthening Goal 1, SLP) -- increase superior movement of the hyolaryngeal complex;increase anterior movement of the hyolaryngeal complex;increase squeeze/positive pressure generation;increase tongue base retraction  -AC --    Increase Superior Movement of the Hyolaryngeal Complex effortful pitch glide (falsetto + pharyngeal squeeze)  -AC effortful pitch glide (falsetto + pharyngeal squeeze)  -AC --    Increase Anterior Movement of the Hyolaryngeal Complex chin tuck against resistance (CTAR)  -AC chin tuck against resistance (CTAR)  -AC --    Increase Squeeze/Positive Pressure Generation jo  -AC jo  -AC --    Increase Tongue Base  Retraction hard effortful swallow  -AC hard effortful swallow  -AC --    [place]/Accuracy (Pharyngeal Strengthening Goal 1, SLP) with moderate cues (50-74% accuracy)  -AC with moderate cues (50-74% accuracy)  -AC --    Time Frame (Pharyngeal Strengthening Goal 1, SLP) short term goal (STG)  -AC short term goal (STG)  -AC --    Progress/Outcomes (Pharyngeal Strengthening Goal 1, SLP) goal ongoing  -AC -- --    Comment (Pharyngeal Strengthening Goal 1, SLP) -- Provided handout for pt/spouse  -AC --       Patient will demonstrate functional cognitive-linguistic skills for return to discharge environment    [place] -- -- with minimal cues  -AC    Time frame -- -- by discharge  -AC       Comprehend Questions Goal 1 (SLP)    Improve Ability to Comprehend Questions Goal 1 (SLP) -- -- simple wh questions;90%;with minimal cues (75-90%)  -AC    Time Frame (Comprehend Questions Goal 1, SLP) -- -- short term goal (STG)  -AC       Follow Directions Goal 2 (SLP)    Improve Ability to Follow Directions Goal 1 (SLP) -- -- 2 step commands;80%;with minimal cues (75-90%)  -AC    Time Frame (Follow Directions Goal 1, SLP) -- -- short term goal (STG)  -AC       Ability to Construct Phrase and Sentence Level Response Goal 1 (SLP)    Improve Ability to Construct Phrase and Sentence Level Responses By Goal 1 (SLP) -- -- answering question with phrase;80%;with minimal cues (75-90%)  -AC    Time Frame (Phrase and Sentence Level Response Goal 1, SLP) -- -- short term goal (STG)  -AC       Attention Goal 1 (SLP)    Improve Attention by Goal 1 (SLP) -- -- complete sustained attention task;80%;with minimal cues (75-90%)  -AC    Time Frame (Attention Goal 1, SLP) -- -- short term goal (STG)  -AC       Orientation Goal 1 (SLP)    Improve Orientation Through Goal 1 (SLP) -- -- demonstrating orientation to disease/impairment;demonstrating orientation to month;demonstrating orientation to day;100%;with minimal cues (75-90%)  -AC    Time  Frame (Orientation Goal 1, SLP) -- -- short term goal (STG)  -AC              User Key  (r) = Recorded By, (t) = Taken By, (c) = Cosigned By      Initials Name Provider Type    Meera Carver MS CCC-SLP Speech and Language Pathologist                       Time Calculation:    Time Calculation- SLP       Row Name 09/20/23 1401             Time Calculation- SLP    SLP Start Time 1100  -AC      SLP Received On 09/20/23  -AC         Untimed Charges    [id number] Treatment Swallow Minutes 66  -AC         Total Minutes    Untimed Charges Total Minutes 66  -AC       Total Minutes 66  -AC                User Key  (r) = Recorded By, (t) = Taken By, (c) = Cosigned By      Initials Name Provider Type    Meera Carver MS CCC-SLP Speech and Language Pathologist                    Therapy Charges for Today       Code Description Service Date Service Provider Modifiers Qty    12045382295 HC ST EVAL ORAL PHARYNG SWALLOW 3 9/19/2023 Meera Pickens MS CCC-SLP GN 1    86592313990 HC ST EVAL SPEECH AND PROD W LANG  1 9/19/2023 Meera Pickens MS CCC-SLP GN 1    [id number] HC ST FIBEROPTIC ENDO EVAL SWALL 6 9/19/2023 Meera Pickens, MS CCC-SLP GN 1    06707723813 HC ST TREATMENT SPEECH 4 9/20/2023 Meera Pickens MS CCC-SLP GN 1                 Meera Pickens MS CCC-SLP  9/20/2023

## 2023-09-20 NOTE — PLAN OF CARE
Goal Outcome Evaluation:  Plan of Care Reviewed With: patient, spouse        Progress: no change     SLP treatment and caregiver instruction completed. Will  recommend SLP services @ next level of care to address dysphagia/aphasia/cognitive-communication disorder. Pt would benefit from OP MBS this week/next for further pharyngoesophageal assessment & to determine if safe for liquid upgrade.  Please see note for further details and recommendations.

## 2023-09-20 NOTE — CONSULTS
Follow up note from education provided per diabetes education yesterday.Vin Roberts from retail pharmacy reached out in regard to meds to beds meter. He reports that patient's insurance will not cover a meter and supplies due to not having a diabetes diagnosis code. At this time education has been completed. If need diabetes education further, please re consult. Thank you.

## 2023-09-21 NOTE — OUTREACH NOTE
Prep Survey      Flowsheet Row Responses   Mandaen facility patient discharged from? [place]   Is LACE score < 7 ? No   Eligibility Readm Mgmt   Discharge diagnosis AMS (altered mental status)   Does the patient have one of the following disease processes/diagnoses(primary or secondary)? Other   Does the patient have Home health ordered? Yes   What is the Home health agency?  [facility]   Is there a DME ordered? No   Prep survey completed? Yes            Erica SHAH - Registered Nurse

## 2023-09-23 LAB
BACTERIA SPEC AEROBE CULT: NORMAL
BACTERIA SPEC AEROBE CULT: NORMAL

## 2023-09-26 ENCOUNTER — READMISSION MANAGEMENT (OUTPATIENT)
Dept: CALL CENTER | Facility: HOSPITAL | Age: 78
End: 2023-09-26
Payer: MEDICARE

## 2023-09-26 NOTE — OUTREACH NOTE
Medical Week 1 Survey      Flowsheet Row Responses   [facility] patient discharged from? [place]   Does the patient have one of the following disease processes/diagnoses(primary or secondary)? Other   Week 1 attempt successful? Yes   Call start time 1109   Call end time 1113   Discharge diagnosis AMS (altered mental status)   Meds reviewed with patient/caregiver? Yes   Is the patient taking all medications as directed (includes completed medication regime)? Yes   Medication comments Reviewed AVS and meds that are to be stopped.   Does the patient have a primary care provider?  Yes   Comments regarding PCP Pt states she will reschedule PCP appt   Has the patient kept scheduled appointments due by today? N/A   Comments pulmo tomorrow   What is the Home health agency?  [facility]   Has home health visited the patient within 72 hours of discharge? Yes   Psychosocial issues? No   Did the patient receive a copy of their discharge instructions? Yes   Nursing interventions Reviewed instructions with patient   What is the patient's perception of their health status since discharge? Improving   Is the patient/caregiver able to teach back signs and symptoms related to disease process for when to call PCP? Yes   Is the patient/caregiver able to teach back signs and symptoms related to disease process for when to call 911? Yes   Is the patient/caregiver able to teach back the hierarchy of who to call/visit for symptoms/problems? PCP, Specialist, Home health nurse, Urgent Care, ED, 911 Yes   Week 1 call completed? Yes   Graduated Yes   Graduated/Revoked comments Pt reports improvment , No needs.   Call end time 1113            MIGUEL OH - Registered Nurse

## 2023-09-27 ENCOUNTER — OFFICE VISIT (OUTPATIENT)
Dept: PULMONOLOGY | Facility: CLINIC | Age: 78
End: 2023-09-27
Payer: MEDICARE

## 2023-09-27 VITALS
HEIGHT: 63 IN | TEMPERATURE: 97.5 F | OXYGEN SATURATION: 93 % | BODY MASS INDEX: 29.96 KG/M2 | DIASTOLIC BLOOD PRESSURE: 80 MMHG | HEART RATE: 83 BPM | WEIGHT: 169.1 LBS | SYSTOLIC BLOOD PRESSURE: 112 MMHG

## 2023-09-27 DIAGNOSIS — R91.1 PULMONARY NODULE: ICD-10-CM

## 2023-09-27 DIAGNOSIS — J96.11 CHRONIC RESPIRATORY FAILURE WITH HYPOXIA AND HYPERCAPNIA: Primary | ICD-10-CM

## 2023-09-27 DIAGNOSIS — J44.9 CHRONIC OBSTRUCTIVE PULMONARY DISEASE, UNSPECIFIED COPD TYPE: ICD-10-CM

## 2023-09-27 DIAGNOSIS — J96.12 CHRONIC RESPIRATORY FAILURE WITH HYPOXIA AND HYPERCAPNIA: Primary | ICD-10-CM

## 2023-09-27 DIAGNOSIS — G47.33 OSA (OBSTRUCTIVE SLEEP APNEA): ICD-10-CM

## 2023-09-27 PROBLEM — J96.21 ACUTE ON CHRONIC RESPIRATORY FAILURE WITH HYPOXIA: Status: RESOLVED | Noted: 2023-09-08 | Resolved: 2023-09-27

## 2023-09-27 PROCEDURE — 3079F DIAST BP 80-89 MM HG: CPT | Performed by: NURSE PRACTITIONER

## 2023-09-27 PROCEDURE — 3074F SYST BP LT 130 MM HG: CPT | Performed by: NURSE PRACTITIONER

## 2023-09-27 PROCEDURE — 1159F MED LIST DOCD IN RCRD: CPT | Performed by: NURSE PRACTITIONER

## 2023-09-27 PROCEDURE — 1160F RVW MEDS BY RX/DR IN RCRD: CPT | Performed by: NURSE PRACTITIONER

## 2023-09-27 PROCEDURE — 99214 OFFICE O/P EST MOD 30 MIN: CPT | Performed by: NURSE PRACTITIONER

## 2023-09-27 RX ORDER — ALBUTEROL SULFATE 90 UG/1
4 AEROSOL, METERED RESPIRATORY (INHALATION) ONCE
Status: COMPLETED | OUTPATIENT
Start: 2023-09-27 | End: 2023-09-27

## 2023-09-27 RX ADMIN — ALBUTEROL SULFATE 4 PUFF: 90 AEROSOL, METERED RESPIRATORY (INHALATION) at 14:58

## 2023-09-27 NOTE — PROGRESS NOTES
[facility] Pulmonary Follow up      Chief Complaint  COPD, Shortness of Breath, Wheezing, and Cough (Having wheezing, SOB, and cough. Productive cough is yellow.)    Subjective          Tessa Bradley presents to [facility] for posthospitalization follow-up.    She was seen by Dr. Zhao during a hospitalization in August with acute respiratory failure and an exacerbation of her COPD.  [facility] but [facility] follow-up here at the [facility] pulmonary office.    She also had a CT scan chest that showed a 6 mm nodule.  Dr. Zhao recommended a 6-month follow-up    She was then readmitted to the hospital in September with altered mental status and hypercarbia.  She did receive BiPAP support.  She does have a history of sleep apnea but has not been wearing her CPAP at home.    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        She does continue to wear her oxygen at 2 L all of the time.  She did will drop into the 80s when she is moving around.  She rests to recover.    She has had a bit of a cough and congestion.  She does feel like she is a bit more congested and with more frequent cough with yellow thick sputum currently.  She did complete a course of azithromycin last week.  She is also on her prolonged prednisone taper.    She does have nebulizer she uses 3 times a day.  She is currently on Breztri and has been for about a year.  She is not sure that is really made much difference at all in her breathing.    She has not been wearing her CPAP.  She finds it very uncomfortable.  She currently uses a full facemask, we did discuss using a nasal pillow for comfort.  [facility]          Objective     Vital Signs:   /80 (BP Location: Left arm, Patient Position: Sitting, Cuff Size: Large Adult)   Pulse 83   Temp 97.5 °F (36.4 °C) (Temporal)  "  Ht 160 cm (62.99\")   Wt 76.7 kg (169 lb 1.6 oz)   SpO2 93% Comment: 2L Constant flow  BMI 29.96 kg/m²       Immunization History   Administered Date(s) Administered    COVID-19 (PFIZER) Purple Cap Monovalent 08/21/2021       Physical Exam  Vitals reviewed.   Constitutional:       General: She is not in acute distress.     Appearance: She is well-developed.   HENT:      Head: Normocephalic and atraumatic.   Eyes:      Pupils: Pupils are equal, round, and reactive to light.   Cardiovascular:      Rate and Rhythm: Normal rate and regular rhythm.      Heart sounds: No murmur heard.  Pulmonary:      Effort: Pulmonary effort is normal. No respiratory distress.      Breath sounds: No wheezing or rales.      Comments: Decreased with a few scattered rales  Abdominal:      General: Bowel sounds are normal. There is no distension.      Palpations: Abdomen is soft.   Musculoskeletal:         General: Normal range of motion.      Cervical back: Normal range of motion and neck supple.   Skin:     General: Skin is warm and dry.      Findings: No erythema.   Neurological:      Mental Status: She is alert and oriented to person, place, and time.   Psychiatric:         Behavior: Behavior normal.        Result Review :       Data reviewed : Radiologic studies CT of chest 8/28/2023      MPRESSION:  Impression:     1. No evidence of pulmonary embolism     2. Borderline size of the heart     3. A 6 mm semisolid nodule in the right upper lobe     Indeterminate part solid pulmonary nodule measuring 6 mm. For a part solid nodule >=6 mm, recommend CT at 3-6 months to confirm persistence. If unchanged and a solid component remains <6 mm, annual CT should be performed for 5 years. A persistent part   solid nodule with a solid component >=6 mm is highly suspicious.     4. Minimal bibasilar atelectasis        PFTs done in the office today:  Moderate to severe obstructive airway disease with a FEV1 of 0.98, 49% predicted and up to 1.14, 58% " predicted postbronchodilator.  Evidence of air trapping with an elevated residual volume decreased DLCO.                   Assessment and Plan    Problem List Items Addressed This Visit          Pulmonary and Pneumonias    COPD (chronic obstructive pulmonary disease)    Chronic respiratory failure with hypoxia and hypercapnia - Primary    Pulmonary nodule    Overview     6 mm right upper lobe         Relevant Orders    CT Chest Without Contrast       Sleep    MAKAYLA (obstructive sleep apnea)       Mrs. Bradley is here today to establish care for her COPD with chronic respiratory failure.    We did review her PFTs that does show moderate to severe obstruction but had a good response postbronchodilator.  We discussed different options today, she has been on Breo in the past and felt like it helped a bit more.  So I did give her a sample of Trelegy with instructions on how to use it.  She will call back and let us know if that seems to be making a difference.    Continue on her nebulizers to help with secretion clearance.    Continue on her oxygen with activity and at night.  As noted above we did discuss using her PAP therapy with different masks to see if that would help her compliance.    Follow-up in 3 months for recheck, we will do a follow-up CT scan in 6 months.    Follow Up     Return in about 3 months (around 12/27/2023).  Patient was given instructions and counseling regarding her condition or for health maintenance advice. Please see specific information pulled into the AVS if appropriate.     I spent 35 minutes caring for Tessa on this date of service. This time includes time spent by me in the following activities:preparing for the visit, reviewing tests, obtaining and/or reviewing a separately obtained history, performing a medically appropriate examination and/or evaluation , counseling and educating the patient/family/caregiver, ordering medications, tests, or procedures, referring and communicating with  other health care professionals , documenting information in the medical record, and independently interpreting results and communicating that information with the patient/family/caregiver    Excluding time spent on other separate services such as performing procedures or test interpretation, if applicable    Moderate level of Medical Decision Making complexity based on 2 or more chronic stable illnesses and prescription drug management.    ALIZA De, ACNP  [facility]  Electronically signed

## 2023-10-04 NOTE — ED PROVIDER NOTES
Subjective   History of Present Illness  Pt is a 77 year old female who presents with confusion, left facial droop and left sided weakness.  Pt is unable to supply history, but onset is well over 4.5 hours as EMS was called to pt's house this morning for confusion and somnolence, but she denied transport to the hospital at that time. Pt has a history of HTN, a-fib, taking Eliquis, and COPD.  No reported fever, chest pain, SOA, abdominal pain, vomiting, or diarrhea.  History and physical is limited secondary to pt confusion and poor cooperation.    Review of Systems   All other systems reviewed and are negative.    Past Medical History:   Diagnosis Date    Cancer     COPD (chronic obstructive pulmonary disease)     Hyperlipidemia     Hypertension        Allergies   Allergen Reactions    Penicillins Anaphylaxis       Past Surgical History:   Procedure Laterality Date    ABDOMINAL SURGERY      HYSTERECTOMY      OOPHORECTOMY         Family History   Problem Relation Age of Onset    Breast cancer Mother 67       Social History     Socioeconomic History    Marital status:    Tobacco Use    Smoking status: Every Day     Packs/day: 0.25     Years: 40.00     Pack years: 10.00     Types: Cigarettes    Smokeless tobacco: Never   Vaping Use    Vaping Use: Unknown   Substance and Sexual Activity    Alcohol use: Not Currently    Drug use: Never    Sexual activity: Defer           Objective   Physical Exam  Vitals and nursing note reviewed.   Constitutional:       Appearance: She is ill-appearing.      Comments: Somnolent.  Arousable with physical and loud verbal stimuli.   HENT:      Head: Normocephalic.      Nose: Nose normal. No congestion.      Mouth/Throat:      Mouth: Mucous membranes are dry.   Cardiovascular:      Rate and Rhythm: Normal rate and regular rhythm.   Pulmonary:      Effort: Pulmonary effort is normal.      Breath sounds: Normal breath sounds.   Abdominal:      General: Bowel sounds are normal. There is  no distension.      Palpations: Abdomen is soft.      Tenderness: There is no abdominal tenderness. There is no guarding or rebound.   Musculoskeletal:         General: Normal range of motion.      Cervical back: Normal range of motion. No tenderness.   Skin:     General: Skin is warm and dry.      Capillary Refill: Capillary refill takes less than 2 seconds.   Neurological:      Mental Status: She is disoriented.      Motor: Weakness (Generalized weakness.  No unilateral deficit apprecated at this time.) present.       Procedures           ED Course      CT Angiogram Neck    Result Date: 9/18/2023  CT ANGIOGRAM HEAD W AI ANALYSIS OF LVO, CT ANGIOGRAM NECK Date of Exam: 9/18/2023 7:53 PM EDT Indication: Neuro deficit, acute stroke suspected Neuro deficit, acute stroke suspected. Comparison: CT head without contrast 9/18/2023 Technique: CTA of the head and neck was performed after the uneventful intravenous administration of 115 mL Isovue-370 . Reconstructed coronal and sagittal images were also obtained. In addition, a 3-D volume rendered image was created for interpretation. Automated exposure control and iterative reconstruction methods were used. Findings: CTA NECK: *Aortic arch: No aneurysm. No significant stenosis or occlusion of the major arch vessels. *Left carotid system: No aneurysm, significant stenosis or occlusion. *Right carotid system: No aneurysm, significant stenosis or occlusion. *Vertebrobasilar system: The vertebral arteries arise from their respective subclavian arteries. No aneurysm, significant stenosis or occlusion. CTA HEAD: *Anterior circulation: No aneurysm, significant stenosis or occlusion. *Posterior circulation: No aneurysm, significant stenosis or occlusion. *Anatomic variants: None of significance. *Venous sinuses: Patent.     1.No hemodynamically significant stenosis, large vessel cut or aneurysms in intracranial circulation 2.No hemodynamically significant stenosis, dissection or  aneurysms in extracranial circulation. Electronically Signed: Umberto Hutchinson MD  9/18/2023 8:25 PM EDT  Workstation ID: [id number]    MRI Brain Without Contrast    Result Date: 9/19/2023  MRI BRAIN WO CONTRAST Date of Exam: 9/18/2023 11:45 PM EDT Indication: Stroke, follow up.  Comparison: CT head, angiography and perfusion 9/18/2023 at 2000 hours. Technique:  Routine multiplanar/multisequence sequence images of the brain were obtained without contrast administration. Findings: There is no diffusion restriction to suggest acute infarct. There is no evidence of acute or chronic intracranial hemorrhage. There are several scattered foci of T2 hyperintensity within the cerebral white matter and within the central avinash. No mass effect or midline shift. No abnormal extra-axial collections.  The major vascular flow voids appear intact. The basal ganglia, brainstem and cerebellum appear within normal limits.  Calvarial and superficial soft tissue signal is within normal limits. There is thinning of the orbital lenses bilaterally. There is mild multifocal paranasal sinus mucosal disease and there are bilateral mastoid effusions. 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 Midline structures are intact.     Impression: 1.No acute intracranial abnormality. 2.Mild chronic small vessel ischemic change. 3.Mild paranasal sinus mucosal disease and bilateral mastoid effusions. Electronically Signed: Curt Ng MD  9/19/2023 12:07 AM EDT  Workstation ID: [id number]    SLP FEES - Fiberoptic Endo Eval Swallow    Result Date: 9/19/2023  This procedure was auto-finalized with no dictation required.    XR Chest 1 View    Result Date: 9/18/2023  XR CHEST 1 VW Date of Exam: 9/18/2023 7:47 PM EDT Indication: Acute Stroke Protocol (onset < 12 hrs) Comparison: 9/7/2023 Findings: Heart size is at the upper limits of normal. Upper lobe pulmonary vessels are prominent consistent with pulmonary vascular congestion. No focal airspace consolidation. No  pleural effusion or pneumothorax.     Impression: 1. Borderline heart size with mild pulmonary vascular congestion. No other acute cardiopulmonary disease. Electronically Signed: Umberto Kulkarni MD  9/18/2023 9:10 PM EDT  Workstation ID: [id number]    XR Chest 1 View    Result Date: 9/6/2023  XR CHEST 1 VW Date of Exam: 9/6/2023 11:36 PM EDT Indication: Weak/Dizzy/AMS triage protocol Comparison: None available. Findings: There are no airspace consolidations. No pleural fluid. No pneumothorax. The pulmonary vasculature appears within normal limits. The cardiac and mediastinal silhouette appear unremarkable. No acute osseous abnormality identified.     Impression: No acute cardiopulmonary process. Electronically Signed: Renetta Duggan MD  9/6/2023 11:51 PM EDT  Workstation ID: [id number]    CT Head Without Contrast Stroke Protocol    Result Date: 9/18/2023  CT HEAD WO CONTRAST STROKE PROTOCOL Date of Exam: 9/18/2023 7:41 PM EDT Indication: Neuro deficit, acute, stroke suspected Neuro deficit, acute stroke suspected. Comparison: None available. Technique: Axial CT images were obtained of the head without contrast administration.  Reconstructed coronal images were also obtained. Automated exposure control and iterative construction methods were used. Scan Time: 1937 hours Results discussed with Mihir De La Rosa at 1947 hours. Findings: *No acute intracranial hemorrhage. *No masses, mass effect, midline shift or hydrocephalus. *White matter is intact. *Calvarium is intact. *Visualized orbits and globes are unremarkable without radiopaque foreign bodies. *Visualized paranasal sinuses are clear. *Visualized mastoid air cells are clear.     1.No acute intracranial hemorrhage. Calvarium is intact. Electronically Signed: Umberto Hutchinson MD  9/18/2023 7:50 PM EDT  Workstation ID: [id number]    XR Abdomen KUB    Result Date: 9/18/2023  XR ABDOMEN KUB Date of Exam: 9/18/2023 9:36 PM EDT Indication: MRI clearance Comparison: None available.  Findings: The abdominal bowel gas pattern is within normal limits. No radiopaque foreign body seen within the abdomen or pelvis. There is contrast material within the renal collecting systems and urinary bladder from prior CT angiogram of the head and neck. Visualized lung bases appear grossly clear. Bony structures are unremarkable.     Impression: 1. No radiopaque foreign body overlying the abdomen or pelvis. 2. Normal bowel gas pattern. Electronically Signed: Umberto Kulkarni MD  9/18/2023 10:39 PM EDT  Workstation ID: [id number]    CT Angiogram Head w AI Analysis of LVO    Result Date: 9/18/2023  CT ANGIOGRAM HEAD W AI ANALYSIS OF LVO, CT ANGIOGRAM NECK Date of Exam: 9/18/2023 7:53 PM EDT Indication: Neuro deficit, acute stroke suspected Neuro deficit, acute stroke suspected. Comparison: CT head without contrast 9/18/2023 Technique: CTA of the head and neck was performed after the uneventful intravenous administration of 115 mL Isovue-370 . Reconstructed coronal and sagittal images were also obtained. In addition, a 3-D volume rendered image was created for interpretation. Automated exposure control and iterative reconstruction methods were used. Findings: CTA NECK: *Aortic arch: No aneurysm. No significant stenosis or occlusion of the major arch vessels. *Left carotid system: No aneurysm, significant stenosis or occlusion. *Right carotid system: No aneurysm, significant stenosis or occlusion. *Vertebrobasilar system: The vertebral arteries arise from their respective subclavian arteries. No aneurysm, significant stenosis or occlusion. CTA HEAD: *Anterior circulation: No aneurysm, significant stenosis or occlusion. *Posterior circulation: No aneurysm, significant stenosis or occlusion. *Anatomic variants: None of significance. *Venous sinuses: Patent.     1.No hemodynamically significant stenosis, large vessel cut or aneurysms in intracranial circulation 2.No hemodynamically significant stenosis, dissection or  aneurysms in extracranial circulation. Electronically Signed: Umberto Hutchinson MD  9/18/2023 8:25 PM EDT  Workstation ID: [id number]    CT CEREBRAL PERFUSION WITH & WITHOUT CONTRAST    Result Date: 9/18/2023  CT CEREBRAL PERFUSION W WO CONTRAST Date of Exam: 9/18/2023 7:53 PM EDT Indication: Neuro deficit, acute stroke suspected.  Comparison: CT head and CTA head and neck 9/18/2023 Technique: Axial CT images of the brain were obtained prior to and after the administration of 150 Isovue 370 . Core blood volume, core blood flow, mean transit time, and Tmax images were obtained utilizing the Rapid software protocol. A limited CT angiogram of the head was also performed to measure the blood vessel density. The radiation dose reduction device was turned on for each scan per the ALARA (As Low as Reasonably Achievable) protocol. Findings: *CBF less than 30%: 0 mL. *Tmax greater than 6 seconds: 0 mL. *Mismatch volume: 0 mL. *Mismatch ratio: None.     Impression: No acute core infarct or ischemia is identified. Electronically Signed: Umberto Hutchinson MD  9/18/2023 8:27 PM EDT  Workstation ID: [id number]         Latest Reference Range & Units 09/18/23 20:48   Color, UA Yellow, Straw  Yellow   Appearance, UA Clear  Clear   Specific Gravity, UA 1.001 - 1.030  1.023   pH, UA 5.0 - 8.0  5.5   Glucose Negative  Negative   Ketones, UA Negative  Negative   Blood, UA Negative  Negative   Nitrite, UA Negative  Negative   Leukocytes, UA Negative  Negative   Protein, UA Negative  Negative   Bilirubin, UA Negative  Negative   Urobilinogen, UA 0.2 - 1.0 E.U./dL  0.2 E.U./dL   Amphetamine, Urine Qual Negative  Negative   Barbiturates Screen, Urine Negative  Negative   Benzodiazepine Screen, Urine Negative  Negative   Buprenorphine, Screen, Urine Negative  Negative   Cocaine Screen, Urine Negative  Negative   Fentanyl, Urine Negative  Negative   Methamphetamine, Ur Negative  Negative   Methadone Screen , Urine Negative  Negative   Opiate Screen  Negative  Positive !   Oxycodone Screen, Urine Negative  Negative   Phencyclidine (PCP), Urine Negative  Negative   Propoxyphene Screen Negative  Negative   THC Screen, Urine Negative  Negative   Tricyclic Antidepressants Screen Negative  Negative   !: Data is abnormal                       Latest Reference Range & Units 09/18/23 19:58 09/18/23 20:00   Sodium 138 - 146 mmol/L 141    Potassium 3.5 - 4.9 mmol/L 5.4 (H)    Chloride 98 - 109 mmol/L 101    Total CO2 24 - 29 mmol/L 35 (H)    BUN 8 - 26 mg/dL 28 (H)    Creatinine 0.60 - 1.30 mg/dL 1.00    eGFR >60.0 mL/min/1.73 58.9 (L)    Glucose 70 - 130 mg/dL 138 (H)    Ionized Calcium 1.20 - 1.32 mmol/L 1.27    Lactate 0.5 - 2.0 mmol/L  0.6   PTT 22.0 - 39.0 seconds  33.2   WBC 3.40 - 10.80 10*3/mm3  17.84 (H)   RBC 3.77 - 5.28 10*6/mm3  4.27   Hemoglobin 12.0 - 15.9 g/dL  12.8   Hemoglobin 12.0 - 17.0 g/dL 15.3    Hematocrit 34.0 - 46.6 %  45.1   Hematocrit 38 - 51 % 45    Platelets 140 - 450 10*3/mm3  165   RDW 12.3 - 15.4 %  15.0   MCV 79.0 - 97.0 fL  105.6 (H)   MCH 26.6 - 33.0 pg  30.0   MCHC 31.5 - 35.7 g/dL  28.4 (L)   MPV 6.0 - 12.0 fL  9.7   RDW-SD 37.0 - 54.0 fl  59.0 (H)   Neutrophil Rel % 42.7 - 76.0 %  89.3 (H)   Lymphocyte Rel % 19.6 - 45.3 %  5.7 (L)   Monocyte Rel % 5.0 - 12.0 %  2.9 (L)   Eosinophil Rel % 0.3 - 6.2 %  1.3   Basophil Rel % 0.0 - 1.5 %  0.2   Immature Granulocyte Rel % 0.0 - 0.5 %  0.6 (H)   Neutrophils Absolute 1.70 - 7.00 10*3/mm3  15.93 (H)   Lymphocytes Absolute 0.70 - 3.10 10*3/mm3  1.02   Monocytes Absolute 0.10 - 0.90 10*3/mm3  0.52   Eosinophils Absolute 0.00 - 0.40 10*3/mm3  0.23   Basophils Absolute 0.00 - 0.20 10*3/mm3  0.03   Immature Grans, Absolute 0.00 - 0.05 10*3/mm3  0.11 (H)   nRBC 0.0 - 0.2 /100 WBC  0.0   (H): Data is abnormally high  (L): Data is abnormally low          Medical Decision Making  Problems Addressed:  Altered mental status, unspecified altered mental status type: complicated acute illness or  injury  Confusion: complicated acute illness or injury  Facial droop: complicated acute illness or injury  Leukocytosis, unspecified type: complicated acute illness or injury    Amount and/or Complexity of Data Reviewed  External Data Reviewed: notes.  Labs: ordered. Decision-making details documented in ED Course.  Radiology: ordered and independent interpretation performed. Decision-making details documented in ED Course.  ECG/medicine tests: ordered and independent interpretation performed. Decision-making details documented in ED Course.    Risk  Prescription drug management.  Decision regarding hospitalization.        Final diagnoses:   Confusion   Altered mental status, unspecified altered mental status type   Leukocytosis, unspecified type   Facial droop       ED Disposition  ED Disposition       ED Disposition   Decision to Admit    Condition   --    Comment   Level of Care: Telemetry [5]   Diagnosis: AMS (altered mental status) [[id number]]   Admitting Physician: JENNIFER HUERTAS [[id number]]   Attending Physician: JENNIFER HUERTAS [[id number]]                        Dandre Patel DO  10/04/23 2614

## 2023-10-10 ENCOUNTER — APPOINTMENT (OUTPATIENT)
Dept: GENERAL RADIOLOGY | Facility: HOSPITAL | Age: 78
End: 2023-10-10
Payer: MEDICARE

## 2023-10-10 ENCOUNTER — HOSPITAL ENCOUNTER (EMERGENCY)
Facility: HOSPITAL | Age: 78
Discharge: HOME | End: 2023-10-10
Attending: EMERGENCY MEDICINE | Admitting: EMERGENCY MEDICINE
Payer: MEDICARE

## 2023-10-10 VITALS
HEART RATE: 91 BPM | TEMPERATURE: 97.6 F | HEIGHT: 63 IN | OXYGEN SATURATION: 91 % | BODY MASS INDEX: 28.88 KG/M2 | SYSTOLIC BLOOD PRESSURE: 114 MMHG | WEIGHT: 163 LBS | DIASTOLIC BLOOD PRESSURE: 72 MMHG | RESPIRATION RATE: 16 BRPM

## 2023-10-10 DIAGNOSIS — R55 NEAR SYNCOPE: ICD-10-CM

## 2023-10-10 DIAGNOSIS — I95.1 ORTHOSTATIC HYPOTENSION: Primary | ICD-10-CM

## 2023-10-10 LAB
ALBUMIN SERPL-MCNC: 3.6 G/DL (ref 3.5–5.2)
ALBUMIN/GLOB SERPL: 1.4 G/DL
ALP SERPL-CCNC: 81 U/L (ref 39–117)
ALT SERPL W P-5'-P-CCNC: 15 U/L (ref 1–33)
AMPHET+METHAMPHET UR QL: NEGATIVE
AMPHETAMINES UR QL: NEGATIVE
ANION GAP SERPL CALCULATED.3IONS-SCNC: 7 MMOL/L (ref 5–15)
AST SERPL-CCNC: 16 U/L (ref 1–32)
BACTERIA UR QL AUTO: ABNORMAL /HPF
BARBITURATES UR QL SCN: NEGATIVE
BASOPHILS # BLD AUTO: 0.01 10*3/MM3 (ref 0–0.2)
BASOPHILS NFR BLD AUTO: 0.1 % (ref 0–1.5)
BENZODIAZ UR QL SCN: NEGATIVE
BILIRUB SERPL-MCNC: 0.4 MG/DL (ref 0–1.2)
BILIRUB UR QL STRIP: NEGATIVE
BUN SERPL-MCNC: 28 MG/DL (ref 8–23)
BUN/CREAT SERPL: 22.2 (ref 7–25)
BUPRENORPHINE SERPL-MCNC: NEGATIVE NG/ML
CALCIUM SPEC-SCNC: 9.1 MG/DL (ref 8.6–10.5)
CANNABINOIDS SERPL QL: NEGATIVE
CHLORIDE SERPL-SCNC: 102 MMOL/L (ref 98–107)
CLARITY UR: CLEAR
CO2 SERPL-SCNC: 35 MMOL/L (ref 22–29)
COCAINE UR QL: NEGATIVE
COLOR UR: YELLOW
CREAT SERPL-MCNC: 1.26 MG/DL (ref 0.57–1)
DEPRECATED RDW RBC AUTO: 53.9 FL (ref 37–54)
EGFRCR SERPLBLD CKD-EPI 2021: 44.3 ML/MIN/1.73
EOSINOPHIL # BLD AUTO: 0.08 10*3/MM3 (ref 0–0.4)
EOSINOPHIL NFR BLD AUTO: 0.8 % (ref 0.3–6.2)
ERYTHROCYTE [DISTWIDTH] IN BLOOD BY AUTOMATED COUNT: 14.6 % (ref 12.3–15.4)
FENTANYL UR-MCNC: NEGATIVE NG/ML
GLOBULIN UR ELPH-MCNC: 2.5 GM/DL
GLUCOSE SERPL-MCNC: 167 MG/DL (ref 65–99)
GLUCOSE UR STRIP-MCNC: NEGATIVE MG/DL
HCT VFR BLD AUTO: 38.1 % (ref 34–46.6)
HGB BLD-MCNC: 11.3 G/DL (ref 12–15.9)
HGB UR QL STRIP.AUTO: NEGATIVE
HOLD SPECIMEN: NORMAL
HYALINE CASTS UR QL AUTO: ABNORMAL /LPF
IMM GRANULOCYTES # BLD AUTO: 0.05 10*3/MM3 (ref 0–0.05)
IMM GRANULOCYTES NFR BLD AUTO: 0.5 % (ref 0–0.5)
KETONES UR QL STRIP: NEGATIVE
LEUKOCYTE ESTERASE UR QL STRIP.AUTO: ABNORMAL
LYMPHOCYTES # BLD AUTO: 0.94 10*3/MM3 (ref 0.7–3.1)
LYMPHOCYTES NFR BLD AUTO: 9.5 % (ref 19.6–45.3)
MAGNESIUM SERPL-MCNC: 2 MG/DL (ref 1.6–2.4)
MCH RBC QN AUTO: 29.8 PG (ref 26.6–33)
MCHC RBC AUTO-ENTMCNC: 29.7 G/DL (ref 31.5–35.7)
MCV RBC AUTO: 100.5 FL (ref 79–97)
METHADONE UR QL SCN: NEGATIVE
MONOCYTES # BLD AUTO: 0.79 10*3/MM3 (ref 0.1–0.9)
MONOCYTES NFR BLD AUTO: 8 % (ref 5–12)
NEUTROPHILS NFR BLD AUTO: 8.03 10*3/MM3 (ref 1.7–7)
NEUTROPHILS NFR BLD AUTO: 81.1 % (ref 42.7–76)
NITRITE UR QL STRIP: POSITIVE
NRBC BLD AUTO-RTO: 0 /100 WBC (ref 0–0.2)
OPIATES UR QL: NEGATIVE
OXYCODONE UR QL SCN: NEGATIVE
PCP UR QL SCN: NEGATIVE
PH UR STRIP.AUTO: 5.5 [PH] (ref 5–8)
PLATELET # BLD AUTO: 179 10*3/MM3 (ref 140–450)
PMV BLD AUTO: 10.5 FL (ref 6–12)
POTASSIUM SERPL-SCNC: 4 MMOL/L (ref 3.5–5.2)
PROPOXYPH UR QL: NEGATIVE
PROT SERPL-MCNC: 6.1 G/DL (ref 6–8.5)
PROT UR QL STRIP: NEGATIVE
QT INTERVAL: 358 MS
QTC INTERVAL: 449 MS
RBC # BLD AUTO: 3.79 10*6/MM3 (ref 3.77–5.28)
RBC # UR STRIP: ABNORMAL /HPF
REF LAB TEST METHOD: ABNORMAL
SODIUM SERPL-SCNC: 144 MMOL/L (ref 136–145)
SP GR UR STRIP: 1.02 (ref 1–1.03)
SQUAMOUS #/AREA URNS HPF: ABNORMAL /HPF
TRICYCLICS UR QL SCN: NEGATIVE
TROPONIN T SERPL HS-MCNC: 23 NG/L
UROBILINOGEN UR QL STRIP: ABNORMAL
WBC # UR STRIP: ABNORMAL /HPF
WBC NRBC COR # BLD: 9.9 10*3/MM3 (ref 3.4–10.8)
WHOLE BLOOD HOLD COAG: NORMAL
WHOLE BLOOD HOLD SPECIMEN: NORMAL

## 2023-10-10 PROCEDURE — 83735 ASSAY OF MAGNESIUM: CPT | Performed by: EMERGENCY MEDICINE

## 2023-10-10 PROCEDURE — 80307 DRUG TEST PRSMV CHEM ANLYZR: CPT | Performed by: EMERGENCY MEDICINE

## 2023-10-10 PROCEDURE — 99284 EMERGENCY DEPT VISIT MOD MDM: CPT

## 2023-10-10 PROCEDURE — 71045 X-RAY EXAM CHEST 1 VIEW: CPT

## 2023-10-10 PROCEDURE — 80053 COMPREHEN METABOLIC PANEL: CPT | Performed by: EMERGENCY MEDICINE

## 2023-10-10 PROCEDURE — 25810000003 SODIUM CHLORIDE 0.9 % SOLUTION: Performed by: EMERGENCY MEDICINE

## 2023-10-10 PROCEDURE — 84484 ASSAY OF TROPONIN QUANT: CPT | Performed by: EMERGENCY MEDICINE

## 2023-10-10 PROCEDURE — 81001 URINALYSIS AUTO W/SCOPE: CPT | Performed by: EMERGENCY MEDICINE

## 2023-10-10 PROCEDURE — 36415 COLL VENOUS BLD VENIPUNCTURE: CPT

## 2023-10-10 PROCEDURE — 85025 COMPLETE CBC W/AUTO DIFF WBC: CPT | Performed by: EMERGENCY MEDICINE

## 2023-10-10 PROCEDURE — 93005 ELECTROCARDIOGRAM TRACING: CPT | Performed by: EMERGENCY MEDICINE

## 2023-10-10 RX ORDER — SODIUM CHLORIDE 0.9 % (FLUSH) 0.9 %
10 SYRINGE (ML) INJECTION AS NEEDED
Status: DISCONTINUED | OUTPATIENT
Start: 2023-10-10 | End: 2023-10-10 | Stop reason: HOSPADM

## 2023-10-10 RX ADMIN — SODIUM CHLORIDE 1000 ML: 9 INJECTION, SOLUTION INTRAVENOUS at 16:57

## 2023-10-10 RX ADMIN — SODIUM CHLORIDE 500 ML: 9 INJECTION, SOLUTION INTRAVENOUS at 18:21

## 2023-10-10 NOTE — ED PROVIDER NOTES
EMERGENCY DEPARTMENT ENCOUNTER    Pt Name: Tessa Bradley  MRN: [id number]  Pt :   1945  Room Number:    Date of encounter:  10/10/2023  PCP: Sunni Santoro MD  ED Provider: Yves Morales MD    Historian: Patient and paramedics      HPI:  Chief Complaint: Near syncope and hypotension        Context: Tessa Bradley is a 78 y.o. female who presents to the ED c/o family feeling extremely lightheaded.  The patient was trying to get her mail using her walker.  She became quite lightheaded and felt as if she might pass out.  She had to sit down but did not completely lose consciousness or fall to the ground.  Paramedics were called and found her to be hypotensive with initial blood pressure in the 60/45 region per the patient's report.  The patient denies nausea vomiting and diarrhea.  She feels that she drinks enough fluids on a daily basis but notes that her urine looks quite concentrated, deep yellow.  She has had no urinary symptoms otherwise.  The patient is on 2 L of O2 via nasal cannula chronically secondary to COPD.  Paramedics report that they bolused her with electrolyte fluids.      PAST MEDICAL HISTORY  Past Medical History:   Diagnosis Date    Cancer     COPD (chronic obstructive pulmonary disease)     Hyperlipidemia     Hypertension          PAST SURGICAL HISTORY  Past Surgical History:   Procedure Laterality Date    ABDOMINAL SURGERY      HYSTERECTOMY      OOPHORECTOMY           FAMILY HISTORY  Family History   Problem Relation Age of Onset    Breast cancer Mother 67         SOCIAL HISTORY  Social History     Socioeconomic History    Marital status:    Tobacco Use    Smoking status: Every Day     Packs/day: 0.25     Years: 40.00     Additional pack years: 0.00     Total pack years: 10.00     Types: Cigarettes    Smokeless tobacco: Never   Vaping Use    Vaping Use: Unknown   Substance and Sexual Activity    Alcohol use: Not Currently    Drug use: Never    Sexual activity: Defer          ALLERGIES  Penicillins, Latex, and Other        REVIEW OF SYSTEMS  Review of Systems       All systems reviewed and negative except for those discussed in HPI.       PHYSICAL EXAM    I have reviewed the triage vital signs and nursing notes.    ED Triage Vitals [10/10/23 1610]   Temp Heart Rate Resp BP SpO2   97.6 øF (36.4 øC) 89 16 102/63 96 %      Temp src Heart Rate Source Patient Position BP Location FiO2 (%)   Oral Monitor -- -- --       Physical Exam  GENERAL:   Appears little weak but nontoxic.  HENT: Nares patent.  Very dry mucous membranes  EYES: No scleral icterus.  CV: Regular rhythm, regular rate.  No murmurs gallops rubs clear to auscultation  RESPIRATORY: Normal effort.  No audible wheezes, rales or rhonchi.  ABDOMEN: Soft, nontender to deep palpation.  No masses especially pulsatile ones.  MUSCULOSKELETAL: No deformities.   NEURO: Alert, moves all extremities, follows commands.  SKIN: Warm, dry, no rash visualized.      LAB RESULTS  Recent Results (from the past 24 hour(s))   ECG 12 Lead ED Triage Standing Order; Weak / Dizzy / AMS    Collection Time: 10/10/23  4:22 PM   Result Value Ref Range    QT Interval 358 ms    QTC Interval 449 ms   Comprehensive Metabolic Panel    Collection Time: 10/10/23  4:38 PM    Specimen: Blood   Result Value Ref Range    Glucose 167 (H) 65 - 99 mg/dL    BUN 28 (H) 8 - 23 mg/dL    Creatinine 1.26 (H) 0.57 - 1.00 mg/dL    Sodium 144 136 - 145 mmol/L    Potassium 4.0 3.5 - 5.2 mmol/L    Chloride 102 98 - 107 mmol/L    CO2 35.0 (H) 22.0 - 29.0 mmol/L    Calcium 9.1 8.6 - 10.5 mg/dL    Total Protein 6.1 6.0 - 8.5 g/dL    Albumin 3.6 3.5 - 5.2 g/dL    ALT (SGPT) 15 1 - 33 U/L    AST (SGOT) 16 1 - 32 U/L    Alkaline Phosphatase 81 39 - 117 U/L    Total Bilirubin 0.4 0.0 - 1.2 mg/dL    Globulin 2.5 gm/dL    A/G Ratio 1.4 g/dL    BUN/Creatinine Ratio 22.2 7.0 - 25.0    Anion Gap 7.0 5.0 - 15.0 mmol/L    eGFR 44.3 (L) >60.0 mL/min/1.73   Single High Sensitivity Troponin T     Collection Time: 10/10/23  4:38 PM    Specimen: Blood   Result Value Ref Range    HS Troponin T 23 (H) <10 ng/L   Magnesium    Collection Time: 10/10/23  4:38 PM    Specimen: Blood   Result Value Ref Range    Magnesium 2.0 1.6 - 2.4 mg/dL   Green Top (Gel)    Collection Time: 10/10/23  4:38 PM   Result Value Ref Range    Extra Tube Hold for add-ons.    Lavender Top    Collection Time: 10/10/23  4:38 PM   Result Value Ref Range    Extra Tube hold for add-on    Gold Top - SST    Collection Time: 10/10/23  4:38 PM   Result Value Ref Range    Extra Tube Hold for add-ons.    Gray Top    Collection Time: 10/10/23  4:38 PM   Result Value Ref Range    Extra Tube Hold for add-ons.    Light Blue Top    Collection Time: 10/10/23  4:38 PM   Result Value Ref Range    Extra Tube Hold for add-ons.    CBC Auto Differential    Collection Time: 10/10/23  4:38 PM    Specimen: Blood   Result Value Ref Range    WBC 9.90 3.40 - 10.80 10*3/mm3    RBC 3.79 3.77 - 5.28 10*6/mm3    Hemoglobin 11.3 (L) 12.0 - 15.9 g/dL    Hematocrit 38.1 34.0 - 46.6 %    .5 (H) 79.0 - 97.0 fL    MCH 29.8 26.6 - 33.0 pg    MCHC 29.7 (L) 31.5 - 35.7 g/dL    RDW 14.6 12.3 - 15.4 %    RDW-SD 53.9 37.0 - 54.0 fl    MPV 10.5 6.0 - 12.0 fL    Platelets 179 140 - 450 10*3/mm3    Neutrophil % 81.1 (H) 42.7 - 76.0 %    Lymphocyte % 9.5 (L) 19.6 - 45.3 %    Monocyte % 8.0 5.0 - 12.0 %    Eosinophil % 0.8 0.3 - 6.2 %    Basophil % 0.1 0.0 - 1.5 %    Immature Grans % 0.5 0.0 - 0.5 %    Neutrophils, Absolute 8.03 (H) 1.70 - 7.00 10*3/mm3    Lymphocytes, Absolute 0.94 0.70 - 3.10 10*3/mm3    Monocytes, Absolute 0.79 0.10 - 0.90 10*3/mm3    Eosinophils, Absolute 0.08 0.00 - 0.40 10*3/mm3    Basophils, Absolute 0.01 0.00 - 0.20 10*3/mm3    Immature Grans, Absolute 0.05 0.00 - 0.05 10*3/mm3    nRBC 0.0 0.0 - 0.2 /100 WBC   Urinalysis With Microscopic If Indicated (No Culture) - Urine, Clean Catch    Collection Time: 10/10/23  6:22 PM    Specimen: Urine, Clean Catch    Result Value Ref Range    Color, UA Yellow Yellow, Straw    Appearance, UA Clear Clear    pH, UA 5.5 5.0 - 8.0    Specific Gravity, UA 1.017 1.001 - 1.030    Glucose, UA Negative Negative    Ketones, UA Negative Negative    Bilirubin, UA Negative Negative    Blood, UA Negative Negative    Protein, UA Negative Negative    Leuk Esterase, UA Trace (A) Negative    Nitrite, UA Positive (A) Negative    Urobilinogen, UA 1.0 E.U./dL 0.2 - 1.0 E.U./dL   Urine Drug Screen - Urine, Clean Catch    Collection Time: 10/10/23  6:22 PM    Specimen: Urine, Clean Catch   Result Value Ref Range    THC, Screen, Urine Negative Negative    Phencyclidine (PCP), Urine Negative Negative    Cocaine Screen, Urine Negative Negative    Methamphetamine, Ur Negative Negative    Opiate Screen Negative Negative    Amphetamine Screen, Urine Negative Negative    Benzodiazepine Screen, Urine Negative Negative    Tricyclic Antidepressants Screen Negative Negative    Methadone Screen, Urine Negative Negative    Barbiturates Screen, Urine Negative Negative    Oxycodone Screen, Urine Negative Negative    Propoxyphene Screen Negative Negative    Buprenorphine, Screen, Urine Negative Negative   Fentanyl, Urine - Urine, Clean Catch    Collection Time: 10/10/23  6:22 PM    Specimen: Urine, Clean Catch   Result Value Ref Range    Fentanyl, Urine Negative Negative   Urinalysis, Microscopic Only - Urine, Clean Catch    Collection Time: 10/10/23  6:22 PM    Specimen: Urine, Clean Catch   Result Value Ref Range    RBC, UA 0-2 None Seen, 0-2 /HPF    WBC, UA 0-2 None Seen, 0-2 /HPF    Bacteria, UA 4+ (A) None Seen, Trace /HPF    Squamous Epithelial Cells, UA 0-2 None Seen, 0-2 /HPF    Hyaline Casts, UA 0-6 0 - 6 /LPF    Methodology Automated Microscopy        If labs were ordered, I independently reviewed the results and considered them in treating the patient.        RADIOLOGY  XR Chest 1 View    Result Date: 10/10/2023  XR CHEST 1 VW Date of Exam: 10/10/2023  4:29 PM EDT Indication: Weak/Dizzy/AMS triage protocol Comparison: 9/18/2023. Findings: Heart and pulmonary vessels appear within normal limits. Lung fields are well inflated. There is mild scar in the left lung base. There are no acute infiltrates or effusions.     Impression: No acute process. Electronically Signed: Sindy Gillette MD  10/10/2023 4:48 PM EDT  Workstation ID: [id number]     I ordered and independently reviewed the above noted radiographic studies.      I viewed images of chest x-ray which showed no active disease per my independent interpretation.    See radiologist's dictation for official interpretation.        PROCEDURES    Procedures    ECG 12 Lead ED Triage Standing Order; Weak / Dizzy / AMS   Final Result   Test Reason : ED Triage Standing Order~   Blood Pressure :   */*   mmHG   Vent. Rate :  95 BPM     Atrial Rate :  95 BPM      P-R Int : 138 ms          QRS Dur :  92 ms       QT Int : 358 ms       P-R-T Axes :  60  76  44 degrees      QTc Int : 449 ms      Sinus rhythm with premature atrial complexes   T wave abnormality, consider anterior ischemia   Abnormal ECG   When compared with ECG of 18-SEP-2023 20:26,   premature atrial complexes are now present   Confirmed by NICHOL MATTSON MD ([id number]) on 10/10/2023 8:52:51 PM      Referred By: ERMD           Confirmed By: NICHOL MATTSON MD          MEDICATIONS GIVEN IN ER    Medications   sodium chloride 0.9 % flush 10 mL (has no administration in time range)   sodium chloride 0.9 % bolus 1,000 mL (0 mL Intravenous Stopped 10/10/23 1921)   sodium chloride 0.9 % bolus 500 mL (500 mL Intravenous New Bag 10/10/23 1821)         MEDICAL DECISION MAKING, PROGRESS, and CONSULTS    All labs have been independently reviewed by me.  All radiology studies have been reviewed by me and the radiologist dictating the report.  All EKG's have been independently viewed and interpreted by me/my attending physician.      Discussion below represents my analysis of pertinent  findings related to patient's condition, differential diagnosis, treatment plan and final disposition.      Differential diagnosis:    Consider cardiac syncope, dehydration/vasovagal syncope/presyncope, occult infection/sepsis, etc.      Additional sources:    - Discussed/ obtained information from independent historians: Paramedics gave report at bedside.    - External (non-ED) record review: I reviewed prior labs to include BUN/creatinine noted in the ED course.    I reviewed the patient's last MRI brain without contrast on 9/18/2023 which showed no acute intracranial abnormalities.    - Chronic or social conditions impacting care: COPD patient to continues to smoke.    - Shared decision making: Patient in full agreement with initial plans for evaluation and treatment and then final plans for discharge to home after discussion of risks and benefits.      Orders placed during this visit:  Orders Placed This Encounter   Procedures    XR Chest 1 View    [place] Draw    Comprehensive Metabolic Panel    Single High Sensitivity Troponin T    Magnesium    Urinalysis With Microscopic If Indicated (No Culture) - Urine, Clean Catch    CBC Auto Differential    Urine Drug Screen - Urine, Clean Catch    Fentanyl, Urine - Urine, Clean Catch    Urinalysis, Microscopic Only - Urine, Clean Catch    NPO Diet NPO Type: Strict NPO    Undress & Gown    Continuous Pulse Oximetry    Vital Signs    Orthostatic Blood Pressure    Orthostatic Vitals    Oxygen Therapy- Nasal Cannula; Titrate 1-6 LPM Per SpO2; 90 - 95%    POC Glucose Once    ECG 12 Lead ED Triage Standing Order; Weak / Dizzy / AMS    Insert Peripheral IV    Fall Precautions    CBC & Differential    Green Top (Gel)    Lavender Top    Gold Top - SST    Gray Top    Light Blue Top         Additional orders considered but not ordered:  CT head.    ED Course:    Consultants:      ED Course as of 10/10/23 2054   Tue Oct 10, 2023   1814 Orthostatics obtained after a liter of normal  saline show no significant drop in blood pressure but a significant increase in heart rate.  The patient was asymptomatic throughout and is feeling well.  She is hoping that she will be able to be discharged home.  I ordered another 500 cc of normal saline.  Awaiting urinalysis as well. [MS]   2030 Feeling much improved after 1500 mL of normal saline.  I spoke with patient in detail about my findings and recommendations.  She is quite comfortable with outpatient follow-up.  She understands the need to return immediately if worse. [MS]   2046 I performed a final check of the patient checking her standing blood pressure.  It is 115/66 with a heart rate of 100.  Patient states that she is feeling much improved and feels safe to go home.  Again I have given her a low threshold for returning to the emergency department. [MS]   2048 BUN(!): 28 [MS]   2048 Creatinine(!): 1.26  BUN and creatinine have increased from 2 weeks ago.  IV hydration will likely improve these numbers and can be rechecked on an outpatient basis. [MS]   2049 Bacteria, UA(!): 4+  Bacteriuria without pyuria.  Patient has no symptoms of urinary tract infection.  Will not treat. [MS]      ED Course User Index  [MS] Yves Morales MD              Shared Decision Making:  After my consideration of clinical presentation and any laboratory/radiology studies obtained, I discussed the findings with the patient/patient representative who is in agreement with the treatment plan and the final disposition.   Risks and benefits of discharge and/or observation/admission were discussed.       AS OF 20:54 EDT VITALS:    BP - 101/60  HR - 88  TEMP - 97.6 øF (36.4 øC) (Oral)  O2 SATS - 100%                  DIAGNOSIS  Final diagnoses:   Orthostatic hypotension   Near syncope         DISPOSITION  DISCHARGE    Patient discharged in stable condition.    Reviewed implications of results, diagnosis, meds, responsibility to follow up, warning signs and symptoms of possible  worsening, potential complications and reasons to return to ER.    Patient/Family voiced understanding of above instructions.    Discussed plan for discharge, as there is no emergent indication for admission.  Pt/family is agreeable and understands need for follow up and possible repeat testing.  Pt/family is aware that discharge does not mean that nothing is wrong but that it indicates no emergency is currently present that requires admission and they must continue care with follow-up as given below or with a physician of their choice.     FOLLOW-UP  Sunni Santoro MD  [address]  [place] [id number] [phone]      NEXT AVAILABLE APPOINTMENT - RECHECK OF CONDITION    [facility] EMERGENCY DEPARTMENT  [address] [address] [phone]    IF YOU HAVE ANY CONCERN OF WORSENING CONDITION         Medication List      No changes were made to your prescriptions during this visit.             Please note that portions of this document were completed with voice recognition software.        Yves Morales MD  10/10/23 [id number]

## 2023-10-11 NOTE — DISCHARGE INSTRUCTIONS
Push fluids.  Aim to have your urine as clear as water.    If you become lightheaded immediately lie down and raise your legs no matter where you are.    Call your doctors office tomorrow morning to schedule close outpatient follow-up.    If you have any concern or worsening condition please return immediately to the emergency department.    Please review the medications you are supposed to be taking according to prior physician recommendations. I have not changed your home medications during this visit. If your discharge instructions indicate that I have changed your home medications, this is not the case, and you should disregard. If you have any questions about the medication you should be taking at home, please call your physician.

## 2023-12-20 ENCOUNTER — OFFICE VISIT (OUTPATIENT)
Dept: NEUROLOGY | Facility: CLINIC | Age: 78
End: 2023-12-20
Payer: MEDICARE

## 2023-12-20 VITALS
DIASTOLIC BLOOD PRESSURE: 62 MMHG | OXYGEN SATURATION: 86 % | WEIGHT: 166 LBS | BODY MASS INDEX: 29.41 KG/M2 | TEMPERATURE: 96.8 F | HEART RATE: 105 BPM | HEIGHT: 63 IN | SYSTOLIC BLOOD PRESSURE: 110 MMHG

## 2023-12-20 DIAGNOSIS — G47.33 OSA (OBSTRUCTIVE SLEEP APNEA): ICD-10-CM

## 2023-12-20 DIAGNOSIS — I48.91 ATRIAL FIBRILLATION, UNSPECIFIED TYPE: ICD-10-CM

## 2023-12-20 DIAGNOSIS — Z72.0 TOBACCO ABUSE: ICD-10-CM

## 2023-12-20 DIAGNOSIS — E78.2 MIXED HYPERLIPIDEMIA: ICD-10-CM

## 2023-12-20 DIAGNOSIS — J44.9 CHRONIC OBSTRUCTIVE PULMONARY DISEASE, UNSPECIFIED COPD TYPE: ICD-10-CM

## 2023-12-20 DIAGNOSIS — I10 HYPERTENSION, UNSPECIFIED TYPE: ICD-10-CM

## 2023-12-20 DIAGNOSIS — Z86.73 HISTORY OF TIA (TRANSIENT ISCHEMIC ATTACK): Primary | ICD-10-CM

## 2023-12-20 RX ORDER — ALBUTEROL SULFATE 90 UG/1
2 POWDER, METERED RESPIRATORY (INHALATION)
COMMUNITY
Start: 2023-07-13

## 2023-12-20 RX ORDER — SERTRALINE HCL 50 MG
TABLET ORAL DAILY
COMMUNITY
Start: 2023-10-04

## 2023-12-20 RX ORDER — BUDESONIDE, GLYCOPYRROLATE, AND FORMOTEROL FUMARATE 160; 9; 4.8 UG/1; UG/1; UG/1
2 AEROSOL, METERED RESPIRATORY (INHALATION)
COMMUNITY
Start: 2023-07-13 | End: 2023-12-27 | Stop reason: SDUPTHER

## 2023-12-20 RX ORDER — TRAZODONE HYDROCHLORIDE 100 MG/1
TABLET ORAL
COMMUNITY
Start: 2023-07-13

## 2023-12-20 RX ORDER — GABAPENTIN 300 MG/1
1 CAPSULE ORAL
COMMUNITY
Start: 2023-07-14

## 2023-12-20 NOTE — PROGRESS NOTES
New Patient Office Visit      Encounter Date: 2023   Patient Name: Tessa Bradley  : 1945   MRN: [id number]   PCP: Sunni Santoro MD    Referring Provider: No ref. provider found     Chief Complaint:    Chief Complaint   Patient presents with    Follow-up       History of Present Illness: Tessa Bradley is a 78 y.o. female who is here today to establish care.  She has a past medical history significant for arthritis, hypertension, hyperlipidemia, atrial fibrillation on chronic Eliquis, Hodgkin's disease status postradiation and chemotherapy, history of tobacco abuse, COVID long haul symptoms, and COPD with oxygen dependence.  She initially presented to [facility] emergency department on  with complaints of left facial droop, left-sided weakness, and altered mental status.  Initial NIH on examination 10.  Blood pressure 131/66.  Initially this patient had EMS called earlier in the day for somnolence as well as altered mentation and she refused medical attention at that time.  EMS was then called again later in the afternoon for her left-sided symptoms alongside altered mental status and she was presented to the emergency department for further workup and evaluation.  MRI completed on workup was negative for any acute or chronic infarct.  She was alert and oriented on reexamination and left facial droop alongside left-sided weakness had resolved.  Suspected that secondary to the patient's symptoms that she suffered a TIA.  Home Eliquis was continued and Lipitor 40 mg daily was added to her home regimen.  CTA head and neck were negative for any large vessel occlusion or significant stenosis.  Echocardiogram was overall unremarkable.     She is seen in clinic today independently. She has been compliant with all of her medications. She reports that she did have a fall over the past few weeks when she was outside with her dogs. She denies any head contact with that. She tells me  has continued left sided sensory deficit in the left arm, but she has a torn shoulder ligament and those issues were present prior to and after her presentation to the hospital. She has no other complaints or concerns to discuss at this time.     Stroke Risk Factors: atrial fibrillation and hyperlipidemia      Subjective      Review of Systems:   Review of Systems   Constitutional:  Positive for fatigue.   HENT:  Negative for trouble swallowing.    Eyes:  Negative for visual disturbance.   Musculoskeletal:  Negative for gait problem.   Neurological:  Positive for weakness. Negative for dizziness, speech difficulty, light-headedness, numbness and headache.       Past Medical History:   Past Medical History:   Diagnosis Date    Atrial fibrillation 2010    Cancer     Cluster headache 1995    COPD (chronic obstructive pulmonary disease)     Difficulty walking 2022    Sciatica and pinched nerves in bsck    Fibromyalgia, primary     Hyperlipidemia     Hypertension     Peripheral neuropathy 2022    TIA (transient ischemic attack) 2010       Past Surgical History:   Past Surgical History:   Procedure Laterality Date    ABDOMINAL SURGERY      HYSTERECTOMY      OOPHORECTOMY         Family History:   Family History   Problem Relation Age of Onset    Breast cancer Mother 67       Social History:   Social History     Socioeconomic History    Marital status:    Tobacco Use    Smoking status: Former     Packs/day: 0.25     Years: 40.00     Additional pack years: 0.00     Total pack years: 10.00     Types: Cigarettes     Passive exposure: Current    Smokeless tobacco: Never   Vaping Use    Vaping Use: Unknown   Substance and Sexual Activity    Alcohol use: Not Currently    Drug use: Never    Sexual activity: Defer       Medications:     Current Outpatient Medications:     albuterol sulfate  (90 Base) MCG/ACT inhaler, Inhale 2 puffs Every 4 (Four) Hours As Needed for Wheezing., Disp: , Rfl:     apixaban (ELIQUIS) 5 MG  "tablet tablet, Take 1 tablet by mouth Every 12 (Twelve) Hours., Disp: , Rfl:     atorvastatin (LIPITOR) 40 MG tablet, Take 1 tablet by mouth Every Night., Disp: 30 tablet, Rfl: 0    Budeson-Glycopyrrol-Formoterol (Breztri Aerosphere) 160-9-4.8 MCG/ACT aerosol inhaler, Inhale 2 puffs., Disp: , Rfl:     cyclobenzaprine (FLEXERIL) 5 MG tablet, Take 1 tablet by mouth 3 (Three) Times a Day As Needed for Muscle Spasms., Disp: , Rfl:     gabapentin (NEURONTIN) 300 MG capsule, Take 1 capsule by mouth., Disp: , Rfl:     ipratropium-albuterol (DUO-NEB) 0.5-2.5 mg/3 ml nebulizer, Take 3 mL by nebulization Every 6 (Six) Hours As Needed for Shortness of Air., Disp: 360 mL, Rfl: 0    loratadine (CLARITIN) 10 MG tablet, Take 1 tablet by mouth Daily., Disp: , Rfl:     Mirabegron ER (MYRBETRIQ) 50 MG tablet sustained-release 24 hour 24 hr tablet, Take 50 mg by mouth Daily., Disp: , Rfl:     ProAir RespiClick 108 (90 Base) MCG/ACT inhaler, Inhale 2 puffs., Disp: , Rfl:     traZODone (DESYREL) 100 MG tablet, Take  by mouth., Disp: , Rfl:     Zoloft 50 MG tablet, Take  by mouth Daily., Disp: , Rfl:     Allergies:   Allergies   Allergen Reactions    Penicillins Anaphylaxis    Latex Other (See Comments)     UNKNOWN    Other Other (See Comments)     \"EGG ALBUMIN\"        Objective     Physical Exam:  Vital Signs:   Vitals:    12/20/23 1340   BP: 110/62   Pulse: 105   Temp: 96.8 °F (36 °C)   SpO2: (!) 86%   Weight: 75.3 kg (166 lb)   Height: 160 cm (62.99\")     Body mass index is 29.41 kg/m².     Physical Exam  Constitutional:       Appearance: Normal appearance.      Comments: Chronically ill appearing    HENT:      Mouth/Throat:      Pharynx: Oropharynx is clear.   Eyes:      Extraocular Movements: Extraocular movements intact.      Pupils: Pupils are equal, round, and reactive to light.   Pulmonary:      Comments: Nasal cannula in place  Musculoskeletal:         General: Normal range of motion.   Skin:     General: Skin is warm and dry. "   Neurological:      General: No focal deficit present.      Mental Status: She is oriented to person, place, and time. Mental status is at baseline.   Psychiatric:         Mood and Affect: Mood normal.         Speech: Speech normal.         Behavior: Behavior normal.         Thought Content: Thought content normal.         Judgment: Judgment normal.         Neurological Exam  Mental Status  Awake, alert and oriented to person, place and time. Oriented to person, place, time and situation. Oriented to person, place, and time. Speech is normal. Language is fluent with no aphasia. Fund of knowledge is appropriate for level of education.    Cranial Nerves  CN II: Visual fields full to confrontation.  CN III, IV, VI: Extraocular movements intact bilaterally. Pupils equal round and reactive to light bilaterally.  CN V:  Right: Facial sensation is normal.  Left: Facial sensation is normal on the left.  CN VII:  Right: There is no facial weakness.  Left: There is no facial weakness.  CN VIII: Hearing appears to be intact bilaterally .  CN IX, X: Palate elevates symmetrically  CN XII: Tongue midline without atrophy or fasciculations.    Motor  Normal muscle bulk throughout. No fasciculations present. Normal muscle tone.  Generalized weakness noted throughout, however no overt unilateral or focal weakness.    Sensory  Light touch is normal in upper and lower extremities.     Coordination  Right: Finger-to-nose normal.Left: Finger-to-nose normal.    Gait  Casual gait: Normal stance. Reduced stride length. Hesitant gait.       Imaging Reviewed:   CT HEAD WO CONTRAST STROKE PROTOCOL     Date of Exam: 9/18/2023 7:41 PM EDT     Indication: Neuro deficit, acute, stroke suspected  Neuro deficit, acute stroke suspected.     Comparison: None available.     Technique: Axial CT images were obtained of the head without contrast administration.  Reconstructed coronal images were also obtained. Automated exposure control and iterative  construction methods were used.     Scan Time: 1937 hours  Results discussed with Mihir De La Rosa at 1947 hours.        Findings:  *No acute intracranial hemorrhage.  *No masses, mass effect, midline shift or hydrocephalus.  *White matter is intact.   *Calvarium is intact.  *Visualized orbits and globes are unremarkable without radiopaque foreign bodies.  *Visualized paranasal sinuses are clear.  *Visualized mastoid air cells are clear.        IMPRESSION:  1.No acute intracranial hemorrhage. Calvarium is intact.              Electronically Signed: Umberto Hutchinson MD    9/18/2023 7:50 PM EDT    Workstation ID: [id number]    CT CEREBRAL PERFUSION W WO CONTRAST     Date of Exam: 9/18/2023 7:53 PM EDT     Indication: Neuro deficit, acute stroke suspected.     Comparison: CT head and CTA head and neck 9/18/2023     Technique: Axial CT images of the brain were obtained prior to and after the administration of 150 Isovue 370 . Core blood volume, core blood flow, mean transit time, and Tmax images were obtained utilizing the Rapid software protocol. A limited CT   angiogram of the head was also performed to measure the blood vessel density.     The radiation dose reduction device was turned on for each scan per the ALARA (As Low as Reasonably Achievable) protocol.        Findings:  *CBF less than 30%: 0 mL.  *Tmax greater than 6 seconds: 0 mL.  *Mismatch volume: 0 mL.  *Mismatch ratio: None.     IMPRESSION:  Impression:  No acute core infarct or ischemia is identified.           Electronically Signed: Umberto Hutchinson MD    9/18/2023 8:27 PM EDT    Workstation ID: [id number]    CT ANGIOGRAM HEAD W AI ANALYSIS OF LVO, CT ANGIOGRAM NECK     Date of Exam: 9/18/2023 7:53 PM EDT     Indication: Neuro deficit, acute stroke suspected  Neuro deficit, acute stroke suspected.     Comparison: CT head without contrast 9/18/2023     Technique: CTA of the head and neck was performed after the uneventful intravenous administration of 115 mL  Isovue-370 . Reconstructed coronal and sagittal images were also obtained. In addition, a 3-D volume rendered image was created for   interpretation. Automated exposure control and iterative reconstruction methods were used.        Findings:     CTA NECK:  *Aortic arch: No aneurysm. No significant stenosis or occlusion of the major arch vessels.  *Left carotid system: No aneurysm, significant stenosis or occlusion.  *Right carotid system: No aneurysm, significant stenosis or occlusion.  *Vertebrobasilar system: The vertebral arteries arise from their respective subclavian arteries. No aneurysm, significant stenosis or occlusion.     CTA HEAD:  *Anterior circulation: No aneurysm, significant stenosis or occlusion.  *Posterior circulation: No aneurysm, significant stenosis or occlusion.  *Anatomic variants: None of significance.  *Venous sinuses: Patent.     IMPRESSION:  1.No hemodynamically significant stenosis, large vessel cut or aneurysms in intracranial circulation  2.No hemodynamically significant stenosis, dissection or aneurysms in extracranial circulation.           Electronically Signed: Umberto Hutchinson MD    9/18/2023 8:25 PM EDT    Workstation ID: [id number]    Narrative & Impression   MRI BRAIN WO CONTRAST     Date of Exam: 9/18/2023 11:45 PM EDT     Indication: Stroke, follow up.     Comparison: CT head, angiography and perfusion 9/18/2023 at 2000 hours.     Technique:  Routine multiplanar/multisequence sequence images of the brain were obtained without contrast administration.        Findings:  There is no diffusion restriction to suggest acute infarct. There is no evidence of acute or chronic intracranial hemorrhage. There are several scattered foci of T2 hyperintensity within the cerebral white matter and within the central avinash. No mass   effect or midline shift. No abnormal extra-axial collections.  The major vascular flow voids appear intact. The basal ganglia, brainstem and cerebellum appear within  normal limits.  Calvarial and superficial soft tissue signal is within normal limits.   There is thinning of the orbital lenses bilaterally. There is mild multifocal paranasal sinus mucosal disease and there are bilateral mastoid effusions. 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 Midline structures are intact.      IMPRESSION:  Impression:  1.No acute intracranial abnormality.  2.Mild chronic small vessel ischemic change.  3.Mild paranasal sinus mucosal disease and bilateral mastoid effusions.           Electronically Signed: Curt Ng MD    9/19/2023 12:07 AM EDT    Workstation ID: [id number]       Laboratory Results:   WBC   Date Value Ref Range Status   10/10/2023 9.90 3.40 - 10.80 10*3/mm3 Final     RBC   Date Value Ref Range Status   10/10/2023 3.79 3.77 - 5.28 10*6/mm3 Final     Hemoglobin   Date Value Ref Range Status   10/10/2023 11.3 (L) 12.0 - 15.9 g/dL Final     Hematocrit   Date Value Ref Range Status   10/10/2023 38.1 34.0 - 46.6 % Final     MCV   Date Value Ref Range Status   10/10/2023 100.5 (H) 79.0 - 97.0 fL Final     MCH   Date Value Ref Range Status   10/10/2023 29.8 26.6 - 33.0 pg Final     MCHC   Date Value Ref Range Status   10/10/2023 29.7 (L) 31.5 - 35.7 g/dL Final     RDW   Date Value Ref Range Status   10/10/2023 14.6 12.3 - 15.4 % Final     RDW-SD   Date Value Ref Range Status   10/10/2023 53.9 37.0 - 54.0 fl Final     MPV   Date Value Ref Range Status   10/10/2023 10.5 6.0 - 12.0 fL Final     Platelets   Date Value Ref Range Status   10/10/2023 179 140 - 450 10*3/mm3 Final     Neutrophil %   Date Value Ref Range Status   10/10/2023 81.1 (H) 42.7 - 76.0 % Final     Lymphocyte %   Date Value Ref Range Status   10/10/2023 9.5 (L) 19.6 - 45.3 % Final     Monocyte %   Date Value Ref Range Status   10/10/2023 8.0 5.0 - 12.0 % Final     Eosinophil %   Date Value Ref Range Status   10/10/2023 0.8 0.3 - 6.2 % Final     Basophil %   Date Value Ref Range Status   10/10/2023 0.1 0.0 - 1.5 % Final      Immature Grans %   Date Value Ref Range Status   10/10/2023 0.5 0.0 - 0.5 % Final     Neutrophils, Absolute   Date Value Ref Range Status   10/10/2023 8.03 (H) 1.70 - 7.00 10*3/mm3 Final     Lymphocytes, Absolute   Date Value Ref Range Status   10/10/2023 0.94 0.70 - 3.10 10*3/mm3 Final     Monocytes, Absolute   Date Value Ref Range Status   10/10/2023 0.79 0.10 - 0.90 10*3/mm3 Final     Eosinophils, Absolute   Date Value Ref Range Status   10/10/2023 0.08 0.00 - 0.40 10*3/mm3 Final     Basophils, Absolute   Date Value Ref Range Status   10/10/2023 0.01 0.00 - 0.20 10*3/mm3 Final     Immature Grans, Absolute   Date Value Ref Range Status   10/10/2023 0.05 0.00 - 0.05 10*3/mm3 Final     nRBC   Date Value Ref Range Status   10/10/2023 0.0 0.0 - 0.2 /100 WBC Final     Lab Results   Component Value Date    GLUCOSE 167 (H) 10/10/2023    BUN 28 (H) 10/10/2023    CREATININE 1.26 (H) 10/10/2023    EGFR 44.3 (L) 10/10/2023    BCR 22.2 10/10/2023    K 4.0 10/10/2023    CO2 35.0 (H) 10/10/2023    CALCIUM 9.1 10/10/2023    ALBUMIN 3.6 10/10/2023    BILITOT 0.4 10/10/2023    AST 16 10/10/2023    ALT 15 10/10/2023     Lipid Panel          9/19/2023    06:07   Lipid Panel   Total Cholesterol 180    Triglycerides 62    HDL Cholesterol 119    VLDL Cholesterol 12    LDL Cholesterol  49    LDL/HDL Ratio 0.41      Hemoglobin A1c: 7.30    Modified [place] Score     MODIFIED [place] SCALE (to be assessed for each patient having history of stroke) []Stroke history but not assessed  [x]0: No symptoms at all  []1: No significant disability despite symptoms  []2: Slight disability  []3: Moderate disability  []4: Moderately severe disability  []5: Severe disability  []6: Death        PHQ-9 Depression Screening  Little interest or pleasure in doing things? 0-->not at all   Feeling down, depressed, or hopeless? 1-->several days   Trouble falling or staying asleep, or sleeping too much?     Feeling tired or having little energy?     Poor  appetite or overeating?     Feeling bad about yourself - or that you are a failure or have let yourself or your family down?     Trouble concentrating on things, such as reading the newspaper or watching television?     Moving or speaking so slowly that other people could have noticed? Or the opposite - being so fidgety or restless that you have been moving around a lot more than usual?     Thoughts that you would be better off dead, or of hurting yourself in some way?     PHQ-9 Total Score 1   If you checked off any problems, how difficult have these problems made it for you to do your work, take care of things at home, or get along with other people?       STOP-Bang Questionnaire :    STOP-Bang Score  Have you been diagnosed with Sleep Apnea?: yes (patient does not tolerate mask)       TL Fall Risk Clinician Key Questions   Have you fallen in the past year?: Yes  Do you feel unsteady with walking?: Yes (sometimes walks with a cane)  Are you worried about falling?: No (no but is aware)     Assessment / Plan      Assessment/Plan:   Diagnoses and all orders for this visit:    1. History of TIA (transient ischemic attack) (Primary)  -Continue home Eliquis  -Continue high intensity statin  -Increase physical activity as tolerated  -Heart healthy diet  -Discussed stroke signs and symptoms that warrant return to the emergency department    2. Atrial fibrillation, unspecified type  -Continue home Eliquis  -Continue management with PCP    3. Mixed hyperlipidemia  -Goal LDL less than 70  -Last LDL 49  -Continue high intensity statin    4. Hypertension, unspecified type  -Goal SBP <140/80  -BP today in clinic 110/62  -Continue follow-up with PCP    5. MAKAYLA (obstructive sleep apnea) && Chronic obstructive pulmonary disease, unspecified COPD type  -Follows with pulmonary  -Does not tolerate face mask  -Continue home oxygen per pulmonary recommendations  -Follow recommendations per their discretion     6.  Tobacco  abuse  -Recommend total cessation    Stroke Plan:    Stroke Education   Blood Pressure control to < 130/80   Goal LDL <70-recommend high dose statins   Serum Glucose < 140   Call 911 for stroke any stroke symptoms    Follow Up:   Return in about 6 months (around 6/20/2024).    ALIZA Gamez  [facility]

## 2023-12-27 ENCOUNTER — OFFICE VISIT (OUTPATIENT)
Dept: PULMONOLOGY | Facility: CLINIC | Age: 78
End: 2023-12-27
Payer: MEDICARE

## 2023-12-27 VITALS
TEMPERATURE: 96.9 F | OXYGEN SATURATION: 88 % | WEIGHT: 167 LBS | DIASTOLIC BLOOD PRESSURE: 72 MMHG | HEART RATE: 92 BPM | BODY MASS INDEX: 29.59 KG/M2 | SYSTOLIC BLOOD PRESSURE: 128 MMHG | HEIGHT: 63 IN

## 2023-12-27 DIAGNOSIS — J96.11 CHRONIC RESPIRATORY FAILURE WITH HYPOXIA AND HYPERCAPNIA: ICD-10-CM

## 2023-12-27 DIAGNOSIS — R91.1 PULMONARY NODULE: ICD-10-CM

## 2023-12-27 DIAGNOSIS — J44.9 CHRONIC OBSTRUCTIVE PULMONARY DISEASE, UNSPECIFIED COPD TYPE: Primary | ICD-10-CM

## 2023-12-27 DIAGNOSIS — J96.12 CHRONIC RESPIRATORY FAILURE WITH HYPOXIA AND HYPERCAPNIA: ICD-10-CM

## 2023-12-27 PROCEDURE — 1160F RVW MEDS BY RX/DR IN RCRD: CPT | Performed by: NURSE PRACTITIONER

## 2023-12-27 PROCEDURE — 3078F DIAST BP <80 MM HG: CPT | Performed by: NURSE PRACTITIONER

## 2023-12-27 PROCEDURE — 3074F SYST BP LT 130 MM HG: CPT | Performed by: NURSE PRACTITIONER

## 2023-12-27 PROCEDURE — 99214 OFFICE O/P EST MOD 30 MIN: CPT | Performed by: NURSE PRACTITIONER

## 2023-12-27 PROCEDURE — 1159F MED LIST DOCD IN RCRD: CPT | Performed by: NURSE PRACTITIONER

## 2023-12-27 RX ORDER — BUDESONIDE, GLYCOPYRROLATE, AND FORMOTEROL FUMARATE 160; 9; 4.8 UG/1; UG/1; UG/1
2 AEROSOL, METERED RESPIRATORY (INHALATION) 2 TIMES DAILY
Qty: 3 EACH | Refills: 3 | Status: SHIPPED | OUTPATIENT
Start: 2023-12-27

## 2023-12-27 NOTE — PROGRESS NOTES
"[facility] Pulmonary Follow up      Chief Complaint  Follow-up    Subjective          Tessa Bradley presents to [facility] for follow-up on her COPD with chronic respiratory failure.    I saw her last in September after a hospitalization in August for a COPD exacerbation.  She had moderate to severe obstruction on her PFTs.    Since then she has stopped smoking.  Says she actually feels like she is doing much better now.  She continues on her Breztri twice daily, currently she is having some trouble getting it from her pharmacy.  She occasionally uses her rescue inhaler but not routinely.    She has been wearing her oxygen at night and sometimes during the day.  She forgot to bring it with her today but does usually have it.        Objective     Vital Signs:   /72   Pulse 92   Temp 96.9 °F (36.1 °C) (Infrared)   Ht 160 cm (62.99\")   Wt 75.8 kg (167 lb)   SpO2 (!) 88% Comment: room air at rest  BMI 29.59 kg/m²       Immunization History   Administered Date(s) Administered    COVID-19 (PFIZER) BIVALENT 12+YRS 09/30/2022    COVID-19 (PFIZER) Purple Cap Monovalent 01/21/2021, 02/21/2021, 08/21/2021    Pneumococcal Conjugate 13-Valent (PCV13) 01/01/2015, 09/15/2015    Pneumococcal Conjugate 20-Valent (PCV20) 09/11/2023    Pneumococcal Polysaccharide (PPSV23) 01/01/2014, 09/02/2014    Shingrix 06/16/2021, 09/30/2022    Tdap 09/02/2014       Physical Exam     Result Review :                         Assessment and Plan    Problem List Items Addressed This Visit          Pulmonary and Pneumonias    COPD (chronic obstructive pulmonary disease) - Primary    Relevant Medications    Budeson-Glycopyrrol-Formoterol (Breztri Aerosphere) 160-9-4.8 MCG/ACT aerosol inhaler    Chronic respiratory failure with hypoxia and hypercapnia    Pulmonary nodule    Overview     6 mm right upper lobe         Relevant Medications    Budeson-Glycopyrrol-Formoterol (Breztri Aerosphere) " 160-9-4.8 MCG/ACT aerosol inhaler       Overall she is doing much better since she quit smoking.  She feels like her breathing and cough is improved.  Continue on her Breztri twice daily, gave her sample day and sent that back into her mail order pharmacy.    Continue on her oxygen 2 L with activity and at night.  Will follow-up on a 6-minute walk test on her next visit.    She will be due a follow-up CT scan in February for a 6-month recheck on that 6 mm nodule that was found in August.    Follow Up     Return in about 3 months (around 3/27/2024).  Patient was given instructions and counseling regarding her condition or for health maintenance advice. Please see specific information pulled into the AVS if appropriate.         Moderate level of Medical Decision Making complexity based on 2 or more chronic stable illnesses and prescription drug management.    ALIZA De, ACNP  [facility] Pulmonary Critical Care Medicine  Electronically signed

## 2023-12-31 PROBLEM — Z72.0 TOBACCO ABUSE: Status: ACTIVE | Noted: 2023-12-31

## 2023-12-31 PROBLEM — Z86.73 HISTORY OF TIA (TRANSIENT ISCHEMIC ATTACK): Status: ACTIVE | Noted: 2023-12-31

## 2024-03-27 ENCOUNTER — OFFICE VISIT (OUTPATIENT)
Dept: PULMONOLOGY | Facility: CLINIC | Age: 79
End: 2024-03-27
Payer: MEDICARE

## 2024-03-27 VITALS
HEIGHT: 63 IN | SYSTOLIC BLOOD PRESSURE: 124 MMHG | OXYGEN SATURATION: 91 % | HEART RATE: 60 BPM | TEMPERATURE: 97.8 F | WEIGHT: 161.5 LBS | BODY MASS INDEX: 28.62 KG/M2 | DIASTOLIC BLOOD PRESSURE: 82 MMHG

## 2024-03-27 DIAGNOSIS — R91.1 PULMONARY NODULE: ICD-10-CM

## 2024-03-27 DIAGNOSIS — J44.9 CHRONIC OBSTRUCTIVE PULMONARY DISEASE, UNSPECIFIED COPD TYPE: Primary | ICD-10-CM

## 2024-03-27 DIAGNOSIS — J96.11 CHRONIC RESPIRATORY FAILURE WITH HYPOXIA AND HYPERCAPNIA: ICD-10-CM

## 2024-03-27 DIAGNOSIS — J96.12 CHRONIC RESPIRATORY FAILURE WITH HYPOXIA AND HYPERCAPNIA: ICD-10-CM

## 2024-03-27 PROCEDURE — 3079F DIAST BP 80-89 MM HG: CPT | Performed by: NURSE PRACTITIONER

## 2024-03-27 PROCEDURE — 1160F RVW MEDS BY RX/DR IN RCRD: CPT | Performed by: NURSE PRACTITIONER

## 2024-03-27 PROCEDURE — 99214 OFFICE O/P EST MOD 30 MIN: CPT | Performed by: NURSE PRACTITIONER

## 2024-03-27 PROCEDURE — 1159F MED LIST DOCD IN RCRD: CPT | Performed by: NURSE PRACTITIONER

## 2024-03-27 PROCEDURE — 3074F SYST BP LT 130 MM HG: CPT | Performed by: NURSE PRACTITIONER

## 2024-03-27 RX ORDER — TOLTERODINE 2 MG/1
CAPSULE, EXTENDED RELEASE ORAL
COMMUNITY
Start: 2024-03-08

## 2024-03-27 NOTE — PROGRESS NOTES
"[facility] Pulmonary Follow up      Chief Complaint  Chronic obstructive pulmonary disease, unspecified COPD type    Subjective          Tessa Bradley presents to [facility] for follow-up on her COPD as well as abnormal CT scan of the chest.    I initially saw her after a hospitalization in September of last year.  She did have a abnormal CT scan with a 6 mm nodule and was due a follow-up in February.    [facility] and had a bronchoscopy and then a VATS for a enlarging 8 mm nodule.  It was found to be adenocarcinoma.  She is following up with oncology for \"strange looking cells \"around the area that was removed.    Her COPD has been stable.  She continues on her Breztri twice daily.  She denies any significant cough or sputum production.    She has no chronic dyspnea with activity and uses oxygen 2 L as needed and at night.    Objective     Vital Signs:   /82   Pulse 60   Temp 97.8 °F (36.6 °C) (Infrared)   Ht 160 cm (62.99\")   Wt 73.3 kg (161 lb 8 oz)   SpO2 91% Comment: resting, room air  BMI 28.62 kg/m²       Immunization History   Administered Date(s) Administered    COVID-19 (PFIZER) BIVALENT 12+YRS 09/30/2022    COVID-19 (PFIZER) Purple Cap Monovalent 01/21/2021, 02/21/2021, 08/21/2021    Pneumococcal Conjugate 13-Valent (PCV13) 01/01/2015, 09/15/2015    Pneumococcal Conjugate 20-Valent (PCV20) 09/11/2023    Pneumococcal Polysaccharide (PPSV23) 01/01/2014, 09/02/2014    Shingrix 06/16/2021, 09/30/2022    Tdap 09/02/2014       Physical Exam  Vitals reviewed.   Constitutional:       Appearance: She is well-developed.   HENT:      Head: Normocephalic and atraumatic.   Eyes:      Pupils: Pupils are equal, round, and reactive to light.   Cardiovascular:      Rate and Rhythm: Normal rate and regular rhythm.      Heart sounds: No murmur heard.  Pulmonary:      Effort: Pulmonary effort is " normal. No respiratory distress.      Breath sounds: Normal breath sounds. No wheezing or rales.   Abdominal:      General: Bowel sounds are normal. There is no distension.      Palpations: Abdomen is soft.   Musculoskeletal:         General: Normal range of motion.      Cervical back: Normal range of motion and neck supple.   Skin:     General: Skin is warm and dry.      Findings: No erythema.   Neurological:      Mental Status: She is alert and oriented to person, place, and time.   Psychiatric:         Behavior: Behavior normal.          Result Review :                         Assessment and Plan    Problem List Items Addressed This Visit          Pulmonary and Pneumonias    COPD (chronic obstructive pulmonary disease) - Primary    Chronic respiratory failure with hypoxia and hypercapnia    Pulmonary nodule    Overview     [facility]            [facility] and ended up with a VATS and removal of the nodule.  She is now following up with oncology for any recommendations of adjuvant therapy.    Her COPD is stable on the Breztri.  We did discuss follow-up here in the office versus at the [facility].  [facility], so I recommended she follow-up with her pulmonologist or even her primary care to continue on her Breztri.    She can follow-up here in the office as needed with any issues or difficulties with her breathing.    Follow Up     Return if symptoms worsen or fail to improve.  Patient was given instructions and counseling regarding her condition or for health maintenance advice. Please see specific information pulled into the AVS if appropriate.       ALIZA De, ACNP  [facility]  Electronically signed

## 2024-07-12 ENCOUNTER — HOSPITAL ENCOUNTER (INPATIENT)
Facility: HOSPITAL | Age: 79
LOS: 2 days | Discharge: HOME | End: 2024-07-15
Attending: EMERGENCY MEDICINE | Admitting: INTERNAL MEDICINE
Payer: MEDICARE

## 2024-07-12 ENCOUNTER — APPOINTMENT (OUTPATIENT)
Dept: GENERAL RADIOLOGY | Facility: HOSPITAL | Age: 79
End: 2024-07-12
Payer: MEDICARE

## 2024-07-12 DIAGNOSIS — R09.02 HYPOXIA: ICD-10-CM

## 2024-07-12 DIAGNOSIS — J44.1 COPD WITH ACUTE EXACERBATION: Primary | ICD-10-CM

## 2024-07-12 PROBLEM — N32.81 OVERACTIVE BLADDER: Status: ACTIVE | Noted: 2024-07-12

## 2024-07-12 PROBLEM — N18.32 STAGE 3B CHRONIC KIDNEY DISEASE: Status: ACTIVE | Noted: 2024-07-12

## 2024-07-12 PROBLEM — J96.21 ACUTE ON CHRONIC RESPIRATORY FAILURE WITH HYPOXIA: Status: ACTIVE | Noted: 2024-07-12

## 2024-07-12 PROBLEM — Z85.71 HISTORY OF HODGKIN'S DISEASE: Status: ACTIVE | Noted: 2024-07-12

## 2024-07-12 PROBLEM — J96.21 ACUTE ON CHRONIC HYPOXIC RESPIRATORY FAILURE: Status: ACTIVE | Noted: 2024-07-12

## 2024-07-12 LAB
ALBUMIN SERPL-MCNC: 3.8 G/DL (ref 3.5–5.2)
ALBUMIN/GLOB SERPL: 1.2 G/DL
ALP SERPL-CCNC: 98 U/L (ref 39–117)
ALT SERPL W P-5'-P-CCNC: 7 U/L (ref 1–33)
ANION GAP SERPL CALCULATED.3IONS-SCNC: 6 MMOL/L (ref 5–15)
AST SERPL-CCNC: 13 U/L (ref 1–32)
B PARAPERT DNA SPEC QL NAA+PROBE: NOT DETECTED
B PERT DNA SPEC QL NAA+PROBE: NOT DETECTED
BASOPHILS # BLD AUTO: 0.03 10*3/MM3 (ref 0–0.2)
BASOPHILS NFR BLD AUTO: 0.3 % (ref 0–1.5)
BILIRUB SERPL-MCNC: 0.4 MG/DL (ref 0–1.2)
BUN SERPL-MCNC: 18 MG/DL (ref 8–23)
BUN/CREAT SERPL: 13.7 (ref 7–25)
C PNEUM DNA NPH QL NAA+NON-PROBE: NOT DETECTED
CALCIUM SPEC-SCNC: 9.1 MG/DL (ref 8.6–10.5)
CHLORIDE SERPL-SCNC: 99 MMOL/L (ref 98–107)
CO2 SERPL-SCNC: 37 MMOL/L (ref 22–29)
CREAT SERPL-MCNC: 1.31 MG/DL (ref 0.57–1)
D-LACTATE SERPL-SCNC: 0.9 MMOL/L (ref 0.5–2)
DEPRECATED RDW RBC AUTO: 54.1 FL (ref 37–54)
EGFRCR SERPLBLD CKD-EPI 2021: 42.3 ML/MIN/1.73
EOSINOPHIL # BLD AUTO: 0.21 10*3/MM3 (ref 0–0.4)
EOSINOPHIL NFR BLD AUTO: 2.2 % (ref 0.3–6.2)
ERYTHROCYTE [DISTWIDTH] IN BLOOD BY AUTOMATED COUNT: 14 % (ref 12.3–15.4)
FLUAV SUBTYP SPEC NAA+PROBE: NOT DETECTED
FLUBV RNA ISLT QL NAA+PROBE: NOT DETECTED
GLOBULIN UR ELPH-MCNC: 3.1 GM/DL
GLUCOSE SERPL-MCNC: 123 MG/DL (ref 65–99)
HADV DNA SPEC NAA+PROBE: NOT DETECTED
HBA1C MFR BLD: 5.8 % (ref 4.8–5.6)
HCOV 229E RNA SPEC QL NAA+PROBE: NOT DETECTED
HCOV HKU1 RNA SPEC QL NAA+PROBE: NOT DETECTED
HCOV NL63 RNA SPEC QL NAA+PROBE: NOT DETECTED
HCOV OC43 RNA SPEC QL NAA+PROBE: NOT DETECTED
HCT VFR BLD AUTO: 45.4 % (ref 34–46.6)
HGB BLD-MCNC: 13.2 G/DL (ref 12–15.9)
HMPV RNA NPH QL NAA+NON-PROBE: NOT DETECTED
HOLD SPECIMEN: NORMAL
HPIV1 RNA ISLT QL NAA+PROBE: NOT DETECTED
HPIV2 RNA SPEC QL NAA+PROBE: NOT DETECTED
HPIV3 RNA NPH QL NAA+PROBE: NOT DETECTED
HPIV4 P GENE NPH QL NAA+PROBE: NOT DETECTED
IMM GRANULOCYTES # BLD AUTO: 0.04 10*3/MM3 (ref 0–0.05)
IMM GRANULOCYTES NFR BLD AUTO: 0.4 % (ref 0–0.5)
LYMPHOCYTES # BLD AUTO: 1.64 10*3/MM3 (ref 0.7–3.1)
LYMPHOCYTES NFR BLD AUTO: 17 % (ref 19.6–45.3)
M PNEUMO IGG SER IA-ACNC: NOT DETECTED
MCH RBC QN AUTO: 30.3 PG (ref 26.6–33)
MCHC RBC AUTO-ENTMCNC: 29.1 G/DL (ref 31.5–35.7)
MCV RBC AUTO: 104.1 FL (ref 79–97)
MONOCYTES # BLD AUTO: 0.9 10*3/MM3 (ref 0.1–0.9)
MONOCYTES NFR BLD AUTO: 9.3 % (ref 5–12)
NEUTROPHILS NFR BLD AUTO: 6.84 10*3/MM3 (ref 1.7–7)
NEUTROPHILS NFR BLD AUTO: 70.8 % (ref 42.7–76)
NRBC BLD AUTO-RTO: 0 /100 WBC (ref 0–0.2)
NT-PROBNP SERPL-MCNC: 809.9 PG/ML (ref 0–1800)
PLATELET # BLD AUTO: 227 10*3/MM3 (ref 140–450)
PMV BLD AUTO: 10.5 FL (ref 6–12)
POTASSIUM SERPL-SCNC: 5.1 MMOL/L (ref 3.5–5.2)
PROT SERPL-MCNC: 6.9 G/DL (ref 6–8.5)
RBC # BLD AUTO: 4.36 10*6/MM3 (ref 3.77–5.28)
RHINOVIRUS RNA SPEC NAA+PROBE: NOT DETECTED
RSV RNA NPH QL NAA+NON-PROBE: NOT DETECTED
SARS-COV-2 RNA NPH QL NAA+NON-PROBE: NOT DETECTED
SODIUM SERPL-SCNC: 142 MMOL/L (ref 136–145)
TROPONIN T SERPL HS-MCNC: 21 NG/L
TSH SERPL DL<=0.05 MIU/L-ACNC: 1.76 UIU/ML (ref 0.27–4.2)
WBC NRBC COR # BLD AUTO: 9.66 10*3/MM3 (ref 3.4–10.8)
WHOLE BLOOD HOLD COAG: NORMAL
WHOLE BLOOD HOLD SPECIMEN: NORMAL

## 2024-07-12 PROCEDURE — 82607 VITAMIN B-12: CPT | Performed by: NURSE PRACTITIONER

## 2024-07-12 PROCEDURE — 25010000002 AZITHROMYCIN PER 500 MG: Performed by: NURSE PRACTITIONER

## 2024-07-12 PROCEDURE — 71045 X-RAY EXAM CHEST 1 VIEW: CPT

## 2024-07-12 PROCEDURE — 94640 AIRWAY INHALATION TREATMENT: CPT

## 2024-07-12 PROCEDURE — 94799 UNLISTED PULMONARY SVC/PX: CPT

## 2024-07-12 PROCEDURE — 85025 COMPLETE CBC W/AUTO DIFF WBC: CPT

## 2024-07-12 PROCEDURE — 63710000001 PREDNISONE PER 1 MG: Performed by: EMERGENCY MEDICINE

## 2024-07-12 PROCEDURE — 83605 ASSAY OF LACTIC ACID: CPT

## 2024-07-12 PROCEDURE — 99222 1ST HOSP IP/OBS MODERATE 55: CPT | Performed by: NURSE PRACTITIONER

## 2024-07-12 PROCEDURE — 25810000003 SODIUM CHLORIDE 0.9 % SOLUTION 250 ML FLEX CONT: Performed by: NURSE PRACTITIONER

## 2024-07-12 PROCEDURE — 84484 ASSAY OF TROPONIN QUANT: CPT

## 2024-07-12 PROCEDURE — 36415 COLL VENOUS BLD VENIPUNCTURE: CPT

## 2024-07-12 PROCEDURE — 93005 ELECTROCARDIOGRAM TRACING: CPT

## 2024-07-12 PROCEDURE — 0202U NFCT DS 22 TRGT SARS-COV-2: CPT | Performed by: EMERGENCY MEDICINE

## 2024-07-12 PROCEDURE — 25010000002 CEFTRIAXONE PER 250 MG: Performed by: NURSE PRACTITIONER

## 2024-07-12 PROCEDURE — 87205 SMEAR GRAM STAIN: CPT | Performed by: NURSE PRACTITIONER

## 2024-07-12 PROCEDURE — G0378 HOSPITAL OBSERVATION PER HR: HCPCS

## 2024-07-12 PROCEDURE — 80053 COMPREHEN METABOLIC PANEL: CPT

## 2024-07-12 PROCEDURE — 94761 N-INVAS EAR/PLS OXIMETRY MLT: CPT

## 2024-07-12 PROCEDURE — 99285 EMERGENCY DEPT VISIT HI MDM: CPT

## 2024-07-12 PROCEDURE — 82746 ASSAY OF FOLIC ACID SERUM: CPT | Performed by: NURSE PRACTITIONER

## 2024-07-12 PROCEDURE — 83036 HEMOGLOBIN GLYCOSYLATED A1C: CPT | Performed by: NURSE PRACTITIONER

## 2024-07-12 PROCEDURE — 87040 BLOOD CULTURE FOR BACTERIA: CPT

## 2024-07-12 PROCEDURE — 83880 ASSAY OF NATRIURETIC PEPTIDE: CPT

## 2024-07-12 PROCEDURE — 84443 ASSAY THYROID STIM HORMONE: CPT | Performed by: NURSE PRACTITIONER

## 2024-07-12 RX ORDER — SERTRALINE HYDROCHLORIDE 100 MG/1
100 TABLET, FILM COATED ORAL DAILY
COMMUNITY
Start: 2024-06-12

## 2024-07-12 RX ORDER — NITROGLYCERIN 0.4 MG/1
0.4 TABLET SUBLINGUAL
Status: DISCONTINUED | OUTPATIENT
Start: 2024-07-12 | End: 2024-07-15 | Stop reason: HOSPADM

## 2024-07-12 RX ORDER — AMOXICILLIN 250 MG
2 CAPSULE ORAL 2 TIMES DAILY PRN
Status: DISCONTINUED | OUTPATIENT
Start: 2024-07-12 | End: 2024-07-15 | Stop reason: HOSPADM

## 2024-07-12 RX ORDER — BISACODYL 5 MG/1
5 TABLET, DELAYED RELEASE ORAL DAILY PRN
Status: DISCONTINUED | OUTPATIENT
Start: 2024-07-12 | End: 2024-07-15 | Stop reason: HOSPADM

## 2024-07-12 RX ORDER — ONDANSETRON 4 MG/1
4 TABLET, ORALLY DISINTEGRATING ORAL EVERY 6 HOURS PRN
Status: DISCONTINUED | OUTPATIENT
Start: 2024-07-12 | End: 2024-07-15 | Stop reason: HOSPADM

## 2024-07-12 RX ORDER — IPRATROPIUM BROMIDE AND ALBUTEROL SULFATE 2.5; .5 MG/3ML; MG/3ML
3 SOLUTION RESPIRATORY (INHALATION) ONCE
Status: COMPLETED | OUTPATIENT
Start: 2024-07-12 | End: 2024-07-12

## 2024-07-12 RX ORDER — ACETAMINOPHEN 650 MG/1
650 SUPPOSITORY RECTAL EVERY 4 HOURS PRN
Status: DISCONTINUED | OUTPATIENT
Start: 2024-07-12 | End: 2024-07-15 | Stop reason: HOSPADM

## 2024-07-12 RX ORDER — ATORVASTATIN CALCIUM 40 MG/1
40 TABLET, FILM COATED ORAL NIGHTLY
Status: DISCONTINUED | OUTPATIENT
Start: 2024-07-12 | End: 2024-07-15 | Stop reason: HOSPADM

## 2024-07-12 RX ORDER — ONDANSETRON 2 MG/ML
4 INJECTION INTRAMUSCULAR; INTRAVENOUS EVERY 6 HOURS PRN
Status: DISCONTINUED | OUTPATIENT
Start: 2024-07-12 | End: 2024-07-15 | Stop reason: HOSPADM

## 2024-07-12 RX ORDER — IPRATROPIUM BROMIDE AND ALBUTEROL SULFATE 2.5; .5 MG/3ML; MG/3ML
3 SOLUTION RESPIRATORY (INHALATION)
Status: DISCONTINUED | OUTPATIENT
Start: 2024-07-13 | End: 2024-07-15 | Stop reason: HOSPADM

## 2024-07-12 RX ORDER — OXYBUTYNIN CHLORIDE 10 MG/1
10 TABLET, EXTENDED RELEASE ORAL DAILY
Status: DISCONTINUED | OUTPATIENT
Start: 2024-07-13 | End: 2024-07-15 | Stop reason: HOSPADM

## 2024-07-12 RX ORDER — SODIUM CHLORIDE 0.9 % (FLUSH) 0.9 %
10 SYRINGE (ML) INJECTION EVERY 12 HOURS SCHEDULED
Status: DISCONTINUED | OUTPATIENT
Start: 2024-07-12 | End: 2024-07-15 | Stop reason: HOSPADM

## 2024-07-12 RX ORDER — SODIUM CHLORIDE 9 MG/ML
40 INJECTION, SOLUTION INTRAVENOUS AS NEEDED
Status: DISCONTINUED | OUTPATIENT
Start: 2024-07-12 | End: 2024-07-15 | Stop reason: HOSPADM

## 2024-07-12 RX ORDER — BENZONATATE 100 MG/1
200 CAPSULE ORAL 3 TIMES DAILY PRN
Status: DISCONTINUED | OUTPATIENT
Start: 2024-07-12 | End: 2024-07-15 | Stop reason: HOSPADM

## 2024-07-12 RX ORDER — SODIUM CHLORIDE 0.9 % (FLUSH) 0.9 %
10 SYRINGE (ML) INJECTION AS NEEDED
Status: DISCONTINUED | OUTPATIENT
Start: 2024-07-12 | End: 2024-07-15 | Stop reason: HOSPADM

## 2024-07-12 RX ORDER — BISACODYL 10 MG
10 SUPPOSITORY, RECTAL RECTAL DAILY PRN
Status: DISCONTINUED | OUTPATIENT
Start: 2024-07-12 | End: 2024-07-15 | Stop reason: HOSPADM

## 2024-07-12 RX ORDER — SODIUM CHLORIDE 450 MG/100ML
100 INJECTION, SOLUTION INTRAVENOUS CONTINUOUS
Status: ACTIVE | OUTPATIENT
Start: 2024-07-12 | End: 2024-07-13

## 2024-07-12 RX ORDER — METHYLPREDNISOLONE SODIUM SUCCINATE 125 MG/2ML
62.5 INJECTION, POWDER, LYOPHILIZED, FOR SOLUTION INTRAMUSCULAR; INTRAVENOUS DAILY
Status: DISCONTINUED | OUTPATIENT
Start: 2024-07-13 | End: 2024-07-14

## 2024-07-12 RX ORDER — GUAIFENESIN 600 MG/1
600 TABLET, EXTENDED RELEASE ORAL EVERY 12 HOURS SCHEDULED
Status: DISCONTINUED | OUTPATIENT
Start: 2024-07-12 | End: 2024-07-15 | Stop reason: HOSPADM

## 2024-07-12 RX ORDER — CETIRIZINE HYDROCHLORIDE 10 MG/1
10 TABLET ORAL DAILY
Status: DISCONTINUED | OUTPATIENT
Start: 2024-07-13 | End: 2024-07-15 | Stop reason: HOSPADM

## 2024-07-12 RX ORDER — PREDNISONE 20 MG/1
60 TABLET ORAL ONCE
Status: COMPLETED | OUTPATIENT
Start: 2024-07-12 | End: 2024-07-12

## 2024-07-12 RX ORDER — POLYETHYLENE GLYCOL 3350 17 G/17G
17 POWDER, FOR SOLUTION ORAL DAILY PRN
Status: DISCONTINUED | OUTPATIENT
Start: 2024-07-12 | End: 2024-07-15 | Stop reason: HOSPADM

## 2024-07-12 RX ORDER — TRAZODONE HYDROCHLORIDE 50 MG/1
50 TABLET ORAL NIGHTLY PRN
Status: DISCONTINUED | OUTPATIENT
Start: 2024-07-12 | End: 2024-07-13

## 2024-07-12 RX ORDER — SODIUM CHLORIDE 0.9 % (FLUSH) 0.9 %
10 SYRINGE (ML) INJECTION AS NEEDED
Status: DISCONTINUED | OUTPATIENT
Start: 2024-07-12 | End: 2024-07-12

## 2024-07-12 RX ORDER — ACETAMINOPHEN 160 MG/5ML
650 SOLUTION ORAL EVERY 4 HOURS PRN
Status: DISCONTINUED | OUTPATIENT
Start: 2024-07-12 | End: 2024-07-15 | Stop reason: HOSPADM

## 2024-07-12 RX ORDER — ACETAMINOPHEN 500 MG
1000 TABLET ORAL ONCE
Status: COMPLETED | OUTPATIENT
Start: 2024-07-12 | End: 2024-07-12

## 2024-07-12 RX ORDER — ACETAMINOPHEN 325 MG/1
650 TABLET ORAL EVERY 4 HOURS PRN
Status: DISCONTINUED | OUTPATIENT
Start: 2024-07-12 | End: 2024-07-15 | Stop reason: HOSPADM

## 2024-07-12 RX ORDER — GABAPENTIN 300 MG/1
300 CAPSULE ORAL NIGHTLY
Status: DISCONTINUED | OUTPATIENT
Start: 2024-07-12 | End: 2024-07-13

## 2024-07-12 RX ORDER — IPRATROPIUM BROMIDE AND ALBUTEROL SULFATE 2.5; .5 MG/3ML; MG/3ML
3 SOLUTION RESPIRATORY (INHALATION) EVERY 6 HOURS PRN
Status: DISCONTINUED | OUTPATIENT
Start: 2024-07-12 | End: 2024-07-15 | Stop reason: HOSPADM

## 2024-07-12 RX ORDER — SERTRALINE HYDROCHLORIDE 100 MG/1
100 TABLET, FILM COATED ORAL DAILY
Status: DISCONTINUED | OUTPATIENT
Start: 2024-07-13 | End: 2024-07-15 | Stop reason: HOSPADM

## 2024-07-12 RX ORDER — CYCLOBENZAPRINE HCL 10 MG
5 TABLET ORAL 3 TIMES DAILY PRN
Status: DISCONTINUED | OUTPATIENT
Start: 2024-07-12 | End: 2024-07-13

## 2024-07-12 RX ADMIN — PREDNISONE 60 MG: 20 TABLET ORAL at 15:11

## 2024-07-12 RX ADMIN — AZITHROMYCIN DIHYDRATE 500 MG: 500 INJECTION, POWDER, LYOPHILIZED, FOR SOLUTION INTRAVENOUS at 22:58

## 2024-07-12 RX ADMIN — SODIUM CHLORIDE 100 ML/HR: 4.5 INJECTION, SOLUTION INTRAVENOUS at 22:56

## 2024-07-12 RX ADMIN — IPRATROPIUM BROMIDE AND ALBUTEROL SULFATE 3 ML: 2.5; .5 SOLUTION RESPIRATORY (INHALATION) at 15:22

## 2024-07-12 RX ADMIN — APIXABAN 5 MG: 5 TABLET, FILM COATED ORAL at 22:57

## 2024-07-12 RX ADMIN — SODIUM CHLORIDE 1000 MG: 900 INJECTION INTRAVENOUS at 22:58

## 2024-07-12 RX ADMIN — ATORVASTATIN CALCIUM 40 MG: 40 TABLET, FILM COATED ORAL at 22:57

## 2024-07-12 RX ADMIN — GABAPENTIN 300 MG: 300 CAPSULE ORAL at 22:57

## 2024-07-12 RX ADMIN — TRAZODONE HYDROCHLORIDE 50 MG: 50 TABLET ORAL at 22:57

## 2024-07-12 RX ADMIN — IPRATROPIUM BROMIDE AND ALBUTEROL SULFATE 3 ML: 2.5; .5 SOLUTION RESPIRATORY (INHALATION) at 16:41

## 2024-07-12 RX ADMIN — ACETAMINOPHEN 1000 MG: 500 TABLET ORAL at 15:11

## 2024-07-12 RX ADMIN — ACETAMINOPHEN 650 MG: 325 TABLET ORAL at 22:57

## 2024-07-12 RX ADMIN — GUAIFENESIN 600 MG: 600 TABLET, EXTENDED RELEASE ORAL at 22:57

## 2024-07-12 NOTE — ED NOTES
Tessa Bradley    Nursing Report ED to Floor:  Mental status: A/Ox4  Ambulatory status: Assist x3  Oxygen Therapy:  4L NC (Baseline 2L NC)   Cardiac Rhythm: NSR to Sinus tach   Admitted from: Home   Safety Concerns:  Fall risk   Social Issues: Family at bedside   ED Room #:  05    ED Nurse Phone Extension - [phone] or may call [phone].      HPI:   Chief Complaint   Patient presents with    Shortness of Breath       Past Medical History:  Past Medical History:   Diagnosis Date    Atrial fibrillation 2010    Cancer     Cluster headache 1995    COPD (chronic obstructive pulmonary disease)     Difficulty walking 2022    Sciatica and pinched nerves in bsck    Fibromyalgia, primary     Hyperlipidemia     Hypertension     Peripheral neuropathy 2022    TIA (transient ischemic attack) 2010        Past Surgical History:  Past Surgical History:   Procedure Laterality Date    ABDOMINAL SURGERY      HYSTERECTOMY      OOPHORECTOMY          Admitting Doctor:   Janessa Marley DO    Consulting Provider(s):  Consults       No orders found from 6/13/2024 to 7/13/2024.             Admitting Diagnosis:   The primary encounter diagnosis was COPD with acute exacerbation. A diagnosis of Hypoxia was also pertinent to this visit.    Most Recent Vitals:   Vitals:    07/12/24 1851 07/12/24 1852 07/12/24 1858 07/12/24 1913   BP:   104/76    Pulse: 93 96 91 94   Resp:       Temp:       TempSrc:       SpO2: (!) 82% (!) 86% 90% 90%   Weight:       Height:           Active LDAs/IV Access:   Lines, Drains & Airways       Active LDAs       Name Placement date Placement time Site Days    Peripheral IV 07/12/24 1408 Anterior;Left Forearm 07/12/24  1408  Forearm  less than 1                    Labs (abnormal labs have a star):   Labs Reviewed   COMPREHENSIVE METABOLIC PANEL - Abnormal; Notable for the following components:       Result Value    Glucose 123 (*)     Creatinine 1.31 (*)     CO2 37.0 (*)     eGFR 42.3 (*)     All other components within  normal limits    Narrative:     GFR Normal >60  Chronic Kidney Disease <60  Kidney Failure <15    The GFR formula is only valid for adults with stable renal function between ages 18 and 70.   CBC WITH AUTO DIFFERENTIAL - Abnormal; Notable for the following components:    .1 (*)     MCHC 29.1 (*)     RDW-SD 54.1 (*)     Lymphocyte % 17.0 (*)     All other components within normal limits   SINGLE HS TROPONIN T - Abnormal; Notable for the following components:    HS Troponin T 21 (*)     All other components within normal limits    Narrative:     High Sensitive Troponin T Reference Range:  <14.0 ng/L- Negative Female for AMI  <22.0 ng/L- Negative Male for AMI  >=14 - Abnormal Female indicating possible myocardial injury.  >=22 - Abnormal Male indicating possible myocardial injury.   Clinicians would have to utilize clinical acumen, EKG, Troponin, and serial changes to determine if it is an Acute Myocardial Infarction or myocardial injury due to an underlying chronic condition.        RESPIRATORY PANEL PCR W/ COVID-19 (SARS-COV-2), NP SWAB IN UTM/VTP, 2 HR TAT - Normal    Narrative:     In the setting of a positive respiratory panel with a viral infection PLUS a negative procalcitonin without other underlying concern for bacterial infection, consider observing off antibiotics or discontinuation of antibiotics and continue supportive care. If the respiratory panel is positive for atypical bacterial infection (Bordetella pertussis, Chlamydophila pneumoniae, or Mycoplasma pneumoniae), consider antibiotic de-escalation to target atypical bacterial infection.   LACTIC ACID, PLASMA - Normal   BNP (IN-HOUSE) - Normal    Narrative:     This assay is used as an aid in the diagnosis of individuals suspected of having heart failure. It can be used as an aid in the diagnosis of acute decompensated heart failure (ADHF) in patients presenting with signs and symptoms of ADHF to the emergency department (ED). In addition,  NT-proBNP of <300 pg/mL indicates ADHF is not likely.    Age Range Result Interpretation  NT-proBNP Concentration (pg/mL:      <50             Positive            >450                   Gray                 300-450                    Negative             <300    50-75           Positive            >900                  Gray                300-900                  Negative            <300      >75             Positive            >1800                  Gray                300-1800                  Negative            <300   COVID PRE-OP / PRE-PROCEDURE SCREENING ORDER (NO ISOLATION)    Narrative:     The following orders were created for panel order COVID PRE-OP / PRE-PROCEDURE SCREENING ORDER (NO ISOLATION) - Swab, Nasopharynx.  Procedure                               Abnormality         Status                     ---------                               -----------         ------                     Respiratory Panel PCR w/...[[id number]]  Normal              Final result                 Please view results for these tests on the individual orders.   BLOOD CULTURE   BLOOD CULTURE   RAINBOW DRAW    Narrative:     The following orders were created for panel order [place] Draw.  Procedure                               Abnormality         Status                     ---------                               -----------         ------                     Green Top (Gel)[[id number]]                                  Final result               Lavender Top[[id number]]                                     Final result               Gold Top - SST[[id number]]                                   Final result               Gray Top[[id number]]                                         Final result               Light Blue Top[[id number]]                                   Final result                 Please view results for these tests on the individual orders.   CBC AND DIFFERENTIAL    Narrative:     The following orders were created for  panel order CBC & Differential.  Procedure                               Abnormality         Status                     ---------                               -----------         ------                     CBC Auto Differential[[id number]]        Abnormal            Final result                 Please view results for these tests on the individual orders.   GREEN TOP   LAVENDER TOP   GOLD TOP - SST   GRAY TOP   LIGHT BLUE TOP       Meds Given in ED:   Medications   sodium chloride 0.9 % flush 10 mL (has no administration in time range)   sodium chloride 0.9 % flush 10 mL (has no administration in time range)   sodium chloride 0.9 % flush 10 mL (has no administration in time range)   acetaminophen (TYLENOL) tablet 1,000 mg (1,000 mg Oral Given 7/12/24 1511)   ipratropium-albuterol (DUO-NEB) nebulizer solution 3 mL (3 mL Nebulization Given 7/12/24 1522)   predniSONE (DELTASONE) tablet 60 mg (60 mg Oral Given 7/12/24 1511)   ipratropium-albuterol (DUO-NEB) nebulizer solution 3 mL (3 mL Nebulization Given 7/12/24 1641)           Last NIH score:                                                          Dysphagia screening results:        Aj Coma Scale:  No data recorded     CIWA:        Restraint Type:            Isolation Status:  No active isolations

## 2024-07-12 NOTE — ED PROVIDER NOTES
Subjective   History of Present Illness  78-year-old female with a known history of COPD who is dependent on 2 L of nasal cannula oxygen at baseline who presents with the complaint of shortness of breath.  The patient states that she has underlying COPD and typically wears roughly 2 L nasal cannula oxygen at baseline.  Over the last 3 days or more she has been wearing more oxygen due to increased shortness of breath.  She denies a fever.  She does report fatigue.  No body aches or chills.  No definitive sick contacts.  However she did have some traveling from out of town she also visits her neighbor who has kids.  No previous history of DVT or PE.  The patient does not take any anticoagulation.  No belly pain.      Review of Systems   Constitutional:  Positive for activity change, appetite change and fatigue. Negative for chills and fever.   HENT:  Negative for congestion, ear pain, postnasal drip, sinus pressure and sore throat.    Eyes:  Negative for pain, redness and visual disturbance.   Respiratory:  Positive for shortness of breath. Negative for cough and chest tightness.    Cardiovascular:  Negative for chest pain, palpitations and leg swelling.   Gastrointestinal:  Negative for abdominal pain, anal bleeding, blood in stool, diarrhea, nausea and vomiting.   Endocrine: Negative for polydipsia and polyuria.   Genitourinary:  Negative for difficulty urinating, dysuria, frequency and urgency.   Musculoskeletal:  Negative for arthralgias, back pain and neck pain.   Skin:  Negative for pallor and rash.   Allergic/Immunologic: Negative for environmental allergies and immunocompromised state.   Neurological:  Negative for dizziness, weakness and headaches.   Hematological:  Negative for adenopathy.   Psychiatric/Behavioral:  Negative for confusion, self-injury and suicidal ideas. The patient is not nervous/anxious.    All other systems reviewed and are negative.      Past Medical History:   Diagnosis Date    Atrial  "fibrillation 2010    Cancer     Cluster headache 1995    COPD (chronic obstructive pulmonary disease)     Difficulty walking 2022    Sciatica and pinched nerves in bsck    Fibromyalgia, primary     Hyperlipidemia     Hypertension     Peripheral neuropathy 2022    TIA (transient ischemic attack) 2010       Allergies   Allergen Reactions    Penicillins Anaphylaxis    Latex Other (See Comments)     UNKNOWN    Other Other (See Comments)     \"EGG ALBUMIN\" only; can still eat eggs       Past Surgical History:   Procedure Laterality Date    ABDOMINAL SURGERY      HYSTERECTOMY      OOPHORECTOMY         Family History   Problem Relation Age of Onset    Breast cancer Mother 67       Social History     Socioeconomic History    Marital status:    Tobacco Use    Smoking status: Some Days     Current packs/day: 0.25     Average packs/day: 0.3 packs/day for 40.0 years (10.0 ttl pk-yrs)     Types: Cigarettes     Passive exposure: Current    Smokeless tobacco: Never   Vaping Use    Vaping status: Never Used   Substance and Sexual Activity    Alcohol use: Not Currently    Drug use: Never    Sexual activity: Defer           Objective   Physical Exam  Vitals and nursing note reviewed.   Constitutional:       General: She is not in acute distress.     Appearance: Normal appearance. She is well-developed. She is not toxic-appearing or diaphoretic.   HENT:      Head: Normocephalic and atraumatic.      Right Ear: External ear normal.      Left Ear: External ear normal.      Nose: Nose normal.   Eyes:      General: Lids are normal.      Pupils: Pupils are equal, round, and reactive to light.   Neck:      Trachea: No tracheal deviation.   Cardiovascular:      Rate and Rhythm: Regular rhythm. Tachycardia present.      Pulses: No decreased pulses.      Heart sounds: Normal heart sounds. No murmur heard.     No friction rub. No gallop.   Pulmonary:      Effort: Pulmonary effort is normal. Tachypnea and prolonged expiration present. No " respiratory distress.      Breath sounds: Examination of the right-middle field reveals wheezing. Examination of the left-middle field reveals wheezing. Examination of the right-lower field reveals wheezing. Examination of the left-lower field reveals wheezing. Wheezing present. No decreased breath sounds, rhonchi or rales.   Abdominal:      General: Bowel sounds are normal.      Palpations: Abdomen is soft.      Tenderness: There is no abdominal tenderness. There is no guarding or rebound.   Musculoskeletal:         General: No deformity. Normal range of motion.      Cervical back: Normal range of motion and neck supple.   Lymphadenopathy:      Cervical: No cervical adenopathy.   Skin:     General: Skin is warm and dry.      Findings: No rash.   Neurological:      Mental Status: She is alert and oriented to person, place, and time.      Cranial Nerves: No cranial nerve deficit.      Sensory: No sensory deficit.   Psychiatric:         Speech: Speech normal.         Behavior: Behavior normal.         Thought Content: Thought content normal.         Judgment: Judgment normal.         Procedures           ED Course  ED Course as of 07/12/24 2325 Fri Jul 12, 2024 1856 The patient has known underlying COPD.  Typically wears 2 L of nasal cannula oxygen.  She has not increased it over the last 3 to 4 days due to increased shortness of breath.  There is significant wheezing on auscultation.  Oxygen saturations dropped down to upper 70s to low 80s on 2 L.  This is corrected with 4 L of nasal cannula oxygen.  No infectious etiology identified on the workup.  I have administered 2 albuterol/Atrovent neb treatments and high-dose steroids here in the ER. [NS]      ED Course User Index  [NS] Terry Mix MD                                             Medical Decision Making  Differential diagnosis includes viral illness, pneumonia, CHF exacerbation, COPD exacerbation, other unspecified etiology.    Viral respiratory  panel is negative.  Chest x-ray shows no acute disease.  Labs show creatinine 1.31, no significant electrolyte abnormalities, normal troponin, normal BNP, normal lactic acid level, normal white count.    The patient was given breathing treatments here and steroids but continues to have significant wheezing albeit it is improving.  She also continues to have oxygen saturations that dropped down to 79 to 80% when on her baseline 2 L but improved to greater than 90% on 4 L of cannula.    She remains clinically alert with normal neurological exam.    I discussed the patient with the hospitalist, who will consult on the patient to determine status of admission.    Problems Addressed:  COPD with acute exacerbation: complicated acute illness or injury with systemic symptoms that poses a threat to life or bodily functions  Hypoxia: complicated acute illness or injury with systemic symptoms that poses a threat to life or bodily functions    Amount and/or Complexity of Data Reviewed  External Data Reviewed: labs, radiology and ECG.  Labs: ordered. Decision-making details documented in ED Course.  Radiology: ordered and independent interpretation performed. Decision-making details documented in ED Course.  ECG/medicine tests: ordered and independent interpretation performed. Decision-making details documented in ED Course.    Risk  OTC drugs.  Prescription drug management.  Decision regarding hospitalization.        Final diagnoses:   COPD with acute exacerbation   Hypoxia       ED Disposition  ED Disposition       ED Disposition   Decision to Admit    Condition   --    Comment   Level of Care: Telemetry [5]   Diagnosis: Acute on chronic hypoxic respiratory failure [[id number]]                 No follow-up provider specified.       Medication List        ASK your doctor about these medications      sertraline 100 MG tablet  Commonly known as: ZOLOFT  Ask about: Which instructions should I use?                 Terry Mix  MD [place]  07/12/24 0425

## 2024-07-13 ENCOUNTER — APPOINTMENT (OUTPATIENT)
Dept: CT IMAGING | Facility: HOSPITAL | Age: 79
End: 2024-07-13
Payer: MEDICARE

## 2024-07-13 PROBLEM — J96.20 ACUTE ON CHRONIC RESPIRATORY FAILURE: Status: ACTIVE | Noted: 2024-07-13

## 2024-07-13 LAB
ANION GAP SERPL CALCULATED.3IONS-SCNC: 7 MMOL/L (ref 5–15)
BACTERIA SPEC RESP CULT: NORMAL
BASOPHILS # BLD AUTO: 0.01 10*3/MM3 (ref 0–0.2)
BASOPHILS NFR BLD AUTO: 0.2 % (ref 0–1.5)
BUN SERPL-MCNC: 27 MG/DL (ref 8–23)
BUN/CREAT SERPL: 20.9 (ref 7–25)
CALCIUM SPEC-SCNC: 8.2 MG/DL (ref 8.6–10.5)
CHLORIDE SERPL-SCNC: 100 MMOL/L (ref 98–107)
CO2 SERPL-SCNC: 32 MMOL/L (ref 22–29)
CREAT SERPL-MCNC: 1.29 MG/DL (ref 0.57–1)
D-LACTATE SERPL-SCNC: 1.1 MMOL/L (ref 0.5–2)
DEPRECATED RDW RBC AUTO: 52.2 FL (ref 37–54)
EGFRCR SERPLBLD CKD-EPI 2021: 43.1 ML/MIN/1.73
EOSINOPHIL # BLD AUTO: 0 10*3/MM3 (ref 0–0.4)
EOSINOPHIL NFR BLD AUTO: 0 % (ref 0.3–6.2)
ERYTHROCYTE [DISTWIDTH] IN BLOOD BY AUTOMATED COUNT: 13.8 % (ref 12.3–15.4)
FOLATE SERPL-MCNC: 10.1 NG/ML (ref 4.78–24.2)
GLUCOSE SERPL-MCNC: 106 MG/DL (ref 65–99)
GRAM STN SPEC: NORMAL
HCT VFR BLD AUTO: 39 % (ref 34–46.6)
HGB BLD-MCNC: 11.3 G/DL (ref 12–15.9)
IMM GRANULOCYTES # BLD AUTO: 0.03 10*3/MM3 (ref 0–0.05)
IMM GRANULOCYTES NFR BLD AUTO: 0.6 % (ref 0–0.5)
LYMPHOCYTES # BLD AUTO: 1.06 10*3/MM3 (ref 0.7–3.1)
LYMPHOCYTES NFR BLD AUTO: 19.9 % (ref 19.6–45.3)
MAGNESIUM SERPL-MCNC: 1.7 MG/DL (ref 1.6–2.4)
MCH RBC QN AUTO: 29.7 PG (ref 26.6–33)
MCHC RBC AUTO-ENTMCNC: 29 G/DL (ref 31.5–35.7)
MCV RBC AUTO: 102.4 FL (ref 79–97)
MONOCYTES # BLD AUTO: 0.48 10*3/MM3 (ref 0.1–0.9)
MONOCYTES NFR BLD AUTO: 9 % (ref 5–12)
NEUTROPHILS NFR BLD AUTO: 3.74 10*3/MM3 (ref 1.7–7)
NEUTROPHILS NFR BLD AUTO: 70.3 % (ref 42.7–76)
NRBC BLD AUTO-RTO: 0 /100 WBC (ref 0–0.2)
PLATELET # BLD AUTO: 182 10*3/MM3 (ref 140–450)
PMV BLD AUTO: 10.6 FL (ref 6–12)
POTASSIUM SERPL-SCNC: 4.8 MMOL/L (ref 3.5–5.2)
RBC # BLD AUTO: 3.81 10*6/MM3 (ref 3.77–5.28)
SODIUM SERPL-SCNC: 139 MMOL/L (ref 136–145)
VIT B12 BLD-MCNC: 313 PG/ML (ref 211–946)
WBC NRBC COR # BLD AUTO: 5.32 10*3/MM3 (ref 3.4–10.8)

## 2024-07-13 PROCEDURE — 80048 BASIC METABOLIC PNL TOTAL CA: CPT | Performed by: NURSE PRACTITIONER

## 2024-07-13 PROCEDURE — 99233 SBSQ HOSP IP/OBS HIGH 50: CPT | Performed by: INTERNAL MEDICINE

## 2024-07-13 PROCEDURE — 83735 ASSAY OF MAGNESIUM: CPT | Performed by: NURSE PRACTITIONER

## 2024-07-13 PROCEDURE — 94799 UNLISTED PULMONARY SVC/PX: CPT

## 2024-07-13 PROCEDURE — 97161 PT EVAL LOW COMPLEX 20 MIN: CPT

## 2024-07-13 PROCEDURE — 94664 DEMO&/EVAL PT USE INHALER: CPT

## 2024-07-13 PROCEDURE — 85025 COMPLETE CBC W/AUTO DIFF WBC: CPT | Performed by: NURSE PRACTITIONER

## 2024-07-13 PROCEDURE — 94761 N-INVAS EAR/PLS OXIMETRY MLT: CPT

## 2024-07-13 PROCEDURE — 83605 ASSAY OF LACTIC ACID: CPT | Performed by: STUDENT IN AN ORGANIZED HEALTH CARE EDUCATION/TRAINING PROGRAM

## 2024-07-13 PROCEDURE — 25810000003 SODIUM CHLORIDE 0.9 % SOLUTION: Performed by: NURSE PRACTITIONER

## 2024-07-13 PROCEDURE — 71250 CT THORAX DX C-: CPT

## 2024-07-13 PROCEDURE — 25010000002 MAGNESIUM SULFATE 2 GM/50ML SOLUTION: Performed by: NURSE PRACTITIONER

## 2024-07-13 PROCEDURE — 97116 GAIT TRAINING THERAPY: CPT

## 2024-07-13 PROCEDURE — 25010000002 METHYLPREDNISOLONE PER 125 MG: Performed by: NURSE PRACTITIONER

## 2024-07-13 RX ORDER — ATORVASTATIN CALCIUM 80 MG/1
80 TABLET, FILM COATED ORAL NIGHTLY
COMMUNITY

## 2024-07-13 RX ORDER — GABAPENTIN 100 MG/1
100 CAPSULE ORAL NIGHTLY
Status: DISCONTINUED | OUTPATIENT
Start: 2024-07-13 | End: 2024-07-15 | Stop reason: HOSPADM

## 2024-07-13 RX ORDER — MAGNESIUM SULFATE HEPTAHYDRATE 40 MG/ML
2 INJECTION, SOLUTION INTRAVENOUS ONCE
Status: COMPLETED | OUTPATIENT
Start: 2024-07-13 | End: 2024-07-13

## 2024-07-13 RX ORDER — DOXYCYCLINE 100 MG/1
100 CAPSULE ORAL EVERY 12 HOURS SCHEDULED
Qty: 8 CAPSULE | Refills: 0 | Status: DISCONTINUED | OUTPATIENT
Start: 2024-07-13 | End: 2024-07-15 | Stop reason: HOSPADM

## 2024-07-13 RX ADMIN — METHYLPREDNISOLONE SODIUM SUCCINATE 62.5 MG: 125 INJECTION, POWDER, FOR SOLUTION INTRAMUSCULAR; INTRAVENOUS at 10:48

## 2024-07-13 RX ADMIN — ATORVASTATIN CALCIUM 40 MG: 40 TABLET, FILM COATED ORAL at 21:43

## 2024-07-13 RX ADMIN — IPRATROPIUM BROMIDE AND ALBUTEROL SULFATE 3 ML: 2.5; .5 SOLUTION RESPIRATORY (INHALATION) at 18:52

## 2024-07-13 RX ADMIN — IPRATROPIUM BROMIDE AND ALBUTEROL SULFATE 3 ML: 2.5; .5 SOLUTION RESPIRATORY (INHALATION) at 11:14

## 2024-07-13 RX ADMIN — GUAIFENESIN 600 MG: 600 TABLET, EXTENDED RELEASE ORAL at 21:43

## 2024-07-13 RX ADMIN — Medication 10 ML: at 10:51

## 2024-07-13 RX ADMIN — SODIUM CHLORIDE 500 ML: 9 INJECTION, SOLUTION INTRAVENOUS at 02:36

## 2024-07-13 RX ADMIN — IPRATROPIUM BROMIDE AND ALBUTEROL SULFATE 3 ML: 2.5; .5 SOLUTION RESPIRATORY (INHALATION) at 01:00

## 2024-07-13 RX ADMIN — IPRATROPIUM BROMIDE AND ALBUTEROL SULFATE 3 ML: 2.5; .5 SOLUTION RESPIRATORY (INHALATION) at 07:21

## 2024-07-13 RX ADMIN — APIXABAN 5 MG: 5 TABLET, FILM COATED ORAL at 10:50

## 2024-07-13 RX ADMIN — SODIUM CHLORIDE 500 ML: 9 INJECTION, SOLUTION INTRAVENOUS at 01:04

## 2024-07-13 RX ADMIN — DOXYCYCLINE 100 MG: 100 CAPSULE ORAL at 13:32

## 2024-07-13 RX ADMIN — Medication 10 ML: at 21:43

## 2024-07-13 RX ADMIN — SERTRALINE 100 MG: 100 TABLET, FILM COATED ORAL at 10:50

## 2024-07-13 RX ADMIN — CETIRIZINE HYDROCHLORIDE 10 MG: 10 TABLET, FILM COATED ORAL at 10:50

## 2024-07-13 RX ADMIN — DOXYCYCLINE 100 MG: 100 CAPSULE ORAL at 21:43

## 2024-07-13 RX ADMIN — MAGNESIUM SULFATE HEPTAHYDRATE 2 G: 40 INJECTION, SOLUTION INTRAVENOUS at 10:47

## 2024-07-13 RX ADMIN — APIXABAN 5 MG: 5 TABLET, FILM COATED ORAL at 21:43

## 2024-07-13 RX ADMIN — GABAPENTIN 100 MG: 100 CAPSULE ORAL at 21:43

## 2024-07-13 RX ADMIN — GUAIFENESIN 600 MG: 600 TABLET, EXTENDED RELEASE ORAL at 10:48

## 2024-07-13 NOTE — PROGRESS NOTES
[facility] Medicine Services  PROGRESS NOTE    Patient Name: Tessa Bradley  : 1945  MRN: [id number]    Date of Admission: 2024  Primary Care Physician: Sunni Santoro MD    Subjective   Subjective     CC:  F/u SOA    HPI:  Patient resting in bed. Still with some SOA but overall says she feels much improved. Concerned about low blood pressure.      Objective   Objective     Vital Signs:   Temp:  [97.9 °F (36.6 °C)-99.8 °F (37.7 °C)] 98 °F (36.7 °C)  Heart Rate:  [] 69  Resp:  [18-22] 18  BP: ()/() 121/66  Flow (L/min):  [2-4.5] 2     Physical Exam:  Constitutional: No acute distress, awake, alert  HENT: NCAT, mucous membranes moist  Respiratory: expiratory wheezing bilaterally, non-labored on O2  Cardiovascular: RRR, no murmurs, rubs, or gallops  Gastrointestinal: soft, nontender, nondistended  Musculoskeletal: No bilateral ankle edema  Psychiatric: Appropriate affect, cooperative  Neurologic: Oriented x 3, speech clear, no focal deficits  Skin: No rashes      Results Reviewed:  LAB RESULTS:      Lab 24  0357 24  1352   WBC 5.32 9.66   HEMOGLOBIN 11.3* 13.2   HEMATOCRIT 39.0 45.4   PLATELETS 182 227   NEUTROS ABS 3.74 6.84   IMMATURE GRANS (ABS) 0.03 0.04   LYMPHS ABS 1.06 1.64   MONOS ABS 0.48 0.90   EOS ABS 0.00 0.21   .4* 104.1*   LACTATE 1.1 0.9         Lab 24  0357 24  1352   SODIUM 139 142   POTASSIUM 4.8 5.1   CHLORIDE 100 99   CO2 32.0* 37.0*   ANION GAP 7.0 6.0   BUN 27* 18   CREATININE 1.29* 1.31*   EGFR 43.1* 42.3*   GLUCOSE 106* 123*   CALCIUM 8.2* 9.1   MAGNESIUM 1.7  --    HEMOGLOBIN A1C  --  5.80*   TSH  --  1.760         Lab 24  1352   TOTAL PROTEIN 6.9   ALBUMIN 3.8   GLOBULIN 3.1   ALT (SGPT) 7   AST (SGOT) 13   BILIRUBIN 0.4   ALK PHOS 98         Lab 24  1352   PROBNP 809.9   HSTROP T 21*             Lab 24  1352   FOLATE 10.10   VITAMIN B 12 313         Brief Urine Lab Results  (Last  result in the past 365 days)        Color   Clarity   Blood   Leuk Est   Nitrite   Protein   CREAT   Urine HCG        10/10/23 1822 Yellow   Clear   Negative   Trace   Positive   Negative                   Microbiology Results Abnormal       Procedure Component Value - Date/Time    Respiratory Culture - Sputum, Cough [[id number]] Collected: 07/12/24 2219    Lab Status: Final result Specimen: Sputum from Cough Updated: 07/13/24 0991     Respiratory Culture Rejected     Gram Stain Few (2+) Epithelial cells per low power field      Few (2+) WBCs per low power field      Few (2+) Mixed bacterial morphotypes seen on Gram Stain    Narrative:      Specimen rejected due to oropharyngeal contamination. Please reorder and recollect specimen if clinically necessary.    COVID PRE-OP / PRE-PROCEDURE SCREENING ORDER (NO ISOLATION) - Swab, Nasopharynx [[id number]]  (Normal) Collected: 07/12/24 1407    Lab Status: Final result Specimen: Swab from Nasopharynx Updated: 07/12/24 1527    Narrative:      The following orders were created for panel order COVID PRE-OP / PRE-PROCEDURE SCREENING ORDER (NO ISOLATION) - Swab, Nasopharynx.  Procedure                               Abnormality         Status                     ---------                               -----------         ------                     Respiratory Panel PCR w/...[[id number]]  Normal              Final result                 Please view results for these tests on the individual orders.    Respiratory Panel PCR w/COVID-19(SARS-CoV-2) [facility]NY/[facility]/ALICE/PAD/COR/ELYSSA In-House, NP Swab in UTM/VTM, 2 HR TAT - Swab, Nasopharynx [[id number]]  (Normal) Collected: 07/12/24 1407    Lab Status: Final result Specimen: Swab from Nasopharynx Updated: 07/12/24 1527     ADENOVIRUS, PCR Not Detected     Coronavirus 229E Not Detected     Coronavirus HKU1 Not Detected     Coronavirus NL63 Not Detected     Coronavirus OC43 Not Detected     COVID19 Not Detected     Human Metapneumovirus Not Detected      Human Rhinovirus/Enterovirus Not Detected     Influenza A PCR Not Detected     Influenza B PCR Not Detected     Parainfluenza Virus 1 Not Detected     Parainfluenza Virus 2 Not Detected     Parainfluenza Virus 3 Not Detected     Parainfluenza Virus 4 Not Detected     RSV, PCR Not Detected     Bordetella pertussis pcr Not Detected     Bordetella parapertussis PCR Not Detected     Chlamydophila pneumoniae PCR Not Detected     Mycoplasma pneumo by PCR Not Detected    Narrative:      In the setting of a positive respiratory panel with a viral infection PLUS a negative procalcitonin without other underlying concern for bacterial infection, consider observing off antibiotics or discontinuation of antibiotics and continue supportive care. If the respiratory panel is positive for atypical bacterial infection (Bordetella pertussis, Chlamydophila pneumoniae, or Mycoplasma pneumoniae), consider antibiotic de-escalation to target atypical bacterial infection.            CT Chest Without Contrast Diagnostic    Result Date: 7/13/2024  CT CHEST WO CONTRAST DIAGNOSTIC Date of Exam: 7/13/2024 4:15 AM EDT Indication: hypoxia; L chest discomfort. Comparison: 7/12/2024. Technique: Axial CT images were obtained of the chest without contrast administration.  Reconstructed coronal and sagittal images were also obtained. Automated exposure control and iterative construction methods were used. Findings: Hilum and Mediastinum: No pathologically enlarged lymph nodes.  Normal heart size.   No pericardial effusion.  Unremarkable thoracic aorta and pulmonary arteries. Granulomatous calcifications are present. Atherosclerotic calcifications are present including within the coronary arteries. Lung Parenchyma and Pleura: No focal consolidations. Scarring present. No suspicious pulmonary nodules.  No endobronchial lesions.  No significant pleural effusions. Upper Abdomen: No acute process. Soft tissues: Unremarkable. Osseous structures: No  aggressive focal lytic or sclerotic osseous lesions.     Impression: Impression: 1.No acute cardiopulmonary process. 2.Ancillary findings as described above. Electronically Signed: Renetta Duggan MD  7/13/2024 4:21 AM EDT  Workstation ID: [id number]    XR Chest 1 View    Result Date: 7/12/2024  XR CHEST 1 VW Date of Exam: 7/12/2024 2:01 PM EDT Indication: SOA triage protocol Comparison: Chest radiograph 10/10/2023. Findings: Postsurgical changes in the right upper chest. Cardiomediastinal silhouette within normal limits. Findings of healed/calcified granulomatous disease. Chronic appearing changes of the lungs. No discrete focal consolidation. No pleural effusion or pneumothorax. Osseous structures are unchanged.     Impression: Impression: Chronic changes without evidence of superimposed acute process. Electronically Signed: Adam Hudson MD  7/12/2024 2:25 PM EDT  Workstation ID: [id number]     Results for orders placed during the hospital encounter of 08/24/23    Adult Transthoracic Echo Complete W/ Cont if Necessary Per Protocol    Interpretation Summary    Left ventricular systolic function is normal. Left ventricular ejection fraction appears to be 56 - 60%.    Left ventricular diastolic function was normal.    Mild aortic valve stenosis is present. Aortic valve area is 1.2 cm2.    Peak velocity of the flow distal to the aortic valve is 267.2 cm/s. Aortic valve mean pressure gradient is 16 mmHg.      Current medications:  Scheduled Meds:apixaban, 5 mg, Oral, Q12H  atorvastatin, 40 mg, Oral, Nightly  azithromycin, 500 mg, Intravenous, Nightly  cefTRIAXone, 1,000 mg, Intravenous, Nightly  cetirizine, 10 mg, Oral, Daily  gabapentin, 100 mg, Oral, Nightly  guaiFENesin, 600 mg, Oral, Q12H  ipratropium-albuterol, 3 mL, Nebulization, Q6H - RT  methylPREDNISolone sodium succinate, 62.5 mg, Intravenous, Daily  oxybutynin XL, 10 mg, Oral, Daily  sertraline, 100 mg, Oral, Daily  sodium chloride, 10 mL, Intravenous,  Q12H      Continuous Infusions:sodium chloride, 100 mL/hr, Last Rate: 100 mL/hr (07/12/24 1502)      PRN Meds:.  acetaminophen **OR** acetaminophen **OR** acetaminophen    benzonatate    senna-docusate sodium **AND** polyethylene glycol **AND** bisacodyl **AND** bisacodyl    ipratropium-albuterol    nitroglycerin    ondansetron ODT **OR** ondansetron    sodium chloride    [COMPLETED] Insert Peripheral IV **AND** sodium chloride    sodium chloride    sodium chloride    Assessment & Plan   Assessment & Plan     Active Hospital Problems    Diagnosis  POA    **Acute on chronic hypoxic respiratory failure [J96.21]  Yes    Acute on chronic respiratory failure [J96.20]  Yes    Acute on chronic respiratory failure with hypoxia [J96.21]  Yes    History of TIA (transient ischemic attack) [Z86.73]  Not Applicable    Pulmonary nodule [R91.1]  Yes    A-fib [I48.91]  Yes    HTN (hypertension) [I10]  Yes    HLD (hyperlipidemia) [E78.5]  Yes    COPD (chronic obstructive pulmonary disease) [J44.9]  Yes      Resolved Hospital Problems   No resolved problems to display.        Brief Hospital Course to date:  Tessa Bradley is a 78 y.o. female with PMHx of COPD on chronic oxygen 2 liters NC (f/w pulmonary associates), MAKAYLA not on CPAP, Hodgkin's disease s/p radiation and chemo, Afib on Eliquis, HTN, HLD, peripheral neuropathy, adenocarcinoma R lung s/p bronch w/ VATS (at [facility] 1/5/2024), TIA, OAB who presents to the ED for evaluation of shortness of breath.     A/C hypoxic respiratory failure  AECOPD, chronic oxygen 2 liters   - CT chest without any acute findings  - continue IV steroids  - scheduled and PRN nebs  - pulmonary toilet / IS / flutter valve  - no infiltrate on CT chest, de-escalate abx to oral doxy  - sputum culture pending  - respiratory pcr negative     Afib on Eliquis  - continue eliquis  - not on antiarrythmic / rate control medications    Hypotension:  - patient states she in on blood pressure medication at home,  "although I do not see any prescribed, does not check BP at home, may consider addition of midodrine pending BP response to fluids. Does look like she been on torsemide in the past.     CKD 3  - baseline 1-1.2, currently stable     Hx R lung adenocarcinoma  S/p VATS 1/2024  - per pulmonary office note 3/27/2024, pt to f/u w/ oncology but per patient has not done so (per note \"strange looking cells\" around the area that was removed), need outpatient f/u      Hx TIA  HTN / HLD  - continue statin     Overactive Bladder  - continue ditropan     Mood disorder  Sleep disorder  - continue zoloft  - continue trazodone, decrease to 50 mg nightly prn       Expected Discharge Location and Transportation: Home  Expected Discharge   Expected Discharge Date: 7/14/2024; Expected Discharge Time:      VTE Prophylaxis:  Pharmacologic & mechanical VTE prophylaxis orders are present.         AM-PAC 6 Clicks Score (PT): 21 (07/12/24 0222)    CODE STATUS:   Code Status and Medical Interventions:   Ordered at: 07/12/24 2026     Code Status (Patient has no pulse and is not breathing):    CPR (Attempt to Resuscitate)     Medical Interventions (Patient has pulse or is breathing):    Full Support       Batool Brooks, DO  07/13/24      "

## 2024-07-13 NOTE — H&P
"    [facility] Medicine Services  HISTORY AND PHYSICAL    Patient Name: Tessa Bradley  : 1945  MRN: [id number]  Primary Care Physician: Sunni Santoro MD  Date of admission: 2024    Subjective   Subjective     Chief Complaint:  Shortness of breath    HPI:  Tessa Bradley is a 78 y.o. female with PMHx of COPD on chronic oxygen 2 liters NC (f/w pulmonary associates), MAKAYLA not on CPAP, Hodgkin's disease s/p radiation and chemo, Afib on Eliquis, HTN, HLD, peripheral neuropathy, adenocarcinoma R lung s/p bronch w/ VATS (at [facility] 2024), TIA, OAB who presents to the ED for evaluation of shortness of breath. Pt reports 2 weeks of increased dyspnea w/ exertion, difficulty walking up steps, wheezing, intermittent low grade fever, productive cough and fatigue (spouse said she slept for 24 hours yesterday). She turned her home oxygen up to 3 liters ~ 3 days ago which has helped some. Went to see PCP today who recommended she come to the ED for evaluation. She tells me she has been having a sensation in her left upper chest to her rib cage that feels like a \"pressure\" like she can't take a deep breath.    Chest xray w/o acute findings. Labs unremarkable. Respiratory PCR negative. She is requiring 4 liters currently to maintain oxygen saturation 88%. She has wheezing on exam. She is feeling better with the increased oxygen. She will be admitted to the hospital medicine service for further evaluation and treatment.    Review of Systems   Constitutional:  Positive for activity change, appetite change, fatigue and fever.   Respiratory:  Positive for cough, chest tightness, shortness of breath and wheezing.    Cardiovascular:  Negative for chest pain, palpitations and leg swelling.   Gastrointestinal: Negative.    Genitourinary: Negative.    Musculoskeletal:  Positive for arthralgias.   All other systems reviewed and are negative.         Personal History     Past Medical History: " "  Diagnosis Date    Atrial fibrillation 2010    Cancer     Cluster headache 1995    COPD (chronic obstructive pulmonary disease)     Difficulty walking 2022    Sciatica and pinched nerves in bsck    Fibromyalgia, primary     Hyperlipidemia     Hypertension     Peripheral neuropathy 2022    TIA (transient ischemic attack) 2010         Oncology Problem List:  Hodgkin disease (09/07/2023; Status: Active)    Oncology/Hematology History       Past Surgical History:   Procedure Laterality Date    ABDOMINAL SURGERY      HYSTERECTOMY      OOPHORECTOMY         Family History:  family history includes Breast cancer (age of onset: 67) in her mother.     Social History:  reports that she has quit smoking. Her smoking use included cigarettes. She has a 10 pack-year smoking history. She has been exposed to tobacco smoke. She has never used smokeless tobacco. She reports that she does not currently use alcohol. She reports that she does not use drugs.  Social History     Social History Narrative    Not on file       Medications:  Budeson-Glycopyrrol-Formoterol, Mirabegron ER, albuterol, albuterol sulfate HFA, apixaban, atorvastatin, cyclobenzaprine, gabapentin, ipratropium-albuterol, loratadine, sertraline, tolterodine LA, and traZODone    Allergies   Allergen Reactions    Penicillins Anaphylaxis    Latex Other (See Comments)     UNKNOWN    Other Other (See Comments)     \"EGG ALBUMIN\" only; can still eat eggs       Objective   Objective     Vital Signs:   Temp:  [99.8 °F (37.7 °C)] 99.8 °F (37.7 °C)  Heart Rate:  [] 90  Resp:  [20-22] 20  BP: (100-119)/() 104/54  Flow (L/min):  [2-4.5] 4.5    Physical Exam  Constitutional:       General: She is not in acute distress.     Appearance: She is well-developed. She is ill-appearing. She is not toxic-appearing.   HENT:      Head: Normocephalic and atraumatic.      Nose: Nose normal.      Mouth/Throat:      Pharynx: Oropharynx is clear.   Eyes:      Extraocular Movements: " Extraocular movements intact.      Conjunctiva/sclera: Conjunctivae normal.      Pupils: Pupils are equal, round, and reactive to light.   Cardiovascular:      Rate and Rhythm: Tachycardia present. Rhythm irregular.      Pulses: Normal pulses.      Heart sounds: Normal heart sounds.   Pulmonary:      Effort: Pulmonary effort is normal.      Breath sounds: Wheezing present.      Comments: Diminished throughout    Abdominal:      General: Bowel sounds are normal. There is no distension.      Palpations: Abdomen is soft.      Tenderness: There is no abdominal tenderness.   Musculoskeletal:         General: No swelling. Normal range of motion.      Cervical back: Normal range of motion and neck supple.   Skin:     General: Skin is warm and dry.      Capillary Refill: Capillary refill takes less than 2 seconds.      Findings: No rash.   Neurological:      Mental Status: She is alert and oriented to person, place, and time.      Cranial Nerves: No cranial nerve deficit.   Psychiatric:         Mood and Affect: Mood normal.         Behavior: Behavior normal.            Result Review:  I have personally reviewed the results from the time of this admission to 7/12/2024 20:37 EDT and agree with these findings:  [x]  Laboratory list / accordion  [x]  Microbiology  [x]  Radiology  [x]  EKG/Telemetry   []  Cardiology/Vascular   []  Pathology  []  Old records  []  Other:  Most notable findings include:     LAB RESULTS:      Lab 07/12/24  1352   WBC 9.66   HEMOGLOBIN 13.2   HEMATOCRIT 45.4   PLATELETS 227   NEUTROS ABS 6.84   IMMATURE GRANS (ABS) 0.04   LYMPHS ABS 1.64   MONOS ABS 0.90   EOS ABS 0.21   .1*   LACTATE 0.9         Lab 07/12/24  1352   SODIUM 142   POTASSIUM 5.1   CHLORIDE 99   CO2 37.0*   ANION GAP 6.0   BUN 18   CREATININE 1.31*   EGFR 42.3*   GLUCOSE 123*   CALCIUM 9.1         Lab 07/12/24  1352   TOTAL PROTEIN 6.9   ALBUMIN 3.8   GLOBULIN 3.1   ALT (SGPT) 7   AST (SGOT) 13   BILIRUBIN 0.4   ALK PHOS 98          Lab 07/12/24  1352   PROBNP 809.9   HSTROP T 21*                 Brief Urine Lab Results  (Last result in the past 365 days)        Color   Clarity   Blood   Leuk Est   Nitrite   Protein   CREAT   Urine HCG        10/10/23 1822 Yellow   Clear   Negative   Trace   Positive   Negative                 Microbiology Results (last 10 days)       Procedure Component Value - Date/Time    COVID PRE-OP / PRE-PROCEDURE SCREENING ORDER (NO ISOLATION) - Swab, Nasopharynx [[id number]]  (Normal) Collected: 07/12/24 1407    Lab Status: Final result Specimen: Swab from Nasopharynx Updated: 07/12/24 1527    Narrative:      The following orders were created for panel order COVID PRE-OP / PRE-PROCEDURE SCREENING ORDER (NO ISOLATION) - Swab, Nasopharynx.  Procedure                               Abnormality         Status                     ---------                               -----------         ------                     Respiratory Panel PCR w/...[[id number]]  Normal              Final result                 Please view results for these tests on the individual orders.    Respiratory Panel PCR w/COVID-19(SARS-CoV-2) [facility]/ALICE/[facility]/ELYSSA In-House, NP Swab in UTM/VTM, 2 HR TAT - Swab, Nasopharynx [[id number]]  (Normal) Collected: 07/12/24 1407    Lab Status: Final result Specimen: Swab from Nasopharynx Updated: 07/12/24 1527     ADENOVIRUS, PCR Not Detected     Coronavirus 229E Not Detected     Coronavirus HKU1 Not Detected     Coronavirus NL63 Not Detected     Coronavirus OC43 Not Detected     COVID19 Not Detected     Human Metapneumovirus Not Detected     Human Rhinovirus/Enterovirus Not Detected     Influenza A PCR Not Detected     Influenza B PCR Not Detected     Parainfluenza Virus 1 Not Detected     Parainfluenza Virus 2 Not Detected     Parainfluenza Virus 3 Not Detected     Parainfluenza Virus 4 Not Detected     RSV, PCR Not Detected     Bordetella pertussis pcr Not Detected     Bordetella parapertussis PCR Not Detected      Chlamydophila pneumoniae PCR Not Detected     Mycoplasma pneumo by PCR Not Detected    Narrative:      In the setting of a positive respiratory panel with a viral infection PLUS a negative procalcitonin without other underlying concern for bacterial infection, consider observing off antibiotics or discontinuation of antibiotics and continue supportive care. If the respiratory panel is positive for atypical bacterial infection (Bordetella pertussis, Chlamydophila pneumoniae, or Mycoplasma pneumoniae), consider antibiotic de-escalation to target atypical bacterial infection.            XR Chest 1 View    Result Date: 7/12/2024  XR CHEST 1 VW Date of Exam: 7/12/2024 2:01 PM EDT Indication: SOA triage protocol Comparison: Chest radiograph 10/10/2023. Findings: Postsurgical changes in the right upper chest. Cardiomediastinal silhouette within normal limits. Findings of healed/calcified granulomatous disease. Chronic appearing changes of the lungs. No discrete focal consolidation. No pleural effusion or pneumothorax. Osseous structures are unchanged.     Impression: Impression: Chronic changes without evidence of superimposed acute process. Electronically Signed: Adam Hudson MD  7/12/2024 2:25 PM EDT  Workstation ID: [id number]     Results for orders placed during the hospital encounter of 08/24/23    Adult Transthoracic Echo Complete W/ Cont if Necessary Per Protocol    Interpretation Summary    Left ventricular systolic function is normal. Left ventricular ejection fraction appears to be 56 - 60%.    Left ventricular diastolic function was normal.    Mild aortic valve stenosis is present. Aortic valve area is 1.2 cm2.    Peak velocity of the flow distal to the aortic valve is 267.2 cm/s. Aortic valve mean pressure gradient is 16 mmHg.      Assessment & Plan   Assessment & Plan       Acute on chronic hypoxic respiratory failure    COPD (chronic obstructive pulmonary disease)    HTN (hypertension)    HLD  "(hyperlipidemia)    A-fib    Pulmonary nodule    History of TIA (transient ischemic attack)    A/C hypoxic respiratory failure  COPD, chronic oxygen 2 liters   - chest xray w/o acute findings  - obtain CT chest w/o contrast d/t CKD avoid contrast  - low concern for PE, on Eliquis, compliant  - on 4 liters NC  - continue steroids  - scheduled and prn duo nebs  - pulmonary toilet / IS / flutter valve  - add rocephin / azithromycin, productive cough  - sputum culture  - respiratory pcr negative  - check tsh, A1c, b12, folate d/t fatigue    Afib on Eliquis  - continue eliquis  - not on antiarrythmic / rate control medications    CKD 3  - creatinine 1.31, baseline 1-1.2  - fluids, 1/2 NS @ 100 d/t poor PO intake  - monitor I/O  - avoid nephrotoxic agents    Hx R lung adenocarcinoma  S/p VATS 1/2024  - CT chest w/o contrast pending  - per pulmonary office note 3/27/2024, pt to f/u w/ oncology but per patient has not done so (per note \"strange looking cells\" around the area that was removed), need outpatient f/u     Hx TIA  HTN / HLD  - continue statin  - not on antihypertensives    OAB  - on myrbetriq / detrol at home, formulary sub to oxybutynin    Mood disorder  Sleep disorder  - continue zoloft  - continue trazodone, decrease to 50 mg nightly prn    DVT prophylaxis:  Eliquis    CODE STATUS:    Code Status (Patient has no pulse and is not breathing): CPR (Attempt to Resuscitate)  Medical Interventions (Patient has pulse or is breathing): Full Support      Expected Discharge   Expected discharge date/ time has not been documented.    Signature: Electronically signed by ALIZA Guzmán, 07/12/24, 8:54 PM EDT      Total time spent: 60 minutes  Time spent includes time reviewing chart, face-to-face time, counseling patient/family/caregiver, ordering medications/tests/procedures, communicating with other health care professionals, documenting clinical information in the electronic health record, and coordination of " care.

## 2024-07-13 NOTE — PLAN OF CARE
Goal Outcome Evaluation:  Plan of Care Reviewed With: patient, spouse        Progress: no change (PT IE)  Outcome Evaluation: PT eval complete. Pt presents with generalized weakness, decreased activity tolerance, and mild balance deficits warranting skilled IPPT services. Rec stair training prior to d/c. PT rec home w/ assist at d/c.      Anticipated Discharge Disposition (PT): home with assist

## 2024-07-13 NOTE — NURSING NOTE
Herve Foy pt BP soft, pt drowsy but able to awake with ease and remains AOX4, HR 70-80's not diaphoretic. new order placed 500cc bolus.   Herve Foy pt Bp remain soft after 500cc bolus. Pt remains asymptomatic, AOX4, HR 75. New order placed for 500cc bolus

## 2024-07-13 NOTE — PLAN OF CARE
Goal Outcome Evaluation:     Problem: Adult Inpatient Plan of Care  Goal: Plan of Care Review  Outcome: Ongoing, Progressing  Goal: Patient-Specific Goal (Individualized)  Outcome: Ongoing, Progressing  Goal: Absence of Hospital-Acquired Illness or Injury  Outcome: Ongoing, Progressing  Intervention: Identify and Manage Fall Risk  Recent Flowsheet Documentation  Taken 7/13/2024 0600 by Dandre Gamboa RN  Safety Promotion/Fall Prevention:   safety round/check completed   room organization consistent   nonskid shoes/slippers when out of bed   lighting adjusted   fall prevention program maintained   activity supervised  Taken 7/13/2024 0230 by Dandre Gamboa RN  Safety Promotion/Fall Prevention:   safety round/check completed   room organization consistent   nonskid shoes/slippers when out of bed   lighting adjusted   fall prevention program maintained   activity supervised  Taken 7/13/2024 0000 by Dandre Gamboa RN  Safety Promotion/Fall Prevention:   safety round/check completed   room organization consistent   nonskid shoes/slippers when out of bed   lighting adjusted   fall prevention program maintained   activity supervised  Taken 7/12/2024 2200 by Dandre Gamboa RN  Safety Promotion/Fall Prevention:   safety round/check completed   room organization consistent   nonskid shoes/slippers when out of bed   lighting adjusted   fall prevention program maintained   activity supervised  Taken 7/12/2024 2049 by Dandre Gamboa RN  Safety Promotion/Fall Prevention:   safety round/check completed   room organization consistent   nonskid shoes/slippers when out of bed   lighting adjusted   fall prevention program maintained   activity supervised  Intervention: Prevent Skin Injury  Recent Flowsheet Documentation  Taken 7/13/2024 0600 by Dandre Gamboa RN  Body Position: position changed independently  Skin Protection:   adhesive use limited   tubing/devices free from skin contact  Taken 7/13/2024 0400 by  Dandre Gamboa RN  Skin Protection:   adhesive use limited   tubing/devices free from skin contact  Taken 7/13/2024 0230 by Dandre Gamboa RN  Body Position: position changed independently  Skin Protection:   adhesive use limited   tubing/devices free from skin contact  Taken 7/13/2024 0000 by Dandre Gamboa RN  Body Position: position changed independently  Skin Protection:   adhesive use limited   tubing/devices free from skin contact  Taken 7/12/2024 2200 by Dandre Gamboa RN  Body Position: position changed independently  Skin Protection:   adhesive use limited   tubing/devices free from skin contact  Taken 7/12/2024 2049 by Dandre Gamboa RN  Body Position: position changed independently  Skin Protection:   adhesive use limited   tubing/devices free from skin contact  Intervention: Prevent and Manage VTE (Venous Thromboembolism) Risk  Recent Flowsheet Documentation  Taken 7/13/2024 0600 by Dandre Gamboa RN  Activity Management: activity encouraged  VTE Prevention/Management: sequential compression devices off  Taken 7/13/2024 0400 by Dandre Gamboa RN  VTE Prevention/Management: sequential compression devices off  Taken 7/13/2024 0230 by Dandre Gamboa RN  Activity Management: activity encouraged  VTE Prevention/Management: sequential compression devices off  Taken 7/13/2024 0000 by Dandre Gamboa RN  Activity Management: activity encouraged  VTE Prevention/Management: sequential compression devices off  Taken 7/12/2024 2200 by Dandre Gamboa RN  Activity Management: activity encouraged  VTE Prevention/Management: sequential compression devices off  Taken 7/12/2024 2049 by Dandre Gamboa RN  Activity Management: activity encouraged  VTE Prevention/Management: sequential compression devices off  Range of Motion: active ROM (range of motion) encouraged  Goal: Optimal Comfort and Wellbeing  Outcome: Ongoing, Progressing  Intervention: Provide Person-Centered Care  Recent Flowsheet  Documentation  Taken 7/12/2024 2049 by Dandre Gamboa RN  Trust Relationship/Rapport:   care explained   choices provided  Goal: Readiness for Transition of Care  Outcome: Ongoing, Progressing  Intervention: Mutually Develop Transition Plan  Recent Flowsheet Documentation  Taken 7/12/2024 2256 by Dandre Gamboa RN  Transportation Anticipated: family or friend will provide  Patient/Family Anticipated Services at Transition: none  Patient/Family Anticipates Transition to: home with family  Taken 7/12/2024 2247 by Dandre Gamboa RN  Equipment Currently Used at Home: cane, straight     Problem: Skin Injury Risk Increased  Goal: Skin Health and Integrity  Outcome: Ongoing, Progressing  Intervention: Optimize Skin Protection  Recent Flowsheet Documentation  Taken 7/13/2024 0600 by Dandre Gamboa RN  Head of Bed ([facility]) Positioning: [facility] elevated  Skin Protection:   adhesive use limited   tubing/devices free from skin contact  Taken 7/13/2024 0400 by Dandre Gamboa RN  Skin Protection:   adhesive use limited   tubing/devices free from skin contact  Taken 7/13/2024 0230 by Dandre Gamboa RN  Head of Bed ([facility]) Positioning: [facility] elevated  Skin Protection:   adhesive use limited   tubing/devices free from skin contact  Taken 7/13/2024 0000 by Dandre Gamboa RN  Pressure Reduction Techniques:   frequent weight shift encouraged   heels elevated off bed   positioned off wounds   pressure points protected   rest period provided between sit times  Head of Bed ([facility]) Positioning: [facility] elevated  Pressure Reduction Devices:   pressure-redistributing mattress utilized   positioning supports utilized   heel offloading device utilized   foam padding utilized  Skin Protection:   adhesive use limited   tubing/devices free from skin contact  Taken 7/12/2024 2200 by Dandre Gamboa RN  Head of Bed ([facility]) Positioning: [facility] elevated  Skin Protection:   adhesive use limited   tubing/devices free from skin contact  Taken 7/12/2024  2049 by Dandre Gamboa RN  Head of Bed ([facility]) Positioning: [facility] elevated  Skin Protection:   adhesive use limited   tubing/devices free from skin contact     Problem: Adjustment to Illness (Sepsis/Septic Shock)  Goal: Optimal Coping  Outcome: Ongoing, Progressing  Intervention: Optimize Psychosocial Adjustment to Illness  Recent Flowsheet Documentation  Taken 7/12/2024 2049 by Dandre Gamboa RN  Family/Support System Care: support provided     Problem: Bleeding (Sepsis/Septic Shock)  Goal: Absence of Bleeding  Outcome: Ongoing, Progressing     Problem: Glycemic Control Impaired (Sepsis/Septic Shock)  Goal: Blood Glucose Level Within Desired Range  Outcome: Ongoing, Progressing     Problem: Infection Progression (Sepsis/Septic Shock)  Goal: Absence of Infection Signs and Symptoms  Outcome: Ongoing, Progressing  Intervention: Promote Recovery  Recent Flowsheet Documentation  Taken 7/13/2024 0600 by Dander Gamboa RN  Activity Management: activity encouraged  Taken 7/13/2024 0230 by Dandre Gamboa RN  Activity Management: activity encouraged  Taken 7/13/2024 0000 by Dandre Gamboa RN  Activity Management: activity encouraged  Taken 7/12/2024 2200 by Dandre Gamboa RN  Activity Management: activity encouraged  Taken 7/12/2024 2049 by Dandre Gamboa RN  Activity Management: activity encouraged  Sleep/Rest Enhancement: awakenings minimized     Problem: Nutrition Impaired (Sepsis/Septic Shock)  Goal: Optimal Nutrition Intake  Outcome: Ongoing, Progressing

## 2024-07-13 NOTE — THERAPY EVALUATION
Patient Name: Tessa Bradley  : 1945    MRN: [id number]                              Today's Date: 2024       Admit Date: 2024    Visit Dx:     ICD-10-CM ICD-9-CM   1. COPD with acute exacerbation  J44.1 491.21   2. Hypoxia  R09.02 799.02     Patient Active Problem List   Diagnosis    COPD (chronic obstructive pulmonary disease)    Elevated serum creatinine    Hypotension    Dyspnea    MAKAYLA (obstructive sleep apnea)    HEATHER (acute kidney injury)    Leukocytosis    HTN (hypertension)    HLD (hyperlipidemia)    A-fib    Hodgkin disease    Arthritis    AMS (altered mental status)    Chronic respiratory failure with hypoxia and hypercapnia    Pulmonary nodule    History of TIA (transient ischemic attack)    Tobacco abuse    Acute on chronic hypoxic respiratory failure    History of Hodgkin's disease    Overactive bladder    Stage 3b chronic kidney disease    Acute on chronic respiratory failure with hypoxia    Acute on chronic respiratory failure     Past Medical History:   Diagnosis Date    Atrial fibrillation     Cancer     Cluster headache     COPD (chronic obstructive pulmonary disease)     Difficulty walking     Sciatica and pinched nerves in bsck    Fibromyalgia, primary     Hyperlipidemia     Hypertension     Peripheral neuropathy     TIA (transient ischemic attack)      Past Surgical History:   Procedure Laterality Date    ABDOMINAL SURGERY      HYSTERECTOMY      OOPHORECTOMY        General Information       Row Name 24 1528          Physical Therapy Time and Intention    Document Type evaluation  -ES     Mode of Treatment physical therapy  -ES       Row Name 24 1528          General Information    Patient Profile Reviewed yes  -ES     Prior Level of Function independent:;all household mobility;community mobility;transfer;bed mobility;ADL's  Uses rollator/SPC. Denies recent falls  -ES     Existing Precautions/Restrictions oxygen therapy device and L/min;other (see  comments)  Hx sciatica, B neuropathy (L>R)  -ES       Row Name 07/13/24 1528          Living Environment    People in Home spouse  -ES       Row Name 07/13/24 1528          Home Main Entrance    Number of Stairs, Main Entrance one  -ES       Row Name 07/13/24 1528          Stairs Within Home, Primary    Number of Stairs, Within Home, Primary other (see comments)  13  -ES       Row Name 07/13/24 1528          Cognition    Orientation Status (Cognition) oriented x 3  -ES       Row Name 07/13/24 1528          Safety Issues, Functional Mobility    Safety Issues Affecting Function (Mobility) awareness of need for assistance;insight into deficits/self-awareness  -ES     Impairments Affecting Function (Mobility) endurance/activity tolerance;shortness of breath  -ES               User Key  (r) = Recorded By, (t) = Taken By, (c) = Cosigned By      Initials Name Provider Type    ES Anayeli Ambrose, PT Physical Therapist                   Mobility       Row Name 07/13/24 1529          Bed Mobility    Bed Mobility supine-sit  -ES     Supine-Sit [place] (Bed Mobility) supervision  -ES     Assistive Device (Bed Mobility) bed rails;head of bed elevated  -ES       Row Name 07/13/24 1529          Sit-Stand Transfer    Sit-Stand [place] (Transfers) standby assist  -ES     Assistive Device (Sit-Stand Transfers) other (see comments)  tripod monitor  -ES       Row Name 07/13/24 1529          Gait/Stairs (Locomotion)    [place] Level (Gait) standby assist  -ES     Assistive Device (Gait) other (see comments)  tripod monitor  -ES     Patient was able to Ambulate yes  -ES     Distance in Feet (Gait) 250  -ES     Deviations/Abnormal Patterns (Gait) bilateral deviations;gait speed decreased;stride length decreased  -ES     Bilateral Gait Deviations forward flexed posture  -ES     Comment, (Gait/Stairs) Pt amb with SBA and UE support on tripod monitor. Demo'd good step-through gait pattern with forward flexed posture. No c/o  TRINH throughout ambulation. Further mob limited by fatigue.  -ES               User Key  (r) = Recorded By, (t) = Taken By, (c) = Cosigned By      Initials Name Provider Type    ES Anayeli Ambrose PT Physical Therapist                   Obj/Interventions       Row Name 07/13/24 1530          Range of Motion Comprehensive    General Range of Motion bilateral lower extremity ROM WFL  -ES       Row Name 07/13/24 1530          Strength Comprehensive (MMT)    General Manual Muscle Testing (MMT) Assessment lower extremity strength deficits identified  -ES     Comment, General Manual Muscle Testing (MMT) Assessment BLE grossly 4/5  -ES       Row Name 07/13/24 1530          Balance    Balance Assessment sitting static balance;sitting dynamic balance;sit to stand dynamic balance;standing static balance;standing dynamic balance  -ES     Static Sitting Balance independent  -ES     Dynamic Sitting Balance supervision  -ES     Position, Sitting Balance unsupported;sitting in chair;sitting edge of bed;other (see comments)  commode  -ES     Sit to Stand Dynamic Balance standby assist  -ES     Static Standing Balance supervision  -ES     Dynamic Standing Balance standby assist  -ES     Position/Device Used, Standing Balance supported  -ES     Balance Interventions sitting;standing;sit to stand;supported;static;dynamic;occupation based/functional task  -ES       Row Name 07/13/24 1530          Sensory Assessment (Somatosensory)    Sensory Assessment (Somatosensory) bilateral LE  -ES     Bilateral LE Sensory Assessment impaired  -ES               User Key  (r) = Recorded By, (t) = Taken By, (c) = Cosigned By      Initials Name Provider Type    ES Anayeli Ambrose PT Physical Therapist                   Goals/Plan       Row Name 07/13/24 1536          Bed Mobility Goal 1 (PT)    Activity/Assistive Device (Bed Mobility Goal 1, PT) sit to supine/supine to sit  -ES     [place] Level/Cues Needed (Bed Mobility Goal 1, PT)  independent  -ES     Time Frame (Bed Mobility Goal 1, PT) short term goal (STG);5 days  -ES       Row Name 07/13/24 1536          Transfer Goal 1 (PT)    Activity/Assistive Device (Transfer Goal 1, PT) sit-to-stand/stand-to-sit;bed-to-chair/chair-to-bed;walker, rolling  -ES     [place] Level/Cues Needed (Transfer Goal 1, PT) independent  -ES     Time Frame (Transfer Goal 1, PT) long term goal (LTG);10 days  -ES       Row Name 07/13/24 1536          Gait Training Goal 1 (PT)    Activity/Assistive Device (Gait Training Goal 1, PT) gait (walking locomotion);decrease fall risk;increase endurance/gait distance;walker, rolling  -ES     [place] Level (Gait Training Goal 1, PT) independent  -ES     Distance (Gait Training Goal 1, PT) 300  -ES     Time Frame (Gait Training Goal 1, PT) long term goal (LTG);10 days  -ES       Row Name 07/13/24 1536          Stairs Goal 1 (PT)    Activity/Assistive Device (Stairs Goal 1, PT) ascending stairs;descending stairs;using handrail, left;using handrail, right  -ES     [place] Level/Cues Needed (Stairs Goal 1, PT) modified independence  -ES     Number of Stairs (Stairs Goal 1, PT) 13  -ES     Time Frame (Stairs Goal 1, PT) long term goal (LTG);10 days  -ES       Row Name 07/13/24 1536          Therapy Assessment/Plan (PT)    Planned Therapy Interventions (PT) balance training;bed mobility training;gait training;home exercise program;neuromuscular re-education;patient/family education;strengthening;stair training;transfer training;postural re-education  -ES               User Key  (r) = Recorded By, (t) = Taken By, (c) = Cosigned By      Initials Name Provider Type    ES Anayeli Ambrose, PT Physical Therapist                   Clinical Impression       Row Name 07/13/24 1533          Pain    Pretreatment Pain Rating 0/10 - no pain  -ES     Posttreatment Pain Rating 0/10 - no pain  -ES     Pre/Posttreatment Pain Comment tolerated  -ES     Pain Intervention(s)  Repositioned;Ambulation/increased activity  -ES       Row Name 07/13/24 1536          Plan of Care Review    Plan of Care Reviewed With patient;spouse  -ES     Progress no change  PT IE  -ES     Outcome Evaluation PT eval complete. Pt presents with generalized weakness, decreased activity tolerance, and mild balance deficits warranting skilled IPPT services. Rec stair training prior to d/c. PT rec home w/ assist at d/c.  -ES       Row Name 07/13/24 4058          Therapy Assessment/Plan (PT)    Rehab Potential (PT) good, to achieve stated therapy goals  -ES     Criteria for Skilled Interventions Met (PT) yes;meets criteria;skilled treatment is necessary  -ES     Therapy Frequency (PT) daily  -ES     Predicted Duration of Therapy Intervention (PT) 10 days  -ES       Row Name 07/13/24 5524          Vital Signs    Pre Systolic BP Rehab --  cleared by RN  -ES     O2 Delivery Pre Treatment nasal cannula  -ES     O2 Delivery Intra Treatment nasal cannula  -ES     O2 Delivery Post Treatment nasal cannula  -ES     Pre Patient Position Supine  -ES     Intra Patient Position Standing  -ES     Post Patient Position Sitting  -ES       Row Name 07/13/24 2688          Positioning and Restraints    Pre-Treatment Position in bed  -ES     Post Treatment Position chair  -ES     In Chair notified nsg;reclined;sitting;call light within reach;encouraged to call for assist;waffle cushion;legs elevated;with family/caregiver  exit alarm status unchanged  -ES               User Key  (r) = Recorded By, (t) = Taken By, (c) = Cosigned By      Initials Name Provider Type    Anayeli Sweeney, PT Physical Therapist                   Outcome Measures       Row Name 07/13/24 8582          How much help from another person do you currently need...    Turning from your back to your side while in flat bed without using bedrails? 4  -ES     Moving from lying on back to sitting on the side of a flat bed without bedrails? 4  -ES     Moving to and from  a bed to a chair (including a wheelchair)? 3  -ES     Standing up from a chair using your arms (e.g., wheelchair, bedside chair)? 3  -ES     Climbing 3-5 steps with a railing? 3  -ES     To walk in hospital room? 3  -ES     AM-PAC 6 Clicks Score (PT) 20  -ES     Highest Level of Mobility Goal 6 --> Walk 10 steps or more  -ES       Row Name 07/13/24 1536          Functional Assessment    Outcome Measure Options AM-PAC 6 Clicks Basic Mobility (PT)  -ES               User Key  (r) = Recorded By, (t) = Taken By, (c) = Cosigned By      Initials Name Provider Type    ES Anayeli Ambrose PT Physical Therapist                                 Physical Therapy Education       Title: PT OT SLP Therapies (In Progress)       Topic: Physical Therapy (In Progress)       Point: Mobility training (Done)       Learning Progress Summary             Patient Acceptance, E,TB, VU by ES at 7/13/2024 1537   Significant Other Acceptance, E,TB, VU by ES at 7/13/2024 1537                         Point: Home exercise program (Not Started)       Learner Progress:  Not documented in this visit.              Point: Body mechanics (Done)       Learning Progress Summary             Patient Acceptance, E,TB, VU by ES at 7/13/2024 1537   Significant Other Acceptance, E,TB, VU by ES at 7/13/2024 1537                         Point: Precautions (Done)       Learning Progress Summary             Patient Acceptance, E,TB, VU by ES at 7/13/2024 1537   Significant Other Acceptance, E,TB, VU by ES at 7/13/2024 1537                                         User Key       Initials Effective Dates Name Provider Type Discipline     08/11/22 -  Anayeli Ambrose PT Physical Therapist PT                  PT Recommendation and Plan  Planned Therapy Interventions (PT): balance training, bed mobility training, gait training, home exercise program, neuromuscular re-education, patient/family education, strengthening, stair training, transfer training, postural  re-education  Plan of Care Reviewed With: patient, spouse  Progress: no change (PT IE)  Outcome Evaluation: PT eval complete. Pt presents with generalized weakness, decreased activity tolerance, and mild balance deficits warranting skilled IPPT services. Rec stair training prior to d/c. PT rec home w/ assist at d/c.     Time Calculation:   PT Evaluation Complexity  History, PT Evaluation Complexity: 1-2 personal factors and/or comorbidities  Examination of Body Systems (PT Eval Complexity): total of 3 or more elements  Clinical Presentation (PT Evaluation Complexity): evolving  Clinical Decision Making (PT Evaluation Complexity): low complexity  Overall Complexity (PT Evaluation Complexity): low complexity     PT Charges       Row Name 07/13/24 1537             Time Calculation    Start Time 1424  -ES      PT Received On 07/13/24  -ES      PT Goal Re-Cert Due Date 07/23/24  -ES         Time Calculation- PT    Total Timed Code Minutes- PT 15 minute(s)  -ES         Timed Charges    87054 - Gait Training Minutes  15  -ES         Untimed Charges    PT Eval/Re-eval Minutes 52  -ES         Total Minutes    Timed Charges Total Minutes 15  -ES      Untimed Charges Total Minutes 52  -ES       Total Minutes 67  -ES                User Key  (r) = Recorded By, (t) = Taken By, (c) = Cosigned By      Initials Name Provider Type    ES Anayeli Ambrose, PT Physical Therapist                  Therapy Charges for Today       Code Description Service Date Service Provider Modifiers Qty    06945502981 HC GAIT TRAINING EA 15 MIN 7/13/2024 Anayeli Ambrose, PT GP 1    70290834151 HC PT EVAL LOW COMPLEXITY 4 7/13/2024 Anayeli Ambrose, PT GP 1            PT G-Codes  Outcome Measure Options: AM-PAC 6 Clicks Basic Mobility (PT)  AM-PAC 6 Clicks Score (PT): 20  PT Discharge Summary  Anticipated Discharge Disposition (PT): home with assist    Anayeli Ambrose PT  7/13/2024

## 2024-07-14 PROCEDURE — 94761 N-INVAS EAR/PLS OXIMETRY MLT: CPT

## 2024-07-14 PROCEDURE — 94799 UNLISTED PULMONARY SVC/PX: CPT

## 2024-07-14 PROCEDURE — 25010000002 METHYLPREDNISOLONE PER 125 MG: Performed by: NURSE PRACTITIONER

## 2024-07-14 PROCEDURE — 94664 DEMO&/EVAL PT USE INHALER: CPT

## 2024-07-14 PROCEDURE — 97535 SELF CARE MNGMENT TRAINING: CPT

## 2024-07-14 PROCEDURE — 99232 SBSQ HOSP IP/OBS MODERATE 35: CPT | Performed by: INTERNAL MEDICINE

## 2024-07-14 PROCEDURE — 97165 OT EVAL LOW COMPLEX 30 MIN: CPT

## 2024-07-14 RX ORDER — PREDNISONE 20 MG/1
40 TABLET ORAL
Status: DISCONTINUED | OUTPATIENT
Start: 2024-07-15 | End: 2024-07-15 | Stop reason: HOSPADM

## 2024-07-14 RX ADMIN — IPRATROPIUM BROMIDE AND ALBUTEROL SULFATE 3 ML: 2.5; .5 SOLUTION RESPIRATORY (INHALATION) at 01:52

## 2024-07-14 RX ADMIN — DOXYCYCLINE 100 MG: 100 CAPSULE ORAL at 21:44

## 2024-07-14 RX ADMIN — APIXABAN 5 MG: 5 TABLET, FILM COATED ORAL at 09:54

## 2024-07-14 RX ADMIN — IPRATROPIUM BROMIDE AND ALBUTEROL SULFATE 3 ML: 2.5; .5 SOLUTION RESPIRATORY (INHALATION) at 12:16

## 2024-07-14 RX ADMIN — ATORVASTATIN CALCIUM 40 MG: 40 TABLET, FILM COATED ORAL at 21:44

## 2024-07-14 RX ADMIN — Medication 10 ML: at 09:54

## 2024-07-14 RX ADMIN — GUAIFENESIN 600 MG: 600 TABLET, EXTENDED RELEASE ORAL at 09:54

## 2024-07-14 RX ADMIN — DOXYCYCLINE 100 MG: 100 CAPSULE ORAL at 09:54

## 2024-07-14 RX ADMIN — IPRATROPIUM BROMIDE AND ALBUTEROL SULFATE 3 ML: 2.5; .5 SOLUTION RESPIRATORY (INHALATION) at 19:59

## 2024-07-14 RX ADMIN — GABAPENTIN 100 MG: 100 CAPSULE ORAL at 21:44

## 2024-07-14 RX ADMIN — IPRATROPIUM BROMIDE AND ALBUTEROL SULFATE 3 ML: 2.5; .5 SOLUTION RESPIRATORY (INHALATION) at 06:47

## 2024-07-14 RX ADMIN — APIXABAN 5 MG: 5 TABLET, FILM COATED ORAL at 21:44

## 2024-07-14 RX ADMIN — GUAIFENESIN 600 MG: 600 TABLET, EXTENDED RELEASE ORAL at 21:43

## 2024-07-14 RX ADMIN — METHYLPREDNISOLONE SODIUM SUCCINATE 62.5 MG: 125 INJECTION, POWDER, FOR SOLUTION INTRAMUSCULAR; INTRAVENOUS at 09:53

## 2024-07-14 RX ADMIN — CETIRIZINE HYDROCHLORIDE 10 MG: 10 TABLET, FILM COATED ORAL at 09:54

## 2024-07-14 RX ADMIN — SERTRALINE 100 MG: 100 TABLET, FILM COATED ORAL at 09:54

## 2024-07-14 NOTE — PLAN OF CARE
Goal Outcome Evaluation:  Plan of Care Reviewed With: patient           Outcome Evaluation: OT eval complete. Pt presents with fatigue, decreased act julio, and decline in mob and adl indep. Pt completed bed mob with sba, setup for lbd and grooming, able to take steps in room with cga with no ae. Pt declined further mobility secondary to fatigue. Recommend IPOT and outpt rehab at d/c.      Anticipated Discharge Disposition (OT): home with assist, home with outpatient therapy services

## 2024-07-14 NOTE — THERAPY EVALUATION
Patient Name: Tessa Bradley  : 1945    MRN: [id number]                              Today's Date: 2024       Admit Date: 2024    Visit Dx:     ICD-10-CM ICD-9-CM   1. COPD with acute exacerbation  J44.1 491.21   2. Hypoxia  R09.02 799.02     Patient Active Problem List   Diagnosis    COPD (chronic obstructive pulmonary disease)    Elevated serum creatinine    Hypotension    Dyspnea    MAKAYLA (obstructive sleep apnea)    HEATHER (acute kidney injury)    Leukocytosis    HTN (hypertension)    HLD (hyperlipidemia)    A-fib    Hodgkin disease    Arthritis    AMS (altered mental status)    Chronic respiratory failure with hypoxia and hypercapnia    Pulmonary nodule    History of TIA (transient ischemic attack)    Tobacco abuse    Acute on chronic hypoxic respiratory failure    History of Hodgkin's disease    Overactive bladder    Stage 3b chronic kidney disease    Acute on chronic respiratory failure with hypoxia    Acute on chronic respiratory failure     Past Medical History:   Diagnosis Date    Atrial fibrillation     Cancer     Cluster headache     COPD (chronic obstructive pulmonary disease)     Difficulty walking     Sciatica and pinched nerves in bsck    Fibromyalgia, primary     Hyperlipidemia     Hypertension     Peripheral neuropathy     TIA (transient ischemic attack)      Past Surgical History:   Procedure Laterality Date    ABDOMINAL SURGERY      HYSTERECTOMY      OOPHORECTOMY        General Information       Row Name 24 1315          OT Time and Intention    Document Type evaluation  -SW     Mode of Treatment occupational therapy  -SW       Row Name 24 1315          General Information    Patient Profile Reviewed yes  -SW     Prior Level of Function independent:;all household mobility;community mobility;ADL's  -SW     Existing Precautions/Restrictions oxygen therapy device and L/min;other (see comments)  Hx sciatica, B neuropathy (L>R)  -SW     Barriers to Rehab  medically complex;previous functional deficit  -[facility] Name 07/14/24 1315          Occupational Profile    Environmental Supports and Barriers (Occupational Profile) Pt lives in basement of home with spouse. She has a rollator, a walk in shower with a seat.  -[facility] Name 07/14/24 1315          Living Environment    People in Home spouse  -[facility] Name 07/14/24 1315          Home Main Entrance    Number of Stairs, Main Entrance one  -[facility] Name 07/14/24 1315          Stairs Within Home, Primary    Number of Stairs, Within Home, Primary other (see comments)  13  -     Stairs Comment, Within Home, Primary basement  -[facility] Name 07/14/24 1315          Cognition    Orientation Status (Cognition) oriented x 3  -[facility] Name 07/14/24 1315          Safety Issues, Functional Mobility    Safety Issues Affecting Function (Mobility) awareness of need for assistance;insight into deficits/self-awareness;safety precautions follow-through/compliance;safety precaution awareness  -     Impairments Affecting Function (Mobility) endurance/activity tolerance;shortness of breath  -               User Key  (r) = Recorded By, (t) = Taken By, (c) = Cosigned By      Initials Name Provider Type     Marcella Uribe OT Occupational Therapist                     Mobility/ADL's       [facility] Name 07/14/24 1315          Bed Mobility    Bed Mobility supine-sit  -     Supine-Sit [place] (Bed Mobility) supervision  -     Assistive Device (Bed Mobility) bed rails;head of bed elevated  -SW       Row Name 07/14/24 1315          Sit-Stand Transfer    Sit-Stand [place] (Transfers) standby assist  -     Assistive Device (Sit-Stand Transfers) other (see comments)  no device  -SW       Row Name 07/14/24 1315          Functional Mobility    Functional Mobility- Ind. Level contact guard assist;verbal cues required  -     Functional Mobility- Device other (see comments)  no device  -     Functional  Mobility-Distance (Feet) 6  -SW     Functional Mobility- Safety Issues supplemental O2  -SW       [facility] Name 07/14/24 1315          Activities of Daily Living    BADL Assessment/Intervention lower body dressing;grooming;feeding  -SW       Row Name 07/14/24 1315          Lower Body Dressing Assessment/Training    [place] Level (Lower Body Dressing) lower body dressing skills;socks;set up  -SW     Position (Lower Body Dressing) edge of bed sitting  -SW       Row Name 07/14/24 1315          Grooming Assessment/Training    [place] Level (Grooming) grooming skills;wash face, hands;set up  -SW     Position (Grooming) edge of bed sitting  -SW       Row Name 07/14/24 1315          Self-Feeding Assessment/Training    [place] Level (Feeding) feeding skills;set up  -SW     Position (Self-Feeding) edge of bed sitting  -               User Key  (r) = Recorded By, (t) = Taken By, (c) = Cosigned By      Initials Name Provider Type    Marcella Marinelli OT Occupational Therapist                   Obj/Interventions       Row Name 07/14/24 1315          Sensory Assessment (Somatosensory)    Sensory Assessment (Somatosensory) UE sensation intact  -SW       Row Name 07/14/24 1315          Vision Assessment/Intervention    Visual Impairment/Limitations WFL  -SW       Row Name 07/14/24 1315          Range of Motion Comprehensive    General Range of Motion bilateral upper extremity ROM WFL  -SW       Row Name 07/14/24 1315          Strength Comprehensive (MMT)    General Manual Muscle Testing (MMT) Assessment upper extremity strength deficits identified  -     Comment, General Manual Muscle Testing (MMT) Assessment BUEs 4/5  -SW       Row Name 07/14/24 1315          Balance    Balance Assessment sitting static balance;sitting dynamic balance;sit to stand dynamic balance;standing static balance;standing dynamic balance  -SW     Static Sitting Balance independent  -     Dynamic Sitting Balance independent  -     Position,  Sitting Balance unsupported  -SW     Sit to Stand Dynamic Balance standby assist  -SW     Static Standing Balance standby assist  -SW     Dynamic Standing Balance contact guard  -SW     Position/Device Used, Standing Balance unsupported  -SW     Balance Interventions sitting;standing;sit to stand;supported;static;dynamic;minimal challenge;occupation based/functional task  -SW               User Key  (r) = Recorded By, (t) = Taken By, (c) = Cosigned By      Initials Name Provider Type    Marcella Marinelli OT Occupational Therapist                   Goals/Plan       Row Name 07/14/24 1315          Transfer Goal 1 (OT)    Activity/Assistive Device (Transfer Goal 1, OT) toilet  -SW     [place] Level/Cues Needed (Transfer Goal 1, OT) standby assist  -SW     Time Frame (Transfer Goal 1, OT) short term goal (STG);by discharge  -SW     Progress/Outcome (Transfer Goal 1, OT) goal ongoing  -SW       Row Name 07/14/24 1315          Dressing Goal 1 (OT)    Activity/Device (Dressing Goal 1, OT) dressing skills, all  -SW     [place]/Cues Needed (Dressing Goal 1, OT) independent  -SW     Time Frame (Dressing Goal 1, OT) long term goal (LTG);by discharge  -SW     Progress/Outcome (Dressing Goal 1, OT) goal ongoing  -SW       Row Name 07/14/24 1315          Therapy Assessment/Plan (OT)    Planned Therapy Interventions (OT) activity tolerance training;adaptive equipment training;BADL retraining;functional balance retraining;patient/caregiver education/training;transfer/mobility retraining;strengthening exercise  -SW               User Key  (r) = Recorded By, (t) = Taken By, (c) = Cosigned By      Initials Name Provider Type    Marcella Marinelli OT Occupational Therapist                   Clinical Impression       Row Name 07/14/24 1315          Pain Assessment    Pretreatment Pain Rating 0/10 - no pain  -SW     Posttreatment Pain Rating 0/10 - no pain  -SW       Row Name 07/14/24 1315          Plan of Care Review    Plan of  Care Reviewed With patient  -SW     Outcome Evaluation OT eval complete. Pt presents with fatigue, decreased act julio, and decline in mob and adl indep. Pt completed bed mob with sba, setup for lbd and grooming, able to take steps in room with cga with no ae. Pt declined further mobility secondary to fatigue. Recommend IPOT and outpt rehab at d/c.  -       Row Name 07/14/24 1315          Therapy Assessment/Plan (OT)    Rehab Potential (OT) good, to achieve stated therapy goals  -     Criteria for Skilled Therapeutic Interventions Met (OT) yes;meets criteria;skilled treatment is necessary  -     Therapy Frequency (OT) daily  -     Predicted Duration of Therapy Intervention (OT) 1 week  -       Row Name 07/14/24 1315          Therapy Plan Review/Discharge Plan (OT)    Anticipated Discharge Disposition (OT) home with assist;home with outpatient therapy services  -       Row Name 07/14/24 1315          Vital Signs    Pre Systolic BP Rehab 153  -SW     Pre Treatment Diastolic BP 86  -SW     Pre SpO2 (%) 91  -SW     O2 Delivery Pre Treatment supplemental O2  -SW     Intra SpO2 (%) 87  -SW     O2 Delivery Intra Treatment supplemental O2  -SW     Post SpO2 (%) 91  -SW     O2 Delivery Post Treatment supplemental O2  -SW     Pre Patient Position Supine  -SW     Intra Patient Position Standing  -SW     Post Patient Position Sitting  -SW       Row Name 07/14/24 1315          Positioning and Restraints    Pre-Treatment Position in bed  -SW     Post Treatment Position bed  -SW     In Bed notified nsg;sitting;sitting EOB;call light within reach;encouraged to call for assist;exit alarm on;side rails up x2  -SW               User Key  (r) = Recorded By, (t) = Taken By, (c) = Cosigned By      Initials Name Provider Type    Marcella Marinelli, OT Occupational Therapist                   Outcome Measures       Row Name 07/14/24 0594          How much help from another is currently needed...    Putting on and taking off regular  lower body clothing? 4  -SW     Bathing (including washing, rinsing, and drying) 3  -SW     Toileting (which includes using toilet bed pan or urinal) 3  -SW     Putting on and taking off regular upper body clothing 4  -SW     Taking care of personal grooming (such as brushing teeth) 4  -SW     Eating meals 4  -SW     AM-PAC 6 Clicks Score (OT) 22  -SW       Row Name 07/14/24 0800          How much help from another person do you currently need...    Turning from your back to your side while in flat bed without using bedrails? 4  -CF     Moving from lying on back to sitting on the side of a flat bed without bedrails? 4  -CF     Moving to and from a bed to a chair (including a wheelchair)? 3  -CF     Standing up from a chair using your arms (e.g., wheelchair, bedside chair)? 3  -CF     Climbing 3-5 steps with a railing? 3  -CF     To walk in hospital room? 3  -CF     AM-PAC 6 Clicks Score (PT) 20  -CF     Highest Level of Mobility Goal 6 --> Walk 10 steps or more  -CF       Row Name 07/14/24 1445          Functional Assessment    Outcome Measure Options AM-PAC 6 Clicks Daily Activity (OT)  -               User Key  (r) = Recorded By, (t) = Taken By, (c) = Cosigned By      Initials Name Provider Type    CF Hailey Weaver LPN Licensed Nurse    Marcella Marinelli OT Occupational Therapist                    Occupational Therapy Education       Title: PT OT SLP Therapies (In Progress)       Topic: Occupational Therapy (In Progress)       Point: ADL training (Done)       Description:   Instruct learner(s) on proper safety adaptation and remediation techniques during self care or transfers.   Instruct in proper use of assistive devices.                  Learning Progress Summary             Patient Acceptance, E, VU by HUSAM at 7/14/2024 4840                         Point: Home exercise program (Not Started)       Description:   Instruct learner(s) on appropriate technique for monitoring, assisting and/or progressing  therapeutic exercises/activities.                  Learner Progress:  Not documented in this visit.              Point: Precautions (Not Started)       Description:   Instruct learner(s) on prescribed precautions during self-care and functional transfers.                  Learner Progress:  Not documented in this visit.              Point: Body mechanics (Done)       Description:   Instruct learner(s) on proper positioning and spine alignment during self-care, functional mobility activities and/or exercises.                  Learning Progress Summary             Patient Acceptance, E, VU by  at 7/14/2024 5923                                         User Key       Initials Effective Dates Name Provider Type Discipline     06/16/21 -  Marcella Uribe, MELONY Occupational Therapist OT                  OT Recommendation and Plan  Planned Therapy Interventions (OT): activity tolerance training, adaptive equipment training, BADL retraining, functional balance retraining, patient/caregiver education/training, transfer/mobility retraining, strengthening exercise  Therapy Frequency (OT): daily  Plan of Care Review  Plan of Care Reviewed With: patient  Outcome Evaluation: OT eval complete. Pt presents with fatigue, decreased act julio, and decline in mob and adl indep. Pt completed bed mob with sba, setup for lbd and grooming, able to take steps in room with cga with no ae. Pt declined further mobility secondary to fatigue. Recommend IPOT and outpt rehab at d/c.     Time Calculation:   Evaluation Complexity (OT)  Review Occupational Profile/Medical/Therapy History Complexity: brief/low complexity  Assessment, Occupational Performance/Identification of Deficit Complexity: 1-3 performance deficits  Clinical Decision Making Complexity (OT): problem focused assessment/low complexity  Overall Complexity of Evaluation (OT): low complexity     Time Calculation- OT       Row Name 07/14/24 1315             Time Calculation- OT    OT Start  Time 1315  -SW      OT Received On 07/14/24  -SW      OT Goal Re-Cert Due Date 07/24/24  -SW         Timed Charges    95810 - OT Self Care/Mgmt Minutes 23  -SW         Untimed Charges    OT Eval/Re-eval Minutes 40  -SW         Total Minutes    Timed Charges Total Minutes 23  -SW      Untimed Charges Total Minutes 40  -SW       Total Minutes 63  -SW                User Key  (r) = Recorded By, (t) = Taken By, (c) = Cosigned By      Initials Name Provider Type     Marcella Uribe OT Occupational Therapist                  Therapy Charges for Today       Code Description Service Date Service Provider Modifiers Qty    [id number]  OT SELF CARE/MGMT/TRAIN EA 15 MIN 7/14/2024 Marcella Uribe OT GO 2    [id number]  OT EVAL LOW COMPLEXITY 3 7/14/2024 Marcella Uribe OT GO 1                 Marcella Uribe OT  7/14/2024

## 2024-07-14 NOTE — PROGRESS NOTES
[facility] Medicine Services  PROGRESS NOTE    Patient Name: Tessa Bradley  : 1945  MRN: [id number]    Date of Admission: 2024  Primary Care Physician: Sunni Santoro MD    Subjective   Subjective     CC:  F/u SOA    HPI:  Patient resting in bed asleep. Says she is feeling better and breathing is better.      Objective   Objective     Vital Signs:   Temp:  [98.1 °F (36.7 °C)-98.3 °F (36.8 °C)] 98.3 °F (36.8 °C)  Heart Rate:  [70-95] 70  Resp:  [8-18] 16  BP: (107-129)/(52-69) 129/69  Flow (L/min):  [2] 2     Physical Exam:  Constitutional: No acute distress, awake, alert  HENT: NCAT, mucous membranes moist  Respiratory: improvement in bilateral expiratory wheezing, non-labored on O2  Cardiovascular: RRR, no murmurs, rubs, or gallops  Gastrointestinal: soft, nontender, nondistended  Musculoskeletal: No bilateral ankle edema  Psychiatric: Appropriate affect, cooperative  Neurologic: Oriented x 3, speech clear, no focal deficits  Skin: No rashes      Results Reviewed:  LAB RESULTS:      Lab 24  0357 24  1352   WBC 5.32 9.66   HEMOGLOBIN 11.3* 13.2   HEMATOCRIT 39.0 45.4   PLATELETS 182 227   NEUTROS ABS 3.74 6.84   IMMATURE GRANS (ABS) 0.03 0.04   LYMPHS ABS 1.06 1.64   MONOS ABS 0.48 0.90   EOS ABS 0.00 0.21   .4* 104.1*   LACTATE 1.1 0.9         Lab 24  0357 24  1352   SODIUM 139 142   POTASSIUM 4.8 5.1   CHLORIDE 100 99   CO2 32.0* 37.0*   ANION GAP 7.0 6.0   BUN 27* 18   CREATININE 1.29* 1.31*   EGFR 43.1* 42.3*   GLUCOSE 106* 123*   CALCIUM 8.2* 9.1   MAGNESIUM 1.7  --    HEMOGLOBIN A1C  --  5.80*   TSH  --  1.760         Lab 24  1352   TOTAL PROTEIN 6.9   ALBUMIN 3.8   GLOBULIN 3.1   ALT (SGPT) 7   AST (SGOT) 13   BILIRUBIN 0.4   ALK PHOS 98         Lab 24  1352   PROBNP 809.9   HSTROP T 21*             Lab 24  1352   FOLATE 10.10   VITAMIN B 12 313         Brief Urine Lab Results  (Last result in the past 365 days)         Color   Clarity   Blood   Leuk Est   Nitrite   Protein   CREAT   Urine HCG        10/10/23 1822 Yellow   Clear   Negative   Trace   Positive   Negative                   Microbiology Results Abnormal       Procedure Component Value - Date/Time    Blood Culture - Blood, Arm, Left [[id number]]  (Normal) Collected: 07/12/24 1403    Lab Status: Preliminary result Specimen: Blood from Arm, Left Updated: 07/13/24 1446     Blood Culture No growth at 24 hours    Blood Culture - Blood, Arm, Right [[id number]]  (Normal) Collected: 07/12/24 1403    Lab Status: Preliminary result Specimen: Blood from Arm, Right Updated: 07/13/24 1446     Blood Culture No growth at 24 hours    Respiratory Culture - Sputum, Cough [[id number]] Collected: 07/12/24 2219    Lab Status: Final result Specimen: Sputum from Cough Updated: 07/13/24 0937     Respiratory Culture Rejected     Gram Stain Few (2+) Epithelial cells per low power field      Few (2+) WBCs per low power field      Few (2+) Mixed bacterial morphotypes seen on Gram Stain    Narrative:      Specimen rejected due to oropharyngeal contamination. Please reorder and recollect specimen if clinically necessary.    COVID PRE-OP / PRE-PROCEDURE SCREENING ORDER (NO ISOLATION) - Swab, Nasopharynx [[id number]]  (Normal) Collected: 07/12/24 1407    Lab Status: Final result Specimen: Swab from Nasopharynx Updated: 07/12/24 1527    Narrative:      The following orders were created for panel order COVID PRE-OP / PRE-PROCEDURE SCREENING ORDER (NO ISOLATION) - Swab, Nasopharynx.  Procedure                               Abnormality         Status                     ---------                               -----------         ------                     Respiratory Panel PCR w/...[[id number]]  Normal              Final result                 Please view results for these tests on the individual orders.    Respiratory Panel PCR w/COVID-19(SARS-CoV-2) PENNY/CLEVE/ALICE/PAD/COR/ELYSSA In-House, NP Swab in UTM/VTM, 2 HR  TAT - Swab, Nasopharynx [[id number]]  (Normal) Collected: 07/12/24 1407    Lab Status: Final result Specimen: Swab from Nasopharynx Updated: 07/12/24 1527     ADENOVIRUS, PCR Not Detected     Coronavirus 229E Not Detected     Coronavirus HKU1 Not Detected     Coronavirus NL63 Not Detected     Coronavirus OC43 Not Detected     COVID19 Not Detected     Human Metapneumovirus Not Detected     Human Rhinovirus/Enterovirus Not Detected     Influenza A PCR Not Detected     Influenza B PCR Not Detected     Parainfluenza Virus 1 Not Detected     Parainfluenza Virus 2 Not Detected     Parainfluenza Virus 3 Not Detected     Parainfluenza Virus 4 Not Detected     RSV, PCR Not Detected     Bordetella pertussis pcr Not Detected     Bordetella parapertussis PCR Not Detected     Chlamydophila pneumoniae PCR Not Detected     Mycoplasma pneumo by PCR Not Detected    Narrative:      In the setting of a positive respiratory panel with a viral infection PLUS a negative procalcitonin without other underlying concern for bacterial infection, consider observing off antibiotics or discontinuation of antibiotics and continue supportive care. If the respiratory panel is positive for atypical bacterial infection (Bordetella pertussis, Chlamydophila pneumoniae, or Mycoplasma pneumoniae), consider antibiotic de-escalation to target atypical bacterial infection.            CT Chest Without Contrast Diagnostic    Result Date: 7/13/2024  CT CHEST WO CONTRAST DIAGNOSTIC Date of Exam: 7/13/2024 4:15 AM EDT Indication: hypoxia; L chest discomfort. Comparison: 7/12/2024. Technique: Axial CT images were obtained of the chest without contrast administration.  Reconstructed coronal and sagittal images were also obtained. Automated exposure control and iterative construction methods were used. Findings: Hilum and Mediastinum: No pathologically enlarged lymph nodes.  Normal heart size.   No pericardial effusion.  Unremarkable thoracic aorta and pulmonary  arteries. Granulomatous calcifications are present. Atherosclerotic calcifications are present including within the coronary arteries. Lung Parenchyma and Pleura: No focal consolidations. Scarring present. No suspicious pulmonary nodules.  No endobronchial lesions.  No significant pleural effusions. Upper Abdomen: No acute process. Soft tissues: Unremarkable. Osseous structures: No aggressive focal lytic or sclerotic osseous lesions.     Impression: Impression: 1.No acute cardiopulmonary process. 2.Ancillary findings as described above. Electronically Signed: Renetta Duggan MD  7/13/2024 4:21 AM EDT  Workstation ID: [id number]    XR Chest 1 View    Result Date: 7/12/2024  XR CHEST 1 VW Date of Exam: 7/12/2024 2:01 PM EDT Indication: SOA triage protocol Comparison: Chest radiograph 10/10/2023. Findings: Postsurgical changes in the right upper chest. Cardiomediastinal silhouette within normal limits. Findings of healed/calcified granulomatous disease. Chronic appearing changes of the lungs. No discrete focal consolidation. No pleural effusion or pneumothorax. Osseous structures are unchanged.     Impression: Impression: Chronic changes without evidence of superimposed acute process. Electronically Signed: Adam Hudson MD  7/12/2024 2:25 PM EDT  Workstation ID: [id number]     Results for orders placed during the hospital encounter of 08/24/23    Adult Transthoracic Echo Complete W/ Cont if Necessary Per Protocol    Interpretation Summary    Left ventricular systolic function is normal. Left ventricular ejection fraction appears to be 56 - 60%.    Left ventricular diastolic function was normal.    Mild aortic valve stenosis is present. Aortic valve area is 1.2 cm2.    Peak velocity of the flow distal to the aortic valve is 267.2 cm/s. Aortic valve mean pressure gradient is 16 mmHg.      Current medications:  Scheduled Meds:apixaban, 5 mg, Oral, Q12H  atorvastatin, 40 mg, Oral, Nightly  cetirizine, 10 mg, Oral,  Daily  doxycycline, 100 mg, Oral, Q12H  gabapentin, 100 mg, Oral, Nightly  guaiFENesin, 600 mg, Oral, Q12H  ipratropium-albuterol, 3 mL, Nebulization, Q6H - RT  methylPREDNISolone sodium succinate, 62.5 mg, Intravenous, Daily  oxybutynin XL, 10 mg, Oral, Daily  sertraline, 100 mg, Oral, Daily  sodium chloride, 10 mL, Intravenous, Q12H      Continuous Infusions:     PRN Meds:.  acetaminophen **OR** acetaminophen **OR** acetaminophen    benzonatate    senna-docusate sodium **AND** polyethylene glycol **AND** bisacodyl **AND** bisacodyl    ipratropium-albuterol    nitroglycerin    ondansetron ODT **OR** ondansetron    sodium chloride    [COMPLETED] Insert Peripheral IV **AND** sodium chloride    sodium chloride    sodium chloride    Assessment & Plan   Assessment & Plan     Active Hospital Problems    Diagnosis  POA    **Acute on chronic hypoxic respiratory failure [J96.21]  Yes    Acute on chronic respiratory failure [J96.20]  Yes    Acute on chronic respiratory failure with hypoxia [J96.21]  Yes    History of TIA (transient ischemic attack) [Z86.73]  Not Applicable    Pulmonary nodule [R91.1]  Yes    A-fib [I48.91]  Yes    HTN (hypertension) [I10]  Yes    HLD (hyperlipidemia) [E78.5]  Yes    COPD (chronic obstructive pulmonary disease) [J44.9]  Yes      Resolved Hospital Problems   No resolved problems to display.        Brief Hospital Course to date:  Tessa Bradley is a 78 y.o. female with PMHx of COPD on chronic oxygen 2 liters NC (f/w pulmonary associates), MAKAYLA not on CPAP, Hodgkin's disease s/p radiation and chemo, Afib on Eliquis, HTN, HLD, peripheral neuropathy, adenocarcinoma R lung s/p bronch w/ VATS (at [facility] 1/5/2024), TIA, OAB who presents to the ED for evaluation of shortness of breath.     A/C hypoxic respiratory failure  AECOPD, chronic oxygen 2 liters   - CT chest without any acute findings  - wean to PO steroids today, wheezing improved  - scheduled and PRN nebs  - pulmonary toilet / IS /  "flutter valve  - no infiltrate on CT chest, de-escalate abx to oral doxy x 5 days  - sputum culture pending  - respiratory pcr negative     Afib on Eliquis  - continue eliquis  - not on antiarrythmic / rate control medications    Hypotension:  - patient states she in on blood pressure medication at home, although I do not see any prescribed, does not check BP at home, BP overall improved, seems to have responded to IV fluids.     CKD 3  - baseline 1-1.2, currently stable     Hx R lung adenocarcinoma  S/p VATS 1/2024  - per pulmonary office note 3/27/2024, pt to f/u w/ oncology but per patient has not done so (per note \"strange looking cells\" around the area that was removed), need outpatient f/u      Hx TIA  HTN / HLD  - continue statin     Overactive Bladder  - continue ditropan     Mood disorder  Sleep disorder  - continue zoloft  - continue trazodone, decrease to 50 mg nightly prn       Expected Discharge Location and Transportation: Home  Expected Discharge   Expected Discharge Date: 7/14/2024; Expected Discharge Time:      VTE Prophylaxis:  Pharmacologic & mechanical VTE prophylaxis orders are present.         AM-PAC 6 Clicks Score (PT): 20 (07/13/24 2000)    CODE STATUS:   Code Status and Medical Interventions:   Ordered at: 07/12/24 2026     Code Status (Patient has no pulse and is not breathing):    CPR (Attempt to Resuscitate)     Medical Interventions (Patient has pulse or is breathing):    Full Support       Batool Brooks, DO  07/14/24      "

## 2024-07-15 ENCOUNTER — READMISSION MANAGEMENT (OUTPATIENT)
Dept: CALL CENTER | Facility: HOSPITAL | Age: 79
End: 2024-07-15
Payer: MEDICARE

## 2024-07-15 VITALS
SYSTOLIC BLOOD PRESSURE: 123 MMHG | WEIGHT: 148 LBS | BODY MASS INDEX: 26.22 KG/M2 | HEIGHT: 63 IN | RESPIRATION RATE: 18 BRPM | TEMPERATURE: 98.4 F | OXYGEN SATURATION: 93 % | DIASTOLIC BLOOD PRESSURE: 48 MMHG | HEART RATE: 84 BPM

## 2024-07-15 PROCEDURE — 63710000001 PREDNISONE PER 1 MG: Performed by: INTERNAL MEDICINE

## 2024-07-15 PROCEDURE — 94799 UNLISTED PULMONARY SVC/PX: CPT

## 2024-07-15 PROCEDURE — 94761 N-INVAS EAR/PLS OXIMETRY MLT: CPT

## 2024-07-15 PROCEDURE — 94664 DEMO&/EVAL PT USE INHALER: CPT

## 2024-07-15 PROCEDURE — 99239 HOSP IP/OBS DSCHRG MGMT >30: CPT | Performed by: INTERNAL MEDICINE

## 2024-07-15 RX ORDER — PREDNISONE 20 MG/1
TABLET ORAL
Qty: 7 TABLET | Refills: 0 | Status: SHIPPED | OUTPATIENT
Start: 2024-07-16 | End: 2024-07-23

## 2024-07-15 RX ORDER — BENZONATATE 200 MG/1
200 CAPSULE ORAL 3 TIMES DAILY PRN
Qty: 15 CAPSULE | Refills: 0 | Status: SHIPPED | OUTPATIENT
Start: 2024-07-15

## 2024-07-15 RX ORDER — DOXYCYCLINE 100 MG/1
100 CAPSULE ORAL EVERY 12 HOURS SCHEDULED
Qty: 4 CAPSULE | Refills: 0 | Status: SHIPPED | OUTPATIENT
Start: 2024-07-15

## 2024-07-15 RX ADMIN — BENZONATATE 200 MG: 100 CAPSULE ORAL at 02:50

## 2024-07-15 RX ADMIN — IPRATROPIUM BROMIDE AND ALBUTEROL SULFATE 3 ML: 2.5; .5 SOLUTION RESPIRATORY (INHALATION) at 07:17

## 2024-07-15 RX ADMIN — DOXYCYCLINE 100 MG: 100 CAPSULE ORAL at 08:30

## 2024-07-15 RX ADMIN — PREDNISONE 40 MG: 20 TABLET ORAL at 08:30

## 2024-07-15 RX ADMIN — GUAIFENESIN 600 MG: 600 TABLET, EXTENDED RELEASE ORAL at 08:30

## 2024-07-15 RX ADMIN — BENZONATATE 200 MG: 100 CAPSULE ORAL at 12:10

## 2024-07-15 RX ADMIN — APIXABAN 5 MG: 5 TABLET, FILM COATED ORAL at 08:30

## 2024-07-15 RX ADMIN — SERTRALINE 100 MG: 100 TABLET, FILM COATED ORAL at 08:30

## 2024-07-15 RX ADMIN — IPRATROPIUM BROMIDE AND ALBUTEROL SULFATE 3 ML: 2.5; .5 SOLUTION RESPIRATORY (INHALATION) at 01:11

## 2024-07-15 RX ADMIN — CETIRIZINE HYDROCHLORIDE 10 MG: 10 TABLET, FILM COATED ORAL at 08:30

## 2024-07-15 RX ADMIN — OXYBUTYNIN CHLORIDE 10 MG: 10 TABLET, EXTENDED RELEASE ORAL at 08:30

## 2024-07-15 NOTE — PLAN OF CARE
Goal Outcome Evaluation:   VSS, AOX4, 2LNC      Problem: Adult Inpatient Plan of Care  Goal: Plan of Care Review  Outcome: Ongoing, Progressing  Goal: Patient-Specific Goal (Individualized)  Outcome: Ongoing, Progressing  Goal: Absence of Hospital-Acquired Illness or Injury  Outcome: Ongoing, Progressing  Intervention: Identify and Manage Fall Risk  Recent Flowsheet Documentation  Taken 7/15/2024 0450 by Dandre Gamboa RN  Safety Promotion/Fall Prevention:   safety round/check completed   room organization consistent   nonskid shoes/slippers when out of bed   lighting adjusted   fall prevention program maintained   activity supervised  Taken 7/15/2024 0251 by Dandre Gamboa RN  Safety Promotion/Fall Prevention:   safety round/check completed   room organization consistent   nonskid shoes/slippers when out of bed   lighting adjusted   fall prevention program maintained   activity supervised  Taken 7/15/2024 0030 by Dandre Gamboa RN  Safety Promotion/Fall Prevention:   safety round/check completed   room organization consistent   nonskid shoes/slippers when out of bed   lighting adjusted   fall prevention program maintained   activity supervised  Taken 7/14/2024 2210 by Dandre Gamboa RN  Safety Promotion/Fall Prevention:   safety round/check completed   room organization consistent   nonskid shoes/slippers when out of bed   lighting adjusted   fall prevention program maintained   activity supervised  Taken 7/14/2024 2031 by Dandre Gamboa RN  Safety Promotion/Fall Prevention:   safety round/check completed   room organization consistent   nonskid shoes/slippers when out of bed   lighting adjusted   fall prevention program maintained   activity supervised  Intervention: Prevent Skin Injury  Recent Flowsheet Documentation  Taken 7/15/2024 0450 by Dandre Gamboa RN  Body Position: position changed independently  Skin Protection:   adhesive use limited   tubing/devices free from skin contact  Taken  7/15/2024 0251 by Dandre Gamboa RN  Body Position: position changed independently  Skin Protection:   adhesive use limited   tubing/devices free from skin contact  Taken 7/15/2024 0030 by Dandre Gamboa RN  Body Position: position changed independently  Skin Protection:   adhesive use limited   tubing/devices free from skin contact  Taken 7/14/2024 2210 by Dandre Gamboa RN  Body Position: position changed independently  Skin Protection:   adhesive use limited   tubing/devices free from skin contact  Taken 7/14/2024 2031 by Dandre Gmaboa RN  Body Position: position changed independently  Skin Protection:   adhesive use limited   tubing/devices free from skin contact  Intervention: Prevent and Manage VTE (Venous Thromboembolism) Risk  Recent Flowsheet Documentation  Taken 7/15/2024 0450 by Dandre Gamboa RN  Activity Management: activity encouraged  VTE Prevention/Management: sequential compression devices off  Taken 7/15/2024 0251 by Dandre Gamboa RN  Activity Management: activity encouraged  VTE Prevention/Management: sequential compression devices off  Taken 7/15/2024 0030 by Dandre Gamboa RN  Activity Management: activity encouraged  VTE Prevention/Management: sequential compression devices off  Taken 7/14/2024 2210 by Dandre Gamboa RN  Activity Management: activity encouraged  VTE Prevention/Management: sequential compression devices off  Taken 7/14/2024 2031 by Dandre Gamboa RN  Activity Management: activity encouraged  VTE Prevention/Management: sequential compression devices off  Range of Motion: active ROM (range of motion) encouraged  Goal: Optimal Comfort and Wellbeing  Outcome: Ongoing, Progressing  Intervention: Provide Person-Centered Care  Recent Flowsheet Documentation  Taken 7/14/2024 2031 by Dandre Gamboa RN  Trust Relationship/Rapport:   care explained   choices provided  Goal: Readiness for Transition of Care  Outcome: Ongoing, Progressing     Problem: Skin  Injury Risk Increased  Goal: Skin Health and Integrity  Outcome: Ongoing, Progressing  Intervention: Optimize Skin Protection  Recent Flowsheet Documentation  Taken 7/15/2024 0450 by Dandre Gamboa RN  Head of Bed ([facility]) Positioning: [facility] elevated  Skin Protection:   adhesive use limited   tubing/devices free from skin contact  Taken 7/15/2024 0251 by Dandre Gamboa RN  Head of Bed ([facility]) Positioning: [facility] elevated  Skin Protection:   adhesive use limited   tubing/devices free from skin contact  Taken 7/15/2024 0030 by Dandre Gamboa RN  Head of Bed ([facility]) Positioning: [facility] elevated  Skin Protection:   adhesive use limited   tubing/devices free from skin contact  Taken 7/14/2024 2210 by Dandre Gamboa RN  Head of Bed ([facility]) Positioning: [facility] elevated  Skin Protection:   adhesive use limited   tubing/devices free from skin contact  Taken 7/14/2024 2031 by Dandre Gamboa RN  Pressure Reduction Techniques:   frequent weight shift encouraged   heels elevated off bed   positioned off wounds   pressure points protected   rest period provided between sit times  Head of Bed ([facility]) Positioning: [facility] elevated  Pressure Reduction Devices:   pressure-redistributing mattress utilized   positioning supports utilized   heel offloading device utilized   foam padding utilized  Skin Protection:   adhesive use limited   tubing/devices free from skin contact     Problem: Adjustment to Illness (Sepsis/Septic Shock)  Goal: Optimal Coping  Outcome: Ongoing, Progressing  Intervention: Optimize Psychosocial Adjustment to Illness  Recent Flowsheet Documentation  Taken 7/14/2024 2031 by Dandre Gamboa RN  Family/Support System Care: support provided     Problem: Bleeding (Sepsis/Septic Shock)  Goal: Absence of Bleeding  Outcome: Ongoing, Progressing     Problem: Glycemic Control Impaired (Sepsis/Septic Shock)  Goal: Blood Glucose Level Within Desired Range  Outcome: Ongoing, Progressing     Problem: Infection Progression  (Sepsis/Septic Shock)  Goal: Absence of Infection Signs and Symptoms  Outcome: Ongoing, Progressing  Intervention: Promote Recovery  Recent Flowsheet Documentation  Taken 7/15/2024 0450 by Dandre Gamboa RN  Activity Management: activity encouraged  Taken 7/15/2024 0251 by Dandre Gamboa RN  Activity Management: activity encouraged  Taken 7/15/2024 0030 by Dandre Gamboa RN  Activity Management: activity encouraged  Taken 7/14/2024 2210 by Dandre Gamboa RN  Activity Management: activity encouraged  Taken 7/14/2024 2031 by Dandre Gamboa RN  Activity Management: activity encouraged     Problem: Nutrition Impaired (Sepsis/Septic Shock)  Goal: Optimal Nutrition Intake  Outcome: Ongoing, Progressing     Problem: Fall Injury Risk  Goal: Absence of Fall and Fall-Related Injury  Outcome: Ongoing, Progressing  Intervention: Identify and Manage Contributors  Recent Flowsheet Documentation  Taken 7/15/2024 0450 by Dandre Gamboa RN  Medication Review/Management: medications reviewed  Taken 7/15/2024 0251 by Dandre Gamboa RN  Medication Review/Management: medications reviewed  Taken 7/15/2024 0030 by Dandre Gamboa RN  Medication Review/Management: medications reviewed  Taken 7/14/2024 2210 by Dandre Gamboa RN  Medication Review/Management: medications reviewed  Taken 7/14/2024 2031 by Dandre Gamboa RN  Medication Review/Management: medications reviewed  Intervention: Promote Injury-Free Environment  Recent Flowsheet Documentation  Taken 7/15/2024 0450 by Dandre Gamboa RN  Safety Promotion/Fall Prevention:   safety round/check completed   room organization consistent   nonskid shoes/slippers when out of bed   lighting adjusted   fall prevention program maintained   activity supervised  Taken 7/15/2024 0251 by Dandre Gamboa RN  Safety Promotion/Fall Prevention:   safety round/check completed   room organization consistent   nonskid shoes/slippers when out of bed   lighting adjusted    fall prevention program maintained   activity supervised  Taken 7/15/2024 0030 by Dandre Gamboa RN  Safety Promotion/Fall Prevention:   safety round/check completed   room organization consistent   nonskid shoes/slippers when out of bed   lighting adjusted   fall prevention program maintained   activity supervised  Taken 7/14/2024 2210 by Dandre Gamboa, RN  Safety Promotion/Fall Prevention:   safety round/check completed   room organization consistent   nonskid shoes/slippers when out of bed   lighting adjusted   fall prevention program maintained   activity supervised  Taken 7/14/2024 2031 by Dandre Gamboa, RN  Safety Promotion/Fall Prevention:   safety round/check completed   room organization consistent   nonskid shoes/slippers when out of bed   lighting adjusted   fall prevention program maintained   activity supervised

## 2024-07-15 NOTE — DISCHARGE SUMMARY
[facility] Medicine Services  DISCHARGE SUMMARY    Patient Name: Tessa Bradley  : 1945  MRN: [id number]    Date of Admission: 2024  3:00 PM  Date of Discharge:  7/15/2024  Primary Care Physician: Sunni Santoro MD    Consults       No orders found from 2024 to 2024.            Hospital Course     Presenting Problem: COPD exacerbation    Active Hospital Problems    Diagnosis  POA    **Acute on chronic hypoxic respiratory failure [J96.21]  Yes    Acute on chronic respiratory failure [J96.20]  Yes    Acute on chronic respiratory failure with hypoxia [J96.21]  Yes    History of TIA (transient ischemic attack) [Z86.73]  Not Applicable    Pulmonary nodule [R91.1]  Yes    A-fib [I48.91]  Yes    HTN (hypertension) [I10]  Yes    HLD (hyperlipidemia) [E78.5]  Yes    COPD (chronic obstructive pulmonary disease) [J44.9]  Yes      Resolved Hospital Problems   No resolved problems to display.          Hospital Course:  Tessa Bradley is a 78 y.o. female with PMHx of COPD on chronic oxygen 2 liters NC (f/w pulmonary associates), MAKAYLA not on CPAP, Hodgkin's disease s/p radiation and chemo, Afib on Eliquis, HTN, HLD, peripheral neuropathy, adenocarcinoma R lung s/p bronch w/ VATS (at [facility] 2024), TIA, OAB who presents to the ED for evaluation of shortness of breath.      A/C hypoxic respiratory failure - resolved  AECOPD, chronic oxygen 2 liters   - CT chest without any acute findings  - IV steroids weaned to PO  prednisone w/plan for taper at discharge  - no infiltrate on CT chest, de-escalated abx to oral doxy to completed 5 days total  - respiratory pcr negative, blood cultures negative  - resume home inhalers/neb regimen     Afib on Eliquis  - continue eliquis  - not on antiarrythmic / rate control medications     Hypotension:  - resolved with IVF, no indication for resumption of anti-hypertensive medications at this time     CKD 3  - baseline 1-1.2, currently  stable     Hx R lung adenocarcinoma  S/p VATS 1/2024  - per pulmonary office note 3/27/2024, pt to f/u w/ oncology but per patient has not done so yet, I encouraged her to keep f/u     Hx TIA  HTN / HLD  - continue statin     Overactive Bladder  - continue ditropan     Mood disorder  Sleep disorder  - continue zoloft  - continue trazodone     Discharge Follow Up Recommendations for outpatient labs/diagnostics:   - f/u with PCP in 1 week    Day of Discharge     HPI:   Patient resting in bed. Slept well overnight., feels that her breathing is at baseline and feels ready for discharge home.    Review of Systems  Gen- No fevers, chills  CV- No chest pain, palpitations  Resp- No cough, dyspnea  GI- No N/V/D, abd pain      Vital Signs:   Temp:  [98 °F (36.7 °C)-98.8 °F (37.1 °C)] 98.4 °F (36.9 °C)  Heart Rate:  [] 67  Resp:  [12-18] 18  BP: ()/(48-86) 123/48  Flow (L/min):  [1-2] 1      Physical Exam:  Constitutional: No acute distress, awake, alert  HENT: NCAT, mucous membranes moist  Respiratory: Clear to auscultation bilaterally. No wheezing, nonlabored, respiratory effort normal   Cardiovascular: RRR, no murmurs, rubs, or gallops  Gastrointestinal: soft, nontender, nondistended  Musculoskeletal: No bilateral ankle edema  Psychiatric: Appropriate affect, cooperative  Neurologic: Oriented x 3, speech clear, no focal deficits  Skin: No rashes      Pertinent  and/or Most Recent Results     LAB RESULTS:      Lab 07/13/24  0357 07/12/24  1352   WBC 5.32 9.66   HEMOGLOBIN 11.3* 13.2   HEMATOCRIT 39.0 45.4   PLATELETS 182 227   NEUTROS ABS 3.74 6.84   IMMATURE GRANS (ABS) 0.03 0.04   LYMPHS ABS 1.06 1.64   MONOS ABS 0.48 0.90   EOS ABS 0.00 0.21   .4* 104.1*   LACTATE 1.1 0.9         Lab 07/13/24  0357 07/12/24  1352   SODIUM 139 142   POTASSIUM 4.8 5.1   CHLORIDE 100 99   CO2 32.0* 37.0*   ANION GAP 7.0 6.0   BUN 27* 18   CREATININE 1.29* 1.31*   EGFR 43.1* 42.3*   GLUCOSE 106* 123*   CALCIUM 8.2* 9.1    MAGNESIUM 1.7  --    HEMOGLOBIN A1C  --  5.80*   TSH  --  1.760         Lab 07/12/24  1352   TOTAL PROTEIN 6.9   ALBUMIN 3.8   GLOBULIN 3.1   ALT (SGPT) 7   AST (SGOT) 13   BILIRUBIN 0.4   ALK PHOS 98         Lab 07/12/24  1352   PROBNP 809.9   HSTROP T 21*             Lab 07/12/24  1352   FOLATE 10.10   VITAMIN B 12 313         Brief Urine Lab Results  (Last result in the past 365 days)        Color   Clarity   Blood   Leuk Est   Nitrite   Protein   CREAT   Urine HCG        10/10/23 1822 Yellow   Clear   Negative   Trace   Positive   Negative                 Microbiology Results (last 10 days)       Procedure Component Value - Date/Time    Respiratory Culture - Sputum, Cough [[id number]] Collected: 07/12/24 2219    Lab Status: Final result Specimen: Sputum from Cough Updated: 07/13/24 0937     Respiratory Culture Rejected     Gram Stain Few (2+) Epithelial cells per low power field      Few (2+) WBCs per low power field      Few (2+) Mixed bacterial morphotypes seen on Gram Stain    Narrative:      Specimen rejected due to oropharyngeal contamination. Please reorder and recollect specimen if clinically necessary.    COVID PRE-OP / PRE-PROCEDURE SCREENING ORDER (NO ISOLATION) - Swab, Nasopharynx [[id number]]  (Normal) Collected: 07/12/24 1407    Lab Status: Final result Specimen: Swab from Nasopharynx Updated: 07/12/24 1527    Narrative:      The following orders were created for panel order COVID PRE-OP / PRE-PROCEDURE SCREENING ORDER (NO ISOLATION) - Swab, Nasopharynx.  Procedure                               Abnormality         Status                     ---------                               -----------         ------                     Respiratory Panel PCR w/...[[id number]]  Normal              Final result                 Please view results for these tests on the individual orders.    Respiratory Panel PCR w/COVID-19(SARS-CoV-2) PENNY/CLEVE/ALICE/PAD/COR/ELYSSA In-House, NP Swab in UTM/VTM, 2 HR TAT - Swab,  Nasopharynx [[id number]]  (Normal) Collected: 07/12/24 1407    Lab Status: Final result Specimen: Swab from Nasopharynx Updated: 07/12/24 1527     ADENOVIRUS, PCR Not Detected     Coronavirus 229E Not Detected     Coronavirus HKU1 Not Detected     Coronavirus NL63 Not Detected     Coronavirus OC43 Not Detected     COVID19 Not Detected     Human Metapneumovirus Not Detected     Human Rhinovirus/Enterovirus Not Detected     Influenza A PCR Not Detected     Influenza B PCR Not Detected     Parainfluenza Virus 1 Not Detected     Parainfluenza Virus 2 Not Detected     Parainfluenza Virus 3 Not Detected     Parainfluenza Virus 4 Not Detected     RSV, PCR Not Detected     Bordetella pertussis pcr Not Detected     Bordetella parapertussis PCR Not Detected     Chlamydophila pneumoniae PCR Not Detected     Mycoplasma pneumo by PCR Not Detected    Narrative:      In the setting of a positive respiratory panel with a viral infection PLUS a negative procalcitonin without other underlying concern for bacterial infection, consider observing off antibiotics or discontinuation of antibiotics and continue supportive care. If the respiratory panel is positive for atypical bacterial infection (Bordetella pertussis, Chlamydophila pneumoniae, or Mycoplasma pneumoniae), consider antibiotic de-escalation to target atypical bacterial infection.    Blood Culture - Blood, Arm, Left [[id number]]  (Normal) Collected: 07/12/24 1403    Lab Status: Preliminary result Specimen: Blood from Arm, Left Updated: 07/14/24 1445     Blood Culture No growth at 2 days    Blood Culture - Blood, Arm, Right [[id number]]  (Normal) Collected: 07/12/24 1403    Lab Status: Preliminary result Specimen: Blood from Arm, Right Updated: 07/14/24 1445     Blood Culture No growth at 2 days            CT Chest Without Contrast Diagnostic    Result Date: 7/13/2024  CT CHEST WO CONTRAST DIAGNOSTIC Date of Exam: 7/13/2024 4:15 AM EDT Indication: hypoxia; L chest discomfort.  Comparison: 7/12/2024. Technique: Axial CT images were obtained of the chest without contrast administration.  Reconstructed coronal and sagittal images were also obtained. Automated exposure control and iterative construction methods were used. Findings: Hilum and Mediastinum: No pathologically enlarged lymph nodes.  Normal heart size.   No pericardial effusion.  Unremarkable thoracic aorta and pulmonary arteries. Granulomatous calcifications are present. Atherosclerotic calcifications are present including within the coronary arteries. Lung Parenchyma and Pleura: No focal consolidations. Scarring present. No suspicious pulmonary nodules.  No endobronchial lesions.  No significant pleural effusions. Upper Abdomen: No acute process. Soft tissues: Unremarkable. Osseous structures: No aggressive focal lytic or sclerotic osseous lesions.     Impression: 1.No acute cardiopulmonary process. 2.Ancillary findings as described above. Electronically Signed: Renetta Duggan MD  7/13/2024 4:21 AM EDT  Workstation ID: [id number]    XR Chest 1 View    Result Date: 7/12/2024  XR CHEST 1 VW Date of Exam: 7/12/2024 2:01 PM EDT Indication: SOA triage protocol Comparison: Chest radiograph 10/10/2023. Findings: Postsurgical changes in the right upper chest. Cardiomediastinal silhouette within normal limits. Findings of healed/calcified granulomatous disease. Chronic appearing changes of the lungs. No discrete focal consolidation. No pleural effusion or pneumothorax. Osseous structures are unchanged.     Impression: Chronic changes without evidence of superimposed acute process. Electronically Signed: Adam Hudson MD  7/12/2024 2:25 PM EDT  Workstation ID: [id number]             Results for orders placed during the hospital encounter of 08/24/23    Adult Transthoracic Echo Complete W/ Cont if Necessary Per Protocol    Interpretation Summary    Left ventricular systolic function is normal. Left ventricular ejection fraction appears to be 56 -  60%.    Left ventricular diastolic function was normal.    Mild aortic valve stenosis is present. Aortic valve area is 1.2 cm2.    Peak velocity of the flow distal to the aortic valve is 267.2 cm/s. Aortic valve mean pressure gradient is 16 mmHg.      Plan for Follow-up of Pending Labs/Results:   Pending Labs       Order Current Status    Blood Culture - Blood, Arm, Left Preliminary result    Blood Culture - Blood, Arm, Right Preliminary result          Discharge Details        Discharge Medications        New Medications        Instructions Start Date   benzonatate 200 MG capsule  Commonly known as: TESSALON   200 mg, Oral, 3 Times Daily PRN      doxycycline 100 MG capsule  Commonly known as: MONODOX   100 mg, Oral, Every 12 Hours Scheduled      predniSONE 20 MG tablet  Commonly known as: DELTASONE   Take 2 tablets by mouth Daily With Breakfast for 1 day, THEN 1 tablet Daily With Breakfast for 3 days, THEN 0.5 tablets Daily With Breakfast for 3 days.   Start Date: July 16, 2024            Continue These Medications        Instructions Start Date   albuterol sulfate  (90 Base) MCG/ACT inhaler  Commonly known as: PROVENTIL HFA;VENTOLIN HFA;PROAIR HFA   2 puffs, Inhalation, Every 4 Hours PRN      ProAir RespiClick 108 (90 Base) MCG/ACT inhaler  Generic drug: albuterol   2 puffs, Inhalation      apixaban 5 MG tablet tablet  Commonly known as: ELIQUIS   5 mg, Oral, Every 12 Hours Scheduled      atorvastatin 80 MG tablet  Commonly known as: LIPITOR   80 mg, Oral, Nightly      Breztri Aerosphere 160-9-4.8 MCG/ACT aerosol inhaler  Generic drug: Budeson-Glycopyrrol-Formoterol   2 puffs, Inhalation, 2 Times Daily      cyclobenzaprine 5 MG tablet  Commonly known as: FLEXERIL   5 mg, Oral, 3 Times Daily PRN      gabapentin 300 MG capsule  Commonly known as: NEURONTIN   1 capsule, Oral      ipratropium-albuterol 0.5-2.5 mg/3 ml nebulizer  Commonly known as: DUO-NEB   3 mL, Nebulization, Every 6 Hours PRN      loratadine  "10 MG tablet  Commonly known as: CLARITIN   10 mg, Oral, Daily      Mirabegron ER 50 MG tablet sustained-release 24 hour 24 hr tablet  Commonly known as: MYRBETRIQ   50 mg, Oral, Daily      sertraline 100 MG tablet  Commonly known as: ZOLOFT   100 mg, Oral, Daily      tolterodine LA 2 MG 24 hr capsule  Commonly known as: DETROL LA       traZODone 100 MG tablet  Commonly known as: DESYREL   Oral               Allergies   Allergen Reactions    Penicillins Anaphylaxis    Latex Other (See Comments)     UNKNOWN    Other Other (See Comments)     \"EGG ALBUMIN\" only; can still eat eggs         Discharge Disposition:  Home or Self Care    Diet:  Hospital:  Diet Order   Procedures    Diet: Cardiac; Healthy Heart (2-3 Na+); Fluid Consistency: Thin (IDDSI 0)            Activity:  - as tolerated    Restrictions or Other Recommendations:  - none       CODE STATUS:    Code Status and Medical Interventions:   Ordered at: 07/12/24 2026     Code Status (Patient has no pulse and is not breathing):    CPR (Attempt to Resuscitate)     Medical Interventions (Patient has pulse or is breathing):    Full Support       No future appointments.              Batool Brooks DO  07/15/24      Time Spent on Discharge:  I spent  35  minutes on this discharge activity which included: face-to-face encounter with the patient, reviewing the data in the system, coordination of the care with the nursing staff as well as consultants, documentation, and entering orders.         "

## 2024-07-15 NOTE — CASE MANAGEMENT/SOCIAL WORK
Discharge Planning Assessment  [facility]     Patient Name: Tessa Bradley  MRN: [id number]  Today's Date: 7/15/2024    Admit Date: 7/12/2024    Plan: Home   Discharge Needs Assessment       Row Name 07/15/24 0929       Living Environment    People in Home spouse    Name(s) of People in Home Rianna Arreolaerd    Current Living Arrangements home    Potentially Unsafe Housing Conditions none    Primary Care Provided by self    Provides Primary Care For no one    Family Caregiver if Needed spouse    Family Caregiver Names Rianna Manuel    Quality of Family Relationships unable to assess    Able to Return to Prior Arrangements yes       Resource/Environmental Concerns    Resource/Environmental Concerns none       Transition Planning    Patient/Family Anticipates Transition to home    Patient/Family Anticipated Services at Transition none    Transportation Anticipated family or friend will provide       Discharge Needs Assessment    Readmission Within the Last 30 Days no previous admission in last 30 days    Equipment Currently Used at Home cane, straight;rollator;shower chair;oxygen;grab bar;nebulizer    Concerns to be Addressed denies needs/concerns at this time;discharge planning    Anticipated Changes Related to Illness none    Equipment Needed After Discharge none    Current Discharge Risk dependent with mobility/activities of daily living    Discharge Coordination/Progress Home                   Discharge Plan       Row Name 07/15/24 0931       Plan    Plan Home    Patient/Family in Agreement with Plan yes    Plan Comments Patient has orders for discharge.  Spoke with patient at bedside regarding discharge planning.  Patient denies use of HH and reports she has a Straight Cane, Rollator, Grab Bars, Built in Shower Seat, Nebulizer and continuous Oxygen at 2L, patient cannot remember her DME provider.  Patient reports she has prescription coverage and medications are affordable.  Patient lives with her spouse in a  multilevel house and reports her bedroom is in the basements with stairs and has handrails.  She admits to some difficulty using the steps but manages.  She has become increasingly weaker and ADL's are likely to become an issue soon.  Discussed patient calling the [facility] to initiate the benefits available to her to get help at home, patient agreeable and reports she will call and do that.  She is now back to baseline O2, patient indicates spouse will bring O2 tank from [facility] for travel.  No discharge needs verbalized.  Patient plan is to discharge home via car with family to transport.    Final Discharge Disposition Code 01 - home or self-care                  Continued Care and Services - Admitted Since 7/12/2024    No active coordination exists for this encounter.       Expected Discharge Date and Time       Expected Discharge Date Expected Discharge Time    Jul 15, 2024            Demographic Summary       Row Name 07/15/24 0928       General Information    Admission Type inpatient    Arrived From home    Referral Source admission list    Reason for Consult discharge planning    Preferred Language English    General Information Comments Sunni Santoro MD       Contact Information    Permission Granted to Share Info With     Contact Information Comments Rianna Manuel, spouse  [phone]                   Functional Status       Row Name 07/15/24 0928       Functional Status    Usual Activity Tolerance good    Current Activity Tolerance good       Functional Status, IADL    Medications independent    Meal Preparation completely dependent    Housekeeping assistive equipment and person;completely dependent    Laundry completely dependent    Shopping completely dependent       Employment/    Employment/ Comments Medicare/ for Life                   Psychosocial    No documentation.                  Abuse/Neglect    No documentation.                  Legal    No documentation.                   Substance Abuse    No documentation.                  Patient Forms    No documentation.                     Delia Snow RN

## 2024-07-15 NOTE — CASE MANAGEMENT/SOCIAL WORK
Case Management Discharge Note      Final Note: Patient has orders for discharge. Spoke with patient at bedside regarding discharge planning. Patient denies use of HH and reports she has a Straight Cane, Rollator, Grab Bars, Built in Shower Seat, Nebulizer and continuous Oxygen at 2L, patient cannot remember her DME provider. Patient reports she has prescription coverage and medications are affordable. Patient lives with her spouse in a multilevel house and reports her bedroom is in the basements with stairs and has handrails. She admits to some difficulty using the steps but manages. She has become increasingly weaker and ADL's are likely to become an issue soon. Discussed patient calling the [facility] to initiate the benefits available to her to get help at home, patient agreeable and reports she will call and do that. She is now back to baseline O2, patient indicates spouse will bring O2 tank from [facility] for travel. No discharge needs verbalized. Patient plan is to discharge home via car with family to transport.         Selected Continued Care - Admitted Since 7/12/2024       Destination    No services have been selected for the patient.                Durable Medical Equipment    No services have been selected for the patient.                Dialysis/Infusion    No services have been selected for the patient.                Home Medical Care    No services have been selected for the patient.                Therapy    No services have been selected for the patient.                Community Resources    No services have been selected for the patient.                Community & DME    No services have been selected for the patient.                         Final Discharge Disposition Code: 01 - home or self-care

## 2024-07-16 NOTE — OUTREACH NOTE
Prep Survey      Flowsheet Row Responses   [employer] facility patient discharged from? [place]   Is LACE score < 7 ? No   Eligibility Readm Mgmt   Discharge diagnosis Acute on chronic hypoxic respiratory failure, acute exacerb COPD   Does the patient have one of the following disease processes/diagnoses(primary or secondary)? COPD   Does the patient have Home health ordered? No   Is there a DME ordered? No   Prep survey completed? Yes            Ambreen LI - Registered Nurse

## 2024-07-17 LAB
BACTERIA SPEC AEROBE CULT: NORMAL
BACTERIA SPEC AEROBE CULT: NORMAL
QT INTERVAL: 322 MS
QTC INTERVAL: 431 MS

## 2024-07-23 ENCOUNTER — READMISSION MANAGEMENT (OUTPATIENT)
Dept: CALL CENTER | Facility: HOSPITAL | Age: 79
End: 2024-07-23
Payer: MEDICARE

## 2024-07-23 NOTE — OUTREACH NOTE
COPD/PN Week 1 Survey      Flowsheet Row Responses   Denominational facility patient discharged from? [place]   Does the patient have one of the following disease processes/diagnoses(primary or secondary)? COPD   Week 1 attempt successful? No   Unsuccessful attempts Attempt 1            Stormy Jiménez Registered Nurse

## 2024-08-01 ENCOUNTER — LAB (OUTPATIENT)
Dept: LAB | Facility: HOSPITAL | Age: 79
End: 2024-08-01
Payer: MEDICARE

## 2024-08-01 ENCOUNTER — OFFICE VISIT (OUTPATIENT)
Dept: NEUROLOGY | Facility: CLINIC | Age: 79
End: 2024-08-01
Payer: MEDICARE

## 2024-08-01 ENCOUNTER — READMISSION MANAGEMENT (OUTPATIENT)
Dept: CALL CENTER | Facility: HOSPITAL | Age: 79
End: 2024-08-01
Payer: MEDICARE

## 2024-08-01 VITALS
HEART RATE: 87 BPM | TEMPERATURE: 99 F | HEIGHT: 63 IN | WEIGHT: 148 LBS | SYSTOLIC BLOOD PRESSURE: 124 MMHG | BODY MASS INDEX: 26.22 KG/M2 | DIASTOLIC BLOOD PRESSURE: 64 MMHG

## 2024-08-01 DIAGNOSIS — Z86.73 HISTORY OF TIA (TRANSIENT ISCHEMIC ATTACK): Primary | ICD-10-CM

## 2024-08-01 DIAGNOSIS — I10 HYPERTENSION, UNSPECIFIED TYPE: ICD-10-CM

## 2024-08-01 DIAGNOSIS — I48.91 ATRIAL FIBRILLATION, UNSPECIFIED TYPE: ICD-10-CM

## 2024-08-01 DIAGNOSIS — E78.5 HYPERLIPIDEMIA, UNSPECIFIED HYPERLIPIDEMIA TYPE: ICD-10-CM

## 2024-08-01 DIAGNOSIS — Z86.73 HISTORY OF TIA (TRANSIENT ISCHEMIC ATTACK): ICD-10-CM

## 2024-08-01 LAB
CHOLEST SERPL-MCNC: 167 MG/DL (ref 0–200)
HDLC SERPL-MCNC: 83 MG/DL (ref 40–60)
LDLC SERPL CALC-MCNC: 70 MG/DL (ref 0–100)
LDLC/HDLC SERPL: 0.84 {RATIO}
TRIGL SERPL-MCNC: 71 MG/DL (ref 0–150)
VLDLC SERPL-MCNC: 14 MG/DL (ref 5–40)

## 2024-08-01 PROCEDURE — 80061 LIPID PANEL: CPT

## 2024-08-01 PROCEDURE — 36415 COLL VENOUS BLD VENIPUNCTURE: CPT

## 2024-08-01 RX ORDER — METHOCARBAMOL 500 MG/1
1 TABLET, FILM COATED ORAL EVERY 6 HOURS
COMMUNITY
Start: 2024-06-12

## 2024-08-01 NOTE — OUTREACH NOTE
COPD/PN Week 3 Survey      Flowsheet Row Responses   [facility] facility patient discharged from? [place]   Does the patient have one of the following disease processes/diagnoses(primary or secondary)? COPD   Week 3 attempt successful? No   Unsuccessful attempts Attempt 1            Elisa FREEDMAN - Registered Nurse

## 2024-08-01 NOTE — LETTER
2024       No Recipients    Patient: Tessa Bradley   YOB: 1945   Date of Visit: 2024     Dear Sunni Santoro MD:       Thank you for referring Tessa Bradley to me for evaluation. Below are the relevant portions of my assessment and plan of care.    If you have questions, please do not hesitate to call me. I look forward to following Tessa along with you.         Sincerely,        [facility]        CC:   No Recipients    Mihir De La Rosa APRN  [id number] [phone]  Sign when Signing Visit      Follow Up Office Visit      Encounter Date: 2024   Patient Name: Tessa Bradley  : 1945   MRN: [id number]   PCP: Sunni Santoro MD    Chief Complaint:    Chief Complaint   Patient presents with   • Follow-up   • Transient Ischemic Attack       History of Present Illness: Tessa Bradley is a 78 y.o. female who is here today to follow up with ALIZA Enriquez     Ms. rBadley is a 78-year-old female with PMH of arthritis, HTN, HLD, A-fib (on Eliquis), Hodgkin's disease s/p postradiation chemotherapy, history of tobacco abuse, COVID long-haul symptoms, and COPD with oxygen dependence.  Patient presented to [facility] ED with new onset of AMS, LFD and left-sided weakness on 2023.  AMS reported they were initially called out this morning for somnolence and patient refused medical attention.  EMS was called out again at approximately 4 PM per presenting symptoms and patient was taken to  ED for further evaluation and stroke workup.  Initial NIH 10 during ED visit.  MRI did not show acute or chronic stroke.  Expected transischemic attack as etiology of patient's symptoms.  CTA head and neck is without intracranial atherosclerosis or significant carotid stenosis.  Transthoracic echocardiogram left ventricular ejection fraction of 56-6%, normal left atrium, no thrombus mentioned.  Patient was sent home with home health.    Follow-up clinic appointment 2023: Patient compliant with all  medications.  Reportedly had a fall without head contact, when she tripped over her dogs.  Patient continued to have left-sided sensory deficit and left arm, but she has torn shoulder ligament and those issues were present prior to and after presentation to the hospital.    6-month follow-up clinic appointment 8/1/2024: Patient not compliant with medications, specifically Eliquis.  Instructed patient to take all medications as ordered.  No recent falls or trauma.  No speech difficulty, unilateral weakness, decreased sensitivity or visual disturbance.  Patient mentions having frequent headaches and transient episodes of dizziness when standing up.  Advised patient to pause with position changes and wear her oxygen as recommended by pulmonology.  Plan accordingly for walking long distances and take breaks as needed.      Subjective        I have reviewed and the following portions of the patient's history were updated as appropriate: past family history, past medical history, past social history, past surgical history and problem list.    Medications:     Current Outpatient Medications:   •  albuterol sulfate  (90 Base) MCG/ACT inhaler, Inhale 2 puffs Every 4 (Four) Hours As Needed for Wheezing., Disp: , Rfl:   •  apixaban (ELIQUIS) 5 MG tablet tablet, Take 1 tablet by mouth Every 12 (Twelve) Hours., Disp: , Rfl:   •  atorvastatin (LIPITOR) 80 MG tablet, Take 1 tablet by mouth Every Night., Disp: , Rfl:   •  benzonatate (TESSALON) 200 MG capsule, Take 1 capsule by mouth 3 (Three) Times a Day As Needed for Cough., Disp: 15 capsule, Rfl: 0  •  Budeson-Glycopyrrol-Formoterol (Breztri Aerosphere) 160-9-4.8 MCG/ACT aerosol inhaler, Inhale 2 puffs 2 (Two) Times a Day., Disp: 3 each, Rfl: 3  •  cyclobenzaprine (FLEXERIL) 5 MG tablet, Take 1 tablet by mouth 3 (Three) Times a Day As Needed for Muscle Spasms., Disp: , Rfl:   •  gabapentin (NEURONTIN) 300 MG capsule, Take 1 capsule by mouth., Disp: , Rfl:   •   "ipratropium-albuterol (DUO-NEB) 0.5-2.5 mg/3 ml nebulizer, Take 3 mL by nebulization Every 6 (Six) Hours As Needed for Shortness of Air., Disp: 360 mL, Rfl: 0  •  loratadine (CLARITIN) 10 MG tablet, Take 1 tablet by mouth Daily., Disp: , Rfl:   •  methocarbamol (ROBAXIN) 500 MG tablet, Take 1 tablet by mouth Every 6 (Six) Hours., Disp: , Rfl:   •  Mirabegron ER (MYRBETRIQ) 50 MG tablet sustained-release 24 hour 24 hr tablet, Take 50 mg by mouth Daily., Disp: , Rfl:   •  ProAir RespiClick 108 (90 Base) MCG/ACT inhaler, Inhale 2 puffs., Disp: , Rfl:   •  sertraline (ZOLOFT) 100 MG tablet, Take 1 tablet by mouth Daily., Disp: , Rfl:   •  tolterodine LA (DETROL LA) 2 MG 24 hr capsule, , Disp: , Rfl:   •  traZODone (DESYREL) 100 MG tablet, Take  by mouth., Disp: , Rfl:   •  doxycycline (MONODOX) 100 MG capsule, Take 1 capsule by mouth Every 12 (Twelve) Hours. Indications: Upper Respiratory Tract Infection (Patient not taking: Reported on 8/1/2024), Disp: 4 capsule, Rfl: 0    Allergies:   Allergies   Allergen Reactions   • Penicillins Anaphylaxis   • Latex Other (See Comments)     UNKNOWN   • Other Other (See Comments)     \"EGG ALBUMIN\" only; can still eat eggs       Objective     Physical Exam:   Vital Signs:   Vitals:    08/01/24 1151   BP: 124/64   Pulse: 87   Temp: 99 °F (37.2 °C)   Weight: 67.1 kg (148 lb)   Height: 160 cm (62.99\")     Body mass index is 26.22 kg/m².    Physical Exam  Constitutional:       General: She is not in acute distress.  HENT:      Head: Normocephalic and atraumatic.      Nose: Nose normal.      Mouth/Throat:      Mouth: Mucous membranes are dry.   Eyes:      Extraocular Movements: Extraocular movements intact.      Pupils: Pupils are equal, round, and reactive to light.   Cardiovascular:      Pulses: Normal pulses.   Pulmonary:      Comments: Diminished breath sounds bilateral  Abdominal:      General: Abdomen is flat.   Musculoskeletal:         General: Normal range of motion.      Cervical " back: Normal range of motion.   Skin:     General: Skin is warm and dry.   Neurological:      Mental Status: She is oriented to person, place, and time.      Cranial Nerves: No cranial nerve deficit.      Sensory: No sensory deficit.      Motor: Motor strength is normal.No weakness.      Coordination: Coordination normal.   Psychiatric:         Mood and Affect: Mood normal.         Speech: Speech normal.         Behavior: Behavior normal.       Neurological Exam  Mental Status  Awake, alert and oriented to person, place and time. Oriented to person, place and time. Oriented to person, place, and time. Speech is normal. Language is fluent with no aphasia.    Cranial Nerves  CN II: Visual fields full to confrontation.  CN III, IV, VI: Extraocular movements intact bilaterally. Pupils equal round and reactive to light bilaterally.  CN V: Facial sensation is normal.  CN VII: Full and symmetric facial movement.  CN VIII: Hearing intact.  CN XI: Shoulder shrug strength is normal.  CN XII: Tongue midline without atrophy or fasciculations.    Motor  Normal muscle bulk throughout. Normal muscle tone. Strength is 5/5 throughout all four extremities.    Sensory  Light touch is normal in upper and lower extremities.     Coordination  Right: Finger-to-nose normal. Rapid alternating movement normal.Left: Finger-to-nose normal. Rapid alternating movement normal.    Gait  Casual gait is normal including stance, stride, and arm swing.  Walks with rollator.       Results Reviewed:   WBC   Date Value Ref Range Status   07/13/2024 5.32 3.40 - 10.80 10*3/mm3 Final     RBC   Date Value Ref Range Status   07/13/2024 3.81 3.77 - 5.28 10*6/mm3 Final     Hemoglobin   Date Value Ref Range Status   07/13/2024 11.3 (L) 12.0 - 15.9 g/dL Final     Hematocrit   Date Value Ref Range Status   07/13/2024 39.0 34.0 - 46.6 % Final     MCV   Date Value Ref Range Status   07/13/2024 102.4 (H) 79.0 - 97.0 fL Final     MCH   Date Value Ref Range Status    07/13/2024 29.7 26.6 - 33.0 pg Final     MCHC   Date Value Ref Range Status   07/13/2024 29.0 (L) 31.5 - 35.7 g/dL Final     RDW   Date Value Ref Range Status   07/13/2024 13.8 12.3 - 15.4 % Final     RDW-SD   Date Value Ref Range Status   07/13/2024 52.2 37.0 - 54.0 fl Final     MPV   Date Value Ref Range Status   07/13/2024 10.6 6.0 - 12.0 fL Final     Platelets   Date Value Ref Range Status   07/13/2024 182 140 - 450 10*3/mm3 Final     Neutrophil %   Date Value Ref Range Status   07/13/2024 70.3 42.7 - 76.0 % Final     Lymphocyte %   Date Value Ref Range Status   07/13/2024 19.9 19.6 - 45.3 % Final     Monocyte %   Date Value Ref Range Status   07/13/2024 9.0 5.0 - 12.0 % Final     Eosinophil %   Date Value Ref Range Status   07/13/2024 0.0 (L) 0.3 - 6.2 % Final     Basophil %   Date Value Ref Range Status   07/13/2024 0.2 0.0 - 1.5 % Final     Immature Grans %   Date Value Ref Range Status   07/13/2024 0.6 (H) 0.0 - 0.5 % Final     Neutrophils, Absolute   Date Value Ref Range Status   07/13/2024 3.74 1.70 - 7.00 10*3/mm3 Final     Lymphocytes, Absolute   Date Value Ref Range Status   07/13/2024 1.06 0.70 - 3.10 10*3/mm3 Final     Monocytes, Absolute   Date Value Ref Range Status   07/13/2024 0.48 0.10 - 0.90 10*3/mm3 Final     Eosinophils, Absolute   Date Value Ref Range Status   07/13/2024 0.00 0.00 - 0.40 10*3/mm3 Final     Basophils, Absolute   Date Value Ref Range Status   07/13/2024 0.01 0.00 - 0.20 10*3/mm3 Final     Immature Grans, Absolute   Date Value Ref Range Status   07/13/2024 0.03 0.00 - 0.05 10*3/mm3 Final     nRBC   Date Value Ref Range Status   07/13/2024 0.0 0.0 - 0.2 /100 WBC Final      Lab Results   Component Value Date    GLUCOSE 106 (H) 07/13/2024    BUN 27 (H) 07/13/2024    CREATININE 1.29 (H) 07/13/2024    EGFR 43.1 (L) 07/13/2024    BCR 20.9 07/13/2024    K 4.8 07/13/2024    CO2 32.0 (H) 07/13/2024    CALCIUM 8.2 (L) 07/13/2024    ALBUMIN 3.8 07/12/2024    BILITOT 0.4 07/12/2024    AST  13 2024    ALT 7 2024         Lipid Panel          2023    06:07   Lipid Panel   Total Cholesterol 180    Triglycerides 62    HDL Cholesterol 119    VLDL Cholesterol 12    LDL Cholesterol  49    LDL/HDL Ratio 0.41       PHQ-2 Depression Screening  Little interest or pleasure in doing things? 0-->not at all   Feeling down, depressed, or hopeless? 1-->several days   PHQ-2 Total Score 1        NIH Stroke Scale  Time: 15:32 EDT  Person Administering Scale: ALIZA Enriquez    1a  Level of consciousness: 0=alert; keenly responsive   1b. LOC questions:  0=Answers both questions correctly   1c. LOC commands: 0=Performs both tasks correctly   2.  Best Gaze: 0=normal   3.  Visual: 0=No visual loss   4. Facial Palsy: 0=Normal symmetric movement   5a.  Motor left arm: 0=No drift, limb holds 90 (or 45) degrees for full 10 seconds   5b.  Motor right arm: 0=No drift, limb holds 90 (or 45) degrees for full 10 seconds   6a. motor left le=No drift, limb holds 90 (or 45) degrees for full 10 seconds   6b  Motor right le=No drift, limb holds 90 (or 45) degrees for full 10 seconds   7. Limb Ataxia: 0=Absent   8.  Sensory: 0=Normal; no sensory loss   9. Best Language:  0=No aphasia, normal   10. Dysarthria: 0=Normal   11. Extinction and Inattention: 0=No abnormality    Total:   0       This was an audio and video enabled telemedicine encounter.     Assessment / Plan      Assessment/Plan:   Diagnoses and all orders for this visit:    History of TIA  -Continue home Eliquis  -Continue high intensity statin  -Increase physical activity as tolerated  -Heart healthy diet  -Discussed stroke signs and symptoms that warrant return to emergency department    2.  Atrial fibrillation, unspecified type  -Continue home Eliquis  -Continue management with PCP    3.  Mixed hyperlipidemia  -Goal LDL less than 70  -Last LDL 49  -Continue high intensity statin  -Lipid panel today, will notify of results and make changes  accordingly    4.  Hypertension, unspecified type  -Goal SBP less than 140/80  -BP today in clinic 124/64  -Continue follow-up with PCP    5.  MAKAYLA and chronic obstructive pulmonary disease, unspecified COPD type  -Follows with pulmonary  -Does not tolerate facemask  -Asked patient to contact [facility] for follow-up appointment, patient was not able to follow[facility]up with [facility] as scheduled  -Continue oxygen per pulmonary recommendations  -Follow recommendations per their discretion    6.  Tobacco abuse  -Recommend total cessation  -Patient no longer smoking    Follow Up:   Follow-up as needed in stroke clinic    Discussed the importance of medication compliance Atorvastatin 80mg nightly and Eliquis 5mg twice daily and lifestyle modifications adequate control of blood pressure, adequate control of cholesterol (goal LDL <70), adequate control of glucose (<140, A1c goal <7), smoking cessation, increased physical activity, and implementation of healthy diet to help reduce the risk of future cerebrovascular events.  Also discussed the signs symptoms that would warrant the patient return back to the emergency department including unilateral weakness, unilateral numbness, visual disturbances, loss of balance, speech difficulties, and/or a sudden severe headache.  Patient verbalized understanding.     ALIZA Enriquez  [facility] [place] [facility]

## 2024-08-01 NOTE — PROGRESS NOTES
Follow Up Office Visit      Encounter Date: 2024   Patient Name: Tessa Bradley  : 1945   MRN: [id number]   PCP: Sunni Santoro MD    Chief Complaint:    Chief Complaint   Patient presents with    Follow-up    Transient Ischemic Attack       History of Present Illness: Tessa Bradley is a 78 y.o. female who is here today to follow up with ALIZA Enriquez     Ms. Bradley is a 78-year-old female with PMH of arthritis, HTN, HLD, A-fib (on Eliquis), Hodgkin's disease s/p postradiation chemotherapy, history of tobacco abuse, COVID long-haul symptoms, and COPD with oxygen dependence.  Patient presented to [facility] ED with new onset of AMS, LFD and left-sided weakness on 2023.  AMS reported they were initially called out this morning for somnolence and patient refused medical attention.  EMS was called out again at approximately 4 PM per presenting symptoms and patient was taken to  ED for further evaluation and stroke workup.  Initial NIH 10 during ED visit.  MRI did not show acute or chronic stroke.  Expected transischemic attack as etiology of patient's symptoms.  CTA head and neck is without intracranial atherosclerosis or significant carotid stenosis.  Transthoracic echocardiogram left ventricular ejection fraction of 56-6%, normal left atrium, no thrombus mentioned.  Patient was sent home with home health.    Follow-up clinic appointment 2023: Patient compliant with all medications.  Reportedly had a fall without head contact, when she tripped over her dogs.  Patient continued to have left-sided sensory deficit and left arm, but she has torn shoulder ligament and those issues were present prior to and after presentation to the hospital.    6-month follow-up clinic appointment 2024: Patient not compliant with medications, specifically Eliquis.  Instructed patient to take all medications as ordered.  No recent falls or trauma.  No speech difficulty, unilateral weakness, decreased  sensitivity or visual disturbance.  Patient mentions having frequent headaches and transient episodes of dizziness when standing up.  Advised patient to pause with position changes and wear her oxygen as recommended by pulmonology.  Plan accordingly for walking long distances and take breaks as needed.      Subjective        I have reviewed and the following portions of the patient's history were updated as appropriate: past family history, past medical history, past social history, past surgical history and problem list.    Medications:     Current Outpatient Medications:     albuterol sulfate  (90 Base) MCG/ACT inhaler, Inhale 2 puffs Every 4 (Four) Hours As Needed for Wheezing., Disp: , Rfl:     apixaban (ELIQUIS) 5 MG tablet tablet, Take 1 tablet by mouth Every 12 (Twelve) Hours., Disp: , Rfl:     atorvastatin (LIPITOR) 80 MG tablet, Take 1 tablet by mouth Every Night., Disp: , Rfl:     benzonatate (TESSALON) 200 MG capsule, Take 1 capsule by mouth 3 (Three) Times a Day As Needed for Cough., Disp: 15 capsule, Rfl: 0    Budeson-Glycopyrrol-Formoterol (Breztri Aerosphere) 160-9-4.8 MCG/ACT aerosol inhaler, Inhale 2 puffs 2 (Two) Times a Day., Disp: 3 each, Rfl: 3    cyclobenzaprine (FLEXERIL) 5 MG tablet, Take 1 tablet by mouth 3 (Three) Times a Day As Needed for Muscle Spasms., Disp: , Rfl:     gabapentin (NEURONTIN) 300 MG capsule, Take 1 capsule by mouth., Disp: , Rfl:     ipratropium-albuterol (DUO-NEB) 0.5-2.5 mg/3 ml nebulizer, Take 3 mL by nebulization Every 6 (Six) Hours As Needed for Shortness of Air., Disp: 360 mL, Rfl: 0    loratadine (CLARITIN) 10 MG tablet, Take 1 tablet by mouth Daily., Disp: , Rfl:     methocarbamol (ROBAXIN) 500 MG tablet, Take 1 tablet by mouth Every 6 (Six) Hours., Disp: , Rfl:     Mirabegron ER (MYRBETRIQ) 50 MG tablet sustained-release 24 hour 24 hr tablet, Take 50 mg by mouth Daily., Disp: , Rfl:     ProAir RespiClick 108 (90 Base) MCG/ACT inhaler, Inhale 2 puffs.,  "Disp: , Rfl:     sertraline (ZOLOFT) 100 MG tablet, Take 1 tablet by mouth Daily., Disp: , Rfl:     tolterodine LA (DETROL LA) 2 MG 24 hr capsule, , Disp: , Rfl:     traZODone (DESYREL) 100 MG tablet, Take  by mouth., Disp: , Rfl:     doxycycline (MONODOX) 100 MG capsule, Take 1 capsule by mouth Every 12 (Twelve) Hours. Indications: Upper Respiratory Tract Infection (Patient not taking: Reported on 8/1/2024), Disp: 4 capsule, Rfl: 0    Allergies:   Allergies   Allergen Reactions    Penicillins Anaphylaxis    Latex Other (See Comments)     UNKNOWN    Other Other (See Comments)     \"EGG ALBUMIN\" only; can still eat eggs       Objective     Physical Exam:   Vital Signs:   Vitals:    08/01/24 1151   BP: 124/64   Pulse: 87   Temp: 99 °F (37.2 °C)   Weight: 67.1 kg (148 lb)   Height: 160 cm (62.99\")     Body mass index is 26.22 kg/m².    Physical Exam  Constitutional:       General: She is not in acute distress.  HENT:      Head: Normocephalic and atraumatic.      Nose: Nose normal.      Mouth/Throat:      Mouth: Mucous membranes are dry.   Eyes:      Extraocular Movements: Extraocular movements intact.      Pupils: Pupils are equal, round, and reactive to light.   Cardiovascular:      Pulses: Normal pulses.   Pulmonary:      Comments: Diminished breath sounds bilateral  Abdominal:      General: Abdomen is flat.   Musculoskeletal:         General: Normal range of motion.      Cervical back: Normal range of motion.   Skin:     General: Skin is warm and dry.   Neurological:      Mental Status: She is oriented to person, place, and time.      Cranial Nerves: No cranial nerve deficit.      Sensory: No sensory deficit.      Motor: Motor strength is normal.No weakness.      Coordination: Coordination normal.   Psychiatric:         Mood and Affect: Mood normal.         Speech: Speech normal.         Behavior: Behavior normal.       Neurological Exam  Mental Status  Awake, alert and oriented to person, place and time. Oriented to " person, place and time. Oriented to person, place, and time. Speech is normal. Language is fluent with no aphasia.    Cranial Nerves  CN II: Visual fields full to confrontation.  CN III, IV, VI: Extraocular movements intact bilaterally. Pupils equal round and reactive to light bilaterally.  CN V: Facial sensation is normal.  CN VII: Full and symmetric facial movement.  CN VIII: Hearing intact.  CN XI: Shoulder shrug strength is normal.  CN XII: Tongue midline without atrophy or fasciculations.    Motor  Normal muscle bulk throughout. Normal muscle tone. Strength is 5/5 throughout all four extremities.    Sensory  Light touch is normal in upper and lower extremities.     Coordination  Right: Finger-to-nose normal. Rapid alternating movement normal.Left: Finger-to-nose normal. Rapid alternating movement normal.    Gait  Casual gait is normal including stance, stride, and arm swing.  Walks with rollator.       Results Reviewed:   WBC   Date Value Ref Range Status   07/13/2024 5.32 3.40 - 10.80 10*3/mm3 Final     RBC   Date Value Ref Range Status   07/13/2024 3.81 3.77 - 5.28 10*6/mm3 Final     Hemoglobin   Date Value Ref Range Status   07/13/2024 11.3 (L) 12.0 - 15.9 g/dL Final     Hematocrit   Date Value Ref Range Status   07/13/2024 39.0 34.0 - 46.6 % Final     MCV   Date Value Ref Range Status   07/13/2024 102.4 (H) 79.0 - 97.0 fL Final     MCH   Date Value Ref Range Status   07/13/2024 29.7 26.6 - 33.0 pg Final     MCHC   Date Value Ref Range Status   07/13/2024 29.0 (L) 31.5 - 35.7 g/dL Final     RDW   Date Value Ref Range Status   07/13/2024 13.8 12.3 - 15.4 % Final     RDW-SD   Date Value Ref Range Status   07/13/2024 52.2 37.0 - 54.0 fl Final     MPV   Date Value Ref Range Status   07/13/2024 10.6 6.0 - 12.0 fL Final     Platelets   Date Value Ref Range Status   07/13/2024 182 140 - 450 10*3/mm3 Final     Neutrophil %   Date Value Ref Range Status   07/13/2024 70.3 42.7 - 76.0 % Final     Lymphocyte %   Date  Value Ref Range Status   07/13/2024 19.9 19.6 - 45.3 % Final     Monocyte %   Date Value Ref Range Status   07/13/2024 9.0 5.0 - 12.0 % Final     Eosinophil %   Date Value Ref Range Status   07/13/2024 0.0 (L) 0.3 - 6.2 % Final     Basophil %   Date Value Ref Range Status   07/13/2024 0.2 0.0 - 1.5 % Final     Immature Grans %   Date Value Ref Range Status   07/13/2024 0.6 (H) 0.0 - 0.5 % Final     Neutrophils, Absolute   Date Value Ref Range Status   07/13/2024 3.74 1.70 - 7.00 10*3/mm3 Final     Lymphocytes, Absolute   Date Value Ref Range Status   07/13/2024 1.06 0.70 - 3.10 10*3/mm3 Final     Monocytes, Absolute   Date Value Ref Range Status   07/13/2024 0.48 0.10 - 0.90 10*3/mm3 Final     Eosinophils, Absolute   Date Value Ref Range Status   07/13/2024 0.00 0.00 - 0.40 10*3/mm3 Final     Basophils, Absolute   Date Value Ref Range Status   07/13/2024 0.01 0.00 - 0.20 10*3/mm3 Final     Immature Grans, Absolute   Date Value Ref Range Status   07/13/2024 0.03 0.00 - 0.05 10*3/mm3 Final     nRBC   Date Value Ref Range Status   07/13/2024 0.0 0.0 - 0.2 /100 WBC Final      Lab Results   Component Value Date    GLUCOSE 106 (H) 07/13/2024    BUN 27 (H) 07/13/2024    CREATININE 1.29 (H) 07/13/2024    EGFR 43.1 (L) 07/13/2024    BCR 20.9 07/13/2024    K 4.8 07/13/2024    CO2 32.0 (H) 07/13/2024    CALCIUM 8.2 (L) 07/13/2024    ALBUMIN 3.8 07/12/2024    BILITOT 0.4 07/12/2024    AST 13 07/12/2024    ALT 7 07/12/2024         Lipid Panel          9/19/2023    06:07   Lipid Panel   Total Cholesterol 180    Triglycerides 62    HDL Cholesterol 119    VLDL Cholesterol 12    LDL Cholesterol  49    LDL/HDL Ratio 0.41       PHQ-2 Depression Screening  Little interest or pleasure in doing things? 0-->not at all   Feeling down, depressed, or hopeless? 1-->several days   PHQ-2 Total Score 1        NIH Stroke Scale  Time: 15:32 EDT  Person Administering Scale: ALIZA Enriquez  Level of consciousness: 0=alert; keenly  responsive   1b. LOC questions:  0=Answers both questions correctly   1c. LOC commands: 0=Performs both tasks correctly   2.  Best Gaze: 0=normal   3.  Visual: 0=No visual loss   4. Facial Palsy: 0=Normal symmetric movement   5a.  Motor left arm: 0=No drift, limb holds 90 (or 45) degrees for full 10 seconds   5b.  Motor right arm: 0=No drift, limb holds 90 (or 45) degrees for full 10 seconds   6a. motor left le=No drift, limb holds 90 (or 45) degrees for full 10 seconds   6b  Motor right le=No drift, limb holds 90 (or 45) degrees for full 10 seconds   7. Limb Ataxia: 0=Absent   8.  Sensory: 0=Normal; no sensory loss   9. Best Language:  0=No aphasia, normal   10. Dysarthria: 0=Normal   11. Extinction and Inattention: 0=No abnormality    Total:   0       This was an audio and video enabled telemedicine encounter.     Assessment / Plan      Assessment/Plan:   Diagnoses and all orders for this visit:    History of TIA  -Continue home Eliquis  -Continue high intensity statin  -Increase physical activity as tolerated  -Heart healthy diet  -Discussed stroke signs and symptoms that warrant return to emergency department    2.  Atrial fibrillation, unspecified type  -Continue home Eliquis  -Continue management with PCP    3.  Mixed hyperlipidemia  -Goal LDL less than 70  -Last LDL 49  -Continue high intensity statin  -Lipid panel today, will notify of results and make changes accordingly    4.  Hypertension, unspecified type  -Goal SBP less than 140/80  -BP today in clinic 124/64  -Continue follow-up with PCP    5.  MAKAYLA and chronic obstructive pulmonary disease, unspecified COPD type  -Follows with pulmonary  -Does not tolerate facemask  -Asked patient to contact [facility] for follow-up appointment, [facility] not able [facility]  -Continue oxygen per pulmonary recommendations  -Follow recommendations per their discretion    6.  Tobacco abuse  -Recommend total  cessation  -Patient no longer smoking    Follow Up:   Follow-up as needed in stroke clinic    Discussed the importance of medication compliance Atorvastatin 80mg nightly and Eliquis 5mg twice daily and lifestyle modifications adequate control of blood pressure, adequate control of cholesterol (goal LDL <70), adequate control of glucose (<140, A1c goal <7), smoking cessation, increased physical activity, and implementation of healthy diet to help reduce the risk of future cerebrovascular events.  Also discussed the signs symptoms that would warrant the patient return back to the emergency department including unilateral weakness, unilateral numbness, visual disturbances, loss of balance, speech difficulties, and/or a sudden severe headache.  Patient verbalized understanding.     ALIZA Enriquez  [facility]

## 2024-08-06 ENCOUNTER — READMISSION MANAGEMENT (OUTPATIENT)
Dept: CALL CENTER | Facility: HOSPITAL | Age: 79
End: 2024-08-06
Payer: MEDICARE

## 2024-08-06 NOTE — OUTREACH NOTE
COPD/PN Week 3 Survey      Flowsheet Row Responses   [facility] patient discharged from? [place]   Does the patient have one of the following disease processes/diagnoses(primary or secondary)? COPD   Week 3 attempt successful? Yes   Call start time 1154   Call end time 1203   Discharge diagnosis Acute on chronic hypoxic respiratory failure, acute exacerb COPD   Person spoke with today (if not patient) and relationship patient   Meds reviewed with patient/caregiver? Yes   Is the patient having any side effects they believe may be caused by any medication additions or changes? No   Does the patient have all medications ordered at discharge? Yes   Is the patient taking all medications as directed (includes completed medication regime)? Yes   Does the patient have a primary care provider?  Yes   Does the patient have an appointment with their PCP or specialist within 7 days of discharge? Yes   Comments regarding PCP PCP--Dr. Sunni Santoro   Has the patient kept scheduled appointments due by today? Yes   Has home health visited the patient within 72 hours of discharge? N/A   Has all DME been delivered? No   DME comments Oxygen 2 L NC continuous   Pulse Ox monitoring Intermittent   Pulse Ox device source Patient   O2 Sat: education provided Sat levels, Monitoring frequency, When to seek care   Psychosocial issues? No   Comments Patient states her a fib is worsening. Now she can hardly walk very far and she gets short of breath. Advised patient to go to ED for evaluation. Patient states she did not have a way there today, she will go tomorrow. Advised patient to call 911 and they would take her to the ED for evaluation. Patient verbalized understanding.   Did the patient receive a copy of their discharge instructions? Yes   Nursing interventions Reviewed instructions with patient   What is the patient's perception of their health status since discharge? Worsening   Nursing Interventions Nurse provided patient  education   Is the patient/caregiver able to teach back the hierarchy of who to call/visit for symptoms/problems? PCP, Specialist, Home health nurse, Urgent Care, ED, 911 Yes   Patient reports what zone on this call? Yellow Zone   Yellow Zone More breathless than usual  [states it is related to A Fib and not her COPD.]   Yellow interventions Use oxygen as prescribed, Call provider immediately if symptoms don't improve: they may indicate that an adjustment in medication or oxygen therapy is needed   Week 3 call completed? Yes   Graduated Yes   Is the patient interested in additional calls from an ambulatory ? No   Would this patient benefit from a Referral to [facility] Social Work? No   Call end time 1203            Cherelle HERBERT - Registered Nurse

## 2025-01-21 ENCOUNTER — HOSPITAL ENCOUNTER (INPATIENT)
Facility: HOSPITAL | Age: 80
LOS: 15 days | Discharge: ANOTHER HOSPITAL | End: 2025-02-05
Attending: EMERGENCY MEDICINE | Admitting: INTERNAL MEDICINE
Payer: MEDICARE

## 2025-01-21 ENCOUNTER — APPOINTMENT (OUTPATIENT)
Dept: GENERAL RADIOLOGY | Facility: HOSPITAL | Age: 80
End: 2025-01-21
Payer: MEDICARE

## 2025-01-21 ENCOUNTER — APPOINTMENT (OUTPATIENT)
Dept: CT IMAGING | Facility: HOSPITAL | Age: 80
End: 2025-01-21
Payer: MEDICARE

## 2025-01-21 DIAGNOSIS — J96.21 ACUTE ON CHRONIC RESPIRATORY FAILURE WITH HYPOXIA AND HYPERCAPNIA: Primary | ICD-10-CM

## 2025-01-21 DIAGNOSIS — J96.22 ACUTE ON CHRONIC RESPIRATORY FAILURE WITH HYPOXIA AND HYPERCAPNIA: Primary | ICD-10-CM

## 2025-01-21 DIAGNOSIS — J44.1 ACUTE EXACERBATION OF CHRONIC OBSTRUCTIVE PULMONARY DISEASE (COPD): ICD-10-CM

## 2025-01-21 DIAGNOSIS — R41.82 ALTERED MENTAL STATUS, UNSPECIFIED ALTERED MENTAL STATUS TYPE: ICD-10-CM

## 2025-01-21 DIAGNOSIS — N30.00 ACUTE CYSTITIS WITHOUT HEMATURIA: ICD-10-CM

## 2025-01-21 DIAGNOSIS — R13.10 DYSPHAGIA, UNSPECIFIED TYPE: ICD-10-CM

## 2025-01-21 PROBLEM — J96.92 RESPIRATORY FAILURE WITH HYPERCAPNIA: Status: ACTIVE | Noted: 2025-01-21

## 2025-01-21 LAB
ALBUMIN SERPL-MCNC: 3.6 G/DL (ref 3.5–5.2)
ALBUMIN/GLOB SERPL: 1.2 G/DL
ALP SERPL-CCNC: 81 U/L (ref 39–117)
ALT SERPL W P-5'-P-CCNC: 6 U/L (ref 1–33)
ANION GAP SERPL CALCULATED.3IONS-SCNC: 4 MMOL/L (ref 5–15)
ARTERIAL PATENCY WRIST A: ABNORMAL
AST SERPL-CCNC: 15 U/L (ref 1–32)
ATMOSPHERIC PRESS: ABNORMAL MM[HG]
BACTERIA UR QL AUTO: ABNORMAL /HPF
BACTERIA UR QL AUTO: ABNORMAL /HPF
BASE EXCESS BLDA CALC-SCNC: 10.8 MMOL/L (ref 0–2)
BASE EXCESS BLDA CALC-SCNC: 11 MMOL/L (ref 0–2)
BASE EXCESS BLDA CALC-SCNC: 13.3 MMOL/L (ref 0–2)
BASOPHILS # BLD AUTO: 0.02 10*3/MM3 (ref 0–0.2)
BASOPHILS NFR BLD AUTO: 0.3 % (ref 0–1.5)
BDY SITE: ABNORMAL
BILIRUB SERPL-MCNC: 0.6 MG/DL (ref 0–1.2)
BILIRUB UR QL STRIP: NEGATIVE
BILIRUB UR QL STRIP: NEGATIVE
BODY TEMPERATURE: 37
BUN SERPL-MCNC: 10 MG/DL (ref 8–23)
BUN/CREAT SERPL: 11.9 (ref 7–25)
CALCIUM SPEC-SCNC: 9.4 MG/DL (ref 8.6–10.5)
CHLORIDE SERPL-SCNC: 100 MMOL/L (ref 98–107)
CK SERPL-CCNC: 25 U/L (ref 20–180)
CLARITY UR: ABNORMAL
CLARITY UR: CLEAR
CO2 BLDA-SCNC: 42 MMOL/L (ref 22–33)
CO2 BLDA-SCNC: 46.8 MMOL/L (ref 22–33)
CO2 BLDA-SCNC: 49.3 MMOL/L (ref 22–33)
CO2 SERPL-SCNC: 42 MMOL/L (ref 22–29)
COHGB MFR BLD: 2.3 % (ref 0–2)
COHGB MFR BLD: 2.5 % (ref 0–2)
COHGB MFR BLD: 2.7 % (ref 0–2)
COLOR UR: YELLOW
COLOR UR: YELLOW
CREAT SERPL-MCNC: 0.84 MG/DL (ref 0.57–1)
D-LACTATE SERPL-SCNC: 0.6 MMOL/L (ref 0.5–2)
DEPRECATED RDW RBC AUTO: 62.5 FL (ref 37–54)
EGFRCR SERPLBLD CKD-EPI 2021: 71.7 ML/MIN/1.73
EOSINOPHIL # BLD AUTO: 0.25 10*3/MM3 (ref 0–0.4)
EOSINOPHIL NFR BLD AUTO: 3.9 % (ref 0.3–6.2)
EPAP: 0
EPAP: 0
ERYTHROCYTE [DISTWIDTH] IN BLOOD BY AUTOMATED COUNT: 16 % (ref 12.3–15.4)
FLUAV SUBTYP SPEC NAA+PROBE: NOT DETECTED
FLUBV RNA ISLT QL NAA+PROBE: NOT DETECTED
GEN 5 1HR TROPONIN T REFLEX: 25 NG/L
GLOBULIN UR ELPH-MCNC: 3.1 GM/DL
GLUCOSE SERPL-MCNC: 130 MG/DL (ref 65–99)
GLUCOSE UR STRIP-MCNC: NEGATIVE MG/DL
GLUCOSE UR STRIP-MCNC: NEGATIVE MG/DL
HCO3 BLDA-SCNC: 40.1 MMOL/L (ref 20–26)
HCO3 BLDA-SCNC: 43.2 MMOL/L (ref 20–26)
HCO3 BLDA-SCNC: 45.1 MMOL/L (ref 20–26)
HCT VFR BLD AUTO: 48.5 % (ref 34–46.6)
HCT VFR BLD CALC: 35.8 % (ref 38–51)
HCT VFR BLD CALC: 36.5 % (ref 38–51)
HCT VFR BLD CALC: 37.9 % (ref 38–51)
HGB BLD-MCNC: 12.8 G/DL (ref 12–15.9)
HGB BLDA-MCNC: 11.7 G/DL (ref 14–18)
HGB BLDA-MCNC: 11.9 G/DL (ref 14–18)
HGB BLDA-MCNC: 12.4 G/DL (ref 14–18)
HGB UR QL STRIP.AUTO: ABNORMAL
HGB UR QL STRIP.AUTO: NEGATIVE
HOLD SPECIMEN: NORMAL
HYALINE CASTS UR QL AUTO: ABNORMAL /LPF
HYALINE CASTS UR QL AUTO: ABNORMAL /LPF
IMM GRANULOCYTES # BLD AUTO: 0.04 10*3/MM3 (ref 0–0.05)
IMM GRANULOCYTES NFR BLD AUTO: 0.6 % (ref 0–0.5)
INHALED O2 CONCENTRATION: 36 %
INHALED O2 CONCENTRATION: 60 %
INHALED O2 CONCENTRATION: 60 %
IPAP: 0
IPAP: 0
KETONES UR QL STRIP: ABNORMAL
KETONES UR QL STRIP: NEGATIVE
LEUKOCYTE ESTERASE UR QL STRIP.AUTO: ABNORMAL
LEUKOCYTE ESTERASE UR QL STRIP.AUTO: NEGATIVE
LIPASE SERPL-CCNC: 13 U/L (ref 13–60)
LYMPHOCYTES # BLD AUTO: 1.19 10*3/MM3 (ref 0.7–3.1)
LYMPHOCYTES NFR BLD AUTO: 18.4 % (ref 19.6–45.3)
Lab: ABNORMAL
Lab: ABNORMAL
MAGNESIUM SERPL-MCNC: 1.9 MG/DL (ref 1.6–2.4)
MCH RBC QN AUTO: 27.6 PG (ref 26.6–33)
MCHC RBC AUTO-ENTMCNC: 26.4 G/DL (ref 31.5–35.7)
MCV RBC AUTO: 104.8 FL (ref 79–97)
METHGB BLD QL: 0.3 % (ref 0–1.5)
METHGB BLD QL: 0.4 % (ref 0–1.5)
METHGB BLD QL: 0.4 % (ref 0–1.5)
MODALITY: ABNORMAL
MONOCYTES # BLD AUTO: 0.67 10*3/MM3 (ref 0.1–0.9)
MONOCYTES NFR BLD AUTO: 10.4 % (ref 5–12)
NEUTROPHILS NFR BLD AUTO: 4.3 10*3/MM3 (ref 1.7–7)
NEUTROPHILS NFR BLD AUTO: 66.4 % (ref 42.7–76)
NITRITE UR QL STRIP: POSITIVE
NITRITE UR QL STRIP: POSITIVE
NOTIFIED BY: ABNORMAL
NOTIFIED BY: ABNORMAL
NOTIFIED WHO: ABNORMAL
NOTIFIED WHO: ABNORMAL
NRBC BLD AUTO-RTO: 0 /100 WBC (ref 0–0.2)
OVALOCYTES BLD QL SMEAR: NORMAL
OXYHGB MFR BLDV: 96.3 % (ref 94–99)
OXYHGB MFR BLDV: 96.9 % (ref 94–99)
OXYHGB MFR BLDV: 97.1 % (ref 94–99)
PAW @ PEAK INSP FLOW SETTING VENT: 0 CMH2O
PAW @ PEAK INSP FLOW SETTING VENT: 0 CMH2O
PCO2 BLDA: 61.4 MM HG (ref 35–45)
PCO2 BLDA: ABNORMAL MM[HG]
PCO2 BLDA: ABNORMAL MM[HG]
PCO2 TEMP ADJ BLD: 61.4 MM HG (ref 35–45)
PCO2 TEMP ADJ BLD: ABNORMAL MM[HG]
PCO2 TEMP ADJ BLD: ABNORMAL MM[HG]
PH BLDA: 7.13 PH UNITS (ref 7.35–7.45)
PH BLDA: 7.18 PH UNITS (ref 7.35–7.45)
PH BLDA: 7.42 PH UNITS (ref 7.35–7.45)
PH UR STRIP.AUTO: 5.5 [PH] (ref 5–8)
PH UR STRIP.AUTO: 7.5 [PH] (ref 5–8)
PH, TEMP CORRECTED: 7.13 PH UNITS
PH, TEMP CORRECTED: 7.18 PH UNITS
PH, TEMP CORRECTED: 7.42 PH UNITS
PLAT MORPH BLD: NORMAL
PLATELET # BLD AUTO: 181 10*3/MM3 (ref 140–450)
PMV BLD AUTO: 10.4 FL (ref 6–12)
PO2 BLDA: 116 MM HG (ref 83–108)
PO2 BLDA: 155 MM HG (ref 83–108)
PO2 BLDA: 175 MM HG (ref 83–108)
PO2 TEMP ADJ BLD: 116 MM HG (ref 83–108)
PO2 TEMP ADJ BLD: 155 MM HG (ref 83–108)
PO2 TEMP ADJ BLD: 175 MM HG (ref 83–108)
POTASSIUM SERPL-SCNC: 4.6 MMOL/L (ref 3.5–5.2)
PROCALCITONIN SERPL-MCNC: 0.05 NG/ML (ref 0–0.25)
PROT SERPL-MCNC: 6.7 G/DL (ref 6–8.5)
PROT UR QL STRIP: ABNORMAL
PROT UR QL STRIP: NEGATIVE
QT INTERVAL: 398 MS
QTC INTERVAL: 407 MS
RBC # BLD AUTO: 4.63 10*6/MM3 (ref 3.77–5.28)
RBC # UR STRIP: ABNORMAL /HPF
RBC # UR STRIP: ABNORMAL /HPF
REF LAB TEST METHOD: ABNORMAL
REF LAB TEST METHOD: ABNORMAL
SARS-COV-2 RNA RESP QL NAA+PROBE: NOT DETECTED
SODIUM SERPL-SCNC: 146 MMOL/L (ref 136–145)
SP GR UR STRIP: 1.02 (ref 1–1.03)
SP GR UR STRIP: 1.03 (ref 1–1.03)
SQUAMOUS #/AREA URNS HPF: ABNORMAL /HPF
SQUAMOUS #/AREA URNS HPF: ABNORMAL /HPF
STOMATOCYTES BLD QL SMEAR: NORMAL
TOTAL RATE: 0 BREATHS/MINUTE
TOTAL RATE: 0 BREATHS/MINUTE
TROPONIN T % DELTA: 0
TROPONIN T NUMERIC DELTA: 0 NG/L
TROPONIN T SERPL HS-MCNC: 25 NG/L
TSH SERPL DL<=0.05 MIU/L-ACNC: 1.56 UIU/ML (ref 0.27–4.2)
UROBILINOGEN UR QL STRIP: ABNORMAL
UROBILINOGEN UR QL STRIP: ABNORMAL
VENTILATOR MODE: ABNORMAL
WBC # UR STRIP: ABNORMAL /HPF
WBC # UR STRIP: ABNORMAL /HPF
WBC MORPH BLD: NORMAL
WBC NRBC COR # BLD AUTO: 6.47 10*3/MM3 (ref 3.4–10.8)
WHOLE BLOOD HOLD COAG: NORMAL
WHOLE BLOOD HOLD SPECIMEN: NORMAL

## 2025-01-21 PROCEDURE — 85007 BL SMEAR W/DIFF WBC COUNT: CPT | Performed by: EMERGENCY MEDICINE

## 2025-01-21 PROCEDURE — 80053 COMPREHEN METABOLIC PANEL: CPT | Performed by: EMERGENCY MEDICINE

## 2025-01-21 PROCEDURE — 94799 UNLISTED PULMONARY SVC/PX: CPT

## 2025-01-21 PROCEDURE — 84145 PROCALCITONIN (PCT): CPT | Performed by: EMERGENCY MEDICINE

## 2025-01-21 PROCEDURE — 36415 COLL VENOUS BLD VENIPUNCTURE: CPT

## 2025-01-21 PROCEDURE — 87040 BLOOD CULTURE FOR BACTERIA: CPT | Performed by: EMERGENCY MEDICINE

## 2025-01-21 PROCEDURE — 82805 BLOOD GASES W/O2 SATURATION: CPT

## 2025-01-21 PROCEDURE — 87636 SARSCOV2 & INF A&B AMP PRB: CPT | Performed by: EMERGENCY MEDICINE

## 2025-01-21 PROCEDURE — 87088 URINE BACTERIA CULTURE: CPT | Performed by: EMERGENCY MEDICINE

## 2025-01-21 PROCEDURE — 25010000002 METHYLPREDNISOLONE PER 125 MG: Performed by: EMERGENCY MEDICINE

## 2025-01-21 PROCEDURE — 94640 AIRWAY INHALATION TREATMENT: CPT

## 2025-01-21 PROCEDURE — 81001 URINALYSIS AUTO W/SCOPE: CPT | Performed by: EMERGENCY MEDICINE

## 2025-01-21 PROCEDURE — 25010000002 PROPOFOL 10 MG/ML EMULSION: Performed by: EMERGENCY MEDICINE

## 2025-01-21 PROCEDURE — 83735 ASSAY OF MAGNESIUM: CPT | Performed by: EMERGENCY MEDICINE

## 2025-01-21 PROCEDURE — 87899 AGENT NOS ASSAY W/OPTIC: CPT | Performed by: STUDENT IN AN ORGANIZED HEALTH CARE EDUCATION/TRAINING PROGRAM

## 2025-01-21 PROCEDURE — 84443 ASSAY THYROID STIM HORMONE: CPT | Performed by: EMERGENCY MEDICINE

## 2025-01-21 PROCEDURE — 93005 ELECTROCARDIOGRAM TRACING: CPT | Performed by: EMERGENCY MEDICINE

## 2025-01-21 PROCEDURE — 84484 ASSAY OF TROPONIN QUANT: CPT | Performed by: EMERGENCY MEDICINE

## 2025-01-21 PROCEDURE — 94660 CPAP INITIATION&MGMT: CPT

## 2025-01-21 PROCEDURE — 82550 ASSAY OF CK (CPK): CPT | Performed by: STUDENT IN AN ORGANIZED HEALTH CARE EDUCATION/TRAINING PROGRAM

## 2025-01-21 PROCEDURE — 83050 HGB METHEMOGLOBIN QUAN: CPT

## 2025-01-21 PROCEDURE — 36600 WITHDRAWAL OF ARTERIAL BLOOD: CPT

## 2025-01-21 PROCEDURE — 99291 CRITICAL CARE FIRST HOUR: CPT

## 2025-01-21 PROCEDURE — 71045 X-RAY EXAM CHEST 1 VIEW: CPT

## 2025-01-21 PROCEDURE — 87449 NOS EACH ORGANISM AG IA: CPT | Performed by: STUDENT IN AN ORGANIZED HEALTH CARE EDUCATION/TRAINING PROGRAM

## 2025-01-21 PROCEDURE — 81001 URINALYSIS AUTO W/SCOPE: CPT | Performed by: STUDENT IN AN ORGANIZED HEALTH CARE EDUCATION/TRAINING PROGRAM

## 2025-01-21 PROCEDURE — 94002 VENT MGMT INPAT INIT DAY: CPT

## 2025-01-21 PROCEDURE — 99291 CRITICAL CARE FIRST HOUR: CPT | Performed by: STUDENT IN AN ORGANIZED HEALTH CARE EDUCATION/TRAINING PROGRAM

## 2025-01-21 PROCEDURE — 70450 CT HEAD/BRAIN W/O DYE: CPT

## 2025-01-21 PROCEDURE — 87086 URINE CULTURE/COLONY COUNT: CPT | Performed by: STUDENT IN AN ORGANIZED HEALTH CARE EDUCATION/TRAINING PROGRAM

## 2025-01-21 PROCEDURE — 82375 ASSAY CARBOXYHB QUANT: CPT

## 2025-01-21 PROCEDURE — 5A1945Z RESPIRATORY VENTILATION, 24-96 CONSECUTIVE HOURS: ICD-10-PCS | Performed by: EMERGENCY MEDICINE

## 2025-01-21 PROCEDURE — 25010000002 CEFTRIAXONE PER 250 MG: Performed by: EMERGENCY MEDICINE

## 2025-01-21 PROCEDURE — 0BH17EZ INSERTION OF ENDOTRACHEAL AIRWAY INTO TRACHEA, VIA NATURAL OR ARTIFICIAL OPENING: ICD-10-PCS | Performed by: EMERGENCY MEDICINE

## 2025-01-21 PROCEDURE — 25010000002 FENTANYL 10 MCG/1 ML NS: Performed by: EMERGENCY MEDICINE

## 2025-01-21 PROCEDURE — 85025 COMPLETE CBC W/AUTO DIFF WBC: CPT | Performed by: EMERGENCY MEDICINE

## 2025-01-21 PROCEDURE — 25810000003 SODIUM CHLORIDE 0.9 % SOLUTION: Performed by: EMERGENCY MEDICINE

## 2025-01-21 PROCEDURE — 83690 ASSAY OF LIPASE: CPT | Performed by: EMERGENCY MEDICINE

## 2025-01-21 PROCEDURE — 25510000001 IOPAMIDOL 61 % SOLUTION: Performed by: EMERGENCY MEDICINE

## 2025-01-21 PROCEDURE — 25010000002 MIDAZOLAM PER 1 MG: Performed by: NURSE PRACTITIONER

## 2025-01-21 PROCEDURE — 87186 SC STD MICRODIL/AGAR DIL: CPT | Performed by: EMERGENCY MEDICINE

## 2025-01-21 PROCEDURE — 74177 CT ABD & PELVIS W/CONTRAST: CPT

## 2025-01-21 PROCEDURE — 87086 URINE CULTURE/COLONY COUNT: CPT | Performed by: EMERGENCY MEDICINE

## 2025-01-21 PROCEDURE — 74018 RADEX ABDOMEN 1 VIEW: CPT

## 2025-01-21 PROCEDURE — 83605 ASSAY OF LACTIC ACID: CPT | Performed by: EMERGENCY MEDICINE

## 2025-01-21 RX ORDER — CHLORHEXIDINE GLUCONATE ORAL RINSE 1.2 MG/ML
15 SOLUTION DENTAL EVERY 12 HOURS SCHEDULED
Status: DISCONTINUED | OUTPATIENT
Start: 2025-01-21 | End: 2025-01-25

## 2025-01-21 RX ORDER — KETAMINE HYDROCHLORIDE 100 MG/ML
INJECTION, SOLUTION INTRAMUSCULAR; INTRAVENOUS
Status: COMPLETED | OUTPATIENT
Start: 2025-01-21 | End: 2025-01-21

## 2025-01-21 RX ORDER — ACETYLCYSTEINE 200 MG/ML
4 SOLUTION ORAL; RESPIRATORY (INHALATION)
Status: COMPLETED | OUTPATIENT
Start: 2025-01-22 | End: 2025-01-23

## 2025-01-21 RX ORDER — MIDAZOLAM HYDROCHLORIDE 1 MG/ML
4 INJECTION, SOLUTION INTRAMUSCULAR; INTRAVENOUS ONCE
Status: COMPLETED | OUTPATIENT
Start: 2025-01-21 | End: 2025-01-21

## 2025-01-21 RX ORDER — MAGNESIUM SULFATE HEPTAHYDRATE 40 MG/ML
4 INJECTION, SOLUTION INTRAVENOUS ONCE
Status: COMPLETED | OUTPATIENT
Start: 2025-01-21 | End: 2025-01-22

## 2025-01-21 RX ORDER — SODIUM CHLORIDE 9 MG/ML
40 INJECTION, SOLUTION INTRAVENOUS AS NEEDED
Status: DISCONTINUED | OUTPATIENT
Start: 2025-01-21 | End: 2025-01-27

## 2025-01-21 RX ORDER — ROCURONIUM BROMIDE 10 MG/ML
INJECTION, SOLUTION INTRAVENOUS
Status: COMPLETED | OUTPATIENT
Start: 2025-01-21 | End: 2025-01-21

## 2025-01-21 RX ORDER — IOPAMIDOL 612 MG/ML
100 INJECTION, SOLUTION INTRAVASCULAR
Status: COMPLETED | OUTPATIENT
Start: 2025-01-21 | End: 2025-01-21

## 2025-01-21 RX ORDER — ONDANSETRON 4 MG/1
4 TABLET, ORALLY DISINTEGRATING ORAL EVERY 6 HOURS PRN
Status: DISCONTINUED | OUTPATIENT
Start: 2025-01-21 | End: 2025-01-23

## 2025-01-21 RX ORDER — SODIUM CHLORIDE 0.9 % (FLUSH) 0.9 %
10 SYRINGE (ML) INJECTION AS NEEDED
Status: DISCONTINUED | OUTPATIENT
Start: 2025-01-21 | End: 2025-01-25

## 2025-01-21 RX ORDER — IPRATROPIUM BROMIDE AND ALBUTEROL SULFATE 2.5; .5 MG/3ML; MG/3ML
3 SOLUTION RESPIRATORY (INHALATION) ONCE
Status: COMPLETED | OUTPATIENT
Start: 2025-01-21 | End: 2025-01-21

## 2025-01-21 RX ORDER — METHYLPREDNISOLONE SODIUM SUCCINATE 125 MG/2ML
125 INJECTION, POWDER, LYOPHILIZED, FOR SOLUTION INTRAMUSCULAR; INTRAVENOUS ONCE
Status: COMPLETED | OUTPATIENT
Start: 2025-01-21 | End: 2025-01-21

## 2025-01-21 RX ORDER — ACETAMINOPHEN 325 MG/1
650 TABLET ORAL EVERY 4 HOURS PRN
Status: DISCONTINUED | OUTPATIENT
Start: 2025-01-21 | End: 2025-01-23

## 2025-01-21 RX ORDER — SODIUM CHLORIDE 0.9 % (FLUSH) 0.9 %
10 SYRINGE (ML) INJECTION AS NEEDED
Status: DISCONTINUED | OUTPATIENT
Start: 2025-01-21 | End: 2025-02-05 | Stop reason: HOSPADM

## 2025-01-21 RX ORDER — NITROGLYCERIN 0.4 MG/1
0.4 TABLET SUBLINGUAL
Status: DISCONTINUED | OUTPATIENT
Start: 2025-01-21 | End: 2025-01-27

## 2025-01-21 RX ORDER — AMOXICILLIN 250 MG
2 CAPSULE ORAL 2 TIMES DAILY
Status: DISCONTINUED | OUTPATIENT
Start: 2025-01-21 | End: 2025-01-23

## 2025-01-21 RX ORDER — ONDANSETRON 2 MG/ML
4 INJECTION INTRAMUSCULAR; INTRAVENOUS EVERY 6 HOURS PRN
Status: DISCONTINUED | OUTPATIENT
Start: 2025-01-21 | End: 2025-01-23

## 2025-01-21 RX ORDER — IPRATROPIUM BROMIDE AND ALBUTEROL SULFATE 2.5; .5 MG/3ML; MG/3ML
3 SOLUTION RESPIRATORY (INHALATION) EVERY 4 HOURS PRN
Status: DISCONTINUED | OUTPATIENT
Start: 2025-01-21 | End: 2025-01-27

## 2025-01-21 RX ORDER — BUDESONIDE 0.5 MG/2ML
0.5 INHALANT ORAL
Status: DISCONTINUED | OUTPATIENT
Start: 2025-01-21 | End: 2025-02-05 | Stop reason: HOSPADM

## 2025-01-21 RX ORDER — BISACODYL 10 MG
10 SUPPOSITORY, RECTAL RECTAL DAILY PRN
Status: DISCONTINUED | OUTPATIENT
Start: 2025-01-21 | End: 2025-01-23

## 2025-01-21 RX ORDER — FAMOTIDINE 10 MG/ML
20 INJECTION, SOLUTION INTRAVENOUS
Status: DISCONTINUED | OUTPATIENT
Start: 2025-01-22 | End: 2025-01-29

## 2025-01-21 RX ORDER — ATORVASTATIN CALCIUM 40 MG/1
80 TABLET, FILM COATED ORAL NIGHTLY
Status: DISCONTINUED | OUTPATIENT
Start: 2025-01-21 | End: 2025-01-23

## 2025-01-21 RX ORDER — POLYETHYLENE GLYCOL 3350 17 G/17G
17 POWDER, FOR SOLUTION ORAL DAILY PRN
Status: DISCONTINUED | OUTPATIENT
Start: 2025-01-21 | End: 2025-01-23

## 2025-01-21 RX ORDER — IPRATROPIUM BROMIDE AND ALBUTEROL SULFATE 2.5; .5 MG/3ML; MG/3ML
3 SOLUTION RESPIRATORY (INHALATION)
Status: DISCONTINUED | OUTPATIENT
Start: 2025-01-21 | End: 2025-01-27

## 2025-01-21 RX ORDER — GUAIFENESIN 200 MG/10ML
400 LIQUID ORAL EVERY 4 HOURS
Status: DISPENSED | OUTPATIENT
Start: 2025-01-21 | End: 2025-01-23

## 2025-01-21 RX ORDER — FAMOTIDINE 20 MG/1
20 TABLET, FILM COATED ORAL
Status: DISCONTINUED | OUTPATIENT
Start: 2025-01-22 | End: 2025-01-21

## 2025-01-21 RX ORDER — SODIUM CHLORIDE 0.9 % (FLUSH) 0.9 %
10 SYRINGE (ML) INJECTION EVERY 12 HOURS SCHEDULED
Status: DISCONTINUED | OUTPATIENT
Start: 2025-01-21 | End: 2025-02-05 | Stop reason: HOSPADM

## 2025-01-21 RX ORDER — PREDNISONE 20 MG/1
40 TABLET ORAL DAILY
Status: DISCONTINUED | OUTPATIENT
Start: 2025-01-22 | End: 2025-01-23

## 2025-01-21 RX ORDER — BISACODYL 5 MG/1
5 TABLET, DELAYED RELEASE ORAL DAILY PRN
Status: DISCONTINUED | OUTPATIENT
Start: 2025-01-21 | End: 2025-01-23

## 2025-01-21 RX ORDER — ACETAMINOPHEN 650 MG/1
650 SUPPOSITORY RECTAL EVERY 4 HOURS PRN
Status: DISCONTINUED | OUTPATIENT
Start: 2025-01-21 | End: 2025-01-23

## 2025-01-21 RX ORDER — ALBUTEROL SULFATE 0.83 MG/ML
2.5 SOLUTION RESPIRATORY (INHALATION) ONCE
Status: COMPLETED | OUTPATIENT
Start: 2025-01-21 | End: 2025-01-21

## 2025-01-21 RX ADMIN — KETAMINE HYDROCHLORIDE 100 MG: 100 INJECTION INTRAMUSCULAR; INTRAVENOUS at 18:28

## 2025-01-21 RX ADMIN — IPRATROPIUM BROMIDE AND ALBUTEROL SULFATE 3 ML: 2.5; .5 SOLUTION RESPIRATORY (INHALATION) at 23:12

## 2025-01-21 RX ADMIN — PROPOFOL 5 MCG/KG/MIN: 10 INJECTION, EMULSION INTRAVENOUS at 20:19

## 2025-01-21 RX ADMIN — BUDESONIDE 0.5 MG: 0.5 INHALANT RESPIRATORY (INHALATION) at 23:12

## 2025-01-21 RX ADMIN — METHYLPREDNISOLONE SODIUM SUCCINATE 125 MG: 125 INJECTION INTRAMUSCULAR; INTRAVENOUS at 19:11

## 2025-01-21 RX ADMIN — DOXYCYCLINE 100 MG: 100 INJECTION, POWDER, LYOPHILIZED, FOR SOLUTION INTRAVENOUS at 19:54

## 2025-01-21 RX ADMIN — Medication 50 MCG/HR: at 20:20

## 2025-01-21 RX ADMIN — SODIUM CHLORIDE 1000 ML: 9 INJECTION, SOLUTION INTRAVENOUS at 19:15

## 2025-01-21 RX ADMIN — MIDAZOLAM HYDROCHLORIDE 4 MG: 1 INJECTION, SOLUTION INTRAMUSCULAR; INTRAVENOUS at 21:26

## 2025-01-21 RX ADMIN — CEFTRIAXONE 1000 MG: 1 INJECTION, POWDER, FOR SOLUTION INTRAMUSCULAR; INTRAVENOUS at 19:10

## 2025-01-21 RX ADMIN — ALBUTEROL SULFATE 2.5 MG: 2.5 SOLUTION RESPIRATORY (INHALATION) at 17:54

## 2025-01-21 RX ADMIN — ROCURONIUM BROMIDE 100 MG: 10 INJECTION INTRAVENOUS at 18:28

## 2025-01-21 RX ADMIN — IPRATROPIUM BROMIDE AND ALBUTEROL SULFATE 3 ML: 2.5; .5 SOLUTION RESPIRATORY (INHALATION) at 15:04

## 2025-01-21 RX ADMIN — IOPAMIDOL 85 ML: 612 INJECTION, SOLUTION INTRAVENOUS at 17:36

## 2025-01-21 NOTE — Clinical Note
Level of Care: Critical Care [6]   Admitting Physician: ESPERANZA OCONNELL [[id number]]   Attending Physician: ESPERANZA OCONNELL [[id number]]

## 2025-01-21 NOTE — ED PROVIDER NOTES
[place]    EMERGENCY DEPARTMENT ENCOUNTER      Pt Name: Tessa Bradley  MRN: [id number]  YOB: 1945  Date of evaluation: 1/21/2025  Provider: Anup Mantilla MD    CHIEF COMPLAINT       Chief Complaint   Patient presents with    Weakness - Generalized         HISTORY OF PRESENT ILLNESS   Tessa Bradley is a 79 y.o. female who presents to the emergency department with altered mental status.  Per report from EMS, patient neighbor went over to check on her today and found her on the floor, severely altered.  Patient encephalopathic for EMS during transport, also noted to be hypoxic.  Patient unable to contribute to history on arrival due to encephalopathy.      Nursing notes were reviewed.    REVIEW OF SYSTEMS     ROS:  A chief complaint appropriate review of systems was completed and is negative except as noted in the HPI.      PAST MEDICAL HISTORY     Past Medical History:   Diagnosis Date    Atrial fibrillation 2010    Cancer     Cluster headache 1995    COPD (chronic obstructive pulmonary disease)     Difficulty walking 2022    Sciatica and pinched nerves in bsck    Fibromyalgia, primary     Hyperlipidemia     Hypertension     Peripheral neuropathy 2022    TIA (transient ischemic attack) 2010         SURGICAL HISTORY       Past Surgical History:   Procedure Laterality Date    ABDOMINAL SURGERY      HYSTERECTOMY      OOPHORECTOMY           CURRENT MEDICATIONS       Current Facility-Administered Medications:     acetaminophen (TYLENOL) tablet 650 mg, 650 mg, Oral, Q4H PRN **OR** acetaminophen (TYLENOL) suppository 650 mg, 650 mg, Rectal, Q4H PRN, Anup Ceja APRN    [START ON 1/22/2025] acetylcysteine (MUCOMYST) 20 % nebulizer solution 4 mL, 4 mL, Nebulization, Q6H - RT, Anup Ceja APRN    apixaban (ELIQUIS) tablet 5 mg, 5 mg, Oral, Q12H, Anup Ceja APRN    atorvastatin (LIPITOR) tablet 80 mg, 80 mg, Oral, Nightly, Anup Ceja APRN    azithromycin (ZITHROMAX) 500 mg in  sodium chloride 0.9 % 250 mL IVPB-VTB, 500 mg, Intravenous, Daily, Anup Ceja APRN    sennosides-docusate (PERICOLACE) 8.6-50 MG per tablet 2 tablet, 2 tablet, Oral, BID **AND** polyethylene glycol (MIRALAX) packet 17 g, 17 g, Oral, Daily PRN **AND** bisacodyl (DULCOLAX) EC tablet 5 mg, 5 mg, Oral, Daily PRN **AND** bisacodyl (DULCOLAX) suppository 10 mg, 10 mg, Rectal, Daily PRN, Anup Ceja APRN    budesonide (PULMICORT) nebulizer solution 0.5 mg, 0.5 mg, Nebulization, BID - RT, nAup Ceja APRN, 0.5 mg at 01/21/25 2312    Calcium Replacement - Follow Nurse / BPA Driven Protocol, , Not Applicable, PRN, Anup Ceja APRN    [START ON 1/22/2025] cefTRIAXone (ROCEPHIN) 1,000 mg in sodium chloride 0.9 % 100 mL MBP, 1,000 mg, Intravenous, Q24H, Anup Ceja APRN    chlorhexidine (PERIDEX) 0.12 % solution 15 mL, 15 mL, Mouth/Throat, Q12H, Anup Ceja APRN    [START ON 1/22/2025] famotidine (PEPCID) injection 20 mg, 20 mg, Intravenous, BID AC, Anup Ceja APRN    fentaNYL (SUBLIMAZE) bolus from bag 10 mcg/mL injection 50 mcg, 50 mcg, Intravenous, Q30 Min PRN, Anup Mantilla MD, 50 mcg at 01/21/25 2019    fentaNYL 2500 mcg/250 mL NS infusion,  mcg/hr, Intravenous, Titrated, Anup Mantilla MD, Last Rate: 25 mL/hr at 01/21/25 2209, 250 mcg/hr at 01/21/25 2209    guaifenesin (ROBITUSSIN) 100 MG/5ML liquid 400 mg, 400 mg, Oral, Q4H, Anup Ceja APRN    ipratropium-albuterol (DUO-NEB) nebulizer solution 3 mL, 3 mL, Nebulization, Q4H PRN, Anup Ceja APRN    ipratropium-albuterol (DUO-NEB) nebulizer solution 3 mL, 3 mL, Nebulization, Q6H - RT, Anup Ceja APRN, 3 mL at 01/21/25 2319    Magnesium Cardiology Dose Replacement - Follow Nurse / BPA Driven Protocol, , Not Applicable, PRN, Anup Ceja APRN    magnesium sulfate 4g/100mL (PREMIX) infusion, 4 g, Intravenous, Once, Tia Geronimo MD    mupirocin (BACTROBAN) 2 % nasal  ointment 1 Application, 1 Application, Each Nare, BID, Anup Ceja APRN    nitroglycerin (NITROSTAT) SL tablet 0.4 mg, 0.4 mg, Sublingual, Q5 Min PRN, Anup Ceja APRN    ondansetron ODT (ZOFRAN-ODT) disintegrating tablet 4 mg, 4 mg, Oral, Q6H PRN **OR** ondansetron (ZOFRAN) injection 4 mg, 4 mg, Intravenous, Q6H PRN, Anup Ceja APRN    Phosphorus Replacement - Follow Nurse / BPA Driven Protocol, , Not Applicable, PRNBrenna Nicholas J, APRN    Potassium Replacement - Follow Nurse / BPA Driven Protocol, , Not Applicable, Brenna NOONAN Nicholas J, APRN    [START ON 1/22/2025] predniSONE (DELTASONE) tablet 40 mg, 40 mg, Oral, Daily, Tia Geronimo MD    propofol (DIPRIVAN) infusion 10 mg/mL 100 mL, 5-50 mcg/kg/min, Intravenous, Titrated, Anup Mantilla MD, Last Rate: 13.59 mL/hr at 01/21/25 2209, 35 mcg/kg/min at 01/21/25 2209    sodium chloride 0.9 % flush 10 mL, 10 mL, Intravenous, PRN, Anup Mantilla MD    sodium chloride 0.9 % flush 10 mL, 10 mL, Intravenous, Q12H, Anup Ceja APRN    sodium chloride 0.9 % flush 10 mL, 10 mL, Intravenous, PRNBrenna Nicholas J, APRN    sodium chloride 0.9 % infusion 40 mL, 40 mL, Intravenous, PRN, Anup Ceja APRN    ALLERGIES     Penicillins, Latex, and Other    FAMILY HISTORY       Family History   Problem Relation Age of Onset    Breast cancer Mother 67          SOCIAL HISTORY       Social History     Socioeconomic History    Marital status:    Tobacco Use    Smoking status: Former     Current packs/day: 0.25     Average packs/day: 0.3 packs/day for 40.0 years (10.0 ttl pk-yrs)     Types: Cigarettes     Passive exposure: Past    Smokeless tobacco: Never   Vaping Use    Vaping status: Never Used   Substance and Sexual Activity    Alcohol use: Not Currently    Drug use: Never    Sexual activity: Defer         PHYSICAL EXAM    (up to 7 for level 4, 8 or more for level 5)     Vitals:    01/21/25 2200 01/21/25 2230 01/21/25  2300 01/21/25 2312   BP:  93/59 100/53    BP Location:       Patient Position:       Pulse: (!) 45 (!) 45 (!) 46 (!) 47   Resp:    18   Temp:       TempSrc:       SpO2: 100% 100% 100% 99%   Weight:       Height:         General: Ill appearing, somnolent  HEENT: Conjunctivae normal.  Neck: Trachea midline.  Cardiac: Heart regular rate  Lungs: Normal effort  Chest wall: No deformity  Abdomen: Non-distended  Musculoskeletal: No deformity.  Neuro: Moves all extremities  Dermatology: Skin is warm and dry  Psych: Cannot assess        DIAGNOSTIC RESULTS     EKG: All EKGs are interpreted by the Emergency Department Physician who either signs or Co-signs this chart in the absence of a cardiologist.    ECG 12 Lead ED Triage Standing Order; Weak / Dizzy / AMS   Preliminary Result   Test Reason : ED Triage Standing Order~   Blood Pressure :   */*   mmHG   Vent. Rate :  63 BPM     Atrial Rate :  63 BPM      P-R Int : 142 ms          QRS Dur :  98 ms       QT Int : 398 ms       P-R-T Axes :  72 103 -39 degrees     QTcB Int : 407 ms      Sinus rhythm with premature atrial complexes   Possible Left atrial enlargement   Incomplete right bundle branch block   Right ventricular hypertrophy with repolarization abnormality   T wave abnormality, consider inferior ischemia   Abnormal ECG   When compared with ECG of 12-Jul-2024 13:25,   Significant changes have occurred      Referred By: edmd           Confirmed By:             RADIOLOGY:   [x] Radiologist's Report Reviewed:  XR Abdomen KUB   Final Result   Impression:   Enteric tube within the stomach.         Electronically Signed: Renetta Duggan MD     1/21/2025 11:14 PM EST     Workstation ID: [id number]      XR Chest 1 View   Final Result   Impression:      1. Interval placement of endotracheal tube with the tip 1.4 cm above the marcelino.   2. Postoperative change of the right lung apex. No new airspace consolidation or other acute process.         Electronically Signed: Umberto Kulkarni MD      1/21/2025 7:22 PM EST     Workstation ID: [id number]      CT Head Without Contrast   Final Result   Impression: No acute intracranial pathology.               Electronically Signed: Darron Granda MD     1/21/2025 5:51 PM EST     Workstation ID: [id number]      CT Abdomen Pelvis With Contrast   Final Result      1. Bilateral striated nephrograms, more apparent on the left. This is a nonspecific finding with etiologies that include infection, sequela of hypotension, acute tubular necrosis   2. No other acute abdominopelvic process demonstrated on this exam   3. Small bilateral pleural effusions                     Electronically Signed: Kyle Ludy     1/21/2025 6:04 PM EST     Workstation ID: [id number]      XR Chest 1 View   Final Result   Impression:      1. No acute cardiopulmonary disease.         Electronically Signed: Umberto Kulkarni MD     1/21/2025 3:20 PM EST     Workstation ID: [id number]      XR Chest 1 View    (Results Pending)   XR Chest 1 View    (Results Pending)       I ordered and independently reviewed the above noted radiographic studies.        LABS:    I have reviewed and interpreted all of the currently available lab results from this visit (if applicable):  Results for orders placed or performed during the hospital encounter of 01/21/25   ECG 12 Lead ED Triage Standing Order; Weak / Dizzy / AMS    Collection Time: 01/21/25  2:05 PM   Result Value Ref Range    QT Interval 398 ms    QTC Interval 407 ms   Comprehensive Metabolic Panel    Collection Time: 01/21/25  2:18 PM    Specimen: Blood   Result Value Ref Range    Glucose 130 (H) 65 - 99 mg/dL    BUN 10 8 - 23 mg/dL    Creatinine 0.84 0.57 - 1.00 mg/dL    Sodium 146 (H) 136 - 145 mmol/L    Potassium 4.6 3.5 - 5.2 mmol/L    Chloride 100 98 - 107 mmol/L    CO2 42.0 (H) 22.0 - 29.0 mmol/L    Calcium 9.4 8.6 - 10.5 mg/dL    Total Protein 6.7 6.0 - 8.5 g/dL    Albumin 3.6 3.5 - 5.2 g/dL    ALT (SGPT) 6 1 - 33 U/L    AST (SGOT) 15 1 - 32 U/L    Alkaline  Phosphatase 81 39 - 117 U/L    Total Bilirubin 0.6 0.0 - 1.2 mg/dL    Globulin 3.1 gm/dL    A/G Ratio 1.2 g/dL    BUN/Creatinine Ratio 11.9 7.0 - 25.0    Anion Gap 4.0 (L) 5.0 - 15.0 mmol/L    eGFR 71.7 >60.0 mL/min/1.73   High Sensitivity Troponin T    Collection Time: 01/21/25  2:18 PM    Specimen: Blood   Result Value Ref Range    HS Troponin T 25 (H) <14 ng/L   Magnesium    Collection Time: 01/21/25  2:18 PM    Specimen: Blood   Result Value Ref Range    Magnesium 1.9 1.6 - 2.4 mg/dL   Urinalysis With Microscopic If Indicated (No Culture) - Urine, Clean Catch    Collection Time: 01/21/25  2:18 PM    Specimen: Urine, Clean Catch   Result Value Ref Range    Color, UA Yellow Yellow, Straw    Appearance, UA Cloudy (A) Clear    pH, UA 5.5 5.0 - 8.0    Specific Gravity, UA 1.030 1.001 - 1.030    Glucose, UA Negative Negative    Ketones, UA Negative Negative    Bilirubin, UA Negative Negative    Blood, UA Moderate (2+) (A) Negative    Protein, UA 30 mg/dL (1+) (A) Negative    Leuk Esterase, UA Small (1+) (A) Negative    Nitrite, UA Positive (A) Negative    Urobilinogen, UA 0.2 E.U./dL 0.2 - 1.0 E.U./dL   CBC Auto Differential    Collection Time: 01/21/25  2:18 PM    Specimen: Blood   Result Value Ref Range    WBC 6.47 3.40 - 10.80 10*3/mm3    RBC 4.63 3.77 - 5.28 10*6/mm3    Hemoglobin 12.8 12.0 - 15.9 g/dL    Hematocrit 48.5 (H) 34.0 - 46.6 %    .8 (H) 79.0 - 97.0 fL    MCH 27.6 26.6 - 33.0 pg    MCHC 26.4 (L) 31.5 - 35.7 g/dL    RDW 16.0 (H) 12.3 - 15.4 %    RDW-SD 62.5 (H) 37.0 - 54.0 fl    MPV 10.4 6.0 - 12.0 fL    Platelets 181 140 - 450 10*3/mm3    Neutrophil % 66.4 42.7 - 76.0 %    Lymphocyte % 18.4 (L) 19.6 - 45.3 %    Monocyte % 10.4 5.0 - 12.0 %    Eosinophil % 3.9 0.3 - 6.2 %    Basophil % 0.3 0.0 - 1.5 %    Immature Grans % 0.6 (H) 0.0 - 0.5 %    Neutrophils, Absolute 4.30 1.70 - 7.00 10*3/mm3    Lymphocytes, Absolute 1.19 0.70 - 3.10 10*3/mm3    Monocytes, Absolute 0.67 0.10 - 0.90 10*3/mm3     Eosinophils, Absolute 0.25 0.00 - 0.40 10*3/mm3    Basophils, Absolute 0.02 0.00 - 0.20 10*3/mm3    Immature Grans, Absolute 0.04 0.00 - 0.05 10*3/mm3    nRBC 0.0 0.0 - 0.2 /100 WBC   Lipase    Collection Time: 01/21/25  2:18 PM    Specimen: Blood   Result Value Ref Range    Lipase 13 13 - 60 U/L   TSH Rfx On Abnormal To Free T4    Collection Time: 01/21/25  2:18 PM    Specimen: Blood   Result Value Ref Range    TSH 1.560 0.270 - 4.200 uIU/mL   Scan Slide    Collection Time: 01/21/25  2:18 PM    Specimen: Blood   Result Value Ref Range    Ovalocytes Slight/1+ None Seen    Stomatocytes Mod/2+ None Seen    WBC Morphology Normal Normal    Platelet Morphology Normal Normal   Urinalysis, Microscopic Only - Urine, Clean Catch    Collection Time: 01/21/25  2:18 PM    Specimen: Urine, Clean Catch   Result Value Ref Range    RBC, UA None Seen None Seen, 0-2 /HPF    WBC, UA 21-50 (A) None Seen, 0-2 /HPF    Bacteria, UA 4+ (A) None Seen, Trace /HPF    Squamous Epithelial Cells, UA 3-6 (A) None Seen, 0-2 /HPF    Hyaline Casts, UA None Seen 0 - 6 /LPF    Methodology Automated Microscopy    Lactic Acid, Plasma    Collection Time: 01/21/25  2:18 PM    Specimen: Blood   Result Value Ref Range    Lactate 0.6 0.5 - 2.0 mmol/L   Green Top (Gel)    Collection Time: 01/21/25  2:18 PM   Result Value Ref Range    Extra Tube Hold for add-ons.    Lavender Top    Collection Time: 01/21/25  2:18 PM   Result Value Ref Range    Extra Tube hold for add-on    Gold Top - SST    Collection Time: 01/21/25  2:18 PM   Result Value Ref Range    Extra Tube Hold for add-ons.    Gray Top    Collection Time: 01/21/25  2:18 PM   Result Value Ref Range    Extra Tube Hold for add-ons.    Light Blue Top    Collection Time: 01/21/25  2:18 PM   Result Value Ref Range    Extra Tube Hold for add-ons.    COVID-19 and FLU A/B PCR, 1 HR TAT - Swab, Nasopharynx    Collection Time: 01/21/25  2:31 PM    Specimen: Nasopharynx; Swab   Result Value Ref Range    COVID19 Not  Detected Not Detected - Ref. Range    Influenza A PCR Not Detected Not Detected    Influenza B PCR Not Detected Not Detected   Blood Gas, Arterial With Co-Ox    Collection Time: 01/21/25  3:12 PM    Specimen: Arterial Blood   Result Value Ref Range    Site Right Radial     Sabas's Test N/A     pH, Arterial 7.126 (C) 7.350 - 7.450 pH units    pCO2, Arterial      pO2, Arterial 155.0 (H) 83.0 - 108.0 mm Hg    HCO3, Arterial 45.1 (H) 20.0 - 26.0 mmol/L    Base Excess, Arterial 11.0 (H) 0.0 - 2.0 mmol/L    Hemoglobin, Blood Gas 12.4 (L) 14 - 18 g/dL    Hematocrit, Blood Gas 37.9 (L) 38.0 - 51.0 %    Oxyhemoglobin 96.3 94 - 99 %    Methemoglobin 0.40 0.00 - 1.50 %    Carboxyhemoglobin 2.7 (H) 0 - 2 %    CO2 Content 49.3 (H) 22 - 33 mmol/L    Temperature 37.0     Barometric Pressure for Blood Gas      Modality Nasal Cannula     FIO2 36 %    Ventilator Mode      Notified Tyrell MALONE RN     Notified By [id number]     Notified Time 01/21/2025 15:15     pH, Temp Corrected 7.126 pH Units    pCO2, Temperature Corrected      pO2, Temperature Corrected 155 (H) 83 - 108 mm Hg   High Sensitivity Troponin T 1Hr    Collection Time: 01/21/25  3:32 PM    Specimen: Blood   Result Value Ref Range    HS Troponin T 25 (H) <14 ng/L    Troponin T Numeric Delta 0 ng/L    Troponin T % Delta 0 Abnormal if >/= 20%   Procalcitonin    Collection Time: 01/21/25  3:32 PM    Specimen: Blood   Result Value Ref Range    Procalcitonin 0.05 0.00 - 0.25 ng/mL   CK    Collection Time: 01/21/25  3:32 PM    Specimen: Blood   Result Value Ref Range    Creatine Kinase 25 20 - 180 U/L   Blood Gas, Arterial With Co-Ox    Collection Time: 01/21/25  6:11 PM    Specimen: Arterial Blood   Result Value Ref Range    Site Right Radial     Sabas's Test N/A     pH, Arterial 7.177 (C) 7.350 - 7.450 pH units    pCO2, Arterial      pO2, Arterial 175.0 (H) 83.0 - 108.0 mm Hg    HCO3, Arterial 43.2 (H) 20.0 - 26.0 mmol/L    Base Excess, Arterial 10.8 (H) 0.0 - 2.0 mmol/L     Hemoglobin, Blood Gas 11.9 (L) 14 - 18 g/dL    Hematocrit, Blood Gas 36.5 (L) 38.0 - 51.0 %    Oxyhemoglobin 96.9 94 - 99 %    Methemoglobin 0.40 0.00 - 1.50 %    Carboxyhemoglobin 2.5 (H) 0 - 2 %    CO2 Content 46.8 (H) 22 - 33 mmol/L    Temperature 37.0     Barometric Pressure for Blood Gas      Modality BiPap     FIO2 60 %    Rate 0 Breaths/minute    PIP 0 cmH2O    IPAP 0     EPAP 0     Notified Tyrell BLUM MD     Notified By [id number]     Notified Time 01/21/2025 18:11     pH, Temp Corrected 7.177 pH Units    pCO2, Temperature Corrected      pO2, Temperature Corrected 175 (H) 83 - 108 mm Hg   Blood Gas, Arterial With Co-Ox    Collection Time: 01/21/25  7:29 PM    Specimen: Arterial Blood   Result Value Ref Range    Site Right Radial     Sabas's Test N/A     pH, Arterial 7.423 7.350 - 7.450 pH units    pCO2, Arterial 61.4 (H) 35.0 - 45.0 mm Hg    pO2, Arterial 116.0 (H) 83.0 - 108.0 mm Hg    HCO3, Arterial 40.1 (H) 20.0 - 26.0 mmol/L    Base Excess, Arterial 13.3 (H) 0.0 - 2.0 mmol/L    Hemoglobin, Blood Gas 11.7 (L) 14 - 18 g/dL    Hematocrit, Blood Gas 35.8 (L) 38.0 - 51.0 %    Oxyhemoglobin 97.1 94 - 99 %    Methemoglobin 0.30 0.00 - 1.50 %    Carboxyhemoglobin 2.3 (H) 0 - 2 %    CO2 Content 42.0 (H) 22 - 33 mmol/L    Temperature 37.0     Barometric Pressure for Blood Gas      Modality Ventilator     FIO2 60 %    Rate 0 Breaths/minute    PIP 0 cmH2O    IPAP 0     EPAP 0     pH, Temp Corrected 7.423 pH Units    pCO2, Temperature Corrected 61.4 (H) 35 - 45 mm Hg    pO2, Temperature Corrected 116 (H) 83 - 108 mm Hg   Urinalysis With Culture If Indicated - Indwelling Urethral Catheter    Collection Time: 01/21/25  9:59 PM    Specimen: Indwelling Urethral Catheter; Urine   Result Value Ref Range    Color, UA Yellow Yellow, Straw    Appearance, UA Clear Clear    pH, UA 7.5 5.0 - 8.0    Specific Gravity, UA 1.024 1.001 - 1.030    Glucose, UA Negative Negative    Ketones, UA Trace (A) Negative    Bilirubin, UA Negative  Negative    Blood, UA Negative Negative    Protein, UA Negative Negative    Leuk Esterase, UA Negative Negative    Nitrite, UA Positive (A) Negative    Urobilinogen, UA 1.0 E.U./dL 0.2 - 1.0 E.U./dL   Urinalysis, Microscopic Only - Indwelling Urethral Catheter    Collection Time: 01/21/25  9:59 PM    Specimen: Indwelling Urethral Catheter; Urine   Result Value Ref Range    RBC, UA 0-2 None Seen, 0-2 /HPF    WBC, UA 6-10 (A) None Seen, 0-2 /HPF    Bacteria, UA 2+ (A) None Seen, Trace /HPF    Squamous Epithelial Cells, UA 3-6 (A) None Seen, 0-2 /HPF    Hyaline Casts, UA 0-6 0 - 6 /LPF    Methodology Manual Light Microscopy         If labs were ordered, I independently reviewed the results and considered them in treating the patient.      EMERGENCY DEPARTMENT COURSE and DIFFERENTIAL DIAGNOSIS/MDM:   Vitals:  AS OF 23:56 EST    BP - 100/53  HR - (!) 47  TEMP - 97.2 °F (36.2 °C) (Bladder)  O2 SATS - 99%        Discussion below represents my analysis of pertinent findings related to patient's condition, differential diagnosis, treatment plan and final disposition.      Differential diagnosis:  The differential diagnosis associated with the patient's presentation includes: ICH, CVA, intracranial hemorrhage, intracranial mass, seizure, pneumonia, COPD, hypercapnia, electrolyte derangement, urinary tract infection      Independent interpretations (ECG/rhythm strip/X-ray/US/CT scan): I independently interpreted the patient's head CT and chest x-ray.  No evidence of ICH and no focal pulmonary able drip.      ED Course:    ED Course as of 01/21/25 2356 Tue Jan 21, 2025   1843 This patient remained encephalopathic.  Repeat blood gas demonstrated CO2 that remain too high to read, acidemia, decision was made at that point to proceed with endotracheal intubation.  This was done without incident. [NS]   1844 I discussed case with intensivist Dr. Jamison.  I discussed patient history, presentation, workup.  Excess patient for ICU  admission [NS]      ED Course User Index  [NS] Anup Mantilla MD             PROCEDURES:  Endotracheal Intubation  Indication: Airway protection, hypoxic/hypercapnic respiratory failure  Consent: Emergent  Pre-procedure exam: Patient's somnolent  Preparation: The patient was pre-oxygenated with BiPAP with head in the upright position. Stable hemodynamics were ensured. Proper equipment at the bedside including multiple blades, endotracheal tubes, OPA/NPA, ETCO2, suction, bougie.  Medications: Ketamine 100 mg, rocuronium 100 mg  Technique: Once adequate paralysis and sedation were attained, the blade was advanced into the oropharynx. The glottis was identified and the tube was advanced into the airway under direct visualization. The tube was secured at 20 cm at the teeth using 8.5 endotracheal tube. Post intubation O2 sat was 100.  Confirmation: Waveform ETCO2, CXR demonstrating adequate positioning.  Complications: None      CRITICAL CARE TIME    Approximately 35 minutes of discontinuous critical care time was provided to this patient by myself absent of any time spent performing procedures.  Patient presents critically ill with acute hypoxic/hypercapnic respiratory failure with associated encephalopathy complicated by underlying urinary tract infection placing the cardiovascular, respiratory, neurologic, renal systems at risk requiring the following interventions: BiPAP therapy, IV antibiotics, nebulizer therapy, IV steroids, interpretation of labs/ECG/blood gas, frequent reassessment, coordination of ICU admission with the following response: Resolution of hypoxia.  Patient at high risk of deterioration and possibly death without these interventions.      FINAL IMPRESSION      1. Acute on chronic respiratory failure with hypoxia and hypercapnia    2. Altered mental status, unspecified altered mental status type    3. Acute exacerbation of chronic obstructive pulmonary disease (COPD)    4. Acute cystitis without  hematuria          DISPOSITION/PLAN     ED Disposition       ED Disposition   Decision to Admit    Condition   --    Comment   Level of Care: Critical Care [6]   Diagnosis: Respiratory failure with hypercapnia [252853]   Certification: I Certify That Inpatient Hospital Services Are Medically Necessary For Greater Than 2 Midnights                   Comment: Please note this report has been produced using speech recognition software.      Anup Mantilla MD  Attending Emergency Physician             Anup Mantilla MD  01/22/25 0000

## 2025-01-22 ENCOUNTER — APPOINTMENT (OUTPATIENT)
Dept: GENERAL RADIOLOGY | Facility: HOSPITAL | Age: 80
End: 2025-01-22
Payer: MEDICARE

## 2025-01-22 LAB
ALBUMIN SERPL-MCNC: 3.7 G/DL (ref 3.5–5.2)
ALBUMIN/GLOB SERPL: 1 G/DL
ALP SERPL-CCNC: 86 U/L (ref 39–117)
ALT SERPL W P-5'-P-CCNC: 7 U/L (ref 1–33)
ANION GAP SERPL CALCULATED.3IONS-SCNC: 15 MMOL/L (ref 5–15)
ANION GAP SERPL CALCULATED.3IONS-SCNC: 21 MMOL/L (ref 5–15)
ARTERIAL PATENCY WRIST A: ABNORMAL
AST SERPL-CCNC: 14 U/L (ref 1–32)
ATMOSPHERIC PRESS: ABNORMAL MM[HG]
BASE EXCESS BLDA CALC-SCNC: 9.1 MMOL/L (ref 0–2)
BDY SITE: ABNORMAL
BILIRUB SERPL-MCNC: 0.6 MG/DL (ref 0–1.2)
BODY TEMPERATURE: 37
BUN SERPL-MCNC: 12 MG/DL (ref 8–23)
BUN SERPL-MCNC: 14 MG/DL (ref 8–23)
BUN/CREAT SERPL: 12.5 (ref 7–25)
BUN/CREAT SERPL: 14.7 (ref 7–25)
CALCIUM SPEC-SCNC: 10.6 MG/DL (ref 8.6–10.5)
CALCIUM SPEC-SCNC: 9.6 MG/DL (ref 8.6–10.5)
CHLORIDE SERPL-SCNC: 99 MMOL/L (ref 98–107)
CHLORIDE SERPL-SCNC: 99 MMOL/L (ref 98–107)
CHOLEST SERPL-MCNC: 198 MG/DL (ref 0–200)
CO2 BLDA-SCNC: 36.5 MMOL/L (ref 22–33)
CO2 SERPL-SCNC: 23 MMOL/L (ref 22–29)
CO2 SERPL-SCNC: 29 MMOL/L (ref 22–29)
COHGB MFR BLD: 1.7 % (ref 0–2)
CREAT SERPL-MCNC: 0.95 MG/DL (ref 0.57–1)
CREAT SERPL-MCNC: 0.96 MG/DL (ref 0.57–1)
CRP SERPL-MCNC: 3.06 MG/DL (ref 0–0.5)
DEPRECATED RDW RBC AUTO: 60.4 FL (ref 37–54)
EGFRCR SERPLBLD CKD-EPI 2021: 61.1 ML/MIN/1.73
EGFRCR SERPLBLD CKD-EPI 2021: 61.8 ML/MIN/1.73
EPAP: 0
ERYTHROCYTE [DISTWIDTH] IN BLOOD BY AUTOMATED COUNT: 15.9 % (ref 12.3–15.4)
GLOBULIN UR ELPH-MCNC: 3.7 GM/DL
GLUCOSE BLDC GLUCOMTR-MCNC: 113 MG/DL (ref 70–130)
GLUCOSE BLDC GLUCOMTR-MCNC: 120 MG/DL (ref 70–130)
GLUCOSE BLDC GLUCOMTR-MCNC: 120 MG/DL (ref 70–130)
GLUCOSE BLDC GLUCOMTR-MCNC: 150 MG/DL (ref 70–130)
GLUCOSE BLDC GLUCOMTR-MCNC: 163 MG/DL (ref 70–130)
GLUCOSE SERPL-MCNC: 122 MG/DL (ref 65–99)
GLUCOSE SERPL-MCNC: 134 MG/DL (ref 65–99)
HBA1C MFR BLD: 5.8 % (ref 4.8–5.6)
HCO3 BLDA-SCNC: 34.9 MMOL/L (ref 20–26)
HCT VFR BLD AUTO: 46.6 % (ref 34–46.6)
HCT VFR BLD CALC: 37.1 % (ref 38–51)
HGB BLD-MCNC: 12.7 G/DL (ref 12–15.9)
HGB BLDA-MCNC: 12.1 G/DL (ref 14–18)
INHALED O2 CONCENTRATION: 40 %
IPAP: 0
L PNEUMO1 AG UR QL IA: NEGATIVE
MAGNESIUM SERPL-MCNC: 2.1 MG/DL (ref 1.6–2.4)
MAGNESIUM SERPL-MCNC: 2.4 MG/DL (ref 1.6–2.4)
MCH RBC QN AUTO: 28.2 PG (ref 26.6–33)
MCHC RBC AUTO-ENTMCNC: 27.3 G/DL (ref 31.5–35.7)
MCV RBC AUTO: 103.3 FL (ref 79–97)
METHGB BLD QL: 0.4 % (ref 0–1.5)
MODALITY: ABNORMAL
NT-PROBNP SERPL-MCNC: 6151 PG/ML (ref 0–1800)
OXYHGB MFR BLDV: 91.3 % (ref 94–99)
PAW @ PEAK INSP FLOW SETTING VENT: 0 CMH2O
PCO2 BLDA: 52.1 MM HG (ref 35–45)
PCO2 TEMP ADJ BLD: 52.1 MM HG (ref 35–45)
PEEP RESPIRATORY: 5 CM[H2O]
PH BLDA: 7.43 PH UNITS (ref 7.35–7.45)
PH, TEMP CORRECTED: 7.43 PH UNITS
PHOSPHATE SERPL-MCNC: 2.5 MG/DL (ref 2.5–4.5)
PHOSPHATE SERPL-MCNC: 2.9 MG/DL (ref 2.5–4.5)
PLATELET # BLD AUTO: 156 10*3/MM3 (ref 140–450)
PMV BLD AUTO: 10.6 FL (ref 6–12)
PO2 BLDA: 59.6 MM HG (ref 83–108)
PO2 TEMP ADJ BLD: 59.6 MM HG (ref 83–108)
POTASSIUM SERPL-SCNC: 4.5 MMOL/L (ref 3.5–5.2)
POTASSIUM SERPL-SCNC: 4.5 MMOL/L (ref 3.5–5.2)
PREALB SERPL-MCNC: 6.5 MG/DL (ref 20–40)
PROT SERPL-MCNC: 7.4 G/DL (ref 6–8.5)
RBC # BLD AUTO: 4.51 10*6/MM3 (ref 3.77–5.28)
S PNEUM AG SPEC QL LA: NEGATIVE
SODIUM SERPL-SCNC: 143 MMOL/L (ref 136–145)
SODIUM SERPL-SCNC: 143 MMOL/L (ref 136–145)
TOTAL RATE: 18 BREATHS/MINUTE
TRIGL SERPL-MCNC: 92 MG/DL (ref 0–150)
VENTILATOR MODE: ABNORMAL
VT ON VENT VENT: 0.37 ML
WBC NRBC COR # BLD AUTO: 3.98 10*3/MM3 (ref 3.4–10.8)

## 2025-01-22 PROCEDURE — 25810000003 SODIUM CHLORIDE 0.9 % SOLUTION 250 ML FLEX CONT: Performed by: NURSE PRACTITIONER

## 2025-01-22 PROCEDURE — 25010000002 MAGNESIUM SULFATE 4 GM/100ML SOLUTION: Performed by: STUDENT IN AN ORGANIZED HEALTH CARE EDUCATION/TRAINING PROGRAM

## 2025-01-22 PROCEDURE — 82465 ASSAY BLD/SERUM CHOLESTEROL: CPT | Performed by: INTERNAL MEDICINE

## 2025-01-22 PROCEDURE — 36600 WITHDRAWAL OF ARTERIAL BLOOD: CPT

## 2025-01-22 PROCEDURE — 83735 ASSAY OF MAGNESIUM: CPT | Performed by: INTERNAL MEDICINE

## 2025-01-22 PROCEDURE — 94003 VENT MGMT INPAT SUBQ DAY: CPT

## 2025-01-22 PROCEDURE — 84134 ASSAY OF PREALBUMIN: CPT | Performed by: INTERNAL MEDICINE

## 2025-01-22 PROCEDURE — 25010000002 AZITHROMYCIN PER 500 MG: Performed by: NURSE PRACTITIONER

## 2025-01-22 PROCEDURE — 94799 UNLISTED PULMONARY SVC/PX: CPT

## 2025-01-22 PROCEDURE — 94761 N-INVAS EAR/PLS OXIMETRY MLT: CPT

## 2025-01-22 PROCEDURE — 85027 COMPLETE CBC AUTOMATED: CPT | Performed by: NURSE PRACTITIONER

## 2025-01-22 PROCEDURE — 82948 REAGENT STRIP/BLOOD GLUCOSE: CPT

## 2025-01-22 PROCEDURE — 25010000002 CEFTRIAXONE PER 250 MG: Performed by: NURSE PRACTITIONER

## 2025-01-22 PROCEDURE — 82375 ASSAY CARBOXYHB QUANT: CPT

## 2025-01-22 PROCEDURE — 84100 ASSAY OF PHOSPHORUS: CPT | Performed by: NURSE PRACTITIONER

## 2025-01-22 PROCEDURE — 80053 COMPREHEN METABOLIC PANEL: CPT | Performed by: NURSE PRACTITIONER

## 2025-01-22 PROCEDURE — 86140 C-REACTIVE PROTEIN: CPT | Performed by: INTERNAL MEDICINE

## 2025-01-22 PROCEDURE — 84478 ASSAY OF TRIGLYCERIDES: CPT | Performed by: INTERNAL MEDICINE

## 2025-01-22 PROCEDURE — 25010000002 KETOROLAC TROMETHAMINE PER 15 MG: Performed by: NURSE PRACTITIONER

## 2025-01-22 PROCEDURE — 71045 X-RAY EXAM CHEST 1 VIEW: CPT

## 2025-01-22 PROCEDURE — 83735 ASSAY OF MAGNESIUM: CPT | Performed by: NURSE PRACTITIONER

## 2025-01-22 PROCEDURE — 83036 HEMOGLOBIN GLYCOSYLATED A1C: CPT | Performed by: NURSE PRACTITIONER

## 2025-01-22 PROCEDURE — 63710000001 PREDNISONE PER 1 MG: Performed by: STUDENT IN AN ORGANIZED HEALTH CARE EDUCATION/TRAINING PROGRAM

## 2025-01-22 PROCEDURE — 83880 ASSAY OF NATRIURETIC PEPTIDE: CPT | Performed by: NURSE PRACTITIONER

## 2025-01-22 PROCEDURE — 99233 SBSQ HOSP IP/OBS HIGH 50: CPT | Performed by: INTERNAL MEDICINE

## 2025-01-22 PROCEDURE — 82805 BLOOD GASES W/O2 SATURATION: CPT

## 2025-01-22 PROCEDURE — 25810000003 LACTATED RINGERS SOLUTION: Performed by: NURSE PRACTITIONER

## 2025-01-22 PROCEDURE — 25010000002 PROPOFOL 10 MG/ML EMULSION: Performed by: EMERGENCY MEDICINE

## 2025-01-22 PROCEDURE — 84100 ASSAY OF PHOSPHORUS: CPT | Performed by: INTERNAL MEDICINE

## 2025-01-22 PROCEDURE — 25810000003 LACTATED RINGERS PER 1000 ML: Performed by: NURSE PRACTITIONER

## 2025-01-22 PROCEDURE — 83050 HGB METHEMOGLOBIN QUAN: CPT

## 2025-01-22 RX ORDER — SODIUM CHLORIDE, SODIUM LACTATE, POTASSIUM CHLORIDE, CALCIUM CHLORIDE 600; 310; 30; 20 MG/100ML; MG/100ML; MG/100ML; MG/100ML
75 INJECTION, SOLUTION INTRAVENOUS CONTINUOUS
Status: ACTIVE | OUTPATIENT
Start: 2025-01-22 | End: 2025-01-23

## 2025-01-22 RX ORDER — DEXMEDETOMIDINE HYDROCHLORIDE 4 UG/ML
.2-1.5 INJECTION, SOLUTION INTRAVENOUS
Status: DISCONTINUED | OUTPATIENT
Start: 2025-01-22 | End: 2025-01-29

## 2025-01-22 RX ORDER — SODIUM CHLORIDE, SODIUM LACTATE, POTASSIUM CHLORIDE, CALCIUM CHLORIDE 600; 310; 30; 20 MG/100ML; MG/100ML; MG/100ML; MG/100ML
75 INJECTION, SOLUTION INTRAVENOUS CONTINUOUS
Status: DISCONTINUED | OUTPATIENT
Start: 2025-01-22 | End: 2025-01-22

## 2025-01-22 RX ORDER — KETOROLAC TROMETHAMINE 15 MG/ML
15 INJECTION, SOLUTION INTRAMUSCULAR; INTRAVENOUS EVERY 6 HOURS PRN
Status: DISPENSED | OUTPATIENT
Start: 2025-01-22 | End: 2025-01-23

## 2025-01-22 RX ORDER — SODIUM CHLORIDE, SODIUM LACTATE, POTASSIUM CHLORIDE, CALCIUM CHLORIDE 600; 310; 30; 20 MG/100ML; MG/100ML; MG/100ML; MG/100ML
75 INJECTION, SOLUTION INTRAVENOUS CONTINUOUS
Status: ACTIVE | OUTPATIENT
Start: 2025-01-22 | End: 2025-01-22

## 2025-01-22 RX ADMIN — MUPIROCIN 1 APPLICATION: 20 OINTMENT TOPICAL at 00:02

## 2025-01-22 RX ADMIN — CHLORHEXIDINE GLUCONATE 15 ML: 1.2 SOLUTION ORAL at 08:19

## 2025-01-22 RX ADMIN — Medication 10 ML: at 08:20

## 2025-01-22 RX ADMIN — Medication 10 ML: at 20:05

## 2025-01-22 RX ADMIN — IPRATROPIUM BROMIDE AND ALBUTEROL SULFATE 3 ML: 2.5; .5 SOLUTION RESPIRATORY (INHALATION) at 18:50

## 2025-01-22 RX ADMIN — ATORVASTATIN CALCIUM 80 MG: 40 TABLET, FILM COATED ORAL at 00:08

## 2025-01-22 RX ADMIN — AZITHROMYCIN DIHYDRATE 500 MG: 500 INJECTION, POWDER, LYOPHILIZED, FOR SOLUTION INTRAVENOUS at 00:01

## 2025-01-22 RX ADMIN — BUDESONIDE 0.5 MG: 0.5 INHALANT RESPIRATORY (INHALATION) at 08:13

## 2025-01-22 RX ADMIN — FAMOTIDINE 20 MG: 10 INJECTION, SOLUTION INTRAVENOUS at 18:26

## 2025-01-22 RX ADMIN — BUDESONIDE 0.5 MG: 0.5 INHALANT RESPIRATORY (INHALATION) at 18:50

## 2025-01-22 RX ADMIN — IPRATROPIUM BROMIDE AND ALBUTEROL SULFATE 3 ML: 2.5; .5 SOLUTION RESPIRATORY (INHALATION) at 08:13

## 2025-01-22 RX ADMIN — GUAIFENESIN 400 MG: 200 SOLUTION ORAL at 00:03

## 2025-01-22 RX ADMIN — ACETYLCYSTEINE 4 ML: 200 SOLUTION ORAL; RESPIRATORY (INHALATION) at 13:13

## 2025-01-22 RX ADMIN — SENNOSIDES AND DOCUSATE SODIUM 2 TABLET: 50; 8.6 TABLET ORAL at 08:19

## 2025-01-22 RX ADMIN — GUAIFENESIN 400 MG: 200 SOLUTION ORAL at 23:27

## 2025-01-22 RX ADMIN — GUAIFENESIN 400 MG: 200 SOLUTION ORAL at 11:56

## 2025-01-22 RX ADMIN — SODIUM CHLORIDE, POTASSIUM CHLORIDE, SODIUM LACTATE AND CALCIUM CHLORIDE 500 ML: 600; 310; 30; 20 INJECTION, SOLUTION INTRAVENOUS at 02:46

## 2025-01-22 RX ADMIN — FAMOTIDINE 20 MG: 10 INJECTION, SOLUTION INTRAVENOUS at 08:19

## 2025-01-22 RX ADMIN — KETOROLAC TROMETHAMINE 15 MG: 15 INJECTION, SOLUTION INTRAMUSCULAR; INTRAVENOUS at 22:33

## 2025-01-22 RX ADMIN — APIXABAN 5 MG: 5 TABLET, FILM COATED ORAL at 08:19

## 2025-01-22 RX ADMIN — ACETYLCYSTEINE 4 ML: 200 SOLUTION ORAL; RESPIRATORY (INHALATION) at 18:50

## 2025-01-22 RX ADMIN — GUAIFENESIN 400 MG: 200 SOLUTION ORAL at 02:47

## 2025-01-22 RX ADMIN — APIXABAN 5 MG: 5 TABLET, FILM COATED ORAL at 00:02

## 2025-01-22 RX ADMIN — GUAIFENESIN 400 MG: 200 SOLUTION ORAL at 18:26

## 2025-01-22 RX ADMIN — IPRATROPIUM BROMIDE AND ALBUTEROL SULFATE 3 ML: 2.5; .5 SOLUTION RESPIRATORY (INHALATION) at 01:29

## 2025-01-22 RX ADMIN — SODIUM CHLORIDE, POTASSIUM CHLORIDE, SODIUM LACTATE AND CALCIUM CHLORIDE 75 ML/HR: 600; 310; 30; 20 INJECTION, SOLUTION INTRAVENOUS at 23:27

## 2025-01-22 RX ADMIN — PREDNISONE 40 MG: 20 TABLET ORAL at 08:19

## 2025-01-22 RX ADMIN — SENNOSIDES AND DOCUSATE SODIUM 2 TABLET: 50; 8.6 TABLET ORAL at 20:05

## 2025-01-22 RX ADMIN — IPRATROPIUM BROMIDE AND ALBUTEROL SULFATE 3 ML: 2.5; .5 SOLUTION RESPIRATORY (INHALATION) at 13:12

## 2025-01-22 RX ADMIN — MUPIROCIN 1 APPLICATION: 20 OINTMENT TOPICAL at 08:20

## 2025-01-22 RX ADMIN — SODIUM CHLORIDE, POTASSIUM CHLORIDE, SODIUM LACTATE AND CALCIUM CHLORIDE 75 ML/HR: 600; 310; 30; 20 INJECTION, SOLUTION INTRAVENOUS at 03:52

## 2025-01-22 RX ADMIN — MAGNESIUM SULFATE IN WATER FOR 4 G: 40 INJECTION INTRAVENOUS at 00:03

## 2025-01-22 RX ADMIN — DEXMEDETOMIDINE HYDROCHLORIDE 1.5 MCG/KG/HR: 400 INJECTION, SOLUTION INTRAVENOUS at 23:27

## 2025-01-22 RX ADMIN — GUAIFENESIN 400 MG: 200 SOLUTION ORAL at 08:19

## 2025-01-22 RX ADMIN — CHLORHEXIDINE GLUCONATE 15 ML: 1.2 SOLUTION ORAL at 20:05

## 2025-01-22 RX ADMIN — Medication 10 ML: at 00:09

## 2025-01-22 RX ADMIN — ATORVASTATIN CALCIUM 80 MG: 40 TABLET, FILM COATED ORAL at 20:05

## 2025-01-22 RX ADMIN — ACETYLCYSTEINE 4 ML: 200 SOLUTION ORAL; RESPIRATORY (INHALATION) at 01:29

## 2025-01-22 RX ADMIN — GUAIFENESIN 400 MG: 200 SOLUTION ORAL at 15:56

## 2025-01-22 RX ADMIN — SODIUM CHLORIDE, POTASSIUM CHLORIDE, SODIUM LACTATE AND CALCIUM CHLORIDE 75 ML/HR: 600; 310; 30; 20 INJECTION, SOLUTION INTRAVENOUS at 10:48

## 2025-01-22 RX ADMIN — ACETYLCYSTEINE 4 ML: 200 SOLUTION ORAL; RESPIRATORY (INHALATION) at 08:13

## 2025-01-22 RX ADMIN — DEXMEDETOMIDINE HYDROCHLORIDE 0.8 MCG/KG/HR: 400 INJECTION, SOLUTION INTRAVENOUS at 18:41

## 2025-01-22 RX ADMIN — CHLORHEXIDINE GLUCONATE 15 ML: 1.2 SOLUTION ORAL at 00:01

## 2025-01-22 RX ADMIN — SODIUM CHLORIDE 1000 MG: 900 INJECTION INTRAVENOUS at 20:05

## 2025-01-22 RX ADMIN — PROPOFOL 15 MCG/KG/MIN: 10 INJECTION, EMULSION INTRAVENOUS at 03:52

## 2025-01-22 RX ADMIN — MUPIROCIN 1 APPLICATION: 20 OINTMENT TOPICAL at 20:05

## 2025-01-22 RX ADMIN — DEXMEDETOMIDINE HYDROCHLORIDE 0.2 MCG/KG/HR: 400 INJECTION, SOLUTION INTRAVENOUS at 13:03

## 2025-01-22 NOTE — H&P
"INTENSIVIST   PROGRESS NOTE          SUBJECTIVE     Tessa 79 y.o. female is followed for:Weakness - Generalized       * No active hospital problems. *    As an Intensivist, we provide an integrated approach to the ICU patient and family, medical management of comorbid conditions, including but not limited to electrolytes, glycemic control, organ dysfunction, lead interdisciplinary rounds and coordinate the care with all other services, including those from other specialists.     HPI:  Tessa is a 79 y.o. female with PMH COPD requiring home oxygen, afib (eliquis), HLD, depression, HTN, TIA, who presented to this Hospital on 1/21/2025 because of AMS. Her SO is currently admitted to [facility]. Patient was found to be encephalopathic at home by neighbor as he had not heard from her in a couple of days. There was mention she was \"down\" but it is unclear if patient had fallen or how long she had been in this state. In ED she was found to be hypercapnic which did not to breathing treatments or nebulizers. She was subsequently intubated with improvement in hypercapnia. CTH negative. Patient seen at bedside. Intubated and sedated on prop and fentanyl. HDS and not requiring pressors.  All history was obtained via chart and ED physician as patient intubated and no family present.        PMH: She  has a past medical history of Atrial fibrillation (2010), Cancer, Cluster headache (1995), COPD (chronic obstructive pulmonary disease), Difficulty walking (2022), Fibromyalgia, primary, Hyperlipidemia, Hypertension, Peripheral neuropathy (2022), and TIA (transient ischemic attack) (2010).   PSxH: She  has a past surgical history that includes Abdominal surgery; Oophorectomy; and Hysterectomy.     Medications:  No current facility-administered medications on file prior to encounter.     Current Outpatient Medications on File Prior to Encounter   Medication Sig    albuterol sulfate  (90 Base) MCG/ACT inhaler Inhale 2 puffs " Every 4 (Four) Hours As Needed for Wheezing.    apixaban (ELIQUIS) 5 MG tablet tablet Take 1 tablet by mouth Every 12 (Twelve) Hours.    atorvastatin (LIPITOR) 80 MG tablet Take 1 tablet by mouth Every Night.    benzonatate (TESSALON) 200 MG capsule Take 1 capsule by mouth 3 (Three) Times a Day As Needed for Cough.    Budeson-Glycopyrrol-Formoterol (Breztri Aerosphere) 160-9-4.8 MCG/ACT aerosol inhaler Inhale 2 puffs 2 (Two) Times a Day.    cyclobenzaprine (FLEXERIL) 5 MG tablet Take 1 tablet by mouth 3 (Three) Times a Day As Needed for Muscle Spasms.    doxycycline (MONODOX) 100 MG capsule Take 1 capsule by mouth Every 12 (Twelve) Hours. Indications: Upper Respiratory Tract Infection (Patient not taking: Reported on 8/1/2024)    gabapentin (NEURONTIN) 300 MG capsule Take 1 capsule by mouth.    ipratropium-albuterol (DUO-NEB) 0.5-2.5 mg/3 ml nebulizer Take 3 mL by nebulization Every 6 (Six) Hours As Needed for Shortness of Air.    loratadine (CLARITIN) 10 MG tablet Take 1 tablet by mouth Daily.    methocarbamol (ROBAXIN) 500 MG tablet Take 1 tablet by mouth Every 6 (Six) Hours.    Mirabegron ER (MYRBETRIQ) 50 MG tablet sustained-release 24 hour 24 hr tablet Take 50 mg by mouth Daily.    ProAir RespiClick 108 (90 Base) MCG/ACT inhaler Inhale 2 puffs.    sertraline (ZOLOFT) 100 MG tablet Take 1 tablet by mouth Daily.    tolterodine LA (DETROL LA) 2 MG 24 hr capsule     traZODone (DESYREL) 100 MG tablet Take  by mouth.        Allergies: She is allergic to penicillins, latex, and other.   FH: Her family history includes Breast cancer (age of onset: 67) in her mother.   SH: She  reports that she has quit smoking. Her smoking use included cigarettes. She has a 10 pack-year smoking history. She has been exposed to tobacco smoke. She has never used smokeless tobacco. She reports that she does not currently use alcohol. She reports that she does not use drugs.     The patient's relevant past medical, surgical and social  history were reviewed and updated in Epic as appropriate.                 Review of Systems  As described in the HPI.       OBJECTIVE     Vitals:  Temp: 96.4 °F (35.8 °C) (25 1428) Temp  Min: 96.4 °F (35.8 °C)  Max: 96.4 °F (35.8 °C)   Temp core:      BP: 107/58 (25) BP  Min: 107/58  Max: 128/65   MAP (non-invasive) Noninvasive MAP (mmHg): 73 (25) Noninvasive MAP (mmHg)  Av.6  Min: 47  Max: 86   Pulse: (!) 49 (25) Pulse  Min: 49  Max: 60   Resp: 16 (25 150) Resp  Min: 16  Max: 18   SpO2: 100 % (25) SpO2  Min: 77 %  Max: 100 %   Device: nonrebreather mask (25)    Flow Rate: 4 (25 150) Flow (L/min) (Oxygen Therapy)  Min: 4  Max: 10         25   Weight: 64.7 kg (142 lb 11.2 oz)        Intake/Ouptut 24 hrs (7:00AM - 6:59 AM)  Intake & Output (last 2 days)       None            Medications (drips):       Invasive Mechanical Ventilator   Settings: Observed:   Mode: VC+/AC (25)    Resp Rate (Set): 18 (25)     Vt (Set, mL): 400 mL (25)                   FiO2 (%): 60 % (25)     PEEP/CPAP (cm H2O): 5 cm H20 (25)                Physical Examination  Telemetry:  Rhythm: sinus bradycardia (25 1435)         Constitutional:  No acute distress.   Cardiovascular: RRR.    Respiratory: Mechanical breath sounds, ETT in place   Abdominal:  Soft nondistended no ascites present    Extremities: No Edema   Neurological:   Sedated, pupils equal and reactive           Lines, Drains & Airways       Active LDAs       Name Placement date Placement time Site Days    Peripheral IV 25 1359 Left;Posterior Forearm 25  1359  Forearm  less than 1    Peripheral IV Anterior;Right Forearm --  --  Forearm  --    Lisa Amaya ETT 8 25  1830  Oral  less than 1                    Results Reviewed:  Laboratory  Microbiology  Radiology  Pathology    Hematology:  Results from last 7 days   Lab  Units 01/21/25  1418   WBC 10*3/mm3 6.47   HEMOGLOBIN g/dL 12.8   MCV fL 104.8*   PLATELETS 10*3/mm3 181     Results from last 7 days   Lab Units 01/21/25  1418   NEUTROS ABS 10*3/mm3 4.30   LYMPHS ABS 10*3/mm3 1.19   EOS ABS 10*3/mm3 0.25     Chemistry:  Estimated Creatinine Clearance: 50.7 mL/min (by C-G formula based on SCr of 0.84 mg/dL).    Results from last 7 days   Lab Units 01/21/25  1418   SODIUM mmol/L 146*   POTASSIUM mmol/L 4.6   CHLORIDE mmol/L 100   CO2 mmol/L 42.0*   BUN mg/dL 10   CREATININE mg/dL 0.84   GLUCOSE mg/dL 130*     Results from last 7 days   Lab Units 01/21/25  1418   CALCIUM mg/dL 9.4   MAGNESIUM mg/dL 1.9       Hepatic Panel:  Results from last 7 days   Lab Units 01/21/25  1418   ALBUMIN g/dL 3.6   TOTAL PROTEIN g/dL 6.7   BILIRUBIN mg/dL 0.6   AST (SGOT) U/L 15   ALT (SGPT) U/L 6   ALK PHOS U/L 81        Coagulation Labs:         Cardiac Labs:  Results from last 7 days   Lab Units 01/21/25  1532 01/21/25  1418   HSTROP T ng/L 25* 25*       Biomarkers:  Results from last 7 days   Lab Units 01/21/25  1532 01/21/25  1418   LACTATE mmol/L  --  0.6   PROCALCITONIN ng/mL 0.05  --        U/A  Results from last 7 days   Lab Units 01/21/25  1418   COLOR UA  Yellow   CLARITY UA  Cloudy*   PH, URINE  5.5   SPECIFIC GRAVITY, URINE  1.030   GLUCOSE UA  Negative   KETONES UA  Negative   BILIRUBIN UA  Negative   PROTEIN UA  30 mg/dL (1+)*   BLOOD UA  Moderate (2+)*   LEUKOCYTES UA  Small (1+)*   NITRITE UA  Positive*   UROBILINOGEN UA  0.2 E.U./dL       Results from last 7 days   Lab Units 01/21/25  1418   RBC UA /HPF None Seen   WBC UA /HPF 21-50*   BACTERIA UA /HPF 4+*   SQUAM EPITHEL UA /HPF 3-6*   HYALINE CASTS UA /LPF None Seen       COVID-19  Lab Results   Component Value Date    COVID19 Not Detected 01/21/2025    COVID19 Not Detected 07/12/2024    COVID19 Not Detected 09/18/2023    COVID19 Not Detected 09/07/2023       Arterial Blood Gases:  Results from last 7 days   Lab Units 01/21/25  1920  01/21/25  1811 01/21/25  1512   PH, ARTERIAL pH units 7.423 7.177* 7.126*   PCO2, ARTERIAL mm Hg 61.4*  --   --    PO2 ART mm Hg 116.0* 175.0* 155.0*   FIO2 % 60 60 36       Images:  XR Chest 1 View    Result Date: 1/21/2025  Impression: 1. Interval placement of endotracheal tube with the tip 1.4 cm above the marcelino. 2. Postoperative change of the right lung apex. No new airspace consolidation or other acute process. Electronically Signed: Umberto Kulkarni MD  1/21/2025 7:22 PM EST  Workstation ID: [id number]    CT Abdomen Pelvis With Contrast    Result Date: 1/21/2025  1. Bilateral striated nephrograms, more apparent on the left. This is a nonspecific finding with etiologies that include infection, sequela of hypotension, acute tubular necrosis 2. No other acute abdominopelvic process demonstrated on this exam 3. Small bilateral pleural effusions Electronically Signed: Kyle Boston  1/21/2025 6:04 PM EST  Workstation ID: [id number]    CT Head Without Contrast    Result Date: 1/21/2025  Impression: No acute intracranial pathology. Electronically Signed: Darron Granda MD  1/21/2025 5:51 PM EST  Workstation ID: [id number]    XR Chest 1 View    Result Date: 1/21/2025  Impression: 1. No acute cardiopulmonary disease. Electronically Signed: Umberto Kulkarni MD  1/21/2025 3:20 PM EST  Workstation ID: [id number]     Echo:  Results for orders placed during the hospital encounter of 08/24/23    Adult Transthoracic Echo Complete W/ Cont if Necessary Per Protocol    Interpretation Summary    Left ventricular systolic function is normal. Left ventricular ejection fraction appears to be 56 - 60%.    Left ventricular diastolic function was normal.    Mild aortic valve stenosis is present. Aortic valve area is 1.2 cm2.    Peak velocity of the flow distal to the aortic valve is 267.2 cm/s. Aortic valve mean pressure gradient is 16 mmHg.      Results: Reviewed.  I reviewed the patient's new laboratory and imaging results.  I independently  reviewed the patient's new images.    Medications: Reviewed.    Assessment    A/P     Hospital:  LOS: 0 days       Assessment/Management/Treatment Plan:  Tessa is a 79 y.o. female admitted on 1/21/2025 with     //Acute hypercapnic respiratory failure  //COPD exacerbation, home oxygen dependency (unclear amount)  //Encephalopathy secondary above   //UTI  //Bilateral striated nephrograms  //PMH: afib (eliquis), HLD, depression, TIA, HTN    Pulmonary   Intubated, ABG appropriate post intubation. ABG qAM  CXR reviewed no acute changes, will adjust ETT and repeat CXR  Scheduled duonebs + pulmicort, s/p 125 methylpred followed by prednisone 40 daily  Cardiovascular  No augmentation required for goal MAP >65. LA wnl. Trop flat at 25  Home statin  Neuro  Sedated with fentanyl and propofol. RAAS -1 to -2  CTH negative. Sedation holiday qAM  GI/Hepatology  NPO, PPI prophy  Renal  CK level pending. Cr wnl, good UOP  ID/Antibiotics  Rocephin + azithro  UA positive but with squam, culture pending.   Blood cultures pending, COVID/flu negative  Hematology  Home eliquis   Endocrine  Body mass index is 25.28 kg/m². Overweight: 25.0-29.9kg/m2   Type 2 diabetes.. Accuchecks q6hr.     Lab Results   Lab Value Date/Time    HGBA1C 5.80 (H) 07/12/2024 1352    HGBA1C 7.30 (H) 09/19/2023 0700           Diet: NPO Diet NPO Type: Strict NPO  No active supplement orders      Advance Directives: There are no questions and answers to display.        VTE Prophylaxis:  No VTE prophylaxis order currently exists.        Disposition: Admit to ICU    Plan of care and goals reviewed during interdisciplinary rounds.  I discussed the patient's findings and my recommendations with nursing staff    Time: was greater than 37 minutes. This is non-concurrent time.   (This excludes time spent performing separately reportable procedures and services).    She has a high risk of imminent or life-threatening  deterioration, which requires the highest level of  physician preparedness to intervene urgently. I devoted my full attention to the direct care of this patient for the amount of time indicated above.     Time spent with family or surrogate(s) is included only if the patient was incapable of providing the necessary information or participating in medical decision making.    She has the following organ/system impairments Respiratory Failure.        Tia Geronimo MD  Pulmonary Critical Care Medicine

## 2025-01-22 NOTE — CASE MANAGEMENT/SOCIAL WORK
Discharge Planning Assessment  [facility]     Patient Name: Tessa Bradley  MRN: [id number]  Today's Date: 1/22/2025    Admit Date: 1/21/2025    Plan: IDP, d/c TBD @ this time.   Discharge Needs Assessment       Row Name 01/22/25 1332       Living Environment    People in Home spouse    Current Living Arrangements home    Potentially Unsafe Housing Conditions none    In the past 12 months has the electric, gas, oil, or water company threatened to shut off services in your home? Pt Unable    Primary Care Provided by self;spouse/significant other;friend    Provides Primary Care For no one    Family Caregiver if Needed spouse    Family Caregiver Names Rianna Kaleb    Quality of Family Relationships helpful;supportive    Living Arrangement Comments LIves in home in [place] co w/spouse Rianna, has steps in home that patient goes up and down w/rails, per spouse normally does ok       Resource/Environmental Concerns    Resource/Environmental Concerns none       Transportation Needs    In the past 12 months, has lack of transportation kept you from medical appointments or from getting medications? Pt Unable    In the past 12 months, has lack of transportation kept you from meetings, work, or from getting things needed for daily living? Pt Unable       Food Insecurity    Within the past 12 months, you worried that your food would run out before you got the money to buy more. Pt Unable    Within the past 12 months, the food you bought just didn't last and you didn't have money to get more. Pt Unable       Transition Planning    Patient/Family Anticipates Transition to inpatient rehabilitation facility    Patient/Family Anticipated Services at Transition     Transportation Anticipated health plan transportation       Discharge Needs Assessment    Equipment Currently Used at Home cane, straight;rollator;shower chair;oxygen;nebulizer    Do you want help finding or keeping work or a job? Patient unable to answer     Do you want help with school or training? For example, starting or completing job training or getting a high school diploma, GED or equivalent Patient unable to answer    Discharge Facility/Level of Care Needs rehabilitation facility                   Discharge Plan       Row Name 01/22/25 6195       Plan    Plan IDP, d/c TBD @ this time.    Patient/Family in Agreement with Plan yes    Plan Comments Patient admitted to 2A 1/21, currently intubated/sedated on MV, weaning sedation, starting tube feeds. I spoke w/patient spouse Yenifer for IDP,  who is on 6B and leaving today to go to [facility]. Insurance of Medicare A/B w/ [employer] as supplement.Denies HH, or inpatient rehab stays. Has home O2 @ 2LNC cont, nebulizer, rollator, cane, shower chair. Ind @ baseline, drives.[facility]. D/C TBD @ this time. CM will continue to follow.                  Continued Care and Services - Admitted Since 1/21/2025    No active coordination exists for this encounter.       Expected Discharge Date and Time       Expected Discharge Date Expected Discharge Time    Jan 29, 2025            Demographic Summary       Row Name 01/22/25 1331       General Information    Admission Type inpatient    Arrived From home    Referral Source emergency department    Preferred Language patient unable to answer    General Information Comments PCP  Sunni Santoro                   Functional Status       Row Name 01/22/25 1331       Functional Status    Usual Activity Tolerance good    Current Activity Tolerance moderate       Physical Activity    On average, how many days per week do you engage in moderate to strenuous exercise (like a brisk walk)? Pt Unable    On average, how many minutes do you engage in exercise at this level? Pt Unable       Functional Status, IADL    Medications independent    Meal Preparation independent    Housekeeping independent    Laundry independent    Shopping independent    If  for any reason you need help with day-to-day activities such as bathing, preparing meals, shopping, managing finances, etc., do you get the help you need? Patient unable to answer                   Psychosocial    No documentation.                  Abuse/Neglect    No documentation.                  Legal    No documentation.                  Substance Abuse    No documentation.                  Patient Forms    No documentation.                     Vasile Euceda RN

## 2025-01-22 NOTE — PLAN OF CARE
Goal Outcome Evaluation:      Pt is still sedated; not following commands; unable to decrease ventilator support at this time.

## 2025-01-22 NOTE — PLAN OF CARE
Goal Outcome Evaluation:      - pt admitted from ER, intub/sed  - prop/fent weaned and pt alert/following commands/restless  - SB/SR, MAP <65, 500 LR bolus + maintenance fluids added   - 2pt restraints  - de la rosa placed + OG   - prop @ 30, fent @ 100   - q6 BS WNL

## 2025-01-22 NOTE — PLAN OF CARE
Goal Outcome Evaluation:  Plan of Care Reviewed With: spouse        Progress: improving     VSS. Remains on ventilator support 40% PEEP of 5. Precedex added, infusing at 0.8. fentanyl and propofol both weaned off. Patient arouses to voice and touch, but not following most commands. CASAS spontaneously. Afebrile. Minimal UOP (153 mL). Spouse updated by phone and at bedside this afternoon.

## 2025-01-22 NOTE — PROGRESS NOTES
INTENSIVIST   PROGRESS NOTE     Hospital:  LOS: 1 day     Ms. Tessa Bradley, 79 y.o. female is followed for a Chief Complaint of: Respiratory Failure      Subjective   S     Interval History:  Remains on mechanical ventilation. ABG improved after mechanical ventilation.        The patient's relevant past medical, surgical and social history were reviewed and updated in Epic as appropriate.      ROS: Unable to obtain secondary to sedation and mechanical ventilation.     Objective   O     Vitals:  Temp  Min: 96.8 °F (36 °C)  Max: 99.3 °F (37.4 °C)  BP  Min: 88/57  Max: 172/79  Pulse  Min: 44  Max: 70  Resp  Min: 16  Max: 18  SpO2  Min: 92 %  Max: 100 % Flow (L/min) (Oxygen Therapy)  Min: 4  Max: 4    Intake/Ouptut 24 hrs (7:00AM - 6:59 AM)  Intake & Output (last 3 days)         01/19 0701  01/20 0700 01/20 0701  01/21 0700 01/21 0701 01/22 0700 01/22 0701 01/23 0700    I.V. (mL/kg)   1007.5 (16.3) 562.5 (9.1)    NG/GT   85 60    IV Piggyback   250     Total Intake(mL/kg)   1342.5 (21.7) 622.5 (10.1)    Urine (mL/kg/hr)   390 79 (0.2)    Total Output   390 79    Net   +952.5 +543.5                    Medications (drips):  dexmedetomidine, Last Rate: 0.8 mcg/kg/hr (01/22/25 1325)  fentanyl 10 mcg/mL, Last Rate: 50 mcg/hr (01/22/25 1302)        Mechanical Ventilator Settings:          Resp Rate (Set): 18     FiO2 (%): 40 %  PEEP/CPAP (cm H2O): 5 cm H20    Minute Ventilation (L/min) (Obs): 6.44 L/min  Resp Rate (Observed) Vent: 18  I:E Ratio (Set): 1:3.50  I:E Ratio (Obs): 1:3.5    PIP Observed (cm H2O): 21 cm H2O  Plateau Pressure (cm H2O): (S) 16 cm H2O    Physical Examination  Telemetry:  Normal sinus rhythm.    Constitutional:  No acute distress.  ETT in place on mechanical ventilation.    Eyes: No scleral icterus.   PERRL, EOM intact.    Neck:  Supple, FROM   Cardiovascular: Normal rate, regular and rhythm. Normal heart sounds.  No murmurs, gallop or rub.   Respiratory: No respiratory distress. Normal respiratory  effort.  Diminished. No wheezing.    Abdominal:  Soft. No masses. Nontender. No distension. No HSM.   Extremities: No digital cyanosis. No clubbing.  No peripheral edema.   Skin: No rashes, lesions or ulcers   Neurological:   Sedated.              Results from last 7 days   Lab Units 01/22/25  0418 01/21/25  1418   WBC 10*3/mm3 3.98 6.47   HEMOGLOBIN g/dL 12.7 12.8   MCV fL 103.3* 104.8*   PLATELETS 10*3/mm3 156 181     Results from last 7 days   Lab Units 01/22/25  0418 01/21/25  1418   SODIUM mmol/L 143 146*   POTASSIUM mmol/L 4.5 4.6   CO2 mmol/L 29.0 42.0*   CREATININE mg/dL 0.96 0.84   GLUCOSE mg/dL 122* 130*   MAGNESIUM mg/dL 2.1 1.9   PHOSPHORUS mg/dL 2.5  --      Estimated Creatinine Clearance: 47.9 mL/min (by C-G formula based on SCr of 0.96 mg/dL).  Results from last 7 days   Lab Units 01/21/25  1418   ALK PHOS U/L 81   BILIRUBIN mg/dL 0.6   ALT (SGPT) U/L 6   AST (SGOT) U/L 15       Results from last 7 days   Lab Units 01/22/25  0421 01/21/25  1929 01/21/25  1811 01/21/25  1512   PH, ARTERIAL pH units 7.434 7.423 7.177* 7.126*   PCO2, ARTERIAL mm Hg 52.1* 61.4*  --   --    PO2 ART mm Hg 59.6* 116.0* 175.0* 155.0*   FIO2 % 40 60 60 36       Images:  Imaging Results (Last 24 Hours)       Procedure Component Value Units Date/Time    XR Chest 1 View [[id number]] Collected: 01/22/25 0719     Updated: 01/22/25 0725    Narrative:      XR CHEST 1 VW    Date of Exam: 1/22/2025 1:52 AM EST    Indication: Intubated Patient    Comparison: Chest radiograph 1/21/2025.    Findings:  Endotracheal tube has been slightly retracted, with tip 2.5 cm above the marcelino. Nasogastric tube sidehole overlies the proximal stomach with tip excluded from the field-of-view. Cardiomediastinal silhouette is unchanged. Postsurgical changes of the   right upper lung. No new or worsening focal consolidation. No sizable pleural effusion. No pneumothorax. Osseous structures are unchanged.      Impression:      Impression:  Satisfactory  position of endotracheal tube. Postsurgical changes of the right upper lung without new or worsening findings.      Electronically Signed: Adam Hudson MD    1/22/2025 7:21 AM EST    Workstation ID: [id number]    XR Abdomen KUB [[id number]] Collected: 01/21/25 2314     Updated: 01/21/25 2317    Narrative:      XR ABDOMEN KUB    Date of Exam: 1/21/2025 10:50 PM EST    Indication: OG placement    Comparison: 1/21/2025.    Findings:  Enteric tube within the stomach.      Impression:      Impression:  Enteric tube within the stomach.      Electronically Signed: Renetta Duggan MD    1/21/2025 11:14 PM EST    Workstation ID: [id number]    XR Chest 1 View [[id number]] Collected: 01/21/25 1920     Updated: 01/21/25 1925    Narrative:      XR CHEST 1 VW    Date of Exam: 1/21/2025 6:47 PM EST    Indication: post intubation    Comparison: 1/21/2025    Findings:  There is been interval placement endotracheal tube with the tip 1.4 cm above the marcelino. Heart size is at the upper limits of normal. Pulmonary vessels appear normal. There are postoperative changes of the right lung apex. No new focal airspace   consolidation. No pleural effusion or pneumothorax.      Impression:      Impression:    1. Interval placement of endotracheal tube with the tip 1.4 cm above the marcelino.  2. Postoperative change of the right lung apex. No new airspace consolidation or other acute process.      Electronically Signed: Umberto Kulkarni MD    1/21/2025 7:22 PM EST    Workstation ID: [id number]    CT Abdomen Pelvis With Contrast [[id number]] Collected: 01/21/25 1751     Updated: 01/21/25 1807    Narrative:      CT ABDOMEN PELVIS W CONTRAST    Date of Exam: 1/21/2025 5:32 PM EST    Indication: diffuse abd pain, ams.    Comparison: 6/21/2016    Technique: Axial CT images were obtained of the abdomen and pelvis following the uneventful intravenous administration of 85 cc Isovue-300. Reconstructed coronal and sagittal images were also obtained. Automated exposure  control and iterative   construction methods were used.      Findings:    Lower Chest: Small bilateral pleural effusions with secondary atelectasis in the lower lobes    Organs: Slightly heterogeneous enhancement of the liver with several areas of peripheral transient hepatic attenuation difference with no focal lesion or biliary duct dilatation. Bilateral striated nephrograms more apparent on the left in the excretory   phase. No hydronephrosis. Patent renal veins. Spleen, pancreas, adrenal glands, gallbladder unremarkable    Gastrointestinal: No intestinal distention or evident wall thickening. No acute abnormality. Normal enhancement of the mesenteric vessels    Pelvis: No abnormal fluid collection. Uterus surgically absent. Some gas present in the urinary bladder    Peritoneum/Retroperitoneum: No ascites or pneumoperitoneum. Normal caliber aorta    Bones/Soft Tissues: No acute bony abnormality      Impression:        1. Bilateral striated nephrograms, more apparent on the left. This is a nonspecific finding with etiologies that include infection, sequela of hypotension, acute tubular necrosis  2. No other acute abdominopelvic process demonstrated on this exam  3. Small bilateral pleural effusions              Electronically Signed: Kyle Boston    1/21/2025 6:04 PM EST    Workstation ID: [id number]    CT Head Without Contrast [[id number]] Collected: 01/21/25 1750     Updated: 01/21/25 1754    Narrative:      CT HEAD WO CONTRAST    Date of Exam: 1/21/2025 5:32 PM EST    Indication: ams.    Comparison: 9/18/2023    Technique: Axial CT images were obtained of the head without contrast administration.  Automated exposure control and iterative construction methods were used.    Findings: No intracranial hemorrhage. Gray-white matter differentiation is maintained without evidence of an acute infarction. Multiple foci of decreased attenuation are present within the subcortical, deep cerebral, and periventricular white  matter   consistent with chronic small vessel/microangiopathic ischemic changes. No extra-axial mass or collection. The ventricles and sulci are prominent commensurate with involutional changes. The posterior fossa appears grossly normal. Sellar and suprasellar   structures are normal.    Lens replacements. The paranasal sinuses, ethmoid air cells, and mastoid air cells are aerated. The bony calvarium is intact.      Impression:      Impression: No acute intracranial pathology.          Electronically Signed: Darron Granda MD    1/21/2025 5:51 PM EST    Workstation ID: [id number]    XR Chest 1 View [[id number]] Collected: 01/21/25 1517     Updated: 01/21/25 1523    Narrative:      XR CHEST 1 VW    Date of Exam: 1/21/2025 2:44 PM EST    Indication: Weak/Dizzy/AMS triage protocol    Comparison: 7/12/2024    Findings:  There is mild cardiomegaly. There is emphysematous change of the lungs. Again seen are postoperative changes within the right lung apex. No focal airspace consolidation. No pleural effusion or pneumothorax. Bony structures are unremarkable.      Impression:      Impression:    1. No acute cardiopulmonary disease.      Electronically Signed: Umberto Kulkarni MD    1/21/2025 3:20 PM EST    Workstation ID: [id number]               Results: Reviewed.  I reviewed the patient's new laboratory and imaging results.  I independently reviewed the patient's new images.    Medications: Reviewed.    Assessment & Plan   A / P     Ms. Bradley is a 80yo F with a history of COPD on home O2, [facility], Afib (on Eliquis), HLD, Depression, HTN, and TIA who presented to [facility] on 1/21/25 with altered mental status. Her significant other is also admitted to [facility]. She was found at home by a neighbor and was noted to be encephalopathic. In the ED, she was found to be hypercapnic and did not improve with breathing treatments and was intubated with subsequent improvement in pCO2.     She was admitted to  the ICU for ongoing care. She has been stared on Rocephin and Azithromycin as well as Prednisone 40mg.     Nutrition:   NPO Diet NPO Type: Strict NPO  Advance Directives:   Code Status and Medical Interventions: CPR (Attempt to Resuscitate); Full Support   Ordered at: 01/21/25 2021     Code Status (Patient has no pulse and is not breathing):    CPR (Attempt to Resuscitate)     Medical Interventions (Patient has pulse or is breathing):    Full Support       Active Hospital Problems    Diagnosis     **Respiratory failure with hypercapnia     Acute on chronic respiratory failure with hypoxia and hypercapnia     History of Hodgkin's disease     Acute on chronic hypoxic respiratory failure     Stage 3b chronic kidney disease     Chronic respiratory failure with hypoxia and hypercapnia     HTN (hypertension)     HLD (hyperlipidemia)     A-fib     MAKAYLA (obstructive sleep apnea)     COPD (chronic obstructive pulmonary disease)        Assessment / Plan:    Continue mechanical ventilation. PST when awake and following commands.   Start Precedex. Wean off Fentanyl as tolerated.   Continue Rocephin and Azithromycin. Urine culture with E. Coli.   Continue Prednisone 40mg daily.   Will hold Eliquis as it is not clear that she was taking this and she has not had Afib since arrival. Plan to resume if she develops Afib or if she can confirm that she was taking this when she is no longer on the vent.   Start tube feeds.   Continue LR at 75ml/hr until tube feeds at goal.   AM labs and CXR    High risk secondary to management of mechanical ventilation.     High level of risk due to:  severe exacerbation of chronic illness and illness with threat to life or bodily function.    Plan of care and goals reviewed during interdisciplinary rounds.  I discussed the patient's findings and my recommendations with nursing staff      Ermelinda Wise DO    Intensive Care Medicine and Pulmonary Medicine

## 2025-01-22 NOTE — PROGRESS NOTES
Clinical Nutrition     Patient Name: Tessa Bradley  YOB: 1945  MRN: [id number]  Date of Encounter: 01/22/25 15:11 EST  Admission date: 1/21/2025  Reason for Visit: MDR, Identified at risk by screening criteria, Physician consult, EN    Assessment   Nutrition Assessment   Admission Diagnosis:  Respiratory failure with hypercapnia [J96.92]    Problem List:    Respiratory failure with hypercapnia    COPD (chronic obstructive pulmonary disease)    MAKAYLA (obstructive sleep apnea)    HTN (hypertension)    HLD (hyperlipidemia)    A-fib    Chronic respiratory failure with hypoxia and hypercapnia    Acute on chronic hypoxic respiratory failure    History of Hodgkin's disease    Stage 3b chronic kidney disease    Acute on chronic respiratory failure with hypoxia and hypercapnia      PMH:   She  has a past medical history of Atrial fibrillation (2010), Cancer, Cluster headache (1995), COPD (chronic obstructive pulmonary disease), Difficulty walking (2022), Fibromyalgia, primary, Hyperlipidemia, Hypertension, Peripheral neuropathy (2022), and TIA (transient ischemic attack) (2010).    PSH:  She  has a past surgical history that includes Abdominal surgery; Oophorectomy; and Hysterectomy.    Applicable Nutrition History:   ARF/VENT 1-21-25    Labs    Labs Reviewed: Yes    Results from last 7 days   Lab Units 01/22/25  0418 01/21/25  1418   GLUCOSE mg/dL 122* 130*   BUN mg/dL 12 10   CREATININE mg/dL 0.96 0.84   SODIUM mmol/L 143 146*   CHLORIDE mmol/L 99 100   POTASSIUM mmol/L 4.5 4.6   PHOSPHORUS mg/dL 2.5  --    MAGNESIUM mg/dL 2.1 1.9   ALT (SGPT) U/L  --  6   LACTATE mmol/L  --  0.6       Results from last 7 days   Lab Units 01/21/25  1418   ALBUMIN g/dL 3.6       Results from last 7 days   Lab Units 01/22/25  1202 01/22/25  0528 01/22/25  0037   GLUCOSE mg/dL 120 113 120     Lab Results   Lab Value Date/Time    HGBA1C 5.80 (H) 01/22/2025 0418    HGBA1C 5.80 (H) 07/12/2024 1352         Results  "from last 7 days   Lab Units 01/22/25  0418   PROBNP pg/mL 6,151.0*       Medications    Medications Reviewed: yes    Scheduled Meds:acetylcysteine, 4 mL, Nebulization, Q6H - RT  [Held by provider] apixaban, 5 mg, Oral, Q12H  atorvastatin, 80 mg, Oral, Nightly  azithromycin, 500 mg, Intravenous, Daily  budesonide, 0.5 mg, Nebulization, BID - RT  cefTRIAXone, 1,000 mg, Intravenous, Q24H  chlorhexidine, 15 mL, Mouth/Throat, Q12H  famotidine, 20 mg, Intravenous, BID AC  guaifenesin, 400 mg, Oral, Q4H  ipratropium-albuterol, 3 mL, Nebulization, Q6H - RT  mupirocin, 1 Application, Each Nare, BID  predniSONE, 40 mg, Oral, Daily  senna-docusate sodium, 2 tablet, Oral, BID  sodium chloride, 10 mL, Intravenous, Q12H      Continuous Infusions:dexmedetomidine, 0.2-1.5 mcg/kg/hr, Last Rate: 0.8 mcg/kg/hr (01/22/25 1325)  fentanyl 10 mcg/mL,  mcg/hr, Last Rate: Stopped (01/22/25 1452)  lactated ringers, 75 mL/hr, Last Rate: 75 mL/hr (01/22/25 1456)      PRN Meds:.  acetaminophen **OR** acetaminophen    senna-docusate sodium **AND** polyethylene glycol **AND** bisacodyl **AND** bisacodyl    Calcium Replacement - Follow Nurse / BPA Driven Protocol    fentaNYL    ipratropium-albuterol    Magnesium Cardiology Dose Replacement - Follow Nurse / BPA Driven Protocol    nitroglycerin    ondansetron ODT **OR** ondansetron    Phosphorus Replacement - Follow Nurse / BPA Driven Protocol    Potassium Replacement - Follow Nurse / BPA Driven Protocol    sodium chloride    sodium chloride    sodium chloride    Intake/Ouptut 24 hrs (0701 - 0700)   I&O's Reviewed: yes    Intake & Output (last day)         01/21 0701 01/22 0700 01/22 0701 01/23 0700    I.V. (mL/kg) 1007.5 (16.3) 562.5 (9.1)    NG/GT 85 60    IV Piggyback 250     Total Intake(mL/kg) 1342.5 (21.7) 622.5 (10.1)    Urine (mL/kg/hr) 390 125 (0.2)    Total Output 390 125    Net +952.5 +497.5                 Anthropometrics     Height: Height: 160 cm (62.99\")  Last Filed Weight: " "Weight: 61.8 kg (136 lb 3.9 oz) (250)  Method: Weight Method: Bed scale  BMI: BMI (Calculated): 24.1    UBW: ?  Weight change:  per EMR ~ 31lb wt loss over past years     Weight       Weight (kg) Weight (lbs) Weight Method Visit Report   2023 70.308 kg  155 lb       71.5 kg  157 lb 10.1 oz      2023 71.215 kg  157 lb      2023 70.761 kg  156 lb  Stated     2023 73.846 kg  162 lb 12.8 oz      2023 69.4 kg  153 lb  Stated     2023 76.204 kg  168 lb      2023 76.703 kg  169 lb 1.6 oz   --    10/10/2023 73.936 kg  163 lb  Stated     2023 75.297 kg  166 lb   --    2023 75.751 kg  167 lb   --    3/27/2024 73.256 kg  161 lb 8 oz   --    2024 67.132 kg  148 lb  Stated     2024   Stated     2024 67.132 kg  148 lb   --    2025 64.728 kg  142 lb 11.2 oz  Bed scale      61.8 kg  136 lb 3.9 oz  Bed scale         Nutrition Focused Physical Exam     Date:     Unable to perform due to Pt unable to participate at time of visit     Subjective   Reported/Observed/Food/Nutrition Related History:     Pt resting in bed, startles to voice, restless  + precedex, LR@75ml/hr    Per RN: pt found down at home, weaning sedation, pt is moving around, hard to tell if she follows commands, poor UOP    Per MD: ok to feed, plan to wean off IVF's once TF at goal    Needs Assessment   Date: 25    Height used:Height: 160 cm (62.99\")  Weights used: 136lb/ 62kg    Estimated Calorie needs: ~1300kcal  Method:  PSU: 1265kcal  Method:  MSJ x 1.2: 1289kcal  Method:  25Kcals/Kkcal    Estimated Protein needs: ~84g protein  Method: 1.2-1.5g protein per k-93g protein      Current Nutrition Prescription     PO: NPO Diet NPO Type: Tube Feeding  Oral Nutrition Supplement:  Intake: N/A    Assessment & Plan   Nutrition Diagnosis   Date: 25 Updated:   Problem Inadequate energy intake    Etiology ARF/VENT   Signs/Symptoms NPO   Status: New      Goal:   Nutrition to support " treatment and Initiate EN      Nutrition Intervention      Follow treatment progress, Care plan reviewed, Nutrition support order placed    Start Peptamen AF at 20ml/hr via ogt, advance gradually as tolerated to goal rate @55ml/hr, free water @25ml/hr    TF at goal  volume will provide 1100ml, 1320kcal, 102% kcal needs, 84g protein, 100% protein needs,7g fiber, 1391ml free water    Suggest add MVI as TF volume too low to meet RDI's    Monitoring/Evaluation:   Per protocol, I&O, Pertinent labs, Weight, Skin status, GI status, Symptoms, Hemodynamic stability    Masha Morgan, MICHAEL  Time Spent: 45min

## 2025-01-23 ENCOUNTER — APPOINTMENT (OUTPATIENT)
Dept: GENERAL RADIOLOGY | Facility: HOSPITAL | Age: 80
End: 2025-01-23
Payer: MEDICARE

## 2025-01-23 LAB
ANION GAP SERPL CALCULATED.3IONS-SCNC: 7 MMOL/L (ref 5–15)
ARTERIAL PATENCY WRIST A: ABNORMAL
ATMOSPHERIC PRESS: ABNORMAL MM[HG]
BACTERIA SPEC AEROBE CULT: ABNORMAL
BACTERIA SPEC AEROBE CULT: NO GROWTH
BASE EXCESS BLDA CALC-SCNC: 11.6 MMOL/L (ref 0–2)
BDY SITE: ABNORMAL
BODY TEMPERATURE: 37
BUN SERPL-MCNC: 23 MG/DL (ref 8–23)
BUN/CREAT SERPL: 20.5 (ref 7–25)
CALCIUM SPEC-SCNC: 9.4 MG/DL (ref 8.6–10.5)
CHLORIDE SERPL-SCNC: 99 MMOL/L (ref 98–107)
CO2 BLDA-SCNC: 36.5 MMOL/L (ref 22–33)
CO2 SERPL-SCNC: 32 MMOL/L (ref 22–29)
COHGB MFR BLD: 1.2 % (ref 0–2)
CREAT SERPL-MCNC: 1.12 MG/DL (ref 0.57–1)
DEPRECATED RDW RBC AUTO: 55.9 FL (ref 37–54)
EGFRCR SERPLBLD CKD-EPI 2021: 50.8 ML/MIN/1.73
EPAP: 0
ERYTHROCYTE [DISTWIDTH] IN BLOOD BY AUTOMATED COUNT: 16.4 % (ref 12.3–15.4)
GLUCOSE BLDC GLUCOMTR-MCNC: 132 MG/DL (ref 70–130)
GLUCOSE BLDC GLUCOMTR-MCNC: 135 MG/DL (ref 70–130)
GLUCOSE BLDC GLUCOMTR-MCNC: 199 MG/DL (ref 70–130)
GLUCOSE BLDC GLUCOMTR-MCNC: 204 MG/DL (ref 70–130)
GLUCOSE SERPL-MCNC: 151 MG/DL (ref 65–99)
HCO3 BLDA-SCNC: 35.3 MMOL/L (ref 20–26)
HCT VFR BLD AUTO: 42.3 % (ref 34–46.6)
HCT VFR BLD CALC: 39.3 % (ref 38–51)
HGB BLD-MCNC: 12.4 G/DL (ref 12–15.9)
HGB BLDA-MCNC: 12.8 G/DL (ref 14–18)
INHALED O2 CONCENTRATION: 40 %
IPAP: 0
MAGNESIUM SERPL-MCNC: 2.2 MG/DL (ref 1.6–2.4)
MCH RBC QN AUTO: 27.6 PG (ref 26.6–33)
MCHC RBC AUTO-ENTMCNC: 29.3 G/DL (ref 31.5–35.7)
MCV RBC AUTO: 94.2 FL (ref 79–97)
METHGB BLD QL: 0.4 % (ref 0–1.5)
MODALITY: ABNORMAL
OXYHGB MFR BLDV: 94.3 % (ref 94–99)
PAW @ PEAK INSP FLOW SETTING VENT: 0 CMH2O
PCO2 BLDA: 41.1 MM HG (ref 35–45)
PCO2 TEMP ADJ BLD: 41.1 MM HG (ref 35–45)
PEEP RESPIRATORY: 5 CM[H2O]
PH BLDA: 7.54 PH UNITS (ref 7.35–7.45)
PH, TEMP CORRECTED: 7.54 PH UNITS
PHOSPHATE SERPL-MCNC: 2.3 MG/DL (ref 2.5–4.5)
PLATELET # BLD AUTO: 172 10*3/MM3 (ref 140–450)
PMV BLD AUTO: 10.3 FL (ref 6–12)
PO2 BLDA: 70.3 MM HG (ref 83–108)
PO2 TEMP ADJ BLD: 70.3 MM HG (ref 83–108)
POTASSIUM SERPL-SCNC: 4 MMOL/L (ref 3.5–5.2)
RBC # BLD AUTO: 4.49 10*6/MM3 (ref 3.77–5.28)
SODIUM SERPL-SCNC: 138 MMOL/L (ref 136–145)
TOTAL RATE: 18 BREATHS/MINUTE
VENTILATOR MODE: ABNORMAL
VT ON VENT VENT: 0.37 ML
WBC NRBC COR # BLD AUTO: 12.33 10*3/MM3 (ref 3.4–10.8)

## 2025-01-23 PROCEDURE — 94799 UNLISTED PULMONARY SVC/PX: CPT

## 2025-01-23 PROCEDURE — 82948 REAGENT STRIP/BLOOD GLUCOSE: CPT

## 2025-01-23 PROCEDURE — 84100 ASSAY OF PHOSPHORUS: CPT | Performed by: INTERNAL MEDICINE

## 2025-01-23 PROCEDURE — 94003 VENT MGMT INPAT SUBQ DAY: CPT

## 2025-01-23 PROCEDURE — 25010000002 MIDAZOLAM PER 1 MG

## 2025-01-23 PROCEDURE — 99232 SBSQ HOSP IP/OBS MODERATE 35: CPT | Performed by: NURSE PRACTITIONER

## 2025-01-23 PROCEDURE — 25810000003 SODIUM CHLORIDE 0.9 % SOLUTION 250 ML FLEX CONT: Performed by: NURSE PRACTITIONER

## 2025-01-23 PROCEDURE — 63710000001 PREDNISONE PER 1 MG: Performed by: INTERNAL MEDICINE

## 2025-01-23 PROCEDURE — 25010000002 AZITHROMYCIN PER 500 MG: Performed by: NURSE PRACTITIONER

## 2025-01-23 PROCEDURE — 85027 COMPLETE CBC AUTOMATED: CPT | Performed by: INTERNAL MEDICINE

## 2025-01-23 PROCEDURE — 36600 WITHDRAWAL OF ARTERIAL BLOOD: CPT

## 2025-01-23 PROCEDURE — 25010000002 ZIPRASIDONE MESYLATE PER 10 MG: Performed by: INTERNAL MEDICINE

## 2025-01-23 PROCEDURE — 83050 HGB METHEMOGLOBIN QUAN: CPT

## 2025-01-23 PROCEDURE — 82375 ASSAY CARBOXYHB QUANT: CPT

## 2025-01-23 PROCEDURE — 83735 ASSAY OF MAGNESIUM: CPT | Performed by: INTERNAL MEDICINE

## 2025-01-23 PROCEDURE — 94761 N-INVAS EAR/PLS OXIMETRY MLT: CPT

## 2025-01-23 PROCEDURE — 25010000002 MIDAZOLAM PER 1 MG: Performed by: NURSE PRACTITIONER

## 2025-01-23 PROCEDURE — 82805 BLOOD GASES W/O2 SATURATION: CPT

## 2025-01-23 PROCEDURE — 71045 X-RAY EXAM CHEST 1 VIEW: CPT

## 2025-01-23 PROCEDURE — 74018 RADEX ABDOMEN 1 VIEW: CPT

## 2025-01-23 PROCEDURE — 80048 BASIC METABOLIC PNL TOTAL CA: CPT | Performed by: INTERNAL MEDICINE

## 2025-01-23 PROCEDURE — 25010000002 CEFTRIAXONE PER 250 MG: Performed by: NURSE PRACTITIONER

## 2025-01-23 RX ORDER — ZIPRASIDONE MESYLATE 20 MG/ML
10 INJECTION, POWDER, LYOPHILIZED, FOR SOLUTION INTRAMUSCULAR ONCE
Status: COMPLETED | OUTPATIENT
Start: 2025-01-23 | End: 2025-01-23

## 2025-01-23 RX ORDER — POLYETHYLENE GLYCOL 3350 17 G/17G
17 POWDER, FOR SOLUTION ORAL DAILY PRN
Status: DISCONTINUED | OUTPATIENT
Start: 2025-01-23 | End: 2025-01-27

## 2025-01-23 RX ORDER — PREDNISONE 20 MG/1
40 TABLET ORAL DAILY
Status: DISCONTINUED | OUTPATIENT
Start: 2025-01-24 | End: 2025-01-23

## 2025-01-23 RX ORDER — AMOXICILLIN 250 MG
2 CAPSULE ORAL 2 TIMES DAILY
Status: DISCONTINUED | OUTPATIENT
Start: 2025-01-23 | End: 2025-01-28

## 2025-01-23 RX ORDER — HYDROXYZINE HYDROCHLORIDE 25 MG/1
25 TABLET, FILM COATED ORAL 3 TIMES DAILY PRN
Status: DISCONTINUED | OUTPATIENT
Start: 2025-01-23 | End: 2025-01-27

## 2025-01-23 RX ORDER — GABAPENTIN 300 MG/1
300 CAPSULE ORAL DAILY
Status: DISCONTINUED | OUTPATIENT
Start: 2025-01-23 | End: 2025-01-28

## 2025-01-23 RX ORDER — ATORVASTATIN CALCIUM 40 MG/1
80 TABLET, FILM COATED ORAL NIGHTLY
Status: DISCONTINUED | OUTPATIENT
Start: 2025-01-23 | End: 2025-01-29

## 2025-01-23 RX ORDER — ONDANSETRON 2 MG/ML
4 INJECTION INTRAMUSCULAR; INTRAVENOUS EVERY 6 HOURS PRN
Status: DISCONTINUED | OUTPATIENT
Start: 2025-01-23 | End: 2025-02-05 | Stop reason: HOSPADM

## 2025-01-23 RX ORDER — SERTRALINE HYDROCHLORIDE 100 MG/1
200 TABLET, FILM COATED ORAL DAILY
Status: DISCONTINUED | OUTPATIENT
Start: 2025-01-23 | End: 2025-01-28

## 2025-01-23 RX ORDER — WATER 10 ML/10ML
INJECTION INTRAMUSCULAR; INTRAVENOUS; SUBCUTANEOUS
Status: COMPLETED
Start: 2025-01-23 | End: 2025-01-23

## 2025-01-23 RX ORDER — PREDNISONE 20 MG/1
40 TABLET ORAL DAILY
Status: DISCONTINUED | OUTPATIENT
Start: 2025-01-23 | End: 2025-01-28

## 2025-01-23 RX ORDER — HYDROXYZINE HYDROCHLORIDE 25 MG/1
25 TABLET, FILM COATED ORAL 3 TIMES DAILY PRN
Status: DISCONTINUED | OUTPATIENT
Start: 2025-01-23 | End: 2025-01-23

## 2025-01-23 RX ORDER — ACETAMINOPHEN 325 MG/1
650 TABLET ORAL EVERY 4 HOURS PRN
Status: DISCONTINUED | OUTPATIENT
Start: 2025-01-23 | End: 2025-01-29

## 2025-01-23 RX ORDER — MIDAZOLAM HYDROCHLORIDE 1 MG/ML
INJECTION, SOLUTION INTRAMUSCULAR; INTRAVENOUS
Status: COMPLETED
Start: 2025-01-23 | End: 2025-01-23

## 2025-01-23 RX ORDER — ACETAMINOPHEN 650 MG/1
650 SUPPOSITORY RECTAL EVERY 4 HOURS PRN
Status: DISCONTINUED | OUTPATIENT
Start: 2025-01-23 | End: 2025-01-27

## 2025-01-23 RX ORDER — BISACODYL 10 MG
10 SUPPOSITORY, RECTAL RECTAL DAILY PRN
Status: DISCONTINUED | OUTPATIENT
Start: 2025-01-23 | End: 2025-01-27

## 2025-01-23 RX ORDER — MIDAZOLAM HYDROCHLORIDE 1 MG/ML
2 INJECTION, SOLUTION INTRAMUSCULAR; INTRAVENOUS ONCE
Status: COMPLETED | OUTPATIENT
Start: 2025-01-23 | End: 2025-01-23

## 2025-01-23 RX ORDER — MIDAZOLAM HYDROCHLORIDE 1 MG/ML
1 INJECTION, SOLUTION INTRAMUSCULAR; INTRAVENOUS ONCE
Status: COMPLETED | OUTPATIENT
Start: 2025-01-23 | End: 2025-01-23

## 2025-01-23 RX ADMIN — WATER 10 ML: 1 INJECTION INTRAMUSCULAR; INTRAVENOUS; SUBCUTANEOUS at 11:27

## 2025-01-23 RX ADMIN — DEXMEDETOMIDINE HYDROCHLORIDE 1 MCG/KG/HR: 400 INJECTION, SOLUTION INTRAVENOUS at 20:11

## 2025-01-23 RX ADMIN — ACETYLCYSTEINE 4 ML: 200 SOLUTION ORAL; RESPIRATORY (INHALATION) at 15:36

## 2025-01-23 RX ADMIN — HYDROXYZINE HYDROCHLORIDE 25 MG: 25 TABLET ORAL at 20:11

## 2025-01-23 RX ADMIN — IPRATROPIUM BROMIDE AND ALBUTEROL SULFATE 3 ML: 2.5; .5 SOLUTION RESPIRATORY (INHALATION) at 07:42

## 2025-01-23 RX ADMIN — GUAIFENESIN 400 MG: 200 SOLUTION ORAL at 02:36

## 2025-01-23 RX ADMIN — MUPIROCIN 1 APPLICATION: 20 OINTMENT TOPICAL at 20:14

## 2025-01-23 RX ADMIN — SENNOSIDES AND DOCUSATE SODIUM 2 TABLET: 50; 8.6 TABLET ORAL at 20:10

## 2025-01-23 RX ADMIN — ACETYLCYSTEINE 4 ML: 200 SOLUTION ORAL; RESPIRATORY (INHALATION) at 18:57

## 2025-01-23 RX ADMIN — DEXMEDETOMIDINE HYDROCHLORIDE 1.5 MCG/KG/HR: 400 INJECTION, SOLUTION INTRAVENOUS at 04:00

## 2025-01-23 RX ADMIN — MIDAZOLAM HYDROCHLORIDE 1 MG: 1 INJECTION, SOLUTION INTRAMUSCULAR; INTRAVENOUS at 06:36

## 2025-01-23 RX ADMIN — Medication 10 ML: at 20:14

## 2025-01-23 RX ADMIN — MUPIROCIN 1 APPLICATION: 20 OINTMENT TOPICAL at 10:03

## 2025-01-23 RX ADMIN — BUDESONIDE 0.5 MG: 0.5 INHALANT RESPIRATORY (INHALATION) at 07:42

## 2025-01-23 RX ADMIN — ZIPRASIDONE MESYLATE 10 MG: 20 INJECTION, POWDER, LYOPHILIZED, FOR SOLUTION INTRAMUSCULAR at 11:20

## 2025-01-23 RX ADMIN — IPRATROPIUM BROMIDE AND ALBUTEROL SULFATE 3 ML: 2.5; .5 SOLUTION RESPIRATORY (INHALATION) at 15:35

## 2025-01-23 RX ADMIN — ACETAMINOPHEN 650 MG: 325 TABLET ORAL at 20:11

## 2025-01-23 RX ADMIN — CHLORHEXIDINE GLUCONATE 15 ML: 1.2 SOLUTION ORAL at 10:03

## 2025-01-23 RX ADMIN — ACETYLCYSTEINE 4 ML: 200 SOLUTION ORAL; RESPIRATORY (INHALATION) at 07:43

## 2025-01-23 RX ADMIN — HYDROXYZINE HYDROCHLORIDE 25 MG: 25 TABLET ORAL at 06:08

## 2025-01-23 RX ADMIN — FAMOTIDINE 20 MG: 10 INJECTION, SOLUTION INTRAVENOUS at 19:02

## 2025-01-23 RX ADMIN — GUAIFENESIN 400 MG: 200 SOLUTION ORAL at 19:03

## 2025-01-23 RX ADMIN — DEXMEDETOMIDINE HYDROCHLORIDE 1.2 MCG/KG/HR: 400 INJECTION, SOLUTION INTRAVENOUS at 08:24

## 2025-01-23 RX ADMIN — SERTRALINE HYDROCHLORIDE 200 MG: 100 TABLET ORAL at 19:02

## 2025-01-23 RX ADMIN — MIDAZOLAM HYDROCHLORIDE 2 MG: 1 INJECTION, SOLUTION INTRAMUSCULAR; INTRAVENOUS at 02:36

## 2025-01-23 RX ADMIN — IPRATROPIUM BROMIDE AND ALBUTEROL SULFATE 3 ML: 2.5; .5 SOLUTION RESPIRATORY (INHALATION) at 18:57

## 2025-01-23 RX ADMIN — GUAIFENESIN 400 MG: 200 SOLUTION ORAL at 06:08

## 2025-01-23 RX ADMIN — DEXMEDETOMIDINE HYDROCHLORIDE 0.8 MCG/KG/HR: 400 INJECTION, SOLUTION INTRAVENOUS at 14:57

## 2025-01-23 RX ADMIN — ATORVASTATIN CALCIUM 80 MG: 40 TABLET, FILM COATED ORAL at 20:10

## 2025-01-23 RX ADMIN — FAMOTIDINE 20 MG: 10 INJECTION, SOLUTION INTRAVENOUS at 06:08

## 2025-01-23 RX ADMIN — GABAPENTIN 300 MG: 300 CAPSULE ORAL at 19:03

## 2025-01-23 RX ADMIN — BUDESONIDE 0.5 MG: 0.5 INHALANT RESPIRATORY (INHALATION) at 18:57

## 2025-01-23 RX ADMIN — PREDNISONE 40 MG: 20 TABLET ORAL at 14:52

## 2025-01-23 RX ADMIN — Medication 10 ML: at 14:52

## 2025-01-23 RX ADMIN — CHLORHEXIDINE GLUCONATE 15 ML: 1.2 SOLUTION ORAL at 20:10

## 2025-01-23 RX ADMIN — GUAIFENESIN 400 MG: 200 SOLUTION ORAL at 14:52

## 2025-01-23 RX ADMIN — SODIUM CHLORIDE 1000 MG: 900 INJECTION INTRAVENOUS at 20:11

## 2025-01-23 RX ADMIN — IPRATROPIUM BROMIDE AND ALBUTEROL SULFATE 3 ML: 2.5; .5 SOLUTION RESPIRATORY (INHALATION) at 01:04

## 2025-01-23 RX ADMIN — ACETYLCYSTEINE 4 ML: 200 SOLUTION ORAL; RESPIRATORY (INHALATION) at 01:04

## 2025-01-23 RX ADMIN — AZITHROMYCIN DIHYDRATE 500 MG: 500 INJECTION, POWDER, LYOPHILIZED, FOR SOLUTION INTRAVENOUS at 10:03

## 2025-01-23 NOTE — PROGRESS NOTES
Intensive Care Follow-up     Hospital:  LOS: 2 days   Ms. Tessa Bradley, 79 y.o. female is followed for:   Respiratory failure with hypercapnia     Subjective   Interval History:  The chart has been reviewed. No acute events overnight. AM labs with slight bump in renal function noted as well as elevated white count. AM CXR grossly unchanged.     Patient remains on mechanical ventilation. ABG this AM acceptable. Patient in bilateral upper extremity restraints on Precedex but easily arouses to voice and becomes agitated / tries to sit up in bed and grab ETT. Remains afebrile and hemodynamically stable.     ROS unable to be obtained 2* intubated & sedated status.      The patient's past medical, surgical and social history were reviewed and updated in Epic as appropriate.    Objective     Infusions:  dexmedetomidine, 0.2-1.5 mcg/kg/hr, Last Rate: 1.2 mcg/kg/hr (01/23/25 0824)  fentanyl 10 mcg/mL,  mcg/hr, Last Rate: Stopped (01/22/25 1452)      Medications:  acetylcysteine, 4 mL, Nebulization, Q6H - RT  [Held by provider] apixaban, 5 mg, Oral, Q12H  atorvastatin, 80 mg, Oral, Nightly  azithromycin, 500 mg, Intravenous, Daily  budesonide, 0.5 mg, Nebulization, BID - RT  cefTRIAXone, 1,000 mg, Intravenous, Q24H  chlorhexidine, 15 mL, Mouth/Throat, Q12H  famotidine, 20 mg, Intravenous, BID AC  guaifenesin, 400 mg, Oral, Q4H  ipratropium-albuterol, 3 mL, Nebulization, Q6H - RT  mupirocin, 1 Application, Each Nare, BID  predniSONE, 40 mg, Oral, Daily  senna-docusate sodium, 2 tablet, Oral, BID  sodium chloride, 10 mL, Intravenous, Q12H      I reviewed the patient's medications.    Vital Sign Min/Max for last 24 hours  Temp  Min: 97 °F (36.1 °C)  Max: 99.3 °F (37.4 °C)   BP  Min: 106/52  Max: 167/85   Pulse  Min: 62  Max: 75   Resp  Min: 18  Max: 19   SpO2  Min: 92 %  Max: 98 %   No data recorded       Input/Output for last 24 hour shift  01/22 0701 - 01/23 0700  In: 2592.8 [I.V.:2124.8]  Out: 391 [Urine:391]   Mode:  VC+/AC  FiO2 (%):  [40 %] 40 %  S RR:  [18] 18  S VT:  [370 mL] 370 mL  PEEP/CPAP (cm H2O):  [5 cm H20] 5 cm H20  MAP (cm H2O):  [8.4-11] 11    Physical Exam:  GENERAL: Female resting supine in bed on mechanical ventilation. No acute distress.   HEENT: Normocephalic and atraumatic. Trachea midline. PER. EOM WNL. ETT in place on mechanical ventilation.    LUNGS: Chest rise of normal depth and symmetric, assisted mechanically. Lungs clear to auscultation bilaterally. No wheezes, rhonchi, or rales.   HEART: S1,S2 detected. Regular rate and rhythm. No rub, murmur, or gallop.   ABDOMEN / GI / : Soft, round, nondistended, and nontender. Bowel sounds present. Urinary catheter in place, draining appropriately.    EXTREMITIES: No clubbing, edema, or cyanosis. Peripheral pulses present. Skin warm and dry.    NEURO/PSYCH: Sedated, intermittently agitated upon awaking. CASAS.      Results from last 7 days   Lab Units 01/23/25  0255 01/22/25  0418 01/21/25  1418   WBC 10*3/mm3 12.33* 3.98 6.47   HEMOGLOBIN g/dL 12.4 12.7 12.8   PLATELETS 10*3/mm3 172 156 181     Results from last 7 days   Lab Units 01/23/25  0255 01/22/25  0418 01/21/25  1418   SODIUM mmol/L 138 143  143 146*   POTASSIUM mmol/L 4.0 4.5  4.5 4.6   CO2 mmol/L 32.0* 23.0  29.0 42.0*   BUN mg/dL 23 14  12 10   CREATININE mg/dL 1.12* 0.95  0.96 0.84   MAGNESIUM mg/dL 2.2 2.4  2.1 1.9   PHOSPHORUS mg/dL 2.3* 2.9  2.5  --    GLUCOSE mg/dL 151* 134*  122* 130*     Estimated Creatinine Clearance: 40.9 mL/min (A) (by C-G formula based on SCr of 1.12 mg/dL (H)).    Results from last 7 days   Lab Units 01/23/25  0330   PH, ARTERIAL pH units 7.542*   PCO2, ARTERIAL mm Hg 41.1   PO2 ART mm Hg 70.3*     I reviewed the patient's new clinical results.  I reviewed the patient's new imaging results/reports including actual images and agree with reports.     Imaging Results (Last 24 Hours)       Procedure Component Value Units Date/Time    XR Chest 1 View [[id number]]  Collected: 01/23/25 0727     Updated: 01/23/25 0732    Narrative:      XR CHEST 1 VW    Date of Exam: 1/23/2025 1:17 AM EST    Indication: Intubated Patient    Comparison: 1/22/2025    Findings:  Endotracheal tube. Nasogastric tube. Cardiac size is similar to prior exam. Similar postsurgical changes of the right upper lung. Similar left basilar opacity and likely trace left pleural effusion. No pneumothorax.. No evidence of acute osseous   abnormalities. Visualized upper abdomen is unremarkable.      Impression:      Impression:  1.Similar left basilar opacity and likely trace left pleural effusion.  2.Similar postsurgical changes of the right upper lung.        Electronically Signed: Sergio Barnes MD    1/23/2025 7:29 AM EST    Workstation ID: [id number]          Assessment & Plan   Impression      Respiratory failure with hypercapnia    COPD (chronic obstructive pulmonary disease)    MAKAYLA (obstructive sleep apnea)    HTN (hypertension)    HLD (hyperlipidemia)    A-fib    Chronic respiratory failure with hypoxia and hypercapnia    Acute on chronic hypoxic respiratory failure    History of Hodgkin's disease    Stage 3b chronic kidney disease    Acute on chronic respiratory failure with hypoxia and hypercapnia    79 yoF with PMH of COPD on home O2, prior RUL lobectomy for adenocarcinoma at [facility], Afib (on Eliquis), HTN, dyslipidemia, depression, and TIA who presented to [facility] on 1/21/25 with altered mental status. Her significant other is also admitted to [facility]. She was found at home by a neighbor and was noted to be encephalopathic. In the ED, she was found to be hypercapnic which did not improve with breathing treatments and was subsequently intubated with reduction in pCO2.     She was admitted to the ICU for ongoing care. She has been started on Rocephin and Azithromycin as well as Prednisone 40 mg.      Plan        Perform trial of extubation.   Continue Prednisone 40 mg daily.   Continue Rocephin and  Azithromycin. Urine culture with E. Coli.   Will hold Eliquis as it is not clear that she was taking this and she has not had Afib since arrival. Plan to resume if she develops Afib or if she can confirm that she was taking this when she is no longer on the vent.   Continue tube feeding.   AM labs and CXR.      DVT Prophylaxis: SCDs  GI Prophylaxis: Pepcid   Dispo: Monitor in ICU     Time spent: 37 minutes   Plan of care and goals reviewed with multidisciplinary/antibiotic stewardship team during rounds.   I discussed the patient's findings and my recommendations with patient and nursing staff     Elizabeth Herrmann, DNP, APRN, AGACNP-BC  Pulmonary and Critical Care Medicine

## 2025-01-23 NOTE — PLAN OF CARE
Goal Outcome Evaluation:           Progress: improving     Pt extubated  Currently on 2L nc, EtCO2 40-50  Geodon given once for agitation   Precedex titrated down from 1.5 to 0.6  Restraints continue   Cortrak placed and tube feeding continued   Spouse updated over the phone

## 2025-01-23 NOTE — PLAN OF CARE
Problem: Adult Inpatient Plan of Care  Goal: Plan of Care Review  Outcome: Progressing  Flowsheets (Taken 1/23/2025 1421)  Progress: no change  Outcome Evaluation: No acute events overnight. NSR on monitor, MAP > 65, afebrile. Remains on mechanical ventilation at FiO2 40%. Mental status remains tenuous, able to open eyes spontaneously however becomes very agitated (sitting up in bed, thrashing arms and head, unable to be redirected) and unable to follow commands. Precedex at max dose and required Versed IVP x 1 and atarax PRN x 1. UOP slightly improved, 238mL for shift. 0 BMs. BUE restraints remain in place for patient safety. LR continues @ 75mL/hr, TF @ 20mL/hr w/ no s/s intolerance.  Plan of Care Reviewed With: patient

## 2025-01-24 ENCOUNTER — APPOINTMENT (OUTPATIENT)
Dept: GENERAL RADIOLOGY | Facility: HOSPITAL | Age: 80
End: 2025-01-24
Payer: MEDICARE

## 2025-01-24 LAB
ANION GAP SERPL CALCULATED.3IONS-SCNC: 7 MMOL/L (ref 5–15)
ARTERIAL PATENCY WRIST A: ABNORMAL
ATMOSPHERIC PRESS: ABNORMAL MM[HG]
BASE EXCESS BLDA CALC-SCNC: 7.8 MMOL/L (ref 0–2)
BDY SITE: ABNORMAL
BODY TEMPERATURE: 37
BUN SERPL-MCNC: 23 MG/DL (ref 8–23)
BUN/CREAT SERPL: 24.5 (ref 7–25)
CALCIUM SPEC-SCNC: 9.2 MG/DL (ref 8.6–10.5)
CHLORIDE SERPL-SCNC: 99 MMOL/L (ref 98–107)
CO2 BLDA-SCNC: 37.4 MMOL/L (ref 22–33)
CO2 SERPL-SCNC: 32 MMOL/L (ref 22–29)
COHGB MFR BLD: 1.5 % (ref 0–2)
CREAT SERPL-MCNC: 0.94 MG/DL (ref 0.57–1)
DEPRECATED RDW RBC AUTO: 58.8 FL (ref 37–54)
EGFRCR SERPLBLD CKD-EPI 2021: 62.6 ML/MIN/1.73
EPAP: 0
ERYTHROCYTE [DISTWIDTH] IN BLOOD BY AUTOMATED COUNT: 16.7 % (ref 12.3–15.4)
GLUCOSE BLDC GLUCOMTR-MCNC: 108 MG/DL (ref 70–130)
GLUCOSE BLDC GLUCOMTR-MCNC: 128 MG/DL (ref 70–130)
GLUCOSE BLDC GLUCOMTR-MCNC: 130 MG/DL (ref 70–130)
GLUCOSE BLDC GLUCOMTR-MCNC: 135 MG/DL (ref 70–130)
GLUCOSE SERPL-MCNC: 154 MG/DL (ref 65–99)
HCO3 BLDA-SCNC: 35.5 MMOL/L (ref 20–26)
HCT VFR BLD AUTO: 44.5 % (ref 34–46.6)
HCT VFR BLD CALC: 38.3 % (ref 38–51)
HGB BLD-MCNC: 12.7 G/DL (ref 12–15.9)
HGB BLDA-MCNC: 12.5 G/DL (ref 14–18)
INHALED O2 CONCENTRATION: 32 %
IPAP: 0
MAGNESIUM SERPL-MCNC: 2.2 MG/DL (ref 1.6–2.4)
MCH RBC QN AUTO: 27.5 PG (ref 26.6–33)
MCHC RBC AUTO-ENTMCNC: 28.5 G/DL (ref 31.5–35.7)
MCV RBC AUTO: 96.5 FL (ref 79–97)
METHGB BLD QL: 0.3 % (ref 0–1.5)
MODALITY: ABNORMAL
OXYHGB MFR BLDV: 91.7 % (ref 94–99)
PAW @ PEAK INSP FLOW SETTING VENT: 0 CMH2O
PCO2 BLDA: 63.5 MM HG (ref 35–45)
PCO2 TEMP ADJ BLD: 63.5 MM HG (ref 35–45)
PH BLDA: 7.36 PH UNITS (ref 7.35–7.45)
PH, TEMP CORRECTED: 7.36 PH UNITS
PHOSPHATE SERPL-MCNC: 3.6 MG/DL (ref 2.5–4.5)
PLATELET # BLD AUTO: 171 10*3/MM3 (ref 140–450)
PMV BLD AUTO: 10.8 FL (ref 6–12)
PO2 BLDA: 71 MM HG (ref 83–108)
PO2 TEMP ADJ BLD: 71 MM HG (ref 83–108)
POTASSIUM SERPL-SCNC: 4.7 MMOL/L (ref 3.5–5.2)
RBC # BLD AUTO: 4.61 10*6/MM3 (ref 3.77–5.28)
SODIUM SERPL-SCNC: 138 MMOL/L (ref 136–145)
TOTAL RATE: 0 BREATHS/MINUTE
WBC NRBC COR # BLD AUTO: 15.97 10*3/MM3 (ref 3.4–10.8)

## 2025-01-24 PROCEDURE — 99233 SBSQ HOSP IP/OBS HIGH 50: CPT | Performed by: INTERNAL MEDICINE

## 2025-01-24 PROCEDURE — 85027 COMPLETE CBC AUTOMATED: CPT | Performed by: NURSE PRACTITIONER

## 2025-01-24 PROCEDURE — 84100 ASSAY OF PHOSPHORUS: CPT | Performed by: NURSE PRACTITIONER

## 2025-01-24 PROCEDURE — 83735 ASSAY OF MAGNESIUM: CPT | Performed by: NURSE PRACTITIONER

## 2025-01-24 PROCEDURE — 25010000002 CEFTRIAXONE PER 250 MG: Performed by: NURSE PRACTITIONER

## 2025-01-24 PROCEDURE — 94799 UNLISTED PULMONARY SVC/PX: CPT

## 2025-01-24 PROCEDURE — 82805 BLOOD GASES W/O2 SATURATION: CPT

## 2025-01-24 PROCEDURE — 71045 X-RAY EXAM CHEST 1 VIEW: CPT

## 2025-01-24 PROCEDURE — 25010000002 HEPARIN (PORCINE) PER 1000 UNITS: Performed by: INTERNAL MEDICINE

## 2025-01-24 PROCEDURE — 63710000001 PREDNISONE PER 1 MG: Performed by: INTERNAL MEDICINE

## 2025-01-24 PROCEDURE — 83050 HGB METHEMOGLOBIN QUAN: CPT

## 2025-01-24 PROCEDURE — 25810000003 SODIUM CHLORIDE 0.9 % SOLUTION 250 ML FLEX CONT: Performed by: NURSE PRACTITIONER

## 2025-01-24 PROCEDURE — 25010000002 DIPHENHYDRAMINE PER 50 MG

## 2025-01-24 PROCEDURE — 25010000002 HALOPERIDOL LACTATE PER 5 MG

## 2025-01-24 PROCEDURE — 94664 DEMO&/EVAL PT USE INHALER: CPT

## 2025-01-24 PROCEDURE — 36600 WITHDRAWAL OF ARTERIAL BLOOD: CPT

## 2025-01-24 PROCEDURE — 80048 BASIC METABOLIC PNL TOTAL CA: CPT | Performed by: NURSE PRACTITIONER

## 2025-01-24 PROCEDURE — 25010000002 AZITHROMYCIN PER 500 MG: Performed by: NURSE PRACTITIONER

## 2025-01-24 PROCEDURE — 82948 REAGENT STRIP/BLOOD GLUCOSE: CPT

## 2025-01-24 PROCEDURE — 82375 ASSAY CARBOXYHB QUANT: CPT

## 2025-01-24 RX ORDER — QUETIAPINE FUMARATE 50 MG/1
50 TABLET, EXTENDED RELEASE ORAL 2 TIMES DAILY
Status: DISCONTINUED | OUTPATIENT
Start: 2025-01-24 | End: 2025-01-24

## 2025-01-24 RX ORDER — HALOPERIDOL 5 MG/ML
5 INJECTION INTRAMUSCULAR ONCE
Status: COMPLETED | OUTPATIENT
Start: 2025-01-24 | End: 2025-01-24

## 2025-01-24 RX ORDER — QUETIAPINE FUMARATE 25 MG/1
50 TABLET, FILM COATED ORAL EVERY 12 HOURS SCHEDULED
Status: DISCONTINUED | OUTPATIENT
Start: 2025-01-24 | End: 2025-01-25

## 2025-01-24 RX ORDER — HEPARIN SODIUM 5000 [USP'U]/ML
5000 INJECTION, SOLUTION INTRAVENOUS; SUBCUTANEOUS EVERY 8 HOURS SCHEDULED
Status: DISCONTINUED | OUTPATIENT
Start: 2025-01-24 | End: 2025-01-28

## 2025-01-24 RX ORDER — DIPHENHYDRAMINE HYDROCHLORIDE 50 MG/ML
25 INJECTION INTRAMUSCULAR; INTRAVENOUS ONCE
Status: COMPLETED | OUTPATIENT
Start: 2025-01-24 | End: 2025-01-24

## 2025-01-24 RX ADMIN — HEPARIN SODIUM 5000 UNITS: 5000 INJECTION INTRAVENOUS; SUBCUTANEOUS at 23:52

## 2025-01-24 RX ADMIN — DEXMEDETOMIDINE HYDROCHLORIDE 1.5 MCG/KG/HR: 400 INJECTION, SOLUTION INTRAVENOUS at 10:44

## 2025-01-24 RX ADMIN — IPRATROPIUM BROMIDE AND ALBUTEROL SULFATE 3 ML: 2.5; .5 SOLUTION RESPIRATORY (INHALATION) at 11:50

## 2025-01-24 RX ADMIN — HYDROXYZINE HYDROCHLORIDE 25 MG: 25 TABLET ORAL at 04:23

## 2025-01-24 RX ADMIN — Medication 10 ML: at 20:12

## 2025-01-24 RX ADMIN — DEXMEDETOMIDINE HYDROCHLORIDE 1.5 MCG/KG/HR: 400 INJECTION, SOLUTION INTRAVENOUS at 06:21

## 2025-01-24 RX ADMIN — ATORVASTATIN CALCIUM 80 MG: 40 TABLET, FILM COATED ORAL at 20:12

## 2025-01-24 RX ADMIN — QUETIAPINE FUMARATE 50 MG: 25 TABLET ORAL at 20:12

## 2025-01-24 RX ADMIN — DEXMEDETOMIDINE HYDROCHLORIDE 1.2 MCG/KG/HR: 400 INJECTION, SOLUTION INTRAVENOUS at 02:36

## 2025-01-24 RX ADMIN — FAMOTIDINE 20 MG: 10 INJECTION, SOLUTION INTRAVENOUS at 08:31

## 2025-01-24 RX ADMIN — HEPARIN SODIUM 5000 UNITS: 5000 INJECTION INTRAVENOUS; SUBCUTANEOUS at 15:32

## 2025-01-24 RX ADMIN — MUPIROCIN 1 APPLICATION: 20 OINTMENT TOPICAL at 08:38

## 2025-01-24 RX ADMIN — Medication 10 ML: at 08:31

## 2025-01-24 RX ADMIN — IPRATROPIUM BROMIDE AND ALBUTEROL SULFATE 3 ML: 2.5; .5 SOLUTION RESPIRATORY (INHALATION) at 00:20

## 2025-01-24 RX ADMIN — ACETAMINOPHEN 650 MG: 325 TABLET ORAL at 04:23

## 2025-01-24 RX ADMIN — DEXMEDETOMIDINE HYDROCHLORIDE 1.5 MCG/KG/HR: 400 INJECTION, SOLUTION INTRAVENOUS at 19:44

## 2025-01-24 RX ADMIN — CHLORHEXIDINE GLUCONATE 15 ML: 1.2 SOLUTION ORAL at 20:12

## 2025-01-24 RX ADMIN — GABAPENTIN 300 MG: 300 CAPSULE ORAL at 08:31

## 2025-01-24 RX ADMIN — IPRATROPIUM BROMIDE AND ALBUTEROL SULFATE 3 ML: 2.5; .5 SOLUTION RESPIRATORY (INHALATION) at 09:34

## 2025-01-24 RX ADMIN — PREDNISONE 40 MG: 20 TABLET ORAL at 08:31

## 2025-01-24 RX ADMIN — BUDESONIDE 0.5 MG: 0.5 INHALANT RESPIRATORY (INHALATION) at 20:17

## 2025-01-24 RX ADMIN — SERTRALINE HYDROCHLORIDE 200 MG: 100 TABLET ORAL at 08:31

## 2025-01-24 RX ADMIN — DEXMEDETOMIDINE HYDROCHLORIDE 1.5 MCG/KG/HR: 400 INJECTION, SOLUTION INTRAVENOUS at 15:32

## 2025-01-24 RX ADMIN — QUETIAPINE FUMARATE 50 MG: 25 TABLET ORAL at 12:52

## 2025-01-24 RX ADMIN — IPRATROPIUM BROMIDE AND ALBUTEROL SULFATE 3 ML: 2.5; .5 SOLUTION RESPIRATORY (INHALATION) at 20:17

## 2025-01-24 RX ADMIN — FAMOTIDINE 20 MG: 10 INJECTION, SOLUTION INTRAVENOUS at 16:41

## 2025-01-24 RX ADMIN — SENNOSIDES AND DOCUSATE SODIUM 2 TABLET: 50; 8.6 TABLET ORAL at 08:31

## 2025-01-24 RX ADMIN — HALOPERIDOL LACTATE 5 MG: 5 INJECTION, SOLUTION INTRAMUSCULAR at 01:45

## 2025-01-24 RX ADMIN — ACETAMINOPHEN 650 MG: 325 TABLET ORAL at 17:40

## 2025-01-24 RX ADMIN — AZITHROMYCIN DIHYDRATE 500 MG: 500 INJECTION, POWDER, LYOPHILIZED, FOR SOLUTION INTRAVENOUS at 08:31

## 2025-01-24 RX ADMIN — SENNOSIDES AND DOCUSATE SODIUM 2 TABLET: 50; 8.6 TABLET ORAL at 20:12

## 2025-01-24 RX ADMIN — BUDESONIDE 0.5 MG: 0.5 INHALANT RESPIRATORY (INHALATION) at 09:35

## 2025-01-24 RX ADMIN — DEXMEDETOMIDINE HYDROCHLORIDE 1.5 MCG/KG/HR: 400 INJECTION, SOLUTION INTRAVENOUS at 23:52

## 2025-01-24 RX ADMIN — MUPIROCIN 1 APPLICATION: 20 OINTMENT TOPICAL at 20:23

## 2025-01-24 RX ADMIN — DIPHENHYDRAMINE HYDROCHLORIDE 25 MG: 50 INJECTION INTRAMUSCULAR; INTRAVENOUS at 01:45

## 2025-01-24 RX ADMIN — SODIUM CHLORIDE 1000 MG: 900 INJECTION INTRAVENOUS at 20:12

## 2025-01-24 NOTE — PROGRESS NOTES
INTENSIVIST   PROGRESS NOTE     Hospital:  LOS: 3 days     Ms. Tessa Bradley, 79 y.o. female is followed for a Chief Complaint of: Respiratory Failure      Subjective   S     Interval History:  Mentation remains poor. She kicks and hits at people when Precedex is weaned.        The patient's relevant past medical, surgical and social history were reviewed and updated in Epic as appropriate.      ROS: Unable to obtain secondary to mental status.     Objective   O     Vitals:  Temp  Min: 95.9 °F (35.5 °C)  Max: 100 °F (37.8 °C)  BP  Min: 115/94  Max: 152/105  Pulse  Min: 68  Max: 117  Resp  Min: 16  Max: 28  SpO2  Min: 61 %  Max: 100 % Flow (L/min) (Oxygen Therapy)  Min: 2  Max: 4    Intake/Ouptut 24 hrs (7:00AM - 6:59 AM)  Intake & Output (last 3 days)         01/19 0701  01/20 0700 01/20 0701  01/21 0700 01/21 0701  01/22 0700 01/22 0701 01/23 0700    I.V. (mL/kg)   1007.5 (16.3) 562.5 (9.1)    NG/GT   85 60    IV Piggyback   250     Total Intake(mL/kg)   1342.5 (21.7) 622.5 (10.1)    Urine (mL/kg/hr)   390 79 (0.2)    Total Output   390 79    Net   +952.5 +543.5                    Medications (drips):  dexmedetomidine, Last Rate: 1.5 mcg/kg/hr (01/24/25 1532)        Mechanical Ventilator Settings:          Resp Rate (Set): 18  Pressure Support (cm H2O): 10 cm H20  FiO2 (%): 40 %  PEEP/CPAP (cm H2O): 5 cm H20    Minute Ventilation (L/min) (Obs): 3.3 L/min  Resp Rate (Observed) Vent: 51  I:E Ratio (Set): 1:3.50  I:E Ratio (Obs): 1:3.6    PIP Observed (cm H2O): 15 cm H2O  Plateau Pressure (cm H2O): 23 cm H2O    Physical Examination  Telemetry:  Normal sinus rhythm.    Constitutional:  No acute distress.  Resting in bed on nasal cannula.    Eyes: No scleral icterus.   PERRL, EOM intact.    Neck:  Supple, FROM   Cardiovascular: Normal rate, regular and rhythm. Normal heart sounds.  No murmurs, gallop or rub.   Respiratory: No respiratory distress. Normal respiratory effort.  Diminished. No wheezing.    Abdominal:   Soft. No masses. Nontender. No distension. No HSM.   Extremities: No digital cyanosis. No clubbing.  No peripheral edema.   Skin: No rashes, lesions or ulcers   Neurological:   Sedated. Agitated with weaning sedation.              Results from last 7 days   Lab Units 01/24/25  0328 01/23/25  0255 01/22/25 0418   WBC 10*3/mm3 15.97* 12.33* 3.98   HEMOGLOBIN g/dL 12.7 12.4 12.7   MCV fL 96.5 94.2 103.3*   PLATELETS 10*3/mm3 171 172 156     Results from last 7 days   Lab Units 01/24/25  0328 01/23/25  0255 01/22/25  0418   SODIUM mmol/L 138 138 143  143   POTASSIUM mmol/L 4.7 4.0 4.5  4.5   CO2 mmol/L 32.0* 32.0* 23.0  29.0   CREATININE mg/dL 0.94 1.12* 0.95  0.96   GLUCOSE mg/dL 154* 151* 134*  122*   MAGNESIUM mg/dL 2.2 2.2 2.4  2.1   PHOSPHORUS mg/dL 3.6 2.3* 2.9  2.5     Estimated Creatinine Clearance: 48.7 mL/min (by C-G formula based on SCr of 0.94 mg/dL).  Results from last 7 days   Lab Units 01/22/25  0418 01/21/25  1418   ALK PHOS U/L 86 81   BILIRUBIN mg/dL 0.6 0.6   ALT (SGPT) U/L 7 6   AST (SGOT) U/L 14 15       Results from last 7 days   Lab Units 01/24/25  1204 01/23/25  0330 01/22/25  0421 01/21/25  1929 01/21/25  1811   PH, ARTERIAL pH units 7.355 7.542* 7.434 7.423 7.177*   PCO2, ARTERIAL mm Hg 63.5* 41.1 52.1* 61.4*  --    PO2 ART mm Hg 71.0* 70.3* 59.6* 116.0* 175.0*   FIO2 % 32 40 40 60 60       Images:  Imaging Results (Last 24 Hours)       Procedure Component Value Units Date/Time    XR Chest 1 View [[id number]] Collected: 01/24/25 0736     Updated: 01/24/25 0741    Narrative:      XR CHEST 1 VW    Date of Exam: 1/24/2025 2:40 AM EST    Indication: Intubated Patient    Comparison:  1/23/2025    Findings:  There appears to have been interval extubation. Enteric tube extends into the left upper quadrant, as before.    Mild increase in bibasilar opacities. Probable small bilateral pleural effusions. Postsurgical changes in the right upper lobe, as before. No definite pneumothorax. Heart size  appears at the upper limits of normal, as before.      Impression:      Impression:  1.Interval extubation.  2.Mild increase in bibasilar opacities which could represent atelectasis or edema.  3.Probable small pleural effusions.  4.Postsurgical changes in the right lung.          Electronically Signed: Piotr Mishra    1/24/2025 7:38 AM EST    Workstation ID: [id number]               Results: Reviewed.  I reviewed the patient's new laboratory and imaging results.  I independently reviewed the patient's new images.    Medications: Reviewed.    Assessment & Plan   A / P     Ms. Bradley is a 78yo F with a history of COPD on home O2, [facility], Afib (on Eliquis), HLD, Depression, HTN, and TIA who presented to [facility] on 1/21/25 with altered mental status. Her significant other is also admitted to [facility]. She was found at home by a neighbor and was noted to be encephalopathic. In the ED, she was found to be hypercapnic and did not improve with breathing treatments and was intubated with subsequent improvement in pCO2.     She was admitted to the ICU for ongoing care. She has been stared on Rocephin and Azithromycin as well as Prednisone 40mg.     She was extubated on 1/23/25. She has remained agitated since extubation.     Nutrition:   NPO Diet NPO Type: Tube Feeding  Advance Directives:   Code Status and Medical Interventions: CPR (Attempt to Resuscitate); Full Support   Ordered at: 01/21/25 2021     Code Status (Patient has no pulse and is not breathing):    CPR (Attempt to Resuscitate)     Medical Interventions (Patient has pulse or is breathing):    Full Support       Active Hospital Problems    Diagnosis     **Respiratory failure with hypercapnia     Acute on chronic respiratory failure with hypoxia and hypercapnia     History of Hodgkin's disease     Acute on chronic hypoxic respiratory failure     Stage 3b chronic kidney disease     Chronic respiratory failure with hypoxia and hypercapnia      HTN (hypertension)     HLD (hyperlipidemia)     A-fib     MAKAYLA (obstructive sleep apnea)     COPD (chronic obstructive pulmonary disease)        Assessment / Plan:    Bipap at night as tolerated. At risk for repeat hypercapnia requiring reintubation.   Continue Precedex. Add Seroquel.   Continue Rocephin and Azithromycin. Urine culture with E. Coli.   Continue Prednisone 40mg daily.   Will hold Eliquis as it is not clear that she was taking this and she has not had Afib since arrival. Plan to resume if she develops Afib or if she can confirm that she was taking this when she is no longer on the vent.   Continue tube feeds.   AM labs    High risk secondary to delirium with chronic respiratory failure.     High level of risk due to:  severe exacerbation of chronic illness and illness with threat to life or bodily function.    Plan of care and goals reviewed during interdisciplinary rounds.  I discussed the patient's findings and my recommendations with nursing staff      Ermelinda Wise, DO    Intensive Care Medicine and Pulmonary Medicine

## 2025-01-24 NOTE — PLAN OF CARE
Goal Outcome Evaluation:    -Patient remains confused, requires frequent redirection  -Can get very agitated and restless, alternates between this and drowsy  -Bilateral soft wrist restraints in pace for safety, protection of lines  -Precedex @ 1.5  -TF Running per order   -, no BM

## 2025-01-24 NOTE — PROGRESS NOTES
Clinical Nutrition     Patient Name: Tessa Bradley  YOB: 1945  MRN: [id number]  Date of Encounter: 01/24/25 13:47 EST  Admission date: 1/21/2025  Reason for Visit: MDR, Follow-up protocol, EN    Assessment   Nutrition Assessment   Admission Diagnosis:  Respiratory failure with hypercapnia [J96.92]    Problem List:    Respiratory failure with hypercapnia    COPD (chronic obstructive pulmonary disease)    MAKAYLA (obstructive sleep apnea)    HTN (hypertension)    HLD (hyperlipidemia)    A-fib    Chronic respiratory failure with hypoxia and hypercapnia    Acute on chronic hypoxic respiratory failure    History of Hodgkin's disease    Stage 3b chronic kidney disease    Acute on chronic respiratory failure with hypoxia and hypercapnia      PMH:   She  has a past medical history of Atrial fibrillation (2010), Cancer, Cluster headache (1995), COPD (chronic obstructive pulmonary disease), Difficulty walking (2022), Fibromyalgia, primary, Hyperlipidemia, Hypertension, Peripheral neuropathy (2022), and TIA (transient ischemic attack) (2010).    PSH:  She  has a past surgical history that includes Abdominal surgery; Oophorectomy; and Hysterectomy.    Applicable Nutrition History:   ARF/VENT 1-21-25  Extubated (1/23)     Labs    Labs Reviewed: Yes    Results from last 7 days   Lab Units 01/24/25  0328 01/23/25  0255 01/22/25  0418 01/21/25  1418   GLUCOSE mg/dL 154* 151* 134*  122* 130*   BUN mg/dL 23 23 14  12 10   CREATININE mg/dL 0.94 1.12* 0.95  0.96 0.84   SODIUM mmol/L 138 138 143  143 146*   CHLORIDE mmol/L 99 99 99  99 100   POTASSIUM mmol/L 4.7 4.0 4.5  4.5 4.6   PHOSPHORUS mg/dL 3.6 2.3* 2.9  2.5  --    MAGNESIUM mg/dL 2.2 2.2 2.4  2.1 1.9   ALT (SGPT) U/L  --   --  7 6   LACTATE mmol/L  --   --   --  0.6       Results from last 7 days   Lab Units 01/22/25  1713 01/22/25  0418 01/21/25  1418   ALBUMIN g/dL  --  3.7 3.6   PREALBUMIN mg/dL 6.5*  --   --    CRP mg/dL  --  3.06*  --     CHOLESTEROL mg/dL  --  198  --    TRIGLYCERIDES mg/dL  --  92  --        Results from last 7 days   Lab Units 01/24/25  1229 01/24/25  0530 01/23/25  2331 01/23/25  1807 01/23/25  1122 01/23/25  0547   GLUCOSE mg/dL 130 128 199* 132* 204* 135*     Lab Results   Lab Value Date/Time    HGBA1C 5.80 (H) 01/22/2025 0418    HGBA1C 5.80 (H) 07/12/2024 1352         Results from last 7 days   Lab Units 01/22/25  0418   PROBNP pg/mL 6,151.0*       Medications    Medications Reviewed: yes    Scheduled Meds:[Held by provider] apixaban, 5 mg, Oral, Q12H  atorvastatin, 80 mg, Nasogastric, Nightly  budesonide, 0.5 mg, Nebulization, BID - RT  cefTRIAXone, 1,000 mg, Intravenous, Q24H  chlorhexidine, 15 mL, Mouth/Throat, Q12H  famotidine, 20 mg, Intravenous, BID AC  gabapentin, 300 mg, Per G Tube, Daily  heparin (porcine), 5,000 Units, Subcutaneous, Q8H  ipratropium-albuterol, 3 mL, Nebulization, Q6H - RT  mupirocin, 1 Application, Each Nare, BID  predniSONE, 40 mg, Nasogastric, Daily  QUEtiapine, 50 mg, Nasogastric, Q12H  senna-docusate sodium, 2 tablet, Nasogastric, BID  sertraline, 200 mg, Per G Tube, Daily  sodium chloride, 10 mL, Intravenous, Q12H      Continuous Infusions:dexmedetomidine, 0.2-1.5 mcg/kg/hr, Last Rate: 1.5 mcg/kg/hr (01/24/25 1044)      PRN Meds:.  acetaminophen **OR** acetaminophen    senna-docusate sodium **AND** polyethylene glycol **AND** [DISCONTINUED] bisacodyl **AND** bisacodyl    Calcium Replacement - Follow Nurse / BPA Driven Protocol    hydrOXYzine    ipratropium-albuterol    Magnesium Cardiology Dose Replacement - Follow Nurse / BPA Driven Protocol    nitroglycerin    [DISCONTINUED] ondansetron ODT **OR** ondansetron    Phosphorus Replacement - Follow Nurse / BPA Driven Protocol    Potassium Replacement - Follow Nurse / BPA Driven Protocol    sodium chloride    sodium chloride    sodium chloride    Intake/Ouptut 24 hrs (0701 - 0700)   I&O's Reviewed: yes    Intake & Output (last day)         01/23  "0701  01/24 0700 01/24 0701  01/25 0700    I.V. (mL/kg) 1451 (23.6) 69 (1.1)    Other 470 200    NG/ 265    IV Piggyback 250 250    Total Intake(mL/kg) 2691 (43.7) 784 (12.7)    Urine (mL/kg/hr) 1445 (1) 285 (0.7)    Total Output 1445 285    Net +1246 +499                 Anthropometrics     Height: Height: 160 cm (62.99\")  Last Filed Weight: Weight: 61.6 kg (135 lb 12.9 oz) (01/23/25 0600)  Method: Weight Method: Bed scale  BMI: BMI (Calculated): 24.1    UBW: ?  Weight change:  per EMR ~ 31lb wt loss over past years     Weight       Weight (kg) Weight (lbs) Weight Method Visit Report   8/24/2023 70.308 kg  155 lb       71.5 kg  157 lb 10.1 oz      8/25/2023 71.215 kg  157 lb      9/6/2023 70.761 kg  156 lb  Stated     9/7/2023 73.846 kg  162 lb 12.8 oz      9/18/2023 69.4 kg  153 lb  Stated     9/19/2023 76.204 kg  168 lb      9/27/2023 76.703 kg  169 lb 1.6 oz   --    10/10/2023 73.936 kg  163 lb  Stated     12/20/2023 75.297 kg  166 lb   --    12/27/2023 75.751 kg  167 lb   --    3/27/2024 73.256 kg  161 lb 8 oz   --    7/12/2024 67.132 kg  148 lb  Stated     7/14/2024   Stated     8/1/2024 67.132 kg  148 lb   --    1/21/2025 64.728 kg  142 lb 11.2 oz  Bed scale      61.8 kg  136 lb 3.9 oz  Bed scale         Nutrition Focused Physical Exam     Date:     Unable to perform due to Pt unable to participate at time of visit     Subjective   Reported/Observed/Food/Nutrition Related History:   1/23  Extubated yesterday.  Requiring restrains and precedex drip due to confusion and agitation.      Patient mumbling at time of visit, did not look at RD calling name of answer any questions. EN infusing @ bedside.     No documented BM.      1/22  Pt resting in bed, startles to voice, restless  + precedex, LR@75ml/hr  Per RN: pt found down at home, weaning sedation, pt is moving around, hard to tell if she follows commands, poor UOP  Per MD: ok to feed, plan to wean off IVF's once TF at goal    Needs Assessment   Date: " "25    Height used:Height: 160 cm (62.99\")  Weights used: 136lb/ 62kg    Estimated Calorie needs: ~1500kcal (post extubation)   Method:  MSJ x 1.2: 1289kcal  Method:  25Kcals/Kkcal    Estimated Protein needs: ~84g protein  Method: 1.2-1.5g protein per k-93g protein      Current Nutrition Prescription     PO: NPO Diet NPO Type: Tube Feeding  Oral Nutrition Supplement:  Intake: N/A    EN: Peptamen AF  Goal Rate: 55ml/hr  Water Flushes: 25ml/hr  Modular: None  Route: NG  Tube: Small bore    At goal over: 20Hrs/day     Rx will supply:   Goal Volume 1100  mL/day     Flush Volume 500 mL/day     Energy 1320 Kcal/day 86 % Est Need   Protein 84 g/day 100 % Est Need   Fiber 7 g/day     Water in   mL     Total Water 1392 mL     Meet DRI No        --------------------------------------------------------------------------  Product/Rate verified at bedside: Yes  Infusing Rate at time of visit: 40ml/hr; water @ 25ml/hr     Average Delivery from [facility] Day:   Volume 520 mL/day 47  % Goal Vol.   Flush Volume 200 mL/day     Energy  Kcal/day  % Est Need   Protein  g/day  % Est Need   Fiber  g/day     Water in  EN  mL     Total Water  mL     Meet DRI N/A          Assessment & Plan   Nutrition Diagnosis   Date: 25 Updated:   Problem Inadequate energy intake    Etiology ARF/VENT   Signs/Symptoms NPO   Status: New EN started       Goal:   Nutrition to support treatment and Establish EN tolerance, Adjust EN      Nutrition Intervention      Follow treatment progress, Care plan reviewed, Nutrition support order placed    To better meet needs, will adjust EN to the following:  Peptamen AF @ goal rate 60ml/hr. GV 1200ml. To provide 1440kcals, 91g PRO, 7.2g fiber and 972ml water from EN. Water flushes 30m Q2hrs.    Monitor need for more aggressive bowel regiment.       Monitoring/Evaluation:   Per protocol, I&O, Pertinent labs, Weight, Skin status, GI status, Symptoms, Hemodynamic stability    Leigha Archibald, " RD  Time Spent: 30min

## 2025-01-24 NOTE — PLAN OF CARE
Problem: Noninvasive Ventilation Acute  Goal: Effective Unassisted Ventilation and Oxygenation  Outcome: Progressing   Tolerating NC at 2L overnight; confused and agitated frequently, precedex at 1.5 this AM. Hen[place] in place adequate output. Keofeed in place and TF increased to 40ml/hr overnight,

## 2025-01-25 ENCOUNTER — APPOINTMENT (OUTPATIENT)
Dept: GENERAL RADIOLOGY | Facility: HOSPITAL | Age: 80
End: 2025-01-25
Payer: MEDICARE

## 2025-01-25 LAB
ANION GAP SERPL CALCULATED.3IONS-SCNC: 7 MMOL/L (ref 5–15)
ARTERIAL PATENCY WRIST A: ABNORMAL
ATMOSPHERIC PRESS: ABNORMAL MM[HG]
BASE EXCESS BLDA CALC-SCNC: 9.2 MMOL/L (ref 0–2)
BDY SITE: ABNORMAL
BODY TEMPERATURE: 37
BUN SERPL-MCNC: 26 MG/DL (ref 8–23)
BUN/CREAT SERPL: 35.6 (ref 7–25)
CALCIUM SPEC-SCNC: 9 MG/DL (ref 8.6–10.5)
CHLORIDE SERPL-SCNC: 101 MMOL/L (ref 98–107)
CO2 BLDA-SCNC: 39.4 MMOL/L (ref 22–33)
CO2 SERPL-SCNC: 32 MMOL/L (ref 22–29)
COHGB MFR BLD: 1.4 % (ref 0–2)
CREAT SERPL-MCNC: 0.73 MG/DL (ref 0.57–1)
DEPRECATED RDW RBC AUTO: 61.9 FL (ref 37–54)
EGFRCR SERPLBLD CKD-EPI 2021: 84.8 ML/MIN/1.73
EPAP: 0
ERYTHROCYTE [DISTWIDTH] IN BLOOD BY AUTOMATED COUNT: 16.5 % (ref 12.3–15.4)
GLUCOSE BLDC GLUCOMTR-MCNC: 100 MG/DL (ref 70–130)
GLUCOSE BLDC GLUCOMTR-MCNC: 106 MG/DL (ref 70–130)
GLUCOSE BLDC GLUCOMTR-MCNC: 137 MG/DL (ref 70–130)
GLUCOSE BLDC GLUCOMTR-MCNC: 178 MG/DL (ref 70–130)
GLUCOSE BLDC GLUCOMTR-MCNC: 210 MG/DL (ref 70–130)
GLUCOSE SERPL-MCNC: 93 MG/DL (ref 65–99)
HCO3 BLDA-SCNC: 37.3 MMOL/L (ref 20–26)
HCT VFR BLD AUTO: 42 % (ref 34–46.6)
HCT VFR BLD CALC: 35.8 % (ref 38–51)
HGB BLD-MCNC: 11.5 G/DL (ref 12–15.9)
HGB BLDA-MCNC: 11.7 G/DL (ref 14–18)
INHALED O2 CONCENTRATION: 21 %
IPAP: 0
Lab: ABNORMAL
MAGNESIUM SERPL-MCNC: 2 MG/DL (ref 1.6–2.4)
MCH RBC QN AUTO: 27.8 PG (ref 26.6–33)
MCHC RBC AUTO-ENTMCNC: 27.4 G/DL (ref 31.5–35.7)
MCV RBC AUTO: 101.4 FL (ref 79–97)
METHGB BLD QL: 0.5 % (ref 0–1.5)
MODALITY: ABNORMAL
NOTIFIED BY: ABNORMAL
NOTIFIED WHO: ABNORMAL
OXYHGB MFR BLDV: 95.5 % (ref 94–99)
PAW @ PEAK INSP FLOW SETTING VENT: 0 CMH2O
PCO2 BLDA: 69.2 MM HG (ref 35–45)
PCO2 TEMP ADJ BLD: 69.2 MM HG (ref 35–45)
PH BLDA: 7.34 PH UNITS (ref 7.35–7.45)
PH, TEMP CORRECTED: 7.34 PH UNITS
PHOSPHATE SERPL-MCNC: 2.8 MG/DL (ref 2.5–4.5)
PLATELET # BLD AUTO: 148 10*3/MM3 (ref 140–450)
PMV BLD AUTO: 11.5 FL (ref 6–12)
PO2 BLDA: 96.7 MM HG (ref 83–108)
PO2 TEMP ADJ BLD: 96.7 MM HG (ref 83–108)
POTASSIUM SERPL-SCNC: 4.4 MMOL/L (ref 3.5–5.2)
RBC # BLD AUTO: 4.14 10*6/MM3 (ref 3.77–5.28)
SODIUM SERPL-SCNC: 140 MMOL/L (ref 136–145)
TOTAL RATE: 0 BREATHS/MINUTE
WBC NRBC COR # BLD AUTO: 12.66 10*3/MM3 (ref 3.4–10.8)

## 2025-01-25 PROCEDURE — 80048 BASIC METABOLIC PNL TOTAL CA: CPT | Performed by: INTERNAL MEDICINE

## 2025-01-25 PROCEDURE — 94660 CPAP INITIATION&MGMT: CPT

## 2025-01-25 PROCEDURE — 82375 ASSAY CARBOXYHB QUANT: CPT

## 2025-01-25 PROCEDURE — 94761 N-INVAS EAR/PLS OXIMETRY MLT: CPT

## 2025-01-25 PROCEDURE — 82805 BLOOD GASES W/O2 SATURATION: CPT

## 2025-01-25 PROCEDURE — 84100 ASSAY OF PHOSPHORUS: CPT | Performed by: INTERNAL MEDICINE

## 2025-01-25 PROCEDURE — 99233 SBSQ HOSP IP/OBS HIGH 50: CPT | Performed by: INTERNAL MEDICINE

## 2025-01-25 PROCEDURE — 36600 WITHDRAWAL OF ARTERIAL BLOOD: CPT

## 2025-01-25 PROCEDURE — 94799 UNLISTED PULMONARY SVC/PX: CPT

## 2025-01-25 PROCEDURE — 83050 HGB METHEMOGLOBIN QUAN: CPT

## 2025-01-25 PROCEDURE — 83735 ASSAY OF MAGNESIUM: CPT | Performed by: INTERNAL MEDICINE

## 2025-01-25 PROCEDURE — 94664 DEMO&/EVAL PT USE INHALER: CPT

## 2025-01-25 PROCEDURE — 63710000001 PREDNISONE PER 1 MG: Performed by: INTERNAL MEDICINE

## 2025-01-25 PROCEDURE — 85027 COMPLETE CBC AUTOMATED: CPT | Performed by: INTERNAL MEDICINE

## 2025-01-25 PROCEDURE — 82948 REAGENT STRIP/BLOOD GLUCOSE: CPT

## 2025-01-25 PROCEDURE — 74018 RADEX ABDOMEN 1 VIEW: CPT

## 2025-01-25 PROCEDURE — 25010000002 HEPARIN (PORCINE) PER 1000 UNITS: Performed by: INTERNAL MEDICINE

## 2025-01-25 RX ORDER — QUETIAPINE FUMARATE 100 MG/1
100 TABLET, FILM COATED ORAL EVERY 12 HOURS SCHEDULED
Status: DISCONTINUED | OUTPATIENT
Start: 2025-01-25 | End: 2025-01-26

## 2025-01-25 RX ORDER — QUETIAPINE FUMARATE 25 MG/1
50 TABLET, FILM COATED ORAL ONCE
Status: COMPLETED | OUTPATIENT
Start: 2025-01-25 | End: 2025-01-25

## 2025-01-25 RX ADMIN — SERTRALINE HYDROCHLORIDE 200 MG: 100 TABLET ORAL at 08:13

## 2025-01-25 RX ADMIN — CHLORHEXIDINE GLUCONATE 15 ML: 1.2 SOLUTION ORAL at 08:13

## 2025-01-25 RX ADMIN — IPRATROPIUM BROMIDE AND ALBUTEROL SULFATE 3 ML: 2.5; .5 SOLUTION RESPIRATORY (INHALATION) at 09:20

## 2025-01-25 RX ADMIN — POLYETHYLENE GLYCOL 3350 17 G: 17 POWDER, FOR SOLUTION ORAL at 12:10

## 2025-01-25 RX ADMIN — DEXMEDETOMIDINE HYDROCHLORIDE 1.5 MCG/KG/HR: 400 INJECTION, SOLUTION INTRAVENOUS at 21:28

## 2025-01-25 RX ADMIN — Medication 10 ML: at 08:13

## 2025-01-25 RX ADMIN — MUPIROCIN 1 APPLICATION: 20 OINTMENT TOPICAL at 20:52

## 2025-01-25 RX ADMIN — QUETIAPINE FUMARATE 100 MG: 100 TABLET ORAL at 20:52

## 2025-01-25 RX ADMIN — Medication 10 ML: at 21:30

## 2025-01-25 RX ADMIN — HEPARIN SODIUM 5000 UNITS: 5000 INJECTION INTRAVENOUS; SUBCUTANEOUS at 06:06

## 2025-01-25 RX ADMIN — SENNOSIDES AND DOCUSATE SODIUM 2 TABLET: 50; 8.6 TABLET ORAL at 20:52

## 2025-01-25 RX ADMIN — IPRATROPIUM BROMIDE AND ALBUTEROL SULFATE 3 ML: 2.5; .5 SOLUTION RESPIRATORY (INHALATION) at 13:41

## 2025-01-25 RX ADMIN — BUDESONIDE 0.5 MG: 0.5 INHALANT RESPIRATORY (INHALATION) at 18:27

## 2025-01-25 RX ADMIN — PREDNISONE 40 MG: 20 TABLET ORAL at 08:13

## 2025-01-25 RX ADMIN — DEXMEDETOMIDINE HYDROCHLORIDE 1.5 MCG/KG/HR: 400 INJECTION, SOLUTION INTRAVENOUS at 12:48

## 2025-01-25 RX ADMIN — GABAPENTIN 300 MG: 300 CAPSULE ORAL at 08:13

## 2025-01-25 RX ADMIN — HEPARIN SODIUM 5000 UNITS: 5000 INJECTION INTRAVENOUS; SUBCUTANEOUS at 14:33

## 2025-01-25 RX ADMIN — IPRATROPIUM BROMIDE AND ALBUTEROL SULFATE 3 ML: 2.5; .5 SOLUTION RESPIRATORY (INHALATION) at 01:01

## 2025-01-25 RX ADMIN — MUPIROCIN 1 APPLICATION: 20 OINTMENT TOPICAL at 08:13

## 2025-01-25 RX ADMIN — HEPARIN SODIUM 5000 UNITS: 5000 INJECTION INTRAVENOUS; SUBCUTANEOUS at 21:26

## 2025-01-25 RX ADMIN — ATORVASTATIN CALCIUM 80 MG: 40 TABLET, FILM COATED ORAL at 20:52

## 2025-01-25 RX ADMIN — FAMOTIDINE 20 MG: 10 INJECTION, SOLUTION INTRAVENOUS at 08:13

## 2025-01-25 RX ADMIN — QUETIAPINE FUMARATE 50 MG: 25 TABLET ORAL at 12:48

## 2025-01-25 RX ADMIN — DEXMEDETOMIDINE HYDROCHLORIDE 1.5 MCG/KG/HR: 400 INJECTION, SOLUTION INTRAVENOUS at 08:25

## 2025-01-25 RX ADMIN — IPRATROPIUM BROMIDE AND ALBUTEROL SULFATE 3 ML: 2.5; .5 SOLUTION RESPIRATORY (INHALATION) at 18:27

## 2025-01-25 RX ADMIN — HYDROXYZINE HYDROCHLORIDE 25 MG: 25 TABLET ORAL at 09:51

## 2025-01-25 RX ADMIN — BUDESONIDE 0.5 MG: 0.5 INHALANT RESPIRATORY (INHALATION) at 09:19

## 2025-01-25 RX ADMIN — SENNOSIDES AND DOCUSATE SODIUM 2 TABLET: 50; 8.6 TABLET ORAL at 08:13

## 2025-01-25 RX ADMIN — FAMOTIDINE 20 MG: 10 INJECTION, SOLUTION INTRAVENOUS at 17:27

## 2025-01-25 RX ADMIN — HYDROXYZINE HYDROCHLORIDE 25 MG: 25 TABLET ORAL at 21:04

## 2025-01-25 RX ADMIN — QUETIAPINE FUMARATE 50 MG: 25 TABLET ORAL at 08:13

## 2025-01-25 RX ADMIN — DEXMEDETOMIDINE HYDROCHLORIDE 1.5 MCG/KG/HR: 400 INJECTION, SOLUTION INTRAVENOUS at 16:45

## 2025-01-25 RX ADMIN — DEXMEDETOMIDINE HYDROCHLORIDE 1.5 MCG/KG/HR: 400 INJECTION, SOLUTION INTRAVENOUS at 04:08

## 2025-01-25 NOTE — PROGRESS NOTES
INTENSIVIST   PROGRESS NOTE     Hospital:  LOS: 4 days     Ms. Tessa Bradley, 79 y.o. female is followed for a Chief Complaint of: Respiratory Failure      Subjective   S     Interval History:  Continues on Precedex. Will awaken and interact but is still agitated in restraints.        The patient's relevant past medical, surgical and social history were reviewed and updated in Epic as appropriate.      ROS: Unable to obtain secondary to mental status.     Objective   O     Vitals:  Temp  Min: 98.2 °F (36.8 °C)  Max: 100.9 °F (38.3 °C)  BP  Min: 108/67  Max: 139/81  Pulse  Min: 73  Max: 98  Resp  Min: 20  Max: 26  SpO2  Min: 86 %  Max: 100 % Flow (L/min) (Oxygen Therapy)  Min: 4  Max: 6    Intake/Ouptut 24 hrs (7:00AM - 6:59 AM)  Intake & Output (last 3 days)         01/19 0701  01/20 0700 01/20 0701 01/21 0700 01/21 0701 01/22 0700 01/22 0701 01/23 0700    I.V. (mL/kg)   1007.5 (16.3) 562.5 (9.1)    NG/GT   85 60    IV Piggyback   250     Total Intake(mL/kg)   1342.5 (21.7) 622.5 (10.1)    Urine (mL/kg/hr)   390 79 (0.2)    Total Output   390 79    Net   +952.5 +543.5                    Medications (drips):  dexmedetomidine, Last Rate: 1.5 mcg/kg/hr (01/25/25 0825)        Mechanical Ventilator Settings:          Resp Rate (Set): 18  Pressure Support (cm H2O): 10 cm H20  FiO2 (%): 40 %  PEEP/CPAP (cm H2O): 5 cm H20    Minute Ventilation (L/min) (Obs): 3.3 L/min  Resp Rate (Observed) Vent: 51  I:E Ratio (Set): 1:3.50  I:E Ratio (Obs): 1:3.6    PIP Observed (cm H2O): 15 cm H2O  Plateau Pressure (cm H2O): 23 cm H2O    Physical Examination  Telemetry:  Normal sinus rhythm.    Constitutional:  No acute distress.  Resting in bed on nasal cannula.    Eyes: No scleral icterus.   PERRL, EOM intact.    Neck:  Supple, FROM   Cardiovascular: Normal rate, regular and rhythm. Normal heart sounds.  No murmurs, gallop or rub.   Respiratory: No respiratory distress. Normal respiratory effort.  Diminished. No wheezing.     Abdominal:  Soft. No masses. Nontender. No distension. No HSM.   Extremities: No digital cyanosis. No clubbing.  No peripheral edema.   Skin: No rashes, lesions or ulcers   Neurological:   Sedated. Opens eyes to stimulation. Does not answer my questions.              Results from last 7 days   Lab Units 01/25/25  0551 01/24/25  0328 01/23/25  0255   WBC 10*3/mm3 12.66* 15.97* 12.33*   HEMOGLOBIN g/dL 11.5* 12.7 12.4   MCV fL 101.4* 96.5 94.2   PLATELETS 10*3/mm3 148 171 172     Results from last 7 days   Lab Units 01/25/25  0551 01/24/25  0328 01/23/25  0255   SODIUM mmol/L 140 138 138   POTASSIUM mmol/L 4.4 4.7 4.0   CO2 mmol/L 32.0* 32.0* 32.0*   CREATININE mg/dL 0.73 0.94 1.12*   GLUCOSE mg/dL 93 154* 151*   MAGNESIUM mg/dL 2.0 2.2 2.2   PHOSPHORUS mg/dL 2.8 3.6 2.3*     Estimated Creatinine Clearance: 62.8 mL/min (by C-G formula based on SCr of 0.73 mg/dL).  Results from last 7 days   Lab Units 01/22/25  0418 01/21/25  1418   ALK PHOS U/L 86 81   BILIRUBIN mg/dL 0.6 0.6   ALT (SGPT) U/L 7 6   AST (SGOT) U/L 14 15       Results from last 7 days   Lab Units 01/25/25  0352 01/24/25  1204 01/23/25  0330 01/22/25  0421 01/21/25  1929   PH, ARTERIAL pH units 7.340* 7.355 7.542* 7.434 7.423   PCO2, ARTERIAL mm Hg 69.2* 63.5* 41.1 52.1* 61.4*   PO2 ART mm Hg 96.7 71.0* 70.3* 59.6* 116.0*   FIO2 % 21 32 40 40 60       Images:  Imaging Results (Last 24 Hours)       Procedure Component Value Units Date/Time    XR Abdomen KUB [[id number]] Collected: 01/25/25 0444     Updated: 01/25/25 0448    Narrative:      XR ABDOMEN KUB    Date of Exam: 1/25/2025 4:37 AM EST    Indication: keofeed placement    Comparison: KUB 1/23/2025    Findings:  There is a feeding tube in the stomach. The gas pattern is nonspecific.      Impression:      Impression:  Feeding tube is in the stomach.          Electronically Signed: Bart Cabrera MD    1/25/2025 4:45 AM EST    Workstation ID: [id number]               Results: Reviewed.  I reviewed the  patient's new laboratory and imaging results.  I independently reviewed the patient's new images.    Medications: Reviewed.    Assessment & Plan   A / P     Ms. Bradley is a 80yo F with a history of COPD on home O2, [facility], Afib (on Eliquis), HLD, Depression, HTN, and TIA who presented to [facility] on 1/21/25 with altered mental status. Her significant other is also admitted to [facility]. She was found at home by a neighbor and was noted to be encephalopathic. In the ED, she was found to be hypercapnic and did not improve with breathing treatments and was intubated with subsequent improvement in pCO2.     She was admitted to the ICU for ongoing care. She has been stared on Rocephin and Azithromycin as well as Prednisone 40mg.     She was extubated on 1/23/25. She has remained agitated since extubation on a Precedex drip.     Nutrition:   NPO Diet NPO Type: Tube Feeding  Advance Directives:   Code Status and Medical Interventions: CPR (Attempt to Resuscitate); Full Support   Ordered at: 01/21/25 2021     Code Status (Patient has no pulse and is not breathing):    CPR (Attempt to Resuscitate)     Medical Interventions (Patient has pulse or is breathing):    Full Support       Active Hospital Problems    Diagnosis     **Respiratory failure with hypercapnia     Acute on chronic respiratory failure with hypoxia and hypercapnia     History of Hodgkin's disease     Acute on chronic hypoxic respiratory failure     Stage 3b chronic kidney disease     Chronic respiratory failure with hypoxia and hypercapnia     HTN (hypertension)     HLD (hyperlipidemia)     A-fib     MAKAYLA (obstructive sleep apnea)     COPD (chronic obstructive pulmonary disease)        Assessment / Plan:    Needs to wear Bipap at night as tolerated. At risk for repeat hypercapnia requiring reintubation.   Continue Precedex. Increase Seroquel.   Completed a course of Rocephin and Azithromycin.   Continue Prednisone 40mg daily.   Will  hold Eliquis as it is not clear that she was taking this and she has not had Afib since arrival. Plan to resume if she develops Afib or if she can confirm that she was taking this when she is no longer on the vent.   Continue tube feeds. Speech evaluation when appropriate.   AM labs    High risk secondary to delirium with acute on chronic respiratory failure.     High level of risk due to:  severe exacerbation of chronic illness and illness with threat to life or bodily function.    Plan of care and goals reviewed during interdisciplinary rounds.  I discussed the patient's findings and my recommendations with nursing staff      Ermelinda Wise, DO    Intensive Care Medicine and Pulmonary Medicine

## 2025-01-25 NOTE — PLAN OF CARE
VSS. Restless this AM, PRN atarax given and rested well off/on the rest of shift. Precedex continued. On 6L NC. Frequently desatting when attempting to wean O2. TF continued, tolerating well. No BM, miralax given this afternoon. Disoriented to place and situation, and very forgetful. 1,635 in UOP. BUE soft wrist restraints remain for patient safety.      Problem: Restraint, Nonviolent  Goal: Absence of Harm or Injury  Outcome: Progressing  Intervention: Implement Least Restrictive Safety Strategies  Recent Flowsheet Documentation  Taken 1/25/2025 1600 by Ifeoma Padilla, RN  Medical Device Protection:   IV pole/bag removed from visual field   tubing secured  Diversional Activities: television  Taken 1/25/2025 1400 by Ifeoma Padilla, RN  Medical Device Protection:   IV pole/bag removed from visual field   tubing secured  Diversional Activities: television  Taken 1/25/2025 1200 by Ifeoma Padilla, RN  Medical Device Protection:   IV pole/bag removed from visual field   tubing secured  Diversional Activities: television  Taken 1/25/2025 1000 by Ifeoma Padilla, RN  Medical Device Protection:   IV pole/bag removed from visual field   tubing secured  Diversional Activities: television  Taken 1/25/2025 0800 by Ifeoma Padilla, RN  Medical Device Protection:   IV pole/bag removed from visual field   tubing secured  Diversional Activities: television  Intervention: Protect Dignity, Rights and Personal Wellbeing  Recent Flowsheet Documentation  Taken 1/25/2025 0800 by Ifeoma Padilla, RN  Trust Relationship/Rapport:   care explained   reassurance provided  Intervention: Protect Skin and Joint Integrity  Recent Flowsheet Documentation  Taken 1/25/2025 1600 by Ifeoma Padilla, RN  Body Position:   tilted   legs elevated   lower extremity elevated   upper extremity elevated   weight shifting  Skin Protection:   incontinence pads utilized   transparent dressing maintained   silicone foam dressing in place   protective footwear used  Taken 1/25/2025 1400  by Ifeoma Padilla RN  Body Position:   weight shifting   legs elevated   lower extremity elevated   upper extremity elevated   heels elevated  Skin Protection:   incontinence pads utilized   transparent dressing maintained   silicone foam dressing in place   protective footwear used  Taken 1/25/2025 1200 by Ifeoma Padilla RN  Body Position:   position maintained   legs elevated   lower extremity elevated   upper extremity elevated   weight shifting  Skin Protection:   incontinence pads utilized   transparent dressing maintained   silicone foam dressing in place   protective footwear used  Taken 1/25/2025 1000 by Ifeoma Padilla RN  Body Position:   tilted   weight shifting   upper extremity elevated   neutral head position   heels elevated  Taken 1/25/2025 0800 by Ifeoma Padilla RN  Body Position:   turned   weight shifting   heels elevated   upper extremity elevated  Skin Protection:   incontinence pads utilized   transparent dressing maintained   silicone foam dressing in place   protective footwear used   Goal Outcome Evaluation:  Plan of Care Reviewed With: patient, family        Progress: improving

## 2025-01-25 NOTE — PLAN OF CARE
Goal Outcome Evaluation:                                          Patient disoriented to place, situation.   4-6L NC. CO2 elevated this AM, APRN notified & aware.  Keofeed removed by patient; replaced, terminates in stomach per KUB. TF restarted.  Precedex gtt continues  BLUE soft wrist restraints in place for safety.  UOP adequate.

## 2025-01-26 LAB
AMMONIA BLD-SCNC: 15 UMOL/L (ref 11–51)
ANION GAP SERPL CALCULATED.3IONS-SCNC: 4 MMOL/L (ref 5–15)
ARTERIAL PATENCY WRIST A: ABNORMAL
ATMOSPHERIC PRESS: ABNORMAL MM[HG]
BACTERIA SPEC AEROBE CULT: NORMAL
BACTERIA SPEC AEROBE CULT: NORMAL
BASE EXCESS BLDA CALC-SCNC: 11.3 MMOL/L (ref 0–2)
BDY SITE: ABNORMAL
BODY TEMPERATURE: 37
BUN SERPL-MCNC: 31 MG/DL (ref 8–23)
BUN/CREAT SERPL: 52.5 (ref 7–25)
CALCIUM SPEC-SCNC: 9.2 MG/DL (ref 8.6–10.5)
CHLORIDE SERPL-SCNC: 102 MMOL/L (ref 98–107)
CO2 BLDA-SCNC: 40.9 MMOL/L (ref 22–33)
CO2 SERPL-SCNC: 33 MMOL/L (ref 22–29)
COHGB MFR BLD: 1.5 % (ref 0–2)
CREAT SERPL-MCNC: 0.59 MG/DL (ref 0.57–1)
DEPRECATED RDW RBC AUTO: 60.5 FL (ref 37–54)
EGFRCR SERPLBLD CKD-EPI 2021: 93 ML/MIN/1.73
EPAP: 5
ERYTHROCYTE [DISTWIDTH] IN BLOOD BY AUTOMATED COUNT: 16.2 % (ref 12.3–15.4)
GLUCOSE BLDC GLUCOMTR-MCNC: 110 MG/DL (ref 70–130)
GLUCOSE BLDC GLUCOMTR-MCNC: 132 MG/DL (ref 70–130)
GLUCOSE BLDC GLUCOMTR-MCNC: 141 MG/DL (ref 70–130)
GLUCOSE BLDC GLUCOMTR-MCNC: 148 MG/DL (ref 70–130)
GLUCOSE SERPL-MCNC: 118 MG/DL (ref 65–99)
HCO3 BLDA-SCNC: 38.9 MMOL/L (ref 20–26)
HCT VFR BLD AUTO: 42.5 % (ref 34–46.6)
HCT VFR BLD CALC: 35.9 % (ref 38–51)
HGB BLD-MCNC: 11.7 G/DL (ref 12–15.9)
HGB BLDA-MCNC: 11.7 G/DL (ref 14–18)
INHALED O2 CONCENTRATION: 35 %
IPAP: 16
MAGNESIUM SERPL-MCNC: 1.8 MG/DL (ref 1.6–2.4)
MCH RBC QN AUTO: 27.9 PG (ref 26.6–33)
MCHC RBC AUTO-ENTMCNC: 27.5 G/DL (ref 31.5–35.7)
MCV RBC AUTO: 101.4 FL (ref 79–97)
METHGB BLD QL: ABNORMAL
MODALITY: ABNORMAL
OXYHGB MFR BLDV: 95.7 % (ref 94–99)
PAW @ PEAK INSP FLOW SETTING VENT: 0 CMH2O
PCO2 BLDA: 66.3 MM HG (ref 35–45)
PCO2 TEMP ADJ BLD: 66.3 MM HG (ref 35–45)
PH BLDA: 7.38 PH UNITS (ref 7.35–7.45)
PH, TEMP CORRECTED: 7.38 PH UNITS
PHOSPHATE SERPL-MCNC: 2.4 MG/DL (ref 2.5–4.5)
PLATELET # BLD AUTO: 125 10*3/MM3 (ref 140–450)
PMV BLD AUTO: 11.3 FL (ref 6–12)
PO2 BLDA: 81 MM HG (ref 83–108)
PO2 TEMP ADJ BLD: 81 MM HG (ref 83–108)
POTASSIUM SERPL-SCNC: 5 MMOL/L (ref 3.5–5.2)
PSV: 11 CMH2O
RBC # BLD AUTO: 4.19 10*6/MM3 (ref 3.77–5.28)
SODIUM SERPL-SCNC: 139 MMOL/L (ref 136–145)
TOTAL RATE: 0 BREATHS/MINUTE
VENTILATOR MODE: ABNORMAL
WBC NRBC COR # BLD AUTO: 9.62 10*3/MM3 (ref 3.4–10.8)

## 2025-01-26 PROCEDURE — 25010000002 HEPARIN (PORCINE) PER 1000 UNITS: Performed by: INTERNAL MEDICINE

## 2025-01-26 PROCEDURE — 63710000001 PREDNISONE PER 1 MG: Performed by: INTERNAL MEDICINE

## 2025-01-26 PROCEDURE — 82375 ASSAY CARBOXYHB QUANT: CPT

## 2025-01-26 PROCEDURE — 99233 SBSQ HOSP IP/OBS HIGH 50: CPT | Performed by: INTERNAL MEDICINE

## 2025-01-26 PROCEDURE — 85027 COMPLETE CBC AUTOMATED: CPT | Performed by: INTERNAL MEDICINE

## 2025-01-26 PROCEDURE — 83050 HGB METHEMOGLOBIN QUAN: CPT

## 2025-01-26 PROCEDURE — 97162 PT EVAL MOD COMPLEX 30 MIN: CPT

## 2025-01-26 PROCEDURE — 97165 OT EVAL LOW COMPLEX 30 MIN: CPT

## 2025-01-26 PROCEDURE — 94660 CPAP INITIATION&MGMT: CPT

## 2025-01-26 PROCEDURE — 80048 BASIC METABOLIC PNL TOTAL CA: CPT | Performed by: INTERNAL MEDICINE

## 2025-01-26 PROCEDURE — 82948 REAGENT STRIP/BLOOD GLUCOSE: CPT

## 2025-01-26 PROCEDURE — 82805 BLOOD GASES W/O2 SATURATION: CPT

## 2025-01-26 PROCEDURE — 25010000002 MAGNESIUM SULFATE 4 GM/100ML SOLUTION: Performed by: INTERNAL MEDICINE

## 2025-01-26 PROCEDURE — 94799 UNLISTED PULMONARY SVC/PX: CPT

## 2025-01-26 PROCEDURE — 36600 WITHDRAWAL OF ARTERIAL BLOOD: CPT

## 2025-01-26 PROCEDURE — 82140 ASSAY OF AMMONIA: CPT | Performed by: INTERNAL MEDICINE

## 2025-01-26 PROCEDURE — 84100 ASSAY OF PHOSPHORUS: CPT | Performed by: INTERNAL MEDICINE

## 2025-01-26 PROCEDURE — 83735 ASSAY OF MAGNESIUM: CPT | Performed by: INTERNAL MEDICINE

## 2025-01-26 RX ORDER — MAGNESIUM SULFATE HEPTAHYDRATE 40 MG/ML
4 INJECTION, SOLUTION INTRAVENOUS ONCE
Status: COMPLETED | OUTPATIENT
Start: 2025-01-26 | End: 2025-01-26

## 2025-01-26 RX ORDER — QUETIAPINE FUMARATE 100 MG/1
100 TABLET, FILM COATED ORAL EVERY MORNING
Status: DISCONTINUED | OUTPATIENT
Start: 2025-01-27 | End: 2025-01-27

## 2025-01-26 RX ADMIN — SENNOSIDES AND DOCUSATE SODIUM 2 TABLET: 50; 8.6 TABLET ORAL at 21:59

## 2025-01-26 RX ADMIN — DEXMEDETOMIDINE HYDROCHLORIDE 1.5 MCG/KG/HR: 400 INJECTION, SOLUTION INTRAVENOUS at 10:11

## 2025-01-26 RX ADMIN — HYDROXYZINE HYDROCHLORIDE 25 MG: 25 TABLET ORAL at 05:12

## 2025-01-26 RX ADMIN — BUDESONIDE 0.5 MG: 0.5 INHALANT RESPIRATORY (INHALATION) at 19:05

## 2025-01-26 RX ADMIN — HEPARIN SODIUM 5000 UNITS: 5000 INJECTION INTRAVENOUS; SUBCUTANEOUS at 15:20

## 2025-01-26 RX ADMIN — QUETIAPINE FUMARATE 100 MG: 100 TABLET ORAL at 08:48

## 2025-01-26 RX ADMIN — IPRATROPIUM BROMIDE AND ALBUTEROL SULFATE 3 ML: 2.5; .5 SOLUTION RESPIRATORY (INHALATION) at 12:35

## 2025-01-26 RX ADMIN — IPRATROPIUM BROMIDE AND ALBUTEROL SULFATE 3 ML: 2.5; .5 SOLUTION RESPIRATORY (INHALATION) at 08:08

## 2025-01-26 RX ADMIN — QUETIAPINE FUMARATE 150 MG: 100 TABLET ORAL at 21:59

## 2025-01-26 RX ADMIN — ATORVASTATIN CALCIUM 80 MG: 40 TABLET, FILM COATED ORAL at 21:59

## 2025-01-26 RX ADMIN — Medication 10 ML: at 08:48

## 2025-01-26 RX ADMIN — Medication 10 ML: at 21:59

## 2025-01-26 RX ADMIN — SERTRALINE HYDROCHLORIDE 200 MG: 100 TABLET ORAL at 08:47

## 2025-01-26 RX ADMIN — DEXMEDETOMIDINE HYDROCHLORIDE 1.5 MCG/KG/HR: 400 INJECTION, SOLUTION INTRAVENOUS at 19:14

## 2025-01-26 RX ADMIN — HEPARIN SODIUM 5000 UNITS: 5000 INJECTION INTRAVENOUS; SUBCUTANEOUS at 05:12

## 2025-01-26 RX ADMIN — FAMOTIDINE 20 MG: 10 INJECTION, SOLUTION INTRAVENOUS at 17:23

## 2025-01-26 RX ADMIN — SENNOSIDES AND DOCUSATE SODIUM 2 TABLET: 50; 8.6 TABLET ORAL at 08:47

## 2025-01-26 RX ADMIN — DEXMEDETOMIDINE HYDROCHLORIDE 1 MCG/KG/HR: 400 INJECTION, SOLUTION INTRAVENOUS at 23:03

## 2025-01-26 RX ADMIN — IPRATROPIUM BROMIDE AND ALBUTEROL SULFATE 3 ML: 2.5; .5 SOLUTION RESPIRATORY (INHALATION) at 19:05

## 2025-01-26 RX ADMIN — HEPARIN SODIUM 5000 UNITS: 5000 INJECTION INTRAVENOUS; SUBCUTANEOUS at 21:59

## 2025-01-26 RX ADMIN — GABAPENTIN 300 MG: 300 CAPSULE ORAL at 08:48

## 2025-01-26 RX ADMIN — BUDESONIDE 0.5 MG: 0.5 INHALANT RESPIRATORY (INHALATION) at 08:08

## 2025-01-26 RX ADMIN — FAMOTIDINE 20 MG: 10 INJECTION, SOLUTION INTRAVENOUS at 08:47

## 2025-01-26 RX ADMIN — MAGNESIUM SULFATE IN WATER FOR 4 G: 40 INJECTION INTRAVENOUS at 08:47

## 2025-01-26 RX ADMIN — MUPIROCIN 1 APPLICATION: 20 OINTMENT TOPICAL at 09:09

## 2025-01-26 RX ADMIN — DEXMEDETOMIDINE HYDROCHLORIDE 1.5 MCG/KG/HR: 400 INJECTION, SOLUTION INTRAVENOUS at 00:22

## 2025-01-26 RX ADMIN — DEXMEDETOMIDINE HYDROCHLORIDE 1.5 MCG/KG/HR: 400 INJECTION, SOLUTION INTRAVENOUS at 05:12

## 2025-01-26 RX ADMIN — DEXMEDETOMIDINE HYDROCHLORIDE 1.5 MCG/KG/HR: 400 INJECTION, SOLUTION INTRAVENOUS at 15:20

## 2025-01-26 RX ADMIN — PREDNISONE 40 MG: 20 TABLET ORAL at 08:47

## 2025-01-26 RX ADMIN — IPRATROPIUM BROMIDE AND ALBUTEROL SULFATE 3 ML: 2.5; .5 SOLUTION RESPIRATORY (INHALATION) at 00:08

## 2025-01-26 NOTE — PLAN OF CARE
Goal Outcome Evaluation:                                          Majority of night spent on BiPap. Little improvement in CO2 on ABG this AM. Bipap settings adjusted per respiratory therapist.  Patient alert to self, time, and occasionally place; disoriented to situation with illogical thoughts at times.  Remains on precedex gtt.  Atarax given x2.  UOP adequate.  Restraints remain in place for safety.

## 2025-01-26 NOTE — PLAN OF CARE
Goal Outcome Evaluation:  Plan of Care Reviewed With: patient        Progress: no change  Outcome Evaluation: OT eval complete. Pt presents below baseline function with decreased activity tolerance, cognition, strength, awareness, and balance deficits. Pt min Ax2 for STS. Needing extra VC for attention and safety awareness. Recommend IPOT POC and IRF at D/C, but will continue to monitor progress closely.    Anticipated Discharge Disposition (OT): inpatient rehabilitation facility

## 2025-01-26 NOTE — PLAN OF CARE
Goal Outcome Evaluation:    -Patient slowly improving today as far as restlessness.. Precedex @ 1.5    -BUE restraints remain in place for pt safety and removing lines/tubes  -Still disoriented, however varies between logical and illogical in conversation  -1600 UOP, No BM today, Bowel sounds hypoactive, Colace given  -TF Running per order,re-assess tomorrow to see if ready for SLP for swallow eval  -Since patient did not sleep last night, Seroquel increased, plan to try and wean Precedex tomorrow if able

## 2025-01-26 NOTE — THERAPY EVALUATION
Patient Name: Tessa Bradley  : 1945    MRN: [id number]                              Today's Date: 2025       Admit Date: 2025    Visit Dx:     ICD-10-CM ICD-9-CM   1. Acute on chronic respiratory failure with hypoxia and hypercapnia  J96.21 518.84    J96.22 786.09     799.02   2. Altered mental status, unspecified altered mental status type  R41.82 780.97   3. Acute exacerbation of chronic obstructive pulmonary disease (COPD)  J44.1 491.21   4. Acute cystitis without hematuria  N30.00 595.0     Patient Active Problem List   Diagnosis    COPD (chronic obstructive pulmonary disease)    Elevated serum creatinine    Hypotension    Dyspnea    MAKAYLA (obstructive sleep apnea)    HEATHER (acute kidney injury)    Leukocytosis    HTN (hypertension)    HLD (hyperlipidemia)    A-fib    Hodgkin disease    Arthritis    AMS (altered mental status)    Chronic respiratory failure with hypoxia and hypercapnia    Pulmonary nodule    History of TIA (transient ischemic attack)    Tobacco abuse    Acute on chronic hypoxic respiratory failure    History of Hodgkin's disease    Overactive bladder    Stage 3b chronic kidney disease    Acute on chronic respiratory failure with hypoxia    Acute on chronic respiratory failure    Respiratory failure with hypercapnia    Acute on chronic respiratory failure with hypoxia and hypercapnia     Past Medical History:   Diagnosis Date    Atrial fibrillation     Cancer     Cluster headache     COPD (chronic obstructive pulmonary disease)     Difficulty walking     Sciatica and pinched nerves in bsck    Fibromyalgia, primary     Hyperlipidemia     Hypertension     Peripheral neuropathy     TIA (transient ischemic attack)      Past Surgical History:   Procedure Laterality Date    ABDOMINAL SURGERY      HYSTERECTOMY      OOPHORECTOMY        General Information       Row Name 25 1430          Physical Therapy Time and Intention    Document Type evaluation  -KG     Mode  of Treatment physical therapy  -KG       Row Name 01/26/25 1430          General Information    Patient Profile Reviewed yes  -KG     Prior Level of Function --  pt is very limited historian; TBA later  -KG     Existing Precautions/Restrictions fall;oxygen therapy device and L/min;other (see comments)  confusion; poor safety awareness  -KG     Barriers to Rehab medically complex;cognitive status  -KG       Row Name 01/26/25 1430          Living Environment    People in Home spouse  -KG       Row Name 01/26/25 1430          Home Main Entrance    Number of Stairs, Main Entrance three  -KG     Stair Railings, Main Entrance --  pt is limited historian; TBA later  -KG       Row Name 01/26/25 1430          Stairs Within Home, Primary    Number of Stairs, Within Home, Primary none  -KG       Row Name 01/26/25 1430          Cognition    Orientation Status (Cognition) oriented to;person;place;time  -KG       Row Name 01/26/25 1430          Safety Issues/Impairments Affecting Functional Mobility    Safety Issues Affecting Function (Mobility) ability to follow commands;at risk behavior observed;awareness of need for assistance;insight into deficits/self-awareness;judgment;safety precaution awareness;safety precautions follow-through/compliance;sequencing abilities  -KG     Impairments Affecting Function (Mobility) balance;cognition;coordination;endurance/activity tolerance;motor control;motor planning;postural/trunk control;shortness of breath;strength  -KG     Cognitive Impairments, Mobility Safety/Performance attention;awareness, need for assistance;insight into deficits/self-awareness;judgment;safety precaution awareness;safety precaution follow-through;sequencing abilities  -KG               User Key  (r) = Recorded By, (t) = Taken By, (c) = Cosigned By      Initials Name Provider Type    KG Suzanne Fonseca, PT Physical Therapist                   Mobility       Row Name 01/26/25 1431          Bed Mobility    Bed  Mobility scooting/bridging;supine-sit;sit-supine  -KG     Scooting/Bridging Barnes (Bed Mobility) dependent (less than 25% patient effort);2 person assist;verbal cues  -KG     Supine-Sit Barnes (Bed Mobility) maximum assist (25% patient effort);2 person assist;verbal cues  -KG     Sit-Supine Barnes (Bed Mobility) maximum assist (25% patient effort);2 person assist;verbal cues  -KG     Assistive Device (Bed Mobility) head of bed elevated;repositioning sheet  -KG     Comment, (Bed Mobility) VC's for sequencing and technique. Pt required increased assistance at trunk and BLEs. Once seated EOB pt demonstrated significant posterior lean and required max cueing to keep eyes open. Pt c/o dizziness. /64.  -KG       Row Name 01/26/25 1431          Transfers    Comment, (Transfers) STS from EOB with blocking of lucien knees and B UE support. Pt able to take 3-4 side steps toward HOB with modA x2.  -KG       Row Name 01/26/25 1431          Sit-Stand Transfer    Sit-Stand Barnes (Transfers) minimum assist (75% patient effort);2 person assist;verbal cues  -KG     Assistive Device (Sit-Stand Transfers) other (see comments)  B UE support  -KG       Row Name 01/26/25 1431          Gait/Stairs (Locomotion)    Barnes Level (Gait) unable to assess  -KG     Comment, (Gait/Stairs) Ambulation deferred. Not appropriate to assess.  -KG               User Key  (r) = Recorded By, (t) = Taken By, (c) = Cosigned By      Initials Name Provider Type    KG Suzanne Fonseca, PT Physical Therapist                   Obj/Interventions       Row Name 01/26/25 1433          Range of Motion Comprehensive    General Range of Motion no range of motion deficits identified  -KG     Comment, General Range of Motion B LE WFL  -KG       Row Name 01/26/25 1433          Strength Comprehensive (MMT)    Comment, General Manual Muscle Testing (MMT) Assessment B LE grossly 3+/5; unable to formally assess due to cognitive status   -KG       Row Name 01/26/25 1433          Balance    Balance Assessment sitting static balance;standing static balance;standing dynamic balance  -KG     Static Sitting Balance maximum assist;2-person assist  progressed to brief periods of CGA with significant verbal and tactile cues  -KG     Static Standing Balance minimal assist;2-person assist  -KG     Dynamic Standing Balance moderate assist;2-person assist  -KG     Position/Device Used, Standing Balance supported  -KG       Row Name 01/26/25 1433          Sensory Assessment (Somatosensory)    Sensory Assessment (Somatosensory) unable/difficult to assess  -KG               User Key  (r) = Recorded By, (t) = Taken By, (c) = Cosigned By      Initials Name Provider Type    KG Suzanne Fonseca N, PT Physical Therapist                   Goals/Plan       Row Name 01/26/25 1438          Bed Mobility Goal 1 (PT)    Activity/Assistive Device (Bed Mobility Goal 1, PT) sit to supine;supine to sit  -KG     Bruce Level/Cues Needed (Bed Mobility Goal 1, PT) moderate assist (50-74% patient effort)  -KG     Time Frame (Bed Mobility Goal 1, PT) short term goal (STG);5 days  -KG     Progress/Outcomes (Bed Mobility Goal 1, PT) goal ongoing  -KG       Row Name 01/26/25 1438          Transfer Goal 1 (PT)    Activity/Assistive Device (Transfer Goal 1, PT) sit-to-stand/stand-to-sit;bed-to-chair/chair-to-bed;walker, rolling  -KG     Bruce Level/Cues Needed (Transfer Goal 1, PT) minimum assist (75% or more patient effort)  -KG     Time Frame (Transfer Goal 1, PT) long term goal (LTG);10 days  -KG     Progress/Outcome (Transfer Goal 1, PT) goal ongoing  -KG       Row Name 01/26/25 1438          Gait Training Goal 1 (PT)    Activity/Assistive Device (Gait Training Goal 1, PT) gait (walking locomotion);assistive device use;walker, rolling  -KG     Bruce Level (Gait Training Goal 1, PT) minimum assist (75% or more patient effort)  -KG     Distance (Gait Training Goal  1, PT) 100 feet  -KG     Time Frame (Gait Training Goal 1, PT) long term goal (LTG);10 days  -KG       Row Name 01/26/25 1438          Therapy Assessment/Plan (PT)    Planned Therapy Interventions (PT) balance training;bed mobility training;gait training;strengthening;transfer training  -KG               User Key  (r) = Recorded By, (t) = Taken By, (c) = Cosigned By      Initials Name Provider Type    KG Suzanne Fonseca, PT Physical Therapist                   Clinical Impression       Row Name 01/26/25 1434          Pain    Pretreatment Pain Rating 0/10 - no pain  -KG     Posttreatment Pain Rating 0/10 - no pain  -KG       Row Name 01/26/25 1434          Plan of Care Review    Plan of Care Reviewed With patient  -KG     Outcome Evaluation PT initial evaluation completed for pt presenting with generalized weakness, impaired balance and coordination, increased SOA, confusion, and decreased functional mobility. Pt required Jigar x2 for STS transfer and modA x2 to take side steps toward HOB. Pt's decreased independence warrants PT skilled care. Recommend D/C to  rehab facility.  -KG       Row Name 01/26/25 1434          Therapy Assessment/Plan (PT)    Patient/Family Therapy Goals Statement (PT) return to PLOF  -KG     Rehab Potential (PT) good  -KG     Criteria for Skilled Interventions Met (PT) yes;skilled treatment is necessary  -KG     Therapy Frequency (PT) daily  -KG     Predicted Duration of Therapy Intervention (PT) 10 days  -KG       Row Name 01/26/25 1434          Vital Signs    Pre Systolic BP Rehab 107  -KG     Pre Treatment Diastolic BP 67  -KG     Intra Systolic BP Rehab 119  -KG     Intra Treatment Diastolic BP 64  -KG     Post Systolic BP Rehab 110  -KG     Post Treatment Diastolic BP 64  -KG     Pretreatment Heart Rate (beats/min) 77  -KG     Posttreatment Heart Rate (beats/min) 87  -KG     Pre SpO2 (%) 93  -KG     O2 Delivery Pre Treatment supplemental O2  -KG     Intra SpO2 (%) 86  -KG     O2  Delivery Intra Treatment supplemental O2  -KG     Post SpO2 (%) 90  -KG     O2 Delivery Post Treatment supplemental O2  -KG     Pre Patient Position Supine  -KG     Intra Patient Position Standing  -KG     Post Patient Position Supine  -KG       Row Name 01/26/25 1434          Positioning and Restraints    Pre-Treatment Position in bed  -KG     Post Treatment Position bed  -KG     In Bed supine;call light within reach;encouraged to call for assist;exit alarm on;with nsg;side rails up x3;RUE elevated;LUE elevated  -KG               User Key  (r) = Recorded By, (t) = Taken By, (c) = Cosigned By      Initials Name Provider Type    Suzanne Real, PT Physical Therapist                   Outcome Measures       Row Name 01/26/25 1438 01/26/25 0800       How much help from another person do you currently need...    Turning from your back to your side while in flat bed without using bedrails? 2  -KG 3  -KR    Moving from lying on back to sitting on the side of a flat bed without bedrails? 2  -KG 2  -KR    Moving to and from a bed to a chair (including a wheelchair)? 2  -KG 2  -KR    Standing up from a chair using your arms (e.g., wheelchair, bedside chair)? 3  -KG 2  -KR    Climbing 3-5 steps with a railing? 1  -KG 1  -KR    To walk in hospital room? 2  -KG 1  -KR    AM-PAC 6 Clicks Score (PT) 12  -KG 11  -KR    Highest Level of Mobility Goal 4 --> Transfer to chair/commode  -KG 4 --> Transfer to chair/commode  -KR      Row Name 01/26/25 1438 01/26/25 1153       Functional Assessment    Outcome Measure Options AM-PAC 6 Clicks Basic Mobility (PT)  -KG AM-PAC 6 Clicks Daily Activity (OT)  -LR              User Key  (r) = Recorded By, (t) = Taken By, (c) = Cosigned By      Initials Name Provider Type    Suzanne Real, PT Physical Therapist    Anita Ricketts, RN Registered Nurse    Mayda Meléndez, OT Occupational Therapist                                 Physical Therapy Education       Title:  PT OT SLP Therapies (In Progress)       Topic: Physical Therapy (In Progress)       Point: Mobility training (In Progress)       Learning Progress Summary            Patient Acceptance, E, NR by KG at 1/26/2025 1001                      Point: Home exercise program (Not Started)       Learner Progress:  Not documented in this visit.              Point: Body mechanics (In Progress)       Learning Progress Summary            Patient Acceptance, E, NR by KG at 1/26/2025 1001                      Point: Precautions (In Progress)       Learning Progress Summary            Patient Acceptance, E, NR by KG at 1/26/2025 1001                                      User Key       Initials Effective Dates Name Provider Type Discipline    KG 05/22/20 -  Suzanne Fonseca, PT Physical Therapist PT                  PT Recommendation and Plan  Planned Therapy Interventions (PT): balance training, bed mobility training, gait training, strengthening, transfer training  Outcome Evaluation: PT initial evaluation completed for pt presenting with generalized weakness, impaired balance and coordination, increased SOA, confusion, and decreased functional mobility. Pt required Jigar x2 for STS transfer and modA x2 to take side steps toward HOB. Pt's decreased independence warrants PT skilled care. Recommend D/C to IP rehab facility.     Time Calculation:   PT Evaluation Complexity  History, PT Evaluation Complexity: 3 or more personal factors and/or comorbidities  Examination of Body Systems (PT Eval Complexity): total of 3 or more elements  Clinical Presentation (PT Evaluation Complexity): evolving  Clinical Decision Making (PT Evaluation Complexity): moderate complexity  Overall Complexity (PT Evaluation Complexity): moderate complexity     PT Charges       Row Name 01/26/25 1001             Time Calculation    Start Time 1001  -KG      PT Received On 01/26/25  -KG      PT Goal Re-Cert Due Date 02/05/25  -KG         Untimed Charges    PT  Eval/Re-eval Minutes 46  -KG         Total Minutes    Untimed Charges Total Minutes 46  -KG       Total Minutes 46  -KG                User Key  (r) = Recorded By, (t) = Taken By, (c) = Cosigned By      Initials Name Provider Type    Suzanne Real, PT Physical Therapist                  Therapy Charges for Today       Code Description Service Date Service Provider Modifiers Qty    88970665803 HC PT EVAL MOD COMPLEXITY 4 1/26/2025 Suzanne Fonseca, PT GP 1            PT G-Codes  Outcome Measure Options: AM-PAC 6 Clicks Basic Mobility (PT)  AM-PAC 6 Clicks Score (PT): 12  AM-PAC 6 Clicks Score (OT): 13  PT Discharge Summary  Anticipated Discharge Disposition (PT): inpatient rehabilitation facility    Gaviota Fonseca, PT  1/26/2025

## 2025-01-26 NOTE — THERAPY EVALUATION
Patient Name: Tessa Bradley  : 1945    MRN: [id number]                              Today's Date: 2025       Admit Date: 2025    Visit Dx:     ICD-10-CM ICD-9-CM   1. Acute on chronic respiratory failure with hypoxia and hypercapnia  J96.21 518.84    J96.22 786.09     799.02   2. Altered mental status, unspecified altered mental status type  R41.82 780.97   3. Acute exacerbation of chronic obstructive pulmonary disease (COPD)  J44.1 491.21   4. Acute cystitis without hematuria  N30.00 595.0     Patient Active Problem List   Diagnosis    COPD (chronic obstructive pulmonary disease)    Elevated serum creatinine    Hypotension    Dyspnea    MAKAYLA (obstructive sleep apnea)    HEATHER (acute kidney injury)    Leukocytosis    HTN (hypertension)    HLD (hyperlipidemia)    A-fib    Hodgkin disease    Arthritis    AMS (altered mental status)    Chronic respiratory failure with hypoxia and hypercapnia    Pulmonary nodule    History of TIA (transient ischemic attack)    Tobacco abuse    Acute on chronic hypoxic respiratory failure    History of Hodgkin's disease    Overactive bladder    Stage 3b chronic kidney disease    Acute on chronic respiratory failure with hypoxia    Acute on chronic respiratory failure    Respiratory failure with hypercapnia    Acute on chronic respiratory failure with hypoxia and hypercapnia     Past Medical History:   Diagnosis Date    Atrial fibrillation     Cancer     Cluster headache     COPD (chronic obstructive pulmonary disease)     Difficulty walking     Sciatica and pinched nerves in bsck    Fibromyalgia, primary     Hyperlipidemia     Hypertension     Peripheral neuropathy     TIA (transient ischemic attack)      Past Surgical History:   Procedure Laterality Date    ABDOMINAL SURGERY      HYSTERECTOMY      OOPHORECTOMY        General Information       Row Name 25 1140          OT Time and Intention    Document Type evaluation  -LR     Mode of Treatment  occupational therapy  -LR       Row Name 01/26/25 1140          General Information    Patient Profile Reviewed yes  -LR     Prior Level of Function --  unable to assess  -LR     Existing Precautions/Restrictions fall  -LR     Barriers to Rehab cognitive status;medically complex  -LR       Row Name 01/26/25 1140          Living Environment    People in Home spouse  -LR       Row Name 01/26/25 1140          Home Main Entrance    Number of Stairs, Main Entrance none  -LR       Row Name 01/26/25 1140          Stairs Within Home, Primary    Number of Stairs, Within Home, Primary none  -LR       Row Name 01/26/25 1140          Cognition    Orientation Status (Cognition) oriented to;person;other (see comments)  responds to name  -LR       Row Name 01/26/25 1140          Safety Issues/Impairments Affecting Functional Mobility    Safety Issues Affecting Function (Mobility) safety precaution awareness;safety precautions follow-through/compliance;awareness of need for assistance;problem-solving;ability to follow commands;judgment;insight into deficits/self-awareness;sequencing abilities;impulsivity  -LR     Impairments Affecting Function (Mobility) balance;cognition;endurance/activity tolerance;strength;pain  -LR     Cognitive Impairments, Mobility Safety/Performance attention;safety precaution follow-through;awareness, need for assistance;sequencing abilities;impulsivity;insight into deficits/self-awareness;judgment;safety precaution awareness;problem-solving/reasoning  -LR               User Key  (r) = Recorded By, (t) = Taken By, (c) = Cosigned By      Initials Name Provider Type    LR Mayda Mathis OT Occupational Therapist                     Mobility/ADL's       Row Name 01/26/25 1142          Bed Mobility    Bed Mobility supine-sit;sit-supine  -LR     Supine-Sit [place] (Bed Mobility) maximum assist (25% patient effort);2 person assist;verbal cues;nonverbal cues (demo/gesture)  -LR     Sit-Supine [place]  (Bed Mobility) moderate assist (50% patient effort);2 person assist;verbal cues  -LR     Bed Mobility, Safety Issues cognitive deficits limit understanding  -LR     Assistive Device (Bed Mobility) bed rails;head of bed elevated;repositioning sheet  -LR     Comment, (Bed Mobility) Pt demo'd strong posterior lean initially sitting EOB. Was able to correct posture with time and VC  -LR       Row Name 01/26/25 1142          Transfers    Transfers sit-stand transfer;stand-sit transfer  -LR       Row Name 01/26/25 1142          Sit-Stand Transfer    Sit-Stand [place] (Transfers) minimum assist (75% patient effort);2 person assist;verbal cues  -LR     Assistive Device (Sit-Stand Transfers) other (see comments)  BUE support  -       Row Name 01/26/25 1142          Stand-Sit Transfer    Stand-Sit [place] (Transfers) minimum assist (75% patient effort);2 person assist;verbal cues  -LR       Row Name 01/26/25 1142          Functional Mobility    Patient was able to Ambulate no, other medical factors prevent ambulation  -LR       Row Name 01/26/25 1142          Activities of Daily Living    BADL Assessment/Intervention lower body dressing  pt denied grooming tasks  -LR       Row Name 01/26/25 1142          Lower Body Dressing Assessment/Training    [place] Level (Lower Body Dressing) don;socks;dependent (less than 25% patient effort)  -LR     Position (Lower Body Dressing) long sitting  -LR               User Key  (r) = Recorded By, (t) = Taken By, (c) = Cosigned By      Initials Name Provider Type    LR Mayda Mathis, OT Occupational Therapist                   Obj/Interventions       Row Name 01/26/25 1146          Sensory Assessment (Somatosensory)    Sensory Assessment (Somatosensory) unable/difficult to assess  -LR       Row Name 01/26/25 1146          Vision Assessment/Intervention    Visual Impairment/Limitations unable/difficult to assess  -       Row Name 01/26/25 1146          Range of Motion  Comprehensive    General Range of Motion bilateral upper extremity ROM WFL  -LR     Comment, General Range of Motion unable to perform formal ROM assessment  -LR       Row Name 01/26/25 1146          Strength Comprehensive (MMT)    General Manual Muscle Testing (MMT) Assessment upper extremity strength deficits identified  -LR     Comment, General Manual Muscle Testing (MMT) Assessment BUE grossly 3+/5 MMT based on observation  -LR       Row Name 01/26/25 1146          Balance    Balance Assessment sitting static balance;sitting dynamic balance;sit to stand dynamic balance;standing static balance;standing dynamic balance  -LR     Static Sitting Balance maximum assist;2-person assist;contact guard;other (see comments)  progressed to CGA after time and VC  -LR     Dynamic Sitting Balance minimal assist;2-person assist  -LR     Position, Sitting Balance unsupported;sitting edge of bed  -LR     Static Standing Balance minimal assist;2-person assist  -LR     Dynamic Standing Balance minimal assist;2-person assist  -LR     Position/Device Used, Standing Balance supported;other (see comments)  BUE support  -LR     Balance Interventions sitting;standing;sit to stand;supported;dynamic;static;occupation based/functional task  -LR               User Key  (r) = Recorded By, (t) = Taken By, (c) = Cosigned By      Initials Name Provider Type    LR Mayda Mathis, MELONY Occupational Therapist                   Goals/Plan       Row Name 01/26/25 1507          Transfer Goal 1 (OT)    Activity/Assistive Device (Transfer Goal 1, OT) sit-to-stand/stand-to-sit;bed-to-chair/chair-to-bed;toilet  -LR     [place] Level/Cues Needed (Transfer Goal 1, OT) minimum assist (75% or more patient effort);other (see comments)  x2  -LR     Time Frame (Transfer Goal 1, OT) long term goal (LTG);1 week  -LR     Progress/Outcome (Transfer Goal 1, OT) goal ongoing;new goal  -LR       Row Name 01/26/25 1152          Toileting Goal 1 (OT)     Activity/Device (Toileting Goal 1, OT) adjust/manage clothing;perform perineal hygiene;commode  -LR     [place] Level/Cues Needed (Toileting Goal 1, OT) standby assist  -LR     Time Frame (Toileting Goal 1, OT) long term goal (LTG);10 days  -LR     Progress/Outcome (Toileting Goal 1, OT) new goal;goal ongoing  -LR       Row Name 01/26/25 1152          Grooming Goal 1 (OT)    Activity/Device (Grooming Goal 1, OT) hair care;oral care;wash face, hands  -LR     [place] (Grooming Goal 1, OT) set-up required;verbal cues required  -LR     Time Frame (Grooming Goal 1, OT) short term goal (STG);5 days  -LR     Progress/Outcome (Grooming Goal 1, OT) goal ongoing;new goal  -LR       Row Name 01/26/25 115          Therapy Assessment/Plan (OT)    Planned Therapy Interventions (OT) activity tolerance training;adaptive equipment training;ROM/therapeutic exercise;BADL retraining;occupation/activity based interventions;strengthening exercise;IADL retraining;functional balance retraining;passive ROM/stretching;transfer/mobility retraining;patient/caregiver education/training  -LR               User Key  (r) = Recorded By, (t) = Taken By, (c) = Cosigned By      Initials Name Provider Type    LR Mayda Mathis, OT Occupational Therapist                   Clinical Impression       Row Name 01/26/25 1148          Pain Assessment    Pretreatment Pain Rating 0/10 - no pain  -LR     Posttreatment Pain Rating 0/10 - no pain  -LR       Row Name 01/26/25 1148          Plan of Care Review    Plan of Care Reviewed With patient  -LR     Progress no change  -LR     Outcome Evaluation OT eval complete. Pt presents below baseline function with decreased activity tolerance, cognition, strength, awareness, and balance deficits. Pt min Ax2 for STS. Needing extra VC for attention and safety awareness. Recommend IPOT POC and IRF at D/C, but will continue to monitor progress closely.  -LR       Row Name 01/26/25 1147          Therapy  Assessment/Plan (OT)    Patient/Family Therapy Goal Statement (OT) PLOF  -LR     Rehab Potential (OT) good  -LR     Criteria for Skilled Therapeutic Interventions Met (OT) yes;skilled treatment is necessary;meets criteria  -LR     Therapy Frequency (OT) daily  -LR     Predicted Duration of Therapy Intervention (OT) 10 days  -LR       Row Name 01/26/25 1148          Therapy Plan Review/Discharge Plan (OT)    Anticipated Discharge Disposition (OT) inpatient rehabilitation facility  -LR       Row Name 01/26/25 1148          Vital Signs    Pre Systolic BP Rehab 107  -LR     Pre Treatment Diastolic BP 67  -LR     Intra Systolic BP Rehab 119  -LR     Intra Treatment Diastolic BP 64  -LR     Post Systolic BP Rehab 110  -LR     Post Treatment Diastolic BP 64  -LR     Pretreatment Heart Rate (beats/min) 79  -LR     Posttreatment Heart Rate (beats/min) 86  -LR     Pre SpO2 (%) 98  -LR     O2 Delivery Pre Treatment room air  -LR     O2 Delivery Intra Treatment room air  -LR     Post SpO2 (%) 97  -LR     O2 Delivery Post Treatment room air  -LR     Pre Patient Position Supine  -LR     Intra Patient Position Standing  -LR     Post Patient Position Supine  -LR       Row Name 01/26/25 1148          Positioning and Restraints    Pre-Treatment Position in bed  -LR     Post Treatment Position bed  -LR     In Bed notified nsg;exit alarm on;call light within reach;encouraged to call for assist;supine;with nsg  -LR               User Key  (r) = Recorded By, (t) = Taken By, (c) = Cosigned By      Initials Name Provider Type    LR Mayda Mathis, OT Occupational Therapist                   Outcome Measures       Row Name 01/26/25 1153          How much help from another is currently needed...    Putting on and taking off regular lower body clothing? 2  -LR     Bathing (including washing, rinsing, and drying) 2  -LR     Toileting (which includes using toilet bed pan or urinal) 2  -LR     Putting on and taking off regular upper body  clothing 2  -LR     Taking care of personal grooming (such as brushing teeth) 3  -LR     Eating meals 2  -LR     AM-PAC 6 Clicks Score (OT) 13  -LR       Row Name 01/26/25 0800          How much help from another person do you currently need...    Turning from your back to your side while in flat bed without using bedrails? 3  -KR     Moving from lying on back to sitting on the side of a flat bed without bedrails? 2  -KR     Moving to and from a bed to a chair (including a wheelchair)? 2  -KR     Standing up from a chair using your arms (e.g., wheelchair, bedside chair)? 2  -KR     Climbing 3-5 steps with a railing? 1  -KR     To walk in hospital room? 1  -KR     AM-PAC 6 Clicks Score (PT) 11  -KR       Row Name 01/26/25 1153          Functional Assessment    Outcome Measure Options AM-PAC 6 Clicks Daily Activity (OT)  -LR               User Key  (r) = Recorded By, (t) = Taken By, (c) = Cosigned By      Initials Name Provider Type    Anita Ricketts, RN Registered Nurse    Mayda Meléndez, MELONY Occupational Therapist                    Occupational Therapy Education       Title: PT OT SLP Therapies (In Progress)       Topic: Occupational Therapy (In Progress)       Point: ADL training (In Progress)       Description:   Instruct learner(s) on proper safety adaptation and remediation techniques during self care or transfers.   Instruct in proper use of assistive devices.                  Learning Progress Summary            Patient Acceptance, E, NR by LR at 1/26/2025 1011                      Point: Precautions (In Progress)       Description:   Instruct learner(s) on prescribed precautions during self-care and functional transfers.                  Learning Progress Summary            Patient Acceptance, E, NR by LR at 1/26/2025 1011                      Point: Body mechanics (In Progress)       Description:   Instruct learner(s) on proper positioning and spine alignment during self-care, functional mobility  activities and/or exercises.                  Learning Progress Summary            Patient Acceptance, E, NR by LR at 1/26/2025 1011                                      User Key       Initials Effective Dates Name Provider Type Discipline    LR 10/09/24 -  Mayda Mathis, OT Occupational Therapist OT                  OT Recommendation and Plan  Planned Therapy Interventions (OT): activity tolerance training, adaptive equipment training, ROM/therapeutic exercise, BADL retraining, occupation/activity based interventions, strengthening exercise, IADL retraining, functional balance retraining, passive ROM/stretching, transfer/mobility retraining, patient/caregiver education/training  Therapy Frequency (OT): daily  Plan of Care Review  Plan of Care Reviewed With: patient  Progress: no change  Outcome Evaluation: OT eval complete. Pt presents below baseline function with decreased activity tolerance, cognition, strength, awareness, and balance deficits. Pt min Ax2 for STS. Needing extra VC for attention and safety awareness. Recommend IPOT POC and IRF at D/C, but will continue to monitor progress closely.     Time Calculation:   Evaluation Complexity (OT)  Review Occupational Profile/Medical/Therapy History Complexity: brief/low complexity  Assessment, Occupational Performance/Identification of Deficit Complexity: 1-3 performance deficits  Clinical Decision Making Complexity (OT): problem focused assessment/low complexity  Overall Complexity of Evaluation (OT): low complexity     Time Calculation- OT       Row Name 01/26/25 1154             Time Calculation- OT    OT Start Time 1011  -LR      OT Received On 01/26/25  -LR      OT Goal Re-Cert Due Date 02/05/25  -LR         Untimed Charges    OT Eval/Re-eval Minutes 46  -LR         Total Minutes    Untimed Charges Total Minutes 46  -LR       Total Minutes 46  -LR                User Key  (r) = Recorded By, (t) = Taken By, (c) = Cosigned By      Initials Name Provider  Type    LR Mayda Mathis, MELONY Occupational Therapist                  Therapy Charges for Today       Code Description Service Date Service Provider Modifiers Qty    [id number] HC OT EVAL LOW COMPLEXITY 4 1/26/2025 Mayda Mathis, MELONY GO 1                 Mayda Mathis, MELONY  1/26/2025

## 2025-01-26 NOTE — PROGRESS NOTES
INTENSIVIST   PROGRESS NOTE     Hospital:  LOS: 5 days     Ms. Tessa Bradley, 79 y.o. female is followed for a Chief Complaint of: Respiratory Failure      Subjective   S     Interval History:  Continues on Precedex. Mentation is slightly improved as compared to yesterday though she does still require restraints and a sitter. She wore Bipap overnight.        The patient's relevant past medical, surgical and social history were reviewed and updated in Epic as appropriate.      ROS: Unable to obtain secondary to mental status.     Objective   O     Vitals:  Temp  Min: 98.2 °F (36.8 °C)  Max: 99.1 °F (37.3 °C)  BP  Min: 107/67  Max: 139/78  Pulse  Min: 58  Max: 84  Resp  Min: 20  Max: 26  SpO2  Min: 86 %  Max: 100 % Flow (L/min) (Oxygen Therapy)  Min: 4  Max: 6    Intake/Ouptut 24 hrs (7:00AM - 6:59 AM)  Intake & Output (last 3 days)         01/19 0701  01/20 0700 01/20 0701  01/21 0700 01/21 0701  01/22 0700 01/22 0701  01/23 0700    I.V. (mL/kg)   1007.5 (16.3) 562.5 (9.1)    NG/GT   85 60    IV Piggyback   250     Total Intake(mL/kg)   1342.5 (21.7) 622.5 (10.1)    Urine (mL/kg/hr)   390 79 (0.2)    Total Output   390 79    Net   +952.5 +543.5                    Medications (drips):  dexmedetomidine, Last Rate: 1.5 mcg/kg/hr (01/26/25 1011)        Mechanical Ventilator Settings:          Resp Rate (Set): 18  Pressure Support (cm H2O): 10 cm H20  FiO2 (%): 40 %  PEEP/CPAP (cm H2O): 5 cm H20    Minute Ventilation (L/min) (Obs): 3.3 L/min  Resp Rate (Observed) Vent: 51  I:E Ratio (Set): 1:3.50  I:E Ratio (Obs): 1:3.6    PIP Observed (cm H2O): 15 cm H2O  Plateau Pressure (cm H2O): 23 cm H2O    Physical Examination  Telemetry:  Normal sinus rhythm.    Constitutional:  No acute distress. In restraints. Keofeed in place.   Resting in bed on nasal cannula.    Eyes: No scleral icterus.   PERRL, EOM intact.    Neck:  Supple, FROM   Cardiovascular: Normal rate, regular and rhythm. Normal heart sounds.  No murmurs, gallop or  rub.   Respiratory: No respiratory distress. Normal respiratory effort.  Diminished. No wheezing.    Abdominal:  Soft. No masses. Nontender. No distension. No HSM.   Extremities: No digital cyanosis. No clubbing.  No peripheral edema.   Skin: No rashes, lesions or ulcers   Neurological:   Alert and will answer some questions but is still confused.              Results from last 7 days   Lab Units 01/26/25  0542 01/25/25  0551 01/24/25  0328   WBC 10*3/mm3 9.62 12.66* 15.97*   HEMOGLOBIN g/dL 11.7* 11.5* 12.7   MCV fL 101.4* 101.4* 96.5   PLATELETS 10*3/mm3 125* 148 171     Results from last 7 days   Lab Units 01/26/25  0542 01/25/25  0551 01/24/25  0328   SODIUM mmol/L 139 140 138   POTASSIUM mmol/L 5.0 4.4 4.7   CO2 mmol/L 33.0* 32.0* 32.0*   CREATININE mg/dL 0.59 0.73 0.94   GLUCOSE mg/dL 118* 93 154*   MAGNESIUM mg/dL 1.8 2.0 2.2   PHOSPHORUS mg/dL 2.4* 2.8 3.6     Estimated Creatinine Clearance: 77.7 mL/min (by C-G formula based on SCr of 0.59 mg/dL).  Results from last 7 days   Lab Units 01/22/25  0418 01/21/25  1418   ALK PHOS U/L 86 81   BILIRUBIN mg/dL 0.6 0.6   ALT (SGPT) U/L 7 6   AST (SGOT) U/L 14 15       Results from last 7 days   Lab Units 01/26/25  0415 01/25/25  0352 01/24/25  1204 01/23/25  0330 01/22/25  0421   PH, ARTERIAL pH units 7.376 7.340* 7.355 7.542* 7.434   PCO2, ARTERIAL mm Hg 66.3* 69.2* 63.5* 41.1 52.1*   PO2 ART mm Hg 81.0* 96.7 71.0* 70.3* 59.6*   FIO2 % 35 21 32 40 40       Images:  Imaging Results (Last 24 Hours)       ** No results found for the last 24 hours. **               Results: Reviewed.  I reviewed the patient's new laboratory and imaging results.  I independently reviewed the patient's new images.    Medications: Reviewed.    Assessment & Plan   A / P     Ms. Bradley is a 78yo F with a history of COPD on home O2, [facility], Afib (on Eliquis), HLD, Depression, HTN, and TIA who presented to [facility] on 1/21/25 with altered mental status. Her  significant other is also admitted to [facility]. She was found at home by a neighbor and was noted to be encephalopathic. In the ED, she was found to be hypercapnic and did not improve with breathing treatments and was intubated with subsequent improvement in pCO2.     She was admitted to the ICU for ongoing care. She completed a course of Rocephin and Azithromycin. She remains on Prednisone 40mg daily.     She was extubated on 1/23/25. She has remained agitated since extubation on a Precedex drip.     Nutrition:   NPO Diet NPO Type: Tube Feeding  Advance Directives:   Code Status and Medical Interventions: CPR (Attempt to Resuscitate); Full Support   Ordered at: 01/21/25 2021     Code Status (Patient has no pulse and is not breathing):    CPR (Attempt to Resuscitate)     Medical Interventions (Patient has pulse or is breathing):    Full Support       Active Hospital Problems    Diagnosis     **Respiratory failure with hypercapnia     Acute on chronic respiratory failure with hypoxia and hypercapnia     History of Hodgkin's disease     Acute on chronic hypoxic respiratory failure     Stage 3b chronic kidney disease     Chronic respiratory failure with hypoxia and hypercapnia     HTN (hypertension)     HLD (hyperlipidemia)     A-fib     MAKAYLA (obstructive sleep apnea)     COPD (chronic obstructive pulmonary disease)        Assessment / Plan:    Needs to wear Bipap at night as tolerated. At risk for repeat hypercapnia requiring reintubation. She may benefit from home NIPPV based on ABG and history of COPD.   Continue Precedex. Continue AM dose of Seroquel. Increase nightly Seroquel as she is not sleeping.     Continue Prednisone 40mg daily.   Will hold Eliquis as it is not clear that she was taking this and she has not had Afib since arrival. Plan to resume if she develops Afib or if she can confirm that she was taking this when she is no longer on the vent.   Continue tube feeds. Speech evaluation when appropriate.   AM  labs    High risk secondary to delirium with acute on chronic respiratory failure.     High level of risk due to:  severe exacerbation of chronic illness and illness with threat to life or bodily function.    Plan of care and goals reviewed during interdisciplinary rounds.  I discussed the patient's findings and my recommendations with patient and nursing staff      Ermelinda Wise,     Intensive Care Medicine and Pulmonary Medicine

## 2025-01-26 NOTE — PLAN OF CARE
Goal Outcome Evaluation:  Plan of Care Reviewed With: patient           Outcome Evaluation: PT initial evaluation completed for pt presenting with generalized weakness, impaired balance and coordination, increased SOA, confusion, and decreased functional mobility. Pt required Jigar x2 for STS transfer and modA x2 to take side steps toward HOB. Pt's decreased independence warrants PT skilled care. Recommend D/C to IP rehab facility.    Anticipated Discharge Disposition (PT): inpatient rehabilitation facility

## 2025-01-27 LAB
ALBUMIN SERPL-MCNC: 2.9 G/DL (ref 3.5–5.2)
ALBUMIN/GLOB SERPL: 1.1 G/DL
ALP SERPL-CCNC: 75 U/L (ref 39–117)
ALT SERPL W P-5'-P-CCNC: 8 U/L (ref 1–33)
ANION GAP SERPL CALCULATED.3IONS-SCNC: 4 MMOL/L (ref 5–15)
ARTERIAL PATENCY WRIST A: ABNORMAL
AST SERPL-CCNC: 14 U/L (ref 1–32)
ATMOSPHERIC PRESS: ABNORMAL MM[HG]
BASE EXCESS BLDA CALC-SCNC: 12.6 MMOL/L (ref 0–2)
BASOPHILS # BLD AUTO: 0.01 10*3/MM3 (ref 0–0.2)
BASOPHILS NFR BLD AUTO: 0.1 % (ref 0–1.5)
BDY SITE: ABNORMAL
BILIRUB SERPL-MCNC: 0.4 MG/DL (ref 0–1.2)
BODY TEMPERATURE: 37
BUN SERPL-MCNC: 35 MG/DL (ref 8–23)
BUN/CREAT SERPL: 61.4 (ref 7–25)
CALCIUM SPEC-SCNC: 9.7 MG/DL (ref 8.6–10.5)
CHLORIDE SERPL-SCNC: 100 MMOL/L (ref 98–107)
CO2 BLDA-SCNC: 42.7 MMOL/L (ref 22–33)
CO2 SERPL-SCNC: 32 MMOL/L (ref 22–29)
COHGB MFR BLD: 1.5 % (ref 0–2)
CREAT SERPL-MCNC: 0.57 MG/DL (ref 0.57–1)
CRP SERPL-MCNC: 1.44 MG/DL (ref 0–0.5)
DEPRECATED RDW RBC AUTO: 56.5 FL (ref 37–54)
EGFRCR SERPLBLD CKD-EPI 2021: 93.7 ML/MIN/1.73
EOSINOPHIL # BLD AUTO: 0.2 10*3/MM3 (ref 0–0.4)
EOSINOPHIL NFR BLD AUTO: 2.4 % (ref 0.3–6.2)
EPAP: 8
ERYTHROCYTE [DISTWIDTH] IN BLOOD BY AUTOMATED COUNT: 15.9 % (ref 12.3–15.4)
GLOBULIN UR ELPH-MCNC: 2.6 GM/DL
GLUCOSE BLDC GLUCOMTR-MCNC: 115 MG/DL (ref 70–130)
GLUCOSE BLDC GLUCOMTR-MCNC: 127 MG/DL (ref 70–130)
GLUCOSE BLDC GLUCOMTR-MCNC: 138 MG/DL (ref 70–130)
GLUCOSE BLDC GLUCOMTR-MCNC: 167 MG/DL (ref 70–130)
GLUCOSE SERPL-MCNC: 125 MG/DL (ref 65–99)
HCO3 BLDA-SCNC: 40.6 MMOL/L (ref 20–26)
HCT VFR BLD AUTO: 41.7 % (ref 34–46.6)
HCT VFR BLD CALC: 36 % (ref 38–51)
HGB BLD-MCNC: 11.8 G/DL (ref 12–15.9)
HGB BLDA-MCNC: 11.8 G/DL (ref 14–18)
IMM GRANULOCYTES # BLD AUTO: 0.02 10*3/MM3 (ref 0–0.05)
IMM GRANULOCYTES NFR BLD AUTO: 0.2 % (ref 0–0.5)
INHALED O2 CONCENTRATION: 40 %
IPAP: 20
LYMPHOCYTES # BLD AUTO: 1.41 10*3/MM3 (ref 0.7–3.1)
LYMPHOCYTES NFR BLD AUTO: 17 % (ref 19.6–45.3)
Lab: ABNORMAL
MAGNESIUM SERPL-MCNC: 2.4 MG/DL (ref 1.6–2.4)
MCH RBC QN AUTO: 27.5 PG (ref 26.6–33)
MCHC RBC AUTO-ENTMCNC: 28.3 G/DL (ref 31.5–35.7)
MCV RBC AUTO: 97.2 FL (ref 79–97)
METHGB BLD QL: 0.3 % (ref 0–1.5)
MODALITY: ABNORMAL
MONOCYTES # BLD AUTO: 0.56 10*3/MM3 (ref 0.1–0.9)
MONOCYTES NFR BLD AUTO: 6.8 % (ref 5–12)
NEUTROPHILS NFR BLD AUTO: 6.07 10*3/MM3 (ref 1.7–7)
NEUTROPHILS NFR BLD AUTO: 73.5 % (ref 42.7–76)
NOTIFIED BY: ABNORMAL
NOTIFIED WHO: ABNORMAL
NRBC BLD AUTO-RTO: 0 /100 WBC (ref 0–0.2)
OXYHGB MFR BLDV: 92.8 % (ref 94–99)
PAW @ PEAK INSP FLOW SETTING VENT: 0 CMH2O
PCO2 BLDA: 69.7 MM HG (ref 35–45)
PCO2 TEMP ADJ BLD: 69.7 MM HG (ref 35–45)
PH BLDA: 7.37 PH UNITS (ref 7.35–7.45)
PH, TEMP CORRECTED: 7.37 PH UNITS
PHOSPHATE SERPL-MCNC: 2.3 MG/DL (ref 2.5–4.5)
PLATELET # BLD AUTO: 137 10*3/MM3 (ref 140–450)
PMV BLD AUTO: 11.8 FL (ref 6–12)
PO2 BLDA: 72.9 MM HG (ref 83–108)
PO2 TEMP ADJ BLD: 72.9 MM HG (ref 83–108)
POTASSIUM SERPL-SCNC: 5.6 MMOL/L (ref 3.5–5.2)
PREALB SERPL-MCNC: 16.2 MG/DL (ref 20–40)
PROT SERPL-MCNC: 5.5 G/DL (ref 6–8.5)
RBC # BLD AUTO: 4.29 10*6/MM3 (ref 3.77–5.28)
SODIUM SERPL-SCNC: 136 MMOL/L (ref 136–145)
TOTAL RATE: 14 BREATHS/MINUTE
TRIGL SERPL-MCNC: 102 MG/DL (ref 0–150)
WBC NRBC COR # BLD AUTO: 8.27 10*3/MM3 (ref 3.4–10.8)

## 2025-01-27 PROCEDURE — 63710000001 PREDNISONE PER 1 MG: Performed by: INTERNAL MEDICINE

## 2025-01-27 PROCEDURE — 94664 DEMO&/EVAL PT USE INHALER: CPT

## 2025-01-27 PROCEDURE — 82805 BLOOD GASES W/O2 SATURATION: CPT

## 2025-01-27 PROCEDURE — 83735 ASSAY OF MAGNESIUM: CPT | Performed by: INTERNAL MEDICINE

## 2025-01-27 PROCEDURE — 82948 REAGENT STRIP/BLOOD GLUCOSE: CPT

## 2025-01-27 PROCEDURE — 25010000002 HEPARIN (PORCINE) PER 1000 UNITS: Performed by: INTERNAL MEDICINE

## 2025-01-27 PROCEDURE — 94799 UNLISTED PULMONARY SVC/PX: CPT

## 2025-01-27 PROCEDURE — 84478 ASSAY OF TRIGLYCERIDES: CPT | Performed by: INTERNAL MEDICINE

## 2025-01-27 PROCEDURE — 99232 SBSQ HOSP IP/OBS MODERATE 35: CPT | Performed by: INTERNAL MEDICINE

## 2025-01-27 PROCEDURE — 85025 COMPLETE CBC W/AUTO DIFF WBC: CPT | Performed by: INTERNAL MEDICINE

## 2025-01-27 PROCEDURE — 80053 COMPREHEN METABOLIC PANEL: CPT | Performed by: INTERNAL MEDICINE

## 2025-01-27 PROCEDURE — 84100 ASSAY OF PHOSPHORUS: CPT | Performed by: INTERNAL MEDICINE

## 2025-01-27 PROCEDURE — 84134 ASSAY OF PREALBUMIN: CPT | Performed by: INTERNAL MEDICINE

## 2025-01-27 PROCEDURE — 86140 C-REACTIVE PROTEIN: CPT | Performed by: INTERNAL MEDICINE

## 2025-01-27 PROCEDURE — 82375 ASSAY CARBOXYHB QUANT: CPT

## 2025-01-27 PROCEDURE — 36600 WITHDRAWAL OF ARTERIAL BLOOD: CPT

## 2025-01-27 PROCEDURE — 83050 HGB METHEMOGLOBIN QUAN: CPT

## 2025-01-27 PROCEDURE — 94660 CPAP INITIATION&MGMT: CPT

## 2025-01-27 PROCEDURE — 94761 N-INVAS EAR/PLS OXIMETRY MLT: CPT

## 2025-01-27 RX ORDER — ALBUTEROL SULFATE 0.83 MG/ML
2.5 SOLUTION RESPIRATORY (INHALATION) 4 TIMES DAILY PRN
Status: DISCONTINUED | OUTPATIENT
Start: 2025-01-27 | End: 2025-02-05 | Stop reason: HOSPADM

## 2025-01-27 RX ADMIN — FAMOTIDINE 20 MG: 10 INJECTION, SOLUTION INTRAVENOUS at 08:09

## 2025-01-27 RX ADMIN — BISACODYL 10 MG: 10 SUPPOSITORY RECTAL at 08:09

## 2025-01-27 RX ADMIN — BUDESONIDE 0.5 MG: 0.5 INHALANT RESPIRATORY (INHALATION) at 08:02

## 2025-01-27 RX ADMIN — FAMOTIDINE 20 MG: 10 INJECTION, SOLUTION INTRAVENOUS at 17:30

## 2025-01-27 RX ADMIN — DEXMEDETOMIDINE HYDROCHLORIDE 1.5 MCG/KG/HR: 400 INJECTION, SOLUTION INTRAVENOUS at 08:09

## 2025-01-27 RX ADMIN — IPRATROPIUM BROMIDE AND ALBUTEROL SULFATE 3 ML: 2.5; .5 SOLUTION RESPIRATORY (INHALATION) at 19:11

## 2025-01-27 RX ADMIN — DEXMEDETOMIDINE HYDROCHLORIDE 1.5 MCG/KG/HR: 400 INJECTION, SOLUTION INTRAVENOUS at 16:16

## 2025-01-27 RX ADMIN — IPRATROPIUM BROMIDE AND ALBUTEROL SULFATE 3 ML: 2.5; .5 SOLUTION RESPIRATORY (INHALATION) at 12:55

## 2025-01-27 RX ADMIN — Medication 10 ML: at 22:30

## 2025-01-27 RX ADMIN — SERTRALINE HYDROCHLORIDE 200 MG: 100 TABLET ORAL at 08:08

## 2025-01-27 RX ADMIN — GABAPENTIN 300 MG: 300 CAPSULE ORAL at 08:08

## 2025-01-27 RX ADMIN — DEXMEDETOMIDINE HYDROCHLORIDE 1.1 MCG/KG/HR: 400 INJECTION, SOLUTION INTRAVENOUS at 21:13

## 2025-01-27 RX ADMIN — IPRATROPIUM BROMIDE AND ALBUTEROL SULFATE 3 ML: 2.5; .5 SOLUTION RESPIRATORY (INHALATION) at 00:26

## 2025-01-27 RX ADMIN — HEPARIN SODIUM 5000 UNITS: 5000 INJECTION INTRAVENOUS; SUBCUTANEOUS at 13:58

## 2025-01-27 RX ADMIN — HEPARIN SODIUM 5000 UNITS: 5000 INJECTION INTRAVENOUS; SUBCUTANEOUS at 05:38

## 2025-01-27 RX ADMIN — Medication 10 ML: at 08:17

## 2025-01-27 RX ADMIN — DEXMEDETOMIDINE HYDROCHLORIDE 1.5 MCG/KG/HR: 400 INJECTION, SOLUTION INTRAVENOUS at 12:11

## 2025-01-27 RX ADMIN — SENNOSIDES AND DOCUSATE SODIUM 2 TABLET: 50; 8.6 TABLET ORAL at 22:30

## 2025-01-27 RX ADMIN — DEXMEDETOMIDINE HYDROCHLORIDE 1.5 MCG/KG/HR: 400 INJECTION, SOLUTION INTRAVENOUS at 03:50

## 2025-01-27 RX ADMIN — HYDROXYZINE HYDROCHLORIDE 25 MG: 25 TABLET ORAL at 01:14

## 2025-01-27 RX ADMIN — IPRATROPIUM BROMIDE AND ALBUTEROL SULFATE 3 ML: 2.5; .5 SOLUTION RESPIRATORY (INHALATION) at 08:02

## 2025-01-27 RX ADMIN — PREDNISONE 40 MG: 20 TABLET ORAL at 08:09

## 2025-01-27 RX ADMIN — QUETIAPINE FUMARATE 100 MG: 100 TABLET ORAL at 08:09

## 2025-01-27 RX ADMIN — BUDESONIDE 0.5 MG: 0.5 INHALANT RESPIRATORY (INHALATION) at 19:11

## 2025-01-27 RX ADMIN — HEPARIN SODIUM 5000 UNITS: 5000 INJECTION INTRAVENOUS; SUBCUTANEOUS at 22:30

## 2025-01-27 RX ADMIN — ATORVASTATIN CALCIUM 80 MG: 40 TABLET, FILM COATED ORAL at 22:30

## 2025-01-27 RX ADMIN — SENNOSIDES AND DOCUSATE SODIUM 2 TABLET: 50; 8.6 TABLET ORAL at 08:09

## 2025-01-27 NOTE — PLAN OF CARE
Goal Outcome Evaluation:  Plan of Care Reviewed With: patient        Progress: improving  Outcome Evaluation: VSS. PT oriented x3-4 this shift. Periods of illogical conversation, easily redirected. Restless most of shift. Precedex gtt maxed, Seroquel administered, and prn atarax administered. UOP adequate. Placed on BiPAP for a total of 6 hours, CO2 remains >60. Sitter at bedside.

## 2025-01-27 NOTE — CASE MANAGEMENT/SOCIAL WORK
Continued Stay Note  [facility]     Patient Name: Tessa Bradley  MRN: [id number]  Today's Date: 1/27/2025    Admit Date: 1/21/2025    Plan: ongoing   Discharge Plan       Row Name 01/27/25 1519       Plan    Plan ongoing    Patient/Family in Agreement with Plan yes    Plan Comments Discussed in MDR, remains on Precedex, still w/confusion, has sitter. Therapy recs over w/e  are inpatient rehab @ d/c. I spoke w/patient spouse Yenifer via phone, she is agreeable to whatever Tessa will need closer to d/c. Agreeable w/[facility]. I explained that inpatient rehabs will not accept until she has had no sitter for @ least 24-48 hours and will need to be off IV Precedex or prns for behaviors. D/C anticipating inpatient rehab, [facility] when medically ready. CM will continue to follow.    Final Discharge Disposition Code 62 - inpatient rehab facility                   Discharge Codes    No documentation.                 Expected Discharge Date and Time       Expected Discharge Date Expected Discharge Time    Jan 29, 2025               Vasile Euceda RN

## 2025-01-27 NOTE — PLAN OF CARE
Problem: Adult Inpatient Plan of Care  Goal: Plan of Care Review  Outcome: Progressing  Flowsheets (Taken 1/27/2025 0778)  Progress: improving  Outcome Evaluation: VSS. Pt's orientation waxes and wanes throughout shift, easily reoriented and redirected. Precedex gtt currently infusing @1.1. Pt restless for the majority of the shift. Restraints removed at approx 1230, and remain off. Pt calm, cooperative, and restful towards end of shift- BiPAP placed at approx 1800. 1450ml UOP. x1 BM this shift. RN sitter remains at bedside for pt safety. Pt's spouse, family, and friends updated via phonecall and at bedside.  Plan of Care Reviewed With:   patient   spouse   family   friend

## 2025-01-28 ENCOUNTER — ANCILLARY PROCEDURE (OUTPATIENT)
Dept: SPEECH THERAPY | Facility: HOSPITAL | Age: 80
End: 2025-01-28
Payer: MEDICARE

## 2025-01-28 LAB
ANION GAP SERPL CALCULATED.3IONS-SCNC: 4 MMOL/L (ref 5–15)
BASOPHILS # BLD AUTO: 0.02 10*3/MM3 (ref 0–0.2)
BASOPHILS NFR BLD AUTO: 0.2 % (ref 0–1.5)
BUN SERPL-MCNC: 34 MG/DL (ref 8–23)
BUN/CREAT SERPL: 58.6 (ref 7–25)
CALCIUM SPEC-SCNC: 9.6 MG/DL (ref 8.6–10.5)
CHLORIDE SERPL-SCNC: 97 MMOL/L (ref 98–107)
CO2 SERPL-SCNC: 39 MMOL/L (ref 22–29)
CREAT SERPL-MCNC: 0.58 MG/DL (ref 0.57–1)
DEPRECATED RDW RBC AUTO: 56.4 FL (ref 37–54)
EGFRCR SERPLBLD CKD-EPI 2021: 93.3 ML/MIN/1.73
EOSINOPHIL # BLD AUTO: 0.25 10*3/MM3 (ref 0–0.4)
EOSINOPHIL NFR BLD AUTO: 2.8 % (ref 0.3–6.2)
ERYTHROCYTE [DISTWIDTH] IN BLOOD BY AUTOMATED COUNT: 16 % (ref 12.3–15.4)
GLUCOSE BLDC GLUCOMTR-MCNC: 101 MG/DL (ref 70–130)
GLUCOSE BLDC GLUCOMTR-MCNC: 117 MG/DL (ref 70–130)
GLUCOSE BLDC GLUCOMTR-MCNC: 158 MG/DL (ref 70–130)
GLUCOSE BLDC GLUCOMTR-MCNC: 38 MG/DL (ref 70–130)
GLUCOSE BLDC GLUCOMTR-MCNC: 48 MG/DL (ref 70–130)
GLUCOSE BLDC GLUCOMTR-MCNC: 58 MG/DL (ref 70–130)
GLUCOSE BLDC GLUCOMTR-MCNC: 63 MG/DL (ref 70–130)
GLUCOSE BLDC GLUCOMTR-MCNC: 70 MG/DL (ref 70–130)
GLUCOSE SERPL-MCNC: 124 MG/DL (ref 65–99)
HCT VFR BLD AUTO: 39.6 % (ref 34–46.6)
HGB BLD-MCNC: 11.6 G/DL (ref 12–15.9)
IMM GRANULOCYTES # BLD AUTO: 0.03 10*3/MM3 (ref 0–0.05)
IMM GRANULOCYTES NFR BLD AUTO: 0.3 % (ref 0–0.5)
LYMPHOCYTES # BLD AUTO: 1.55 10*3/MM3 (ref 0.7–3.1)
LYMPHOCYTES NFR BLD AUTO: 17.5 % (ref 19.6–45.3)
MCH RBC QN AUTO: 28 PG (ref 26.6–33)
MCHC RBC AUTO-ENTMCNC: 29.3 G/DL (ref 31.5–35.7)
MCV RBC AUTO: 95.4 FL (ref 79–97)
MONOCYTES # BLD AUTO: 0.71 10*3/MM3 (ref 0.1–0.9)
MONOCYTES NFR BLD AUTO: 8 % (ref 5–12)
NEUTROPHILS NFR BLD AUTO: 6.3 10*3/MM3 (ref 1.7–7)
NEUTROPHILS NFR BLD AUTO: 71.2 % (ref 42.7–76)
NRBC BLD AUTO-RTO: 0 /100 WBC (ref 0–0.2)
NT-PROBNP SERPL-MCNC: 8074 PG/ML (ref 0–1800)
PLATELET # BLD AUTO: 158 10*3/MM3 (ref 140–450)
PMV BLD AUTO: 11.5 FL (ref 6–12)
POTASSIUM SERPL-SCNC: 5.3 MMOL/L (ref 3.5–5.2)
PROCALCITONIN SERPL-MCNC: 0.05 NG/ML (ref 0–0.25)
RBC # BLD AUTO: 4.15 10*6/MM3 (ref 3.77–5.28)
SODIUM SERPL-SCNC: 140 MMOL/L (ref 136–145)
WBC NRBC COR # BLD AUTO: 8.86 10*3/MM3 (ref 3.4–10.8)

## 2025-01-28 PROCEDURE — 85025 COMPLETE CBC W/AUTO DIFF WBC: CPT | Performed by: INTERNAL MEDICINE

## 2025-01-28 PROCEDURE — 94799 UNLISTED PULMONARY SVC/PX: CPT

## 2025-01-28 PROCEDURE — 82948 REAGENT STRIP/BLOOD GLUCOSE: CPT

## 2025-01-28 PROCEDURE — 83880 ASSAY OF NATRIURETIC PEPTIDE: CPT | Performed by: INTERNAL MEDICINE

## 2025-01-28 PROCEDURE — 92610 EVALUATE SWALLOWING FUNCTION: CPT

## 2025-01-28 PROCEDURE — 80048 BASIC METABOLIC PNL TOTAL CA: CPT | Performed by: INTERNAL MEDICINE

## 2025-01-28 PROCEDURE — 25010000002 HEPARIN (PORCINE) PER 1000 UNITS: Performed by: INTERNAL MEDICINE

## 2025-01-28 PROCEDURE — 63710000001 PREDNISONE PER 1 MG: Performed by: INTERNAL MEDICINE

## 2025-01-28 PROCEDURE — 92612 ENDOSCOPY SWALLOW (FEES) VID: CPT

## 2025-01-28 PROCEDURE — 84145 PROCALCITONIN (PCT): CPT | Performed by: INTERNAL MEDICINE

## 2025-01-28 PROCEDURE — 99232 SBSQ HOSP IP/OBS MODERATE 35: CPT | Performed by: INTERNAL MEDICINE

## 2025-01-28 PROCEDURE — 63710000001 REVEFENACIN 175 MCG/3ML SOLUTION: Performed by: INTERNAL MEDICINE

## 2025-01-28 RX ORDER — ECHINACEA PURPUREA EXTRACT 125 MG
1 TABLET ORAL EVERY 6 HOURS PRN
Status: DISCONTINUED | OUTPATIENT
Start: 2025-01-28 | End: 2025-02-05 | Stop reason: HOSPADM

## 2025-01-28 RX ORDER — PREDNISONE 20 MG/1
40 TABLET ORAL DAILY
Status: DISCONTINUED | OUTPATIENT
Start: 2025-01-29 | End: 2025-01-29

## 2025-01-28 RX ORDER — AMOXICILLIN 250 MG
2 CAPSULE ORAL 2 TIMES DAILY
Status: DISCONTINUED | OUTPATIENT
Start: 2025-01-28 | End: 2025-02-05 | Stop reason: HOSPADM

## 2025-01-28 RX ORDER — CLONIDINE HYDROCHLORIDE 0.2 MG/1
0.2 TABLET ORAL EVERY 8 HOURS SCHEDULED
Status: DISCONTINUED | OUTPATIENT
Start: 2025-01-28 | End: 2025-01-30

## 2025-01-28 RX ORDER — DEXTROSE MONOHYDRATE 25 G/50ML
INJECTION, SOLUTION INTRAVENOUS
Status: DISPENSED
Start: 2025-01-28 | End: 2025-01-29

## 2025-01-28 RX ORDER — CLONIDINE HYDROCHLORIDE 0.2 MG/1
0.2 TABLET ORAL EVERY 8 HOURS SCHEDULED
Status: DISCONTINUED | OUTPATIENT
Start: 2025-01-28 | End: 2025-01-28

## 2025-01-28 RX ORDER — IBUPROFEN 600 MG/1
1 TABLET ORAL
Status: DISCONTINUED | OUTPATIENT
Start: 2025-01-28 | End: 2025-02-05 | Stop reason: HOSPADM

## 2025-01-28 RX ORDER — NICOTINE 21 MG/24HR
1 PATCH, TRANSDERMAL 24 HOURS TRANSDERMAL
Status: DISCONTINUED | OUTPATIENT
Start: 2025-01-28 | End: 2025-02-05 | Stop reason: HOSPADM

## 2025-01-28 RX ORDER — DEXTROSE MONOHYDRATE 25 G/50ML
25 INJECTION, SOLUTION INTRAVENOUS
Status: DISCONTINUED | OUTPATIENT
Start: 2025-01-28 | End: 2025-02-05 | Stop reason: HOSPADM

## 2025-01-28 RX ORDER — GABAPENTIN 300 MG/1
300 CAPSULE ORAL DAILY
Status: DISCONTINUED | OUTPATIENT
Start: 2025-01-29 | End: 2025-02-05 | Stop reason: HOSPADM

## 2025-01-28 RX ORDER — NICOTINE POLACRILEX 4 MG
15 LOZENGE BUCCAL
Status: DISCONTINUED | OUTPATIENT
Start: 2025-01-28 | End: 2025-02-05 | Stop reason: HOSPADM

## 2025-01-28 RX ORDER — SERTRALINE HYDROCHLORIDE 100 MG/1
200 TABLET, FILM COATED ORAL DAILY
Status: DISCONTINUED | OUTPATIENT
Start: 2025-01-29 | End: 2025-02-03

## 2025-01-28 RX ADMIN — GABAPENTIN 300 MG: 300 CAPSULE ORAL at 08:04

## 2025-01-28 RX ADMIN — DEXMEDETOMIDINE HYDROCHLORIDE 1.1 MCG/KG/HR: 400 INJECTION, SOLUTION INTRAVENOUS at 09:21

## 2025-01-28 RX ADMIN — ATORVASTATIN CALCIUM 80 MG: 40 TABLET, FILM COATED ORAL at 22:11

## 2025-01-28 RX ADMIN — HEPARIN SODIUM 5000 UNITS: 5000 INJECTION INTRAVENOUS; SUBCUTANEOUS at 05:32

## 2025-01-28 RX ADMIN — BUDESONIDE 0.5 MG: 0.5 INHALANT RESPIRATORY (INHALATION) at 09:56

## 2025-01-28 RX ADMIN — APIXABAN 5 MG: 5 TABLET, FILM COATED ORAL at 22:11

## 2025-01-28 RX ADMIN — HEPARIN SODIUM 5000 UNITS: 5000 INJECTION INTRAVENOUS; SUBCUTANEOUS at 15:31

## 2025-01-28 RX ADMIN — ACETAMINOPHEN 650 MG: 325 TABLET ORAL at 10:30

## 2025-01-28 RX ADMIN — Medication 10 ML: at 22:12

## 2025-01-28 RX ADMIN — CLONIDINE HYDROCHLORIDE 0.2 MG: 0.2 TABLET ORAL at 10:01

## 2025-01-28 RX ADMIN — FAMOTIDINE 20 MG: 10 INJECTION, SOLUTION INTRAVENOUS at 08:04

## 2025-01-28 RX ADMIN — REVEFENACIN 175 MCG: 175 SOLUTION RESPIRATORY (INHALATION) at 09:56

## 2025-01-28 RX ADMIN — PREDNISONE 40 MG: 20 TABLET ORAL at 08:04

## 2025-01-28 RX ADMIN — NICOTINE 1 PATCH: 21 PATCH TRANSDERMAL at 10:01

## 2025-01-28 RX ADMIN — BUDESONIDE 0.5 MG: 0.5 INHALANT RESPIRATORY (INHALATION) at 20:23

## 2025-01-28 RX ADMIN — Medication 10 ML: at 08:04

## 2025-01-28 RX ADMIN — CLONIDINE HYDROCHLORIDE 0.2 MG: 0.2 TABLET ORAL at 22:12

## 2025-01-28 RX ADMIN — FAMOTIDINE 20 MG: 10 INJECTION, SOLUTION INTRAVENOUS at 16:52

## 2025-01-28 RX ADMIN — SENNOSIDES AND DOCUSATE SODIUM 2 TABLET: 50; 8.6 TABLET ORAL at 08:04

## 2025-01-28 RX ADMIN — SERTRALINE HYDROCHLORIDE 200 MG: 100 TABLET ORAL at 08:04

## 2025-01-28 RX ADMIN — DEXMEDETOMIDINE HYDROCHLORIDE 1.1 MCG/KG/HR: 400 INJECTION, SOLUTION INTRAVENOUS at 03:02

## 2025-01-28 NOTE — PROGRESS NOTES
Clinical Nutrition     Patient Name: Tessa Bradley  YOB: 1945  MRN: [id number]  Date of Encounter: 01/28/25 11:11 EST  Admission date: 1/21/2025  Reason for Visit: Follow-up protocol, EN    Assessment   Nutrition Assessment   Admission Diagnosis:  Respiratory failure with hypercapnia [J96.92]    Problem List:    Respiratory failure with hypercapnia    COPD (chronic obstructive pulmonary disease)    MAKAYLA (obstructive sleep apnea)    HTN (hypertension)    HLD (hyperlipidemia)    A-fib    Chronic respiratory failure with hypoxia and hypercapnia    Acute on chronic hypoxic respiratory failure    History of Hodgkin's disease    Stage 3b chronic kidney disease    Acute on chronic respiratory failure with hypoxia and hypercapnia      PMH:   She  has a past medical history of Atrial fibrillation (2010), Cancer, Cluster headache (1995), COPD (chronic obstructive pulmonary disease), Difficulty walking (2022), Fibromyalgia, primary, Hyperlipidemia, Hypertension, Peripheral neuropathy (2022), and TIA (transient ischemic attack) (2010).    PSH:  She  has a past surgical history that includes Abdominal surgery; Oophorectomy; and Hysterectomy.    Applicable Nutrition History:   ARF/VENT 1-21-25  Extubated (1/23)     Labs    Labs Reviewed: Yes    Results from last 7 days   Lab Units 01/28/25  0527 01/27/25  0538 01/26/25  0542 01/25/25  0551 01/23/25  0255 01/22/25  0418 01/21/25  1418   GLUCOSE mg/dL 124* 125* 118* 93   < > 134*  122* 130*   BUN mg/dL 34* 35* 31* 26*   < > 14  12 10   CREATININE mg/dL 0.58 0.57 0.59 0.73   < > 0.95  0.96 0.84   SODIUM mmol/L 140 136 139 140   < > 143  143 146*   CHLORIDE mmol/L 97* 100 102 101   < > 99  99 100   POTASSIUM mmol/L 5.3* 5.6* 5.0 4.4   < > 4.5  4.5 4.6   PHOSPHORUS mg/dL  --  2.3* 2.4* 2.8   < > 2.9  2.5  --    MAGNESIUM mg/dL  --  2.4 1.8 2.0   < > 2.4  2.1 1.9   ALT (SGPT) U/L  --  8  --   --   --  7 6   LACTATE mmol/L  --   --   --   --   --    --  0.6    < > = values in this interval not displayed.       Results from last 7 days   Lab Units 01/27/25  0538 01/22/25  1713 01/22/25  0418 01/21/25  1418   ALBUMIN g/dL 2.9*  --  3.7 3.6   PREALBUMIN mg/dL 16.2* 6.5*  --   --    CRP mg/dL 1.44*  --  3.06*  --    CHOLESTEROL mg/dL  --   --  198  --    TRIGLYCERIDES mg/dL 102  --  92  --        Results from last 7 days   Lab Units 01/28/25  0539 01/27/25  2343 01/27/25  1754 01/27/25  1150 01/27/25  0515 01/26/25  2354   GLUCOSE mg/dL 117 138* 127 167* 115 132*     Lab Results   Lab Value Date/Time    HGBA1C 5.80 (H) 01/22/2025 0418    HGBA1C 5.80 (H) 07/12/2024 1352         Results from last 7 days   Lab Units 01/28/25  0527 01/22/25  0418   PROBNP pg/mL 8,074.0* 6,151.0*       Medications    Medications Reviewed: yes    Scheduled Meds:[Held by provider] apixaban, 5 mg, Oral, Q12H  atorvastatin, 80 mg, Nasogastric, Nightly  budesonide, 0.5 mg, Nebulization, BID - RT  cloNIDine, 0.2 mg, Nasogastric, Q8H  famotidine, 20 mg, Intravenous, BID AC  gabapentin, 300 mg, Per G Tube, Daily  heparin (porcine), 5,000 Units, Subcutaneous, Q8H  nicotine, 1 patch, Transdermal, Q24H  predniSONE, 40 mg, Nasogastric, Daily  revefenacin, 175 mcg, Nebulization, Daily - RT  senna-docusate sodium, 2 tablet, Nasogastric, BID  sertraline, 200 mg, Per G Tube, Daily  sodium chloride, 10 mL, Intravenous, Q12H      Continuous Infusions:dexmedetomidine, 0.2-1.5 mcg/kg/hr, Last Rate: 0.9 mcg/kg/hr (01/28/25 1110)      PRN Meds:.  acetaminophen **OR** [DISCONTINUED] acetaminophen    Albuterol Sulfate NEB Orderable    [DISCONTINUED] ondansetron ODT **OR** ondansetron    sodium chloride    Intake/Ouptut 24 hrs (0701 - 0700)   I&O's Reviewed: yes    Intake & Output (last day)         01/27 0701 01/28 0700 01/28 0701 01/29 0700    I.V. (mL/kg) 502.3 (8.2)     Other 420 30    NG/GT 1619 249    Total Intake(mL/kg) 2541.3 (41.3) 279 (4.5)    Urine (mL/kg/hr) 2950 (2) 625 (2.4)    Stool 0      "Total Output 9947 625    Net -408.7 -346          Stool Unmeasured Occurrence 1 x            Anthropometrics     Height: Height: 160 cm (62.99\")  Last Filed Weight: Weight: 61.6 kg (135 lb 12.9 oz) (01/23/25 0600)  Method: Weight Method: Bed scale  BMI: BMI (Calculated): 24.1    UBW: ?  Weight change:  per EMR ~ 31lb wt loss over past years     Weight       Weight (kg) Weight (lbs) Weight Method Visit Report   8/24/2023 70.308 kg  155 lb       71.5 kg  157 lb 10.1 oz      8/25/2023 71.215 kg  157 lb      9/6/2023 70.761 kg  156 lb  Stated     9/7/2023 73.846 kg  162 lb 12.8 oz      9/18/2023 69.4 kg  153 lb  Stated     9/19/2023 76.204 kg  168 lb      9/27/2023 76.703 kg  169 lb 1.6 oz   --    10/10/2023 73.936 kg  163 lb  Stated     12/20/2023 75.297 kg  166 lb   --    12/27/2023 75.751 kg  167 lb   --    3/27/2024 73.256 kg  161 lb 8 oz   --    7/12/2024 67.132 kg  148 lb  Stated     7/14/2024   Stated     8/1/2024 67.132 kg  148 lb   --    1/21/2025 64.728 kg  142 lb 11.2 oz  Bed scale      61.8 kg  136 lb 3.9 oz  Bed scale         Nutrition Focused Physical Exam     Date:     Unable to perform due to Pt unable to participate at time of visit, RD to f/u for NPFE when feasible*     Subjective   Reported/Observed/Food/Nutrition Related History:     1-28:pt resting in bed, on nasal cannula, is alert, very pleasant, [place]  + precedex    Per RN: pt tolerating TF,  + bm, plan for FEES later today    1/23  Extubated yesterday.  Requiring restrains and precedex drip due to confusion and agitation.      Patient mumbling at time of visit, did not look at RD calling name of answer any questions. EN infusing @ bedside.     No documented BM.      1/22  Pt resting in bed, startles to voice, restless  + precedex, LR@75ml/hr  Per RN: pt found down at home, weaning sedation, pt is moving around, hard to tell if she follows commands, poor UOP  Per MD: ok to feed, plan to wean off IVF's once TF at goal    Needs Assessment   Date: " "25    Height used:Height: 160 cm (62.99\")  Weights used: 136lb/ 62kg    Estimated Calorie needs: ~1500kcal (post extubation)   Method:  MSJ x 1.2: 1289kcal  Method:  25Kcals/Kkcal    Estimated Protein needs: ~84g protein  Method: 1.2-1.5g protein per k-93g protein      Current Nutrition Prescription     PO: NPO Diet NPO Type: Tube Feeding  Oral Nutrition Supplement:  Intake: N/A    EN: Peptamen AF  Goal Rate: 60ml/hr  Water Flushes: 30ml Q 2 hours  Modular: None  Route: ND  Tube: Small bore    At goal over: 20Hrs/day     Rx will supply:   Goal Volume 1200  mL/day     Flush Volume 360 mL/day     Energy 1440 Kcal/day 96 % Est Need   Protein 91 g/day 108 % Est Need   Fiber 7 g/day     Water in   mL     Total Water 1332 mL     Meet DRI No        --------------------------------------------------------------------------  Product/Rate verified at bedside: Yes  Infusing Rate at time of visit: 60ml    Average Delivery from [facility] [facility]:   Volume 1529 mL/day 127  % Goal Vol.   Flush Volume 510 mL/day     Energy  Kcal/day  % Est Need   Protein  g/day  % Est Need   Fiber  g/day     Water in  EN  mL     Total Water  mL     Meet DRI Yes          Assessment & Plan   Nutrition Diagnosis   Date: 25 Updated:   Problem Inadequate energy intake    Etiology ARF/Extubated   Signs/Symptoms 127% goal volume EN   Status: Active improved      Goal:   Nutrition to support treatment and Maintain EN      Nutrition Intervention      Follow treatment progress, Care plan reviewed, Await begin PO    Await FEES    K+ 5.3 slightly elevated, continue to monitor, suggest maintain current EN for now     Monitoring/Evaluation:   Per protocol, I&O, Pertinent labs, Weight, Skin status, GI status, Symptoms, Hemodynamic stability    Masha Morgan, MICHAEL  Time Spent: 30min  "

## 2025-01-28 NOTE — PLAN OF CARE
Goal Outcome Evaluation:  Plan of Care Reviewed With: patient        Progress: improving  Outcome Evaluation: VSS. Oriented x 3-4 most of shift. Periods of confusion this am, easily reoriented. Wore BiPAP for a total of 8 hours, no complications. Currently on 4L nasal cannula. Remains out of restraints. Sitter at bedside.                              Statement Selected

## 2025-01-28 NOTE — PROGRESS NOTES
INTENSIVIST   PROGRESS NOTE        SUBJECTIVE     Tessa 79 y.o. female is followed for: Weakness - Generalized       Respiratory failure with hypercapnia    COPD (chronic obstructive pulmonary disease)    MAKAYLA (obstructive sleep apnea)    HTN (hypertension)    HLD (hyperlipidemia)    A-fib    Chronic respiratory failure with hypoxia and hypercapnia    Acute on chronic hypoxic respiratory failure    History of Hodgkin's disease    Stage 3b chronic kidney disease    Acute on chronic respiratory failure with hypoxia and hypercapnia    As an Intensivist, we provide an integrated approach to the ICU patient and family, medical management of comorbid conditions, including but not limited to electrolytes, glycemic control, organ dysfunction, lead interdisciplinary rounds and coordinate the care with all other services, including those from other specialists.     Interval History:    More comfortable, cooperative today.     Last Bowel Movement: 25 (25 0930)    Temp  Min: 98.6 °F (37 °C)  Max: 99.5 °F (37.5 °C)       History     Last Reviewed by Galo Cruz MD on 2025 at  7:48 PM    Sections Reviewed    Medical, Surgical, Family, Tobacco, Alcohol, Drug Use, Sexual Activity,   Social Documentation    Problem list reviewed by Galo Cruz MD on 2025 at  7:47 PM  Medicines reviewed by Galo Cruz MD on 2025 at  7:47 PM  Allergies reviewed by Galo Cruz MD on 2025 at  7:47 PM       The patient's relevant past medical, surgical and social history were reviewed and updated in Epic as appropriate.        OBJECTIVE     Physical Examination    Vitals:  Temp: 99.3 °F (37.4 °C) (25 0800) Temp  Min: 98.6 °F (37 °C)  Max: 99.5 °F (37.5 °C)   Temp core:      BP: 112/65 (25 0800) BP  Min: 98/75  Max: 126/64   MAP (non-invasive) Noninvasive MAP (mmHg): 77 (25 0800) Noninvasive MAP (mmHg)  Av.4  Min: 60  Max: 133   Pulse: 68 (25 0800) Pulse  Min: 49  Max: 77   Resp:  18 (01/28/25 0800) Resp  Min: 14  Max: 20   SpO2: 95 % (01/28/25 0800) SpO2  Min: 90 %  Max: 100 %   Device: nasal cannula (01/28/25 0800)    Flow Rate: 3 (01/28/25 0800) Flow (L/min) (Oxygen Therapy)  Min: 2  Max: 4     Intake/Ouptut 24 hrs (7:00AM - 6:59 AM)  Intake & Output (last 2 days)         01/26 0701 01/27 0700 01/27 0701 01/28 0700 01/28 0701 01/29 0700    I.V. (mL/kg) 536.9 (8.7) 502.3 (8.2)     Other 450 420 30    NG/GT 1355 1619 249    Total Intake(mL/kg) 2341.9 (38) 2541.3 (41.3) 279 (4.5)    Urine (mL/kg/hr) 2700 (1.8) 2950 (2) 400 (4.1)    Stool  0     Total Output 2700 2950 400    Net -358.1 -408.7 -121           Stool Unmeasured Occurrence  1 x             Medications (drips):  dexmedetomidine, Last Rate: 1.1 mcg/kg/hr (01/28/25 0302)            01/21/25 2150 01/23/25 0600   Weight: 61.8 kg (136 lb 3.9 oz) 61.6 kg (135 lb 12.9 oz)     NIV:   Settings: Observed:   Mode of Delivery: standby (01/28/25 0326)          IPAP (cm H2O): 20 (01/27/25 1911)    EPAP (cm H2O): 8 (01/27/25 1911)    Pressure Support (cm H20): 12 cm H2O (01/27/25 0258)         Set Rate (Breaths/Min): 14 breaths/min (01/27/25 1911) Respiratory Rate Total (Breaths/Min): 14 breaths/min (01/27/25 1911)         Tidal Volume (mL): 609 mL (01/27/25 1911)   Oxygen Concentration (%): 40 (01/28/25 0200)  Minute Ventilation (L/Min): 8.6 (01/27/25 1911)      Telemetry:  Rhythm: sinus bradycardia (01/28/25 0600)         Constitutional:  No acute distress.   Cardiovascular: RRR.    Respiratory: Normal breath sounds  No adventitious sounds   Abdominal:  Soft with no tenderness.   Extremities: No Edema   Neurological:   Awake.  Best Eye Response: 4-->(E4) spontaneous (01/28/25 0600)  Best Motor Response: 6-->(M6) obeys commands (01/28/25 0600)  Best Verbal Response: 4-->(V4) confused (01/28/25 0600)  Aj Coma Scale Score: 14 (01/28/25 0600)     Results Reviewed:  Laboratory  Microbiology  Radiology  Pathology    Hematology:  Results from  last 7 days   Lab Units 01/28/25  0527 01/27/25  0538 01/26/25  0542   WBC 10*3/mm3 8.86 8.27 9.62   HEMOGLOBIN g/dL 11.6* 11.8* 11.7*   MCV fL 95.4 97.2* 101.4*   PLATELETS 10*3/mm3 158 137* 125*     Results from last 7 days   Lab Units 01/28/25  0527 01/27/25  0538 01/21/25  1418   NEUTROS ABS 10*3/mm3 6.30 6.07 4.30   LYMPHS ABS 10*3/mm3 1.55 1.41 1.19   EOS ABS 10*3/mm3 0.25 0.20 0.25     Chemistry:  Estimated Creatinine Clearance: 79 mL/min (by C-G formula based on SCr of 0.58 mg/dL).    Results from last 7 days   Lab Units 01/28/25  0527 01/27/25  0538   SODIUM mmol/L 140 136   POTASSIUM mmol/L 5.3* 5.6*   CHLORIDE mmol/L 97* 100   CO2 mmol/L 39.0* 32.0*   BUN mg/dL 34* 35*   CREATININE mg/dL 0.58 0.57   GLUCOSE mg/dL 124* 125*     Results from last 7 days   Lab Units 01/28/25  0527 01/27/25  0538 01/26/25  0542   CALCIUM mg/dL 9.6 9.7 9.2   MAGNESIUM mg/dL  --  2.4 1.8   PHOSPHORUS mg/dL  --  2.3* 2.4*     Hepatic Panel:  Results from last 7 days   Lab Units 01/27/25  0538 01/22/25  0418 01/21/25  1418   ALBUMIN g/dL 2.9* 3.7 3.6   TOTAL PROTEIN g/dL 5.5* 7.4 6.7   BILIRUBIN mg/dL 0.4 0.6 0.6   AST (SGOT) U/L 14 14 15   ALT (SGPT) U/L 8 7 6   ALK PHOS U/L 75 86 81       Cardiac Labs:  Results from last 7 days   Lab Units 01/28/25  0527 01/22/25  0418 01/21/25  1532 01/21/25  1418   PROBNP pg/mL 8,074.0* 6,151.0*  --   --    CK TOTAL U/L  --   --  25  --    HSTROP T ng/L  --   --  25* 25*     Biomarkers:  Results from last 7 days   Lab Units 01/28/25  0527 01/27/25  0538 01/22/25  0418 01/21/25  1532 01/21/25  1418   CRP mg/dL  --  1.44* 3.06*  --   --    LACTATE mmol/L  --   --   --   --  0.6   PROCALCITONIN ng/mL 0.05  --   --  0.05  --      COVID-19  Lab Results   Component Value Date    COVID19 Not Detected 01/21/2025    COVID19 Not Detected 07/12/2024    COVID19 Not Detected 09/18/2023    COVID19 Not Detected 09/07/2023           Images:  No radiology results for the last day    Echo:  Results for orders  placed during the hospital encounter of 08/24/23    Adult Transthoracic Echo Complete W/ Cont if Necessary Per Protocol    Interpretation Summary    Left ventricular systolic function is normal. Left ventricular ejection fraction appears to be 56 - 60%.    Left ventricular diastolic function was normal.    Mild aortic valve stenosis is present. Aortic valve area is 1.2 cm2.    Peak velocity of the flow distal to the aortic valve is 267.2 cm/s. Aortic valve mean pressure gradient is 16 mmHg.      Results: Reviewed.  I reviewed the patient's new laboratory and imaging results.  I independently reviewed the patient's new images.    Medications: Reviewed.    Assessment   A/P   Assessment/Management/Treatment Plan:  Hospital:  LOS: 7 days   ICU: 6d 10h     Active Hospital Problems    Diagnosis  POA    **Respiratory failure with hypercapnia [J96.92]  Yes    Acute on chronic respiratory failure with hypoxia and hypercapnia [J96.21, J96.22]  Yes    History of Hodgkin's disease [Z85.71]  Not Applicable    Acute on chronic hypoxic respiratory failure [J96.21]  Yes    Stage 3b chronic kidney disease [N18.32]  Yes    Chronic respiratory failure with hypoxia and hypercapnia [J96.11, J96.12]  Yes    HTN (hypertension) [I10]  Yes    HLD (hyperlipidemia) [E78.5]  Yes    A-fib [I48.91]  Yes    MAKAYLA (obstructive sleep apnea) [G47.33]  Yes    COPD (chronic obstructive pulmonary disease) [J44.9]  Yes     Tessa is a 79 y.o. female admitted on 1/21/2025 with altered mental status and Respiratory failure with hypercapnia [J96.92]    Her significant other is also admitted to [facility]. She was found at home by a neighbor and was noted to be encephalopathic. In the ED, she was found to be hypercapnic and did not improve with breathing treatments and was intubated with subsequent improvement in pCO2.      She was admitted to the ICU for ongoing care. She completed a course of Rocephin and Azithromycin. She remains on Prednisone 40mg daily.       She was extubated on 1/23/25.     Comorbidities: COPD on home O2, [facility], Afib (on Eliquis), HLD, Depression, HTN, and TIA    A/C respiratory failure  S/P Invasive Mechanical Ventilation - Extubated 01/23/25  Compensated respiratory acidosis.  NIV  COPD on home oxygen  Prior RUL lobectomy for NSCLC  Sleep Medicine  MAKAYLA  Cardiovascular  A Fib - APIxaban   HTN  Dyslipidemia   Depression  CKD 3b  Nutrition Support   Tube Feeding: Formula: Peptamen AF (Peptide Based, Critical Care, ALI); Feeding Type: Continuous; Start at: Other; Other: 60; Then Advance By: Do Not Advance; Total Daily Feeding Volume (mL/day): 1200; Water Flush: 30 mL; Every: 2 hours; Water Donnie...  [REMOVED] NG/OG Tube Orogastric 16 Fr Right mouth-Tube Feeding Rate (mL/hr): 20 mL/HR  [REMOVED] NG/OG Tube Nasogastric 10 Fr Left nostril-Tube Feeding Rate (mL/hr): 60 mL/HR  NG/OG Tube Nasoduodenal Left nostril-Tube Feeding Rate (mL/hr): 60 mL/HR (Based on a feeding duration of 20 h/d)    Lab Results   Component Value Date    PREALBUMIN 16.2 (L) 01/27/2025    PREALBUMIN 6.5 (L) 01/22/2025    CRP 1.44 (H) 01/27/2025    CRP 3.06 (H) 01/22/2025     Endocrine   Body mass index is 24.06 kg/m². Normal: 18.5-24.9kg/m2  Prediabetes (A1C 5.7%-6.4%)    Lab Results   Lab Value Date/Time    HGBA1C 5.80 (H) 01/22/2025 0418    HGBA1C 5.80 (H) 07/12/2024 1352     Results from last 7 days   Lab Units 01/28/25  0539 01/27/25  2343 01/27/25  1754 01/27/25  1150 01/27/25  0515 01/26/25  2354 01/26/25  1727 01/26/25  1225   GLUCOSE mg/dL 117 138* 127 167* 115 132* 141* 148*       Diet: NPO Diet NPO Type: Tube Feeding   Advance Directives: Code Status and Medical Interventions: CPR (Attempt to Resuscitate); Full Support   Ordered at: 01/21/25 2021     Code Status (Patient has no pulse and is not breathing):    CPR (Attempt to Resuscitate)     Medical Interventions (Patient has pulse or is breathing):    Full Support        VTE  Prophylaxis:  Pharmacologic & mechanical VTE prophylaxis orders are present.         In brief:  Discussed with patient and family at bedside.  Add clonidine (an alpha-2 agonist). Wean Dexmedetomidine off (an alpha-2 adrenergic receptor agonist).  SLT: OK  to start PO diet.   Disposition: Keep in ICU.    Plan of care and goals reviewed during interdisciplinary rounds.  I discussed the patient's findings and my recommendations with patient, family, and nursing staff    MDM:    Problem(s) High due to: Acute or Chronic illness or injury that may poses a threat to life or bodily function  Data: Moderate due to: Review of prior external records from each unique source, Review or results of each unique test, and Ordering of each unique test    Moderate    [x] Primary Attending Intensive Care Medicine - Nutrition Support   [] Consultant    Copied text in this note has been reviewed and is accurate as of 01/28/25       Airway patent, Nasal mucosa clear. Mouth with normal mucosa. Throat has no vesicles, no oropharyngeal exudates and uvula is midline.

## 2025-01-28 NOTE — PLAN OF CARE
Problem: Adult Inpatient Plan of Care  Goal: Plan of Care Review  Outcome: Progressing  Flowsheets (Taken 1/28/2025 1832)  Progress: improving  Outcome Evaluation: VSS on 2-3L NC. Pt oriented x3-4 throughout shift. Precedex gtt weaned off. [place] catheter and Keofeed removed. Pt tolerating PO diet. x1 Tylenol administered. 2225ml UOP. x1 BM this shift. Tech sitter at bedside for pt safety.  Plan of Care Reviewed With: patient

## 2025-01-28 NOTE — PLAN OF CARE
Goal Outcome Evaluation:                   Anticipated Discharge Disposition (SLP): inpatient rehabilitation facility          SLP Swallowing Diagnosis: mild-moderate, pharyngeal dysphagia (01/28/25 8164)

## 2025-01-28 NOTE — PLAN OF CARE
Goal Outcome Evaluation:                   Anticipated Discharge Disposition (SLP): inpatient rehabilitation facility          SLP Swallowing Diagnosis: suspected pharyngeal dysphagia (01/28/25 1010)

## 2025-01-28 NOTE — PROGRESS NOTES
INTENSIVIST   PROGRESS NOTE        SUBJECTIVE     Tessa 79 y.o. female is followed for: Weakness - Generalized       Respiratory failure with hypercapnia    COPD (chronic obstructive pulmonary disease)    MAKAYLA (obstructive sleep apnea)    HTN (hypertension)    HLD (hyperlipidemia)    A-fib    Chronic respiratory failure with hypoxia and hypercapnia    Acute on chronic hypoxic respiratory failure    History of Hodgkin's disease    Stage 3b chronic kidney disease    Acute on chronic respiratory failure with hypoxia and hypercapnia    As an Intensivist, we provide an integrated approach to the ICU patient and family, medical management of comorbid conditions, including but not limited to electrolytes, glycemic control, organ dysfunction, lead interdisciplinary rounds and coordinate the care with all other services, including those from other specialists.     Interval History:    Some periods of confusion, agitation, and pulling out her devices.    This morning, she Comfortable, cooperative and calm.    NIV.    Dexmedetomidine     Last Bowel Movement: 01/27/25 (01/27/25 0930)    Temp  Min: 97.3 °F (36.3 °C)  Max: 99.5 °F (37.5 °C)       History     Last Reviewed by Galo Cruz MD on 1/27/2025 at  7:48 PM    Sections Reviewed    Medical, Surgical, Family, Tobacco, Alcohol, Drug Use, Sexual Activity,   Social Documentation    Problem list reviewed by Galo Cruz MD on 1/27/2025 at  7:47 PM  Medicines reviewed by Galo Cruz MD on 1/27/2025 at  7:47 PM  Allergies reviewed by Galo Cruz MD on 1/27/2025 at  7:47 PM       The patient's relevant past medical, surgical and social history were reviewed and updated in Epic as appropriate.        OBJECTIVE     Physical Examination    Vitals:  Temp: 99.5 °F (37.5 °C) (01/27/25 1700) Temp  Min: 97.3 °F (36.3 °C)  Max: 99.5 °F (37.5 °C)   Temp core:      BP: 121/60 (01/27/25 1900) BP  Min: 100/64  Max: 136/67   MAP (non-invasive) Noninvasive MAP (mmHg): 87 (01/27/25  ) Noninvasive MAP (mmHg)  Av.9  Min: 60  Max: 133   Pulse: 50 (25) Pulse  Min: 48  Max: 77   Resp: 14 (25) Resp  Min: 14  Max: 21   SpO2: 99 % (25) SpO2  Min: 90 %  Max: 100 %   Device: NPPV/NIV (25)    Flow Rate: 2 (25 1600) Flow (L/min) (Oxygen Therapy)  Min: 2  Max: 4     Intake/Ouptut 24 hrs (7:00AM - 6:59 AM)  Intake & Output (last 2 days)          07 0700  07 07    I.V. (mL/kg) 536.9 (8.7) 295.8 (4.8)    Other 450 240    NG/GT 1355 999    Total Intake(mL/kg) 2341.9 (38) 1534.8 (24.9)    Urine (mL/kg/hr) 2700 (1.8) 1450 (1.8)    Stool  0    Total Output 2700 1450    Net -358.1 +84.8          Stool Unmeasured Occurrence  1 x            Medications (drips):  dexmedetomidine, Last Rate: 1.1 mcg/kg/hr (25 1728)            25  2150 25  0600   Weight: 61.8 kg (136 lb 3.9 oz) 61.6 kg (135 lb 12.9 oz)     NIV:   Settings: Observed:   Mode of Delivery: BiPAP (25)          IPAP (cm H2O): 20 (25)    EPAP (cm H2O): 8 (25)    Pressure Support (cm H20): 12 cm H2O (25 0258)         Set Rate (Breaths/Min): 14 breaths/min (25) Respiratory Rate Total (Breaths/Min): 14 breaths/min (25)         Tidal Volume (mL): 609 mL (25)   Oxygen Concentration (%): 40 (25)  Minute Ventilation (L/Min): 8.6 (25)      Telemetry:  Rhythm: sinus bradycardia (25)         Constitutional:  No acute distress.   Cardiovascular: RRR.    Respiratory: Normal breath sounds  No adventitious sounds   Abdominal:  Soft with no tenderness.   Extremities: No Edema   Neurological:   Awake.  Best Eye Response: 4-->(E4) spontaneous (25)  Best Motor Response: 6-->(M6) obeys commands (25)  Best Verbal Response: 4-->(V4) confused (25)  Aj Coma Scale Score: 14 (25)     Results  Reviewed:  Laboratory  Microbiology  Radiology  Pathology    Hematology:  Results from last 7 days   Lab Units 01/27/25  0538 01/26/25  0542 01/25/25  0551   WBC 10*3/mm3 8.27 9.62 12.66*   HEMOGLOBIN g/dL 11.8* 11.7* 11.5*   MCV fL 97.2* 101.4* 101.4*   PLATELETS 10*3/mm3 137* 125* 148     Results from last 7 days   Lab Units 01/27/25  0538 01/21/25  1418   NEUTROS ABS 10*3/mm3 6.07 4.30   LYMPHS ABS 10*3/mm3 1.41 1.19   EOS ABS 10*3/mm3 0.20 0.25     Chemistry:  Estimated Creatinine Clearance: 80.4 mL/min (by C-G formula based on SCr of 0.57 mg/dL).    Results from last 7 days   Lab Units 01/27/25  0538 01/26/25  0542   SODIUM mmol/L 136 139   POTASSIUM mmol/L 5.6* 5.0   CHLORIDE mmol/L 100 102   CO2 mmol/L 32.0* 33.0*   BUN mg/dL 35* 31*   CREATININE mg/dL 0.57 0.59   GLUCOSE mg/dL 125* 118*     Results from last 7 days   Lab Units 01/27/25  0538 01/26/25  0542   CALCIUM mg/dL 9.7 9.2   MAGNESIUM mg/dL 2.4 1.8   PHOSPHORUS mg/dL 2.3* 2.4*     Hepatic Panel:  Results from last 7 days   Lab Units 01/27/25  0538 01/22/25  0418 01/21/25  1418   ALBUMIN g/dL 2.9* 3.7 3.6   TOTAL PROTEIN g/dL 5.5* 7.4 6.7   BILIRUBIN mg/dL 0.4 0.6 0.6   AST (SGOT) U/L 14 14 15   ALT (SGPT) U/L 8 7 6   ALK PHOS U/L 75 86 81       Cardiac Labs:  Results from last 7 days   Lab Units 01/22/25  0418 01/21/25  1532 01/21/25  1418   PROBNP pg/mL 6,151.0*  --   --    CK TOTAL U/L  --  25  --    HSTROP T ng/L  --  25* 25*     Biomarkers:  Results from last 7 days   Lab Units 01/27/25  0538 01/22/25  0418 01/21/25  1532 01/21/25  1418   CRP mg/dL 1.44* 3.06*  --   --    LACTATE mmol/L  --   --   --  0.6   PROCALCITONIN ng/mL  --   --  0.05  --        U/A    Results from last 7 days   Lab Units 01/21/25  2159   RBC UA /HPF 0-2   WBC UA /HPF 6-10*   BACTERIA UA /HPF 2+*   SQUAM EPITHEL UA /HPF 3-6*   HYALINE CASTS UA /LPF 0-6     COVID-19  Lab Results   Component Value Date    COVID19 Not Detected 01/21/2025    COVID19 Not Detected 07/12/2024     COVID19 Not Detected 09/18/2023    COVID19 Not Detected 09/07/2023       Arterial Blood Gases:  Results from last 7 days   Lab Units 01/27/25  0446 01/26/25  0415 01/25/25  0352   PH, ARTERIAL pH units 7.374 7.376 7.340*   PCO2, ARTERIAL mm Hg 69.7* 66.3* 69.2*   PO2 ART mm Hg 72.9* 81.0* 96.7   FIO2 % 40 35 21       Images:  No radiology results for the last day    Echo:  Results for orders placed during the hospital encounter of 08/24/23    Adult Transthoracic Echo Complete W/ Cont if Necessary Per Protocol    Interpretation Summary    Left ventricular systolic function is normal. Left ventricular ejection fraction appears to be 56 - 60%.    Left ventricular diastolic function was normal.    Mild aortic valve stenosis is present. Aortic valve area is 1.2 cm2.    Peak velocity of the flow distal to the aortic valve is 267.2 cm/s. Aortic valve mean pressure gradient is 16 mmHg.      Results: Reviewed.  I reviewed the patient's new laboratory and imaging results.  I independently reviewed the patient's new images.    Medications: Reviewed.    Assessment   A/P   Assessment/Management/Treatment Plan:  Hospital:  LOS: 6 days   ICU: 5d 22h     Active Hospital Problems    Diagnosis  POA    **Respiratory failure with hypercapnia [J96.92]  Yes    Acute on chronic respiratory failure with hypoxia and hypercapnia [J96.21, J96.22]  Yes    History of Hodgkin's disease [Z85.71]  Not Applicable    Acute on chronic hypoxic respiratory failure [J96.21]  Yes    Stage 3b chronic kidney disease [N18.32]  Yes    Chronic respiratory failure with hypoxia and hypercapnia [J96.11, J96.12]  Yes    HTN (hypertension) [I10]  Yes    HLD (hyperlipidemia) [E78.5]  Yes    A-fib [I48.91]  Yes    MAKAYLA (obstructive sleep apnea) [G47.33]  Yes    COPD (chronic obstructive pulmonary disease) [J44.9]  Yes     Tessa is a 79 y.o. female admitted on 1/21/2025 with altered mental status and Respiratory failure with hypercapnia [J96.92]    Her significant  other is also admitted to [facility]. She was found at home by a neighbor and was noted to be encephalopathic. In the ED, she was found to be hypercapnic and did not improve with breathing treatments and was intubated with subsequent improvement in pCO2.      She was admitted to the ICU for ongoing care. She completed a course of Rocephin and Azithromycin. She remains on Prednisone 40mg daily.      She was extubated on 1/23/25.     Comorbidities: COPD on home O2, [facility], Afib (on Eliquis), HLD, Depression, HTN, and TIA    A/C respiratory failure  S/P Invasive Mechanical Ventilation - Extubated 01/23/25  NIV  COPD on home oxygen  Prior RUL lobectomy for NSCLC  Sleep Medicine  MAKAYLA  Cardiovascular  A Fib - APIxaban   HTN  Dyslipidemia   Depression  CKD 3b  Nutrition Support   Tube Feeding: Formula: Peptamen AF (Peptide Based, Critical Care, ALI); Feeding Type: Continuous; Start at: Other; Other: 60; Then Advance By: Do Not Advance; Total Daily Feeding Volume (mL/day): 1200; Water Flush: 30 mL; Every: 2 hours; Water Donnie...  [REMOVED] NG/OG Tube Orogastric 16 Fr Right mouth-Tube Feeding Rate (mL/hr): 20 mL/HR  [REMOVED] NG/OG Tube Nasogastric 10 Fr Left nostril-Tube Feeding Rate (mL/hr): 60 mL/HR  NG/OG Tube Nasoduodenal Left nostril-Tube Feeding Rate (mL/hr): 60 mL/HR (Based on a feeding duration of 20 h/d)    Lab Results   Component Value Date    PREALBUMIN 16.2 (L) 01/27/2025    PREALBUMIN 6.5 (L) 01/22/2025    CRP 1.44 (H) 01/27/2025    CRP 3.06 (H) 01/22/2025     Endocrine   Body mass index is 24.06 kg/m². Normal: 18.5-24.9kg/m2  Prediabetes (A1C 5.7%-6.4%)    Lab Results   Lab Value Date/Time    HGBA1C 5.80 (H) 01/22/2025 0418    HGBA1C 5.80 (H) 07/12/2024 1352     Results from last 7 days   Lab Units 01/27/25  1754 01/27/25  1150 01/27/25  0515 01/26/25  2354 01/26/25  1727 01/26/25  1225 01/26/25  0512 01/25/25  2327   GLUCOSE mg/dL 127 167* 115 132* 141* 148* 110 106        Diet: NPO Diet NPO Type: Tube Feeding   Advance Directives: Code Status and Medical Interventions: CPR (Attempt to Resuscitate); Full Support   Ordered at: 01/21/25 2021     Code Status (Patient has no pulse and is not breathing):    CPR (Attempt to Resuscitate)     Medical Interventions (Patient has pulse or is breathing):    Full Support        VTE Prophylaxis:  Pharmacologic & mechanical VTE prophylaxis orders are present.         In brief:  Discussed with patient and family at bedside.  Wean Dexmedetomidine as tolerated  Continue Enteral Nutrition   SLT to re-assess dysphagia, as she is improving.  Disposition: Keep in ICU.    Plan of care and goals reviewed during interdisciplinary rounds.  I discussed the patient's findings and my recommendations with patient, family, and nursing staff    MDM:    Problem(s) High due to: Acute or Chronic illness or injury that may poses a threat to life or bodily function  Data: Moderate due to: Review of prior external records from each unique source, Review or results of each unique test, and Ordering of each unique test    Moderate    [x] Primary Attending Intensive Care Medicine - Nutrition Support   [] Consultant    Copied text in this note has been reviewed and is accurate as of 01/27/25

## 2025-01-28 NOTE — THERAPY EVALUATION
Acute Care - Speech Language Pathology   Swallow Initial Evaluation [facility]  Clinical Swallow Evaluation       Patient Name: Tessa Bradley  : 1945  MRN: [id number]  Today's Date: 2025               Admit Date: 2025    Visit Dx:     ICD-10-CM ICD-9-CM   1. Acute on chronic respiratory failure with hypoxia and hypercapnia  J96.21 518.84    J96.22 786.09     799.02   2. Altered mental status, unspecified altered mental status type  R41.82 780.97   3. Acute exacerbation of chronic obstructive pulmonary disease (COPD)  J44.1 491.21   4. Acute cystitis without hematuria  N30.00 595.0   5. Dysphagia, unspecified type  R13.10 787.20     Patient Active Problem List   Diagnosis    COPD (chronic obstructive pulmonary disease)    Elevated serum creatinine    Hypotension    Dyspnea    MAKAYLA (obstructive sleep apnea)    HEATHER (acute kidney injury)    Leukocytosis    HTN (hypertension)    HLD (hyperlipidemia)    A-fib    Hodgkin disease    Arthritis    AMS (altered mental status)    Chronic respiratory failure with hypoxia and hypercapnia    Pulmonary nodule    History of TIA (transient ischemic attack)    Tobacco abuse    Acute on chronic hypoxic respiratory failure    History of Hodgkin's disease    Overactive bladder    Stage 3b chronic kidney disease    Acute on chronic respiratory failure with hypoxia    Acute on chronic respiratory failure    Respiratory failure with hypercapnia    Acute on chronic respiratory failure with hypoxia and hypercapnia     Past Medical History:   Diagnosis Date    Atrial fibrillation     Cancer     Cluster headache     COPD (chronic obstructive pulmonary disease)     Difficulty walking     Sciatica and pinched nerves in bsck    Fibromyalgia, primary     Hyperlipidemia     Hypertension     Peripheral neuropathy     TIA (transient ischemic attack)      Past Surgical History:   Procedure Laterality Date    ABDOMINAL SURGERY      HYSTERECTOMY      OOPHORECTOMY          SLP Recommendation and Plan  SLP Swallowing Diagnosis: suspected pharyngeal dysphagia (01/28/25 1010)  SLP Diet Recommendation: NPO, temporary alternate methods of nutrition/hydration, other (see comments) (ok for 3-4 ice chips/hr after oral care as tolerated) (01/28/25 1010)     SLP Rec. for Method of Medication Administration: meds via alternate route (01/28/25 1010)     Monitor for Signs of Aspiration: yes, notify SLP if any concerns (01/28/25 1010)  Recommended Diagnostics: reassess via FEES (01/28/25 1010)  Swallow Criteria for Skilled Therapeutic Interventions Met: demonstrates skilled criteria (01/28/25 1010)  Anticipated Discharge Disposition (SLP): inpatient rehabilitation facility (01/28/25 1010)  Rehab Potential/Prognosis, Swallowing: good, to achieve stated therapy goals (01/28/25 1010)     Predicted Duration Therapy Intervention (Days): 1 week (01/28/25 1010)  Oral Care Recommendations: Oral Care BID/PRN, Suction toothbrush (01/28/25 1010)                                               SWALLOW EVALUATION (Last 72 Hours)       SLP Adult Swallow Evaluation       Row Name 01/28/25 1010                   Rehab Evaluation    Document Type evaluation  -        Subjective Information no complaints  -        Patient Observations alert;cooperative  -        Patient/Family/Caregiver Comments/Observations No family present  -        Patient Effort good  -        Symptoms Noted During/After Treatment none  -           General Information    Patient Profile Reviewed yes  -        Pertinent History Of Current Problem Adm w/ respiratory failure w/ hypercapnia. Intubated 1/21-1/23. Hx:  COPD, RUL lobectomy, a-fib, HTN, TIA  -        Current Method of Nutrition NPO;nasogastric feedings;small-bore  -        Precautions/Limitations, Vision WFL;for purposes of eval  -        Precautions/Limitations, Hearing WFL;for purposes of eval  -        Prior Level of Function-Communication unknown  -         Prior Level of Function-Swallowing --  FEES completed 9/19/23. Pt w/ moderate pharyngeal dysphagia & suspected esophageal dysphagia. (Retrograde flow through UES)  -        Plans/Goals Discussed with patient;agreed upon  -        Barriers to Rehab medically complex;cognitive status  -        Patient's Goals for Discharge patient did not state  -           Pain    Additional Documentation Pain Scale: FACES Pre/Post-Treatment (Group)  -           Pain Scale: FACES Pre/Post-Treatment    Pain: FACES Scale, Pretreatment 0-->no hurt  -        Posttreatment Pain Rating 0-->no hurt  -           Oral Motor Structure and Function    Mucosal Quality dry  -           Oral Musculature and Cranial Nerve Assessment    Oral Motor General Assessment generalized oral motor weakness  -           General Eating/Swallowing Observations    Respiratory Support Currently in Use nasal cannula  -        O2 Liters 3L  -        Eating/Swallowing Skills self-fed;fed by SLP  -        Positioning During Eating upright 90 degree;upright in bed  -        Utensils Used spoon;cup;straw  -        Consistencies Trialed ice chips;thin liquids;nectar/syrup-thick liquids;pureed  -           Clinical Swallow Eval    Pharyngeal Phase suspected pharyngeal impairment  -        Clinical Swallow Evaluation Summary Pt w/ overt s/s of aspiration c/b intermittent throat clear w/ thins & NTL trials despite presentation method. No glaring s/s of aspiration w/ pureed trials. Rec cont NPO w/ NG, plan for FEES this PM. Ok for 3-4 ice chips/hr after oral care as tolerated  -           Pharyngeal Phase Concerns    Pharyngeal Phase Concerns throat clear  -        Throat Clear thin;nectar  -           SLP Evaluation Clinical Impression    SLP Swallowing Diagnosis suspected pharyngeal dysphagia  -        Functional Impact risk of aspiration/pneumonia  -        Rehab Potential/Prognosis, Swallowing good, to achieve stated therapy  goals  -        Swallow Criteria for Skilled Therapeutic Interventions Met demonstrates skilled criteria  -           Recommendations    Predicted Duration Therapy Intervention (Days) 1 week  -        SLP Diet Recommendation NPO;temporary alternate methods of nutrition/hydration;other (see comments)  ok for 3-4 ice chips/hr after oral care as tolerated  -        Recommended Diagnostics reassess via FEES  -        Oral Care Recommendations Oral Care BID/PRN;Suction toothbrush  -        SLP Rec. for Method of Medication Administration meds via alternate route  -        Monitor for Signs of Aspiration yes;notify SLP if any concerns  -        Anticipated Discharge Disposition (SLP) inpatient rehabilitation facility  -                  User Key  (r) = Recorded By, (t) = Taken By, (c) = Cosigned By      Initials Name Effective Dates     Kady Treviño MS CCC-SLP 05/12/23 -                     EDUCATION  The patient has been educated in the following areas:   Dysphagia (Swallowing Impairment) Oral Care/Hydration NPO rationale.                Time Calculation:    Time Calculation- SLP       Row Name 01/28/25 1321             Time Calculation- [facility]    SLP Start Time 1010  -      SLP Received On 01/28/25  -         Untimed Charges    87261-DQ Eval Oral Pharyng Swallow Minutes 60  -         Total Minutes    Untimed Charges Total Minutes 60  -       Total Minutes 60  -                User Key  (r) = Recorded By, (t) = Taken By, (c) = Cosigned By      Initials Name Provider Type     Kady Treviño MS CCC-SLP Speech and Language Pathologist                    Therapy Charges for Today       Code Description Service Date Service Provider Modifiers Qty    [id number] HC ST EVAL ORAL PHARYNG SWALLOW 4 1/28/2025 Kady Treviño MS CCC-SLP GN 1                 MS ROSEMARIE Hartley  1/28/2025

## 2025-01-28 NOTE — MBS/VFSS/FEES
Acute Care - Speech Language Pathology   Swallow Initial Evaluation [facility]  Fiberoptic Endoscopic Evaluation of Swallowing (FEES)       Patient Name: Tessa Bradley  : 1945  MRN: [id number]  Today's Date: 2025               Admit Date: 2025    Visit Dx:     ICD-10-CM ICD-9-CM   1. Acute on chronic respiratory failure with hypoxia and hypercapnia  J96.21 518.84    J96.22 786.09     799.02   2. Altered mental status, unspecified altered mental status type  R41.82 780.97   3. Acute exacerbation of chronic obstructive pulmonary disease (COPD)  J44.1 491.21   4. Acute cystitis without hematuria  N30.00 595.0   5. Dysphagia, unspecified type  R13.10 787.20     Patient Active Problem List   Diagnosis    COPD (chronic obstructive pulmonary disease)    Elevated serum creatinine    Hypotension    Dyspnea    MAKAYLA (obstructive sleep apnea)    HEATHER (acute kidney injury)    Leukocytosis    HTN (hypertension)    HLD (hyperlipidemia)    A-fib    Hodgkin disease    Arthritis    AMS (altered mental status)    Chronic respiratory failure with hypoxia and hypercapnia    Pulmonary nodule    History of TIA (transient ischemic attack)    Tobacco abuse    Acute on chronic hypoxic respiratory failure    History of Hodgkin's disease    Overactive bladder    Stage 3b chronic kidney disease    Acute on chronic respiratory failure with hypoxia    Acute on chronic respiratory failure    Respiratory failure with hypercapnia    Acute on chronic respiratory failure with hypoxia and hypercapnia     Past Medical History:   Diagnosis Date    Atrial fibrillation     Cancer     Cluster headache     COPD (chronic obstructive pulmonary disease)     Difficulty walking     Sciatica and pinched nerves in bsck    Fibromyalgia, primary     Hyperlipidemia     Hypertension     Peripheral neuropathy     TIA (transient ischemic attack)      Past Surgical History:   Procedure Laterality Date    ABDOMINAL SURGERY       HYSTERECTOMY      OOPHORECTOMY         SLP Recommendation and Plan  SLP Swallowing Diagnosis: mild-moderate, pharyngeal dysphagia (01/28/25 1535)  SLP Diet Recommendation: soft to chew textures, chopped, nectar thick liquids, no mixed consistencies (01/28/25 1535)  Recommended Precautions and Strategies: general aspiration precautions, small bites of food and sips of liquid, assist with feeding (01/28/25 1535)  SLP Rec. for Method of Medication Administration: meds whole, meds crushed, with puree, as tolerated (01/28/25 1535)     Monitor for Signs of Aspiration: yes, notify SLP if any concerns (01/28/25 1535)  Recommended Diagnostics: reassess via FEES (01/28/25 1010)  Swallow Criteria for Skilled Therapeutic Interventions Met: demonstrates skilled criteria (01/28/25 1535)  Anticipated Discharge Disposition (SLP): inpatient rehabilitation facility (01/28/25 1535)  Rehab Potential/Prognosis, Swallowing: good, to achieve stated therapy goals (01/28/25 1535)  Therapy Frequency (Swallow): 5 days per week (01/28/25 1535)  Predicted Duration Therapy Intervention (Days): 1 week (01/28/25 1535)  Oral Care Recommendations: Oral Care BID/PRN, Suction toothbrush (01/28/25 1535)                                               SWALLOW EVALUATION (Last 72 Hours)       SLP Adult Swallow Evaluation       Row Name 01/28/25 1535 01/28/25 1010                Rehab Evaluation    Document Type evaluation  - evaluation  -       Subjective Information no complaints  - no complaints  -       Patient Observations alert;cooperative  - alert;cooperative  -       Patient/Family/Caregiver Comments/Observations family present  - No family present  -       Patient Effort good  - good  -       Symptoms Noted During/After Treatment none  - none  -          General Information    Patient Profile Reviewed yes  -MH yes  -       Pertinent History Of Current Problem See initial eval, pt referred for FEES  - Adm w/ respiratory  failure w/ hypercapnia. Intubated 1/21-1/23. Hx:  COPD, RUL lobectomy, a-fib, HTN, TIA  -       Current Method of Nutrition NPO;nasogastric feedings;small-bore  - NPO;nasogastric feedings;small-bore  -       Precautions/Limitations, Vision WFL;for purposes of eval  - WFL;for purposes of eval  -       Precautions/Limitations, Hearing WFL;for purposes of eval  - WFL;for purposes of eval  -       Prior Level of Function-Communication unknown  - unknown  -       Prior Level of Function-Swallowing unknown  - --  FEES completed 9/19/23. Pt w/ moderate pharyngeal dysphagia & suspected esophageal dysphagia. (Retrograde flow through UES)  -       Plans/Goals Discussed with patient;agreed upon  - patient;agreed upon  -       Barriers to Rehab medically complex;cognitive status  - medically complex;cognitive status  -       Patient's Goals for Discharge patient did not state  - patient did not state  -       Family Goals for Discharge family did not state  - --          Pain    Additional Documentation Pain Scale: FACES Pre/Post-Treatment (Group)  - Pain Scale: FACES Pre/Post-Treatment (Group)  -          Pain Scale: FACES Pre/Post-Treatment    Pain: FACES Scale, Pretreatment 0-->no hurt  - 0-->no hurt  -       Posttreatment Pain Rating 0-->no hurt  - 0-->no hurt  -          Oral Motor Structure and Function    Mucosal Quality -- dry  -          Oral Musculature and Cranial Nerve Assessment    Oral Motor General Assessment -- generalized oral motor weakness  -          General Eating/Swallowing Observations    Respiratory Support Currently in Use -- nasal cannula  -       O2 Liters -- 3L  -       Eating/Swallowing Skills -- self-fed;fed by SLP  -       Positioning During Eating -- upright 90 degree;upright in bed  -       Utensils Used -- spoon;cup;straw  -       Consistencies Trialed -- ice chips;thin liquids;nectar/syrup-thick liquids;pureed  -          Clinical  Swallow Eval    Pharyngeal Phase -- suspected pharyngeal impairment  -       Clinical Swallow Evaluation Summary -- Pt w/ overt s/s of aspiration c/b intermittent throat clear w/ thins & NTL trials despite presentation method. No glaring s/s of aspiration w/ pureed trials. Rec cont NPO w/ NG, plan for FEES this PM. Ok for 3-4 ice chips/hr after oral care as tolerated  -          Pharyngeal Phase Concerns    Pharyngeal Phase Concerns -- throat clear  -       Throat Clear -- thin;nectar  -          Fiberoptic Endoscopic Evaluation of Swallowing (FEES)    Risks/Benefits Reviewed risks/benefits explained;patient;agreed to eval  - --       Nasal Entry right:  - --       Scope serial number/identification [id number]  - --          Anatomy and Physiology    Anatomic Considerations no anatomic structural deviation  - --       Velopharyngeal WFL  - --       Base of Tongue symmetrical  - --       Epiglottis WFL  - --       Laryngeal Function Breathing symmetrical  - --       Laryngeal Function Phonation symmetrical  - --       Laryngeal Function to Breath Hold TVF/FVF/Arytenoid;sustained closure  - --       Secretion Rating Scale (Edgar et al. 1996) 0- normal, no visible secretions  - --       Ice Chips DNA  - --       Spontaneous Swallow frequency adequate  - --       Sensory sensed scope  - --       Utensils Used Spoon;Cup;Straw  - --       Consistencies Trialed spoon;cup;straw;thin liquids;nectar-thick liquids;pudding/puree;regular textures  - --          FEES Interpretation    Oral Phase oral residue present;increased A-P transit time;prespill of liquids into pharynx  - --          Initiation of Pharyngeal Swallow    Initiation of Pharyngeal Swallow bolus in pyriform sinuses  - --       Pharyngeal Phase impaired pharyngeal phase of swallowing  - --       Penetration During the Swallow thin liquids;secondary to reduced vestibular closure;secondary to reduced laryngeal  elevation;secondary to delayed swallow initiation or mistiming  - --       Penetration After the Swallow thin liquids;secondary to residue;in valleculae;in pyriform sinuses  - --       Depth of Penetration deep  - --       Response to Penetration No  -MH --       No spontaneous response to penetration and non-effective laryngeal clearance with cue (see comments);other (see comments)  cued cough  - --       Response to Aspiration No  -MH --       No spontaneous response to aspiration with non-effective subglottic clearance with cue (see comments);other (see comments)  cued cough  - --       Rosenbek's Scale thin:;5-->Level 5;nectar:;pudding/puree:;regular textures:;1-->Level 1  - --       Residue all consistencies tested;diffuse within pharynx;secondary to reduced hyolaryngeal excursion;secondary to reduced laryngeal elevation;secondary to reduced posterior pharyngeal wall stripping;other (see comments)  worse w/ thins  - --       Response to Residue partial residue clearance;with cued swallow;with liquid wash  - --       Attempted Compensatory Maneuvers bolus size;bolus presentation style;additional subsequent swallow;chin tuck;breath hold  - --       Response to Attempted Compensatory Maneuvers did not prevent penetration;reduced residue  - --       Successful Compensatory Maneuver Competency patient able to;demonstrate compensations;with cues  - --       FEES Summary Mild to moderate pharyngeal dysphagia. Prespill w/ thins to the pyriforms. Deep penetration w/ thins during the swallow w/ continued penetration aspiration after the swallow. Given depth of penetration & inability to clear, pt @ risk of aspiration w/ thins over course of a meal. No penetration/aspiration w/ NTL, pudding or regular solid consistencies. Recommend soft/chopped solid diet w/ thin liquids, no mixed consistencies, small/single bites & sips, assist w/ feeding PRN  - --          SLP Evaluation Clinical Impression     SLP Swallowing Diagnosis mild-moderate;pharyngeal dysphagia  - suspected pharyngeal dysphagia  -       Functional Impact risk of aspiration/pneumonia  - risk of aspiration/pneumonia  -       Rehab Potential/Prognosis, Swallowing good, to achieve stated therapy goals  - good, to achieve stated therapy goals  -       Swallow Criteria for Skilled Therapeutic Interventions Met demonstrates skilled criteria  - demonstrates skilled criteria  -          Recommendations    Therapy Frequency (Swallow) 5 days per week  - --       Predicted Duration Therapy Intervention (Days) 1 week  - 1 week  -       SLP Diet Recommendation soft to chew textures;chopped;nectar thick liquids;no mixed consistencies  - NPO;temporary alternate methods of nutrition/hydration;other (see comments)  ok for 3-4 ice chips/hr after oral care as tolerated  -       Recommended Diagnostics -- reassess via FEES  -       Recommended Precautions and Strategies general aspiration precautions;small bites of food and sips of liquid;assist with feeding  - --       Oral Care Recommendations Oral Care BID/PRN;Suction toothbrush  - Oral Care BID/PRN;Suction toothbrush  -       SLP Rec. for Method of Medication Administration meds whole;meds crushed;with puree;as tolerated  - meds via alternate route  -       Monitor for Signs of Aspiration yes;notify SLP if any concerns  - yes;notify SLP if any concerns  -       Anticipated Discharge Disposition (SLP) inpatient rehabilitation facility  - inpatient rehabilitation facility  -                 User Key  (r) = Recorded By, (t) = Taken By, (c) = Cosigned By      Initials Name Effective Dates    Kady Vasques, MS [facility]-SLP 05/12/23 -                     EDUCATION  The patient has been educated in the following areas:   Dysphagia (Swallowing Impairment) Oral Care/Hydration Modified Diet Instruction.        SLP GOALS       Row Name 01/28/25 2285             (LTG) Patient  will demonstrate functional swallow for    Diet Texture (Demonstrate functional swallow) regular textures  -      Liquid viscosity (Demonstrate functional swallow) thin liquids  -      [place] (Demonstrate functional swallow) with minimal cues (75-90% accuracy)  -      Time Frame (Demonstrate functional swallow) 1 week  -      Progress/Outcomes (Demonstrate functional swallow) new goal  -         ([facility]) Patient will tolerate trials of    Consistencies Trialed (Tolerate trials) soft to chew (chopped) textures;nectar/ mildly thick liquids  -      Desired Outcome (Tolerate trials) without signs/symptoms of aspiration  -      [place] (Tolerate trials) with minimal cues (75-90% accuracy)  -      Time Frame (Tolerate trials) 1 week  -      Progress/Outcomes (Tolerate trials) new goal  -         ([facility]) Lingual Strengthening Goal 1 (SLP)    Activity (Lingual Strengthening Goal 1, SLP) increase lingual tone/sensation/control/coordination/movement;increase tongue back strength  -      Increase Lingual Tone/Sensation/Control/Coordination/Movement swallow trials;lingual resistance exercises  -      Increase Tongue Back Strength lingual resistance exercises  -      [place]/Accuracy (Lingual Strengthening Goal 1, SLP) with minimal cues (75-90% accuracy)  -      Time Frame (Lingual Strengthening Goal 1, SLP) 1 week  -      Progress/Outcomes (Lingual Strengthening Goal 1, SLP) new goal  -         ([facility]) Pharyngeal Strengthening Exercise Goal 1 (SLP)    Activity (Pharyngeal Strengthening Goal 1, SLP) increase timing;increase superior movement of the hyolaryngeal complex;increase anterior movement of the hyolaryngeal complex;increase closure at entrance to airway/closure of airway at glottis;increase squeeze/positive pressure generation  -      Increase Timing prepping - 3 second prep or suck swallow or 3-step swallow  -      Increase Superior Movement of the Hyolaryngeal Complex  effortful pitch glide (falsetto + pharyngeal squeeze)  -      Increase Anterior Movement of the Hyolaryngeal Complex chin tuck against resistance (CTAR)  -      Increase Closure at Entrance to Airway/Closure of Airway at Glottis supraglottic swallow  -      Increase Squeeze/Positive Pressure Generation hard effortful swallow  -      [place]/Accuracy (Pharyngeal Strengthening Goal 1, SLP) with minimal cues (75-90% accuracy)  -      Time Frame (Pharyngeal Strengthening Goal 1, SLP) 1 week  -      Progress/Outcomes (Pharyngeal Strengthening Goal 1, SLP) new goal  -                User Key  (r) = Recorded By, (t) = Taken By, (c) = Cosigned By      Initials Name Provider Type    Kady Vasques MS CCC-SLP Speech and Language Pathologist                         Time Calculation:    Time Calculation- SLP       Row Name 01/28/25 1651 01/28/25 1321          Time Calculation- SLP    SLP Start Time 1535  -MH 1010  -MH     SLP Received On 01/28/25  - 01/28/25  -        Untimed Charges    59496-XT Eval Oral Pharyng Swallow Minutes -- 60  -MH     89853-OZ Fiberoptic Endo Eval Swallow Minutes 100  -MH --        Total Minutes    Untimed Charges Total Minutes 100  -MH 60  -MH      Total Minutes 100  -MH 60  -MH               User Key  (r) = Recorded By, (t) = Taken By, (c) = Cosigned By      Initials Name Provider Type    Kady Vasques MS CCC-SLP Speech and Language Pathologist                    Therapy Charges for Today       Code Description Service Date Service Provider Modifiers Qty    [id number] HC ST EVAL ORAL PHARYNG SWALLOW 4 1/28/2025 Kady Treviño MS CCC-KELLY GN 1    [id number] HC ST FIBEROPTIC ENDO EVAL SWALL 7 1/28/2025 Kady Treviño MS CCC-SLP GN 1                 MS ROSEMARIE Hartley  1/28/2025

## 2025-01-29 LAB
ANION GAP SERPL CALCULATED.3IONS-SCNC: 2 MMOL/L (ref 5–15)
BASOPHILS # BLD AUTO: 0.01 10*3/MM3 (ref 0–0.2)
BASOPHILS NFR BLD AUTO: 0.1 % (ref 0–1.5)
BUN SERPL-MCNC: 32 MG/DL (ref 8–23)
BUN/CREAT SERPL: 40.5 (ref 7–25)
CALCIUM SPEC-SCNC: 9.6 MG/DL (ref 8.6–10.5)
CHLORIDE SERPL-SCNC: 101 MMOL/L (ref 98–107)
CO2 SERPL-SCNC: 39 MMOL/L (ref 22–29)
CREAT SERPL-MCNC: 0.79 MG/DL (ref 0.57–1)
DEPRECATED RDW RBC AUTO: 56.9 FL (ref 37–54)
EGFRCR SERPLBLD CKD-EPI 2021: 77.2 ML/MIN/1.73
EOSINOPHIL # BLD AUTO: 0.19 10*3/MM3 (ref 0–0.4)
EOSINOPHIL NFR BLD AUTO: 2 % (ref 0.3–6.2)
ERYTHROCYTE [DISTWIDTH] IN BLOOD BY AUTOMATED COUNT: 16.2 % (ref 12.3–15.4)
GLUCOSE BLDC GLUCOMTR-MCNC: 46 MG/DL (ref 70–130)
GLUCOSE BLDC GLUCOMTR-MCNC: 47 MG/DL (ref 70–130)
GLUCOSE BLDC GLUCOMTR-MCNC: 77 MG/DL (ref 70–130)
GLUCOSE BLDC GLUCOMTR-MCNC: 85 MG/DL (ref 70–130)
GLUCOSE SERPL-MCNC: 84 MG/DL (ref 65–99)
HCT VFR BLD AUTO: 37.7 % (ref 34–46.6)
HGB BLD-MCNC: 11 G/DL (ref 12–15.9)
IMM GRANULOCYTES # BLD AUTO: 0.03 10*3/MM3 (ref 0–0.05)
IMM GRANULOCYTES NFR BLD AUTO: 0.3 % (ref 0–0.5)
LYMPHOCYTES # BLD AUTO: 2.21 10*3/MM3 (ref 0.7–3.1)
LYMPHOCYTES NFR BLD AUTO: 23.4 % (ref 19.6–45.3)
MAGNESIUM SERPL-MCNC: 2 MG/DL (ref 1.6–2.4)
MCH RBC QN AUTO: 27.8 PG (ref 26.6–33)
MCHC RBC AUTO-ENTMCNC: 29.2 G/DL (ref 31.5–35.7)
MCV RBC AUTO: 95.4 FL (ref 79–97)
MONOCYTES # BLD AUTO: 0.87 10*3/MM3 (ref 0.1–0.9)
MONOCYTES NFR BLD AUTO: 9.2 % (ref 5–12)
NEUTROPHILS NFR BLD AUTO: 6.14 10*3/MM3 (ref 1.7–7)
NEUTROPHILS NFR BLD AUTO: 65 % (ref 42.7–76)
NRBC BLD AUTO-RTO: 0 /100 WBC (ref 0–0.2)
PLATELET # BLD AUTO: 193 10*3/MM3 (ref 140–450)
PMV BLD AUTO: 11.1 FL (ref 6–12)
POTASSIUM SERPL-SCNC: 4.8 MMOL/L (ref 3.5–5.2)
RBC # BLD AUTO: 3.95 10*6/MM3 (ref 3.77–5.28)
SODIUM SERPL-SCNC: 142 MMOL/L (ref 136–145)
WBC NRBC COR # BLD AUTO: 9.45 10*3/MM3 (ref 3.4–10.8)

## 2025-01-29 PROCEDURE — 80048 BASIC METABOLIC PNL TOTAL CA: CPT | Performed by: INTERNAL MEDICINE

## 2025-01-29 PROCEDURE — 63710000001 PREDNISONE PER 1 MG: Performed by: INTERNAL MEDICINE

## 2025-01-29 PROCEDURE — 85025 COMPLETE CBC W/AUTO DIFF WBC: CPT | Performed by: INTERNAL MEDICINE

## 2025-01-29 PROCEDURE — 94660 CPAP INITIATION&MGMT: CPT

## 2025-01-29 PROCEDURE — 99232 SBSQ HOSP IP/OBS MODERATE 35: CPT | Performed by: INTERNAL MEDICINE

## 2025-01-29 PROCEDURE — 83735 ASSAY OF MAGNESIUM: CPT | Performed by: INTERNAL MEDICINE

## 2025-01-29 PROCEDURE — 94799 UNLISTED PULMONARY SVC/PX: CPT

## 2025-01-29 PROCEDURE — 63710000001 REVEFENACIN 175 MCG/3ML SOLUTION: Performed by: INTERNAL MEDICINE

## 2025-01-29 PROCEDURE — 97530 THERAPEUTIC ACTIVITIES: CPT

## 2025-01-29 PROCEDURE — 82948 REAGENT STRIP/BLOOD GLUCOSE: CPT

## 2025-01-29 PROCEDURE — 94664 DEMO&/EVAL PT USE INHALER: CPT

## 2025-01-29 PROCEDURE — 97110 THERAPEUTIC EXERCISES: CPT

## 2025-01-29 RX ORDER — ARFORMOTEROL TARTRATE 15 UG/2ML
15 SOLUTION RESPIRATORY (INHALATION)
Status: DISCONTINUED | OUTPATIENT
Start: 2025-01-29 | End: 2025-02-05 | Stop reason: HOSPADM

## 2025-01-29 RX ORDER — TRAZODONE HYDROCHLORIDE 50 MG/1
50 TABLET, FILM COATED ORAL NIGHTLY PRN
Status: DISCONTINUED | OUTPATIENT
Start: 2025-01-29 | End: 2025-02-05 | Stop reason: HOSPADM

## 2025-01-29 RX ORDER — ACETAMINOPHEN 325 MG/1
650 TABLET ORAL EVERY 4 HOURS PRN
Status: DISCONTINUED | OUTPATIENT
Start: 2025-01-29 | End: 2025-02-05 | Stop reason: HOSPADM

## 2025-01-29 RX ORDER — MIRABEGRON 50 MG/1
50 TABLET, FILM COATED, EXTENDED RELEASE ORAL DAILY
Status: DISCONTINUED | OUTPATIENT
Start: 2025-01-30 | End: 2025-02-05 | Stop reason: HOSPADM

## 2025-01-29 RX ORDER — HYDROXYZINE HYDROCHLORIDE 25 MG/1
25 TABLET, FILM COATED ORAL 3 TIMES DAILY PRN
Status: DISCONTINUED | OUTPATIENT
Start: 2025-01-29 | End: 2025-01-29

## 2025-01-29 RX ORDER — ATORVASTATIN CALCIUM 40 MG/1
80 TABLET, FILM COATED ORAL NIGHTLY
Status: DISCONTINUED | OUTPATIENT
Start: 2025-01-29 | End: 2025-02-05 | Stop reason: HOSPADM

## 2025-01-29 RX ADMIN — APIXABAN 5 MG: 5 TABLET, FILM COATED ORAL at 21:02

## 2025-01-29 RX ADMIN — PREDNISONE 40 MG: 20 TABLET ORAL at 08:12

## 2025-01-29 RX ADMIN — CLONIDINE HYDROCHLORIDE 0.2 MG: 0.2 TABLET ORAL at 21:02

## 2025-01-29 RX ADMIN — GABAPENTIN 300 MG: 300 CAPSULE ORAL at 08:13

## 2025-01-29 RX ADMIN — SENNOSIDES AND DOCUSATE SODIUM 2 TABLET: 50; 8.6 TABLET ORAL at 08:13

## 2025-01-29 RX ADMIN — SERTRALINE HYDROCHLORIDE 200 MG: 100 TABLET ORAL at 08:12

## 2025-01-29 RX ADMIN — Medication 10 ML: at 21:03

## 2025-01-29 RX ADMIN — FAMOTIDINE 20 MG: 10 INJECTION, SOLUTION INTRAVENOUS at 08:13

## 2025-01-29 RX ADMIN — BUDESONIDE 0.5 MG: 0.5 INHALANT RESPIRATORY (INHALATION) at 09:53

## 2025-01-29 RX ADMIN — TRAZODONE HYDROCHLORIDE 50 MG: 50 TABLET ORAL at 21:02

## 2025-01-29 RX ADMIN — SENNOSIDES AND DOCUSATE SODIUM 2 TABLET: 50; 8.6 TABLET ORAL at 21:02

## 2025-01-29 RX ADMIN — APIXABAN 5 MG: 5 TABLET, FILM COATED ORAL at 08:12

## 2025-01-29 RX ADMIN — ARFORMOTEROL TARTRATE 15 MCG: 15 SOLUTION RESPIRATORY (INHALATION) at 21:28

## 2025-01-29 RX ADMIN — Medication 10 ML: at 08:13

## 2025-01-29 RX ADMIN — NICOTINE 1 PATCH: 21 PATCH TRANSDERMAL at 08:21

## 2025-01-29 RX ADMIN — BUDESONIDE 0.5 MG: 0.5 INHALANT RESPIRATORY (INHALATION) at 19:21

## 2025-01-29 RX ADMIN — ATORVASTATIN CALCIUM 80 MG: 40 TABLET, FILM COATED ORAL at 21:02

## 2025-01-29 RX ADMIN — REVEFENACIN 175 MCG: 175 SOLUTION RESPIRATORY (INHALATION) at 09:53

## 2025-01-29 RX ADMIN — CLONIDINE HYDROCHLORIDE 0.2 MG: 0.2 TABLET ORAL at 06:44

## 2025-01-29 RX ADMIN — HYDROXYZINE HYDROCHLORIDE 25 MG: 25 TABLET ORAL at 01:43

## 2025-01-29 NOTE — PROGRESS NOTES
INTENSIVIST   PROGRESS NOTE        SUBJECTIVE     Tessa 79 y.o. female is followed for: Weakness - Generalized       Respiratory failure with hypercapnia    COPD (chronic obstructive pulmonary disease)    MAKAYLA (obstructive sleep apnea)    HTN (hypertension)    HLD (hyperlipidemia)    A-fib    Chronic respiratory failure with hypoxia and hypercapnia    Acute on chronic hypoxic respiratory failure    History of Hodgkin's disease    Stage 3b chronic kidney disease    Acute on chronic respiratory failure with hypoxia and hypercapnia    As an Intensivist, we provide an integrated approach to the ICU patient and family, medical management of comorbid conditions, including but not limited to electrolytes, glycemic control, organ dysfunction, lead interdisciplinary rounds and coordinate the care with all other services, including those from other specialists.     Interval History:    Best day so far.    She feels she is ready to move to the floor.    Comfortable, cooperative and calm.    Last Bowel Movement: 25 (25 1500)    Temp  Min: 97.8 °F (36.6 °C)  Max: 99.7 °F (37.6 °C)       History     Last Reviewed by Galo Cruz MD on 2025 at  7:48 PM    Sections Reviewed    Medical, Surgical, Family, Tobacco, Alcohol, Drug Use, Sexual Activity,   Social Documentation    Problem list reviewed by Galo Cruz MD on 2025 at  7:47 PM  Medicines reviewed by Galo Cruz MD on 2025 at  7:47 PM  Allergies reviewed by Galo Cruz MD on 2025 at  7:47 PM       The patient's relevant past medical, surgical and social history were reviewed and updated in Epic as appropriate.        OBJECTIVE     Physical Examination    Vitals:  Temp: 97.8 °F (36.6 °C) (25 0400) Temp  Min: 97.8 °F (36.6 °C)  Max: 99.7 °F (37.6 °C)   Temp core:      BP: 124/59 (25 0600) BP  Min: 92/50  Max: 132/87   MAP (non-invasive) Noninvasive MAP (mmHg): 88 (25 0600) Noninvasive MAP (mmHg)  Av.5  Min: 57   Max: 133   Pulse: 71 (01/29/25 0615) Pulse  Min: 64  Max: 102   Resp: 20 (01/29/25 0600) Resp  Min: 16  Max: 22   SpO2: (!) 86 % (01/29/25 0644) SpO2  Min: 86 %  Max: 100 %   Device: nasal cannula (01/29/25 0644)    Flow Rate: 2 (01/29/25 0644) Flow (L/min) (Oxygen Therapy)  Min: 2  Max: 3     Intake/Ouptut 24 hrs (7:00AM - 6:59 AM)  Intake & Output (last 2 days)         01/27 0701 01/28 0700 01/28 0701 01/29 0700 01/29 0701 01/30 0700    P.O.  200     I.V. (mL/kg) 502.3 (8.2) 116.1 (1.9)     Other 420 150     NG/GT 1619 717     Total Intake(mL/kg) 2541.3 (41.3) 1183.1 (19.2)     Urine (mL/kg/hr) 2950 (2) 3625 (2.5)     Stool 0 0     Total Output 2950 3625     Net -408.7 -2441.9            Stool Unmeasured Occurrence 1 x 1 x             Medications (drips):  dexmedetomidine, Last Rate: Stopped (01/28/25 1433)            01/21/25 2150 01/23/25  0600   Weight: 61.8 kg (136 lb 3.9 oz) 61.6 kg (135 lb 12.9 oz)     NIV:   Settings: Observed:   Mode of Delivery: BiPAP (01/29/25 0309)          IPAP (cm H2O): 14 (01/29/25 0309)    EPAP (cm H2O): 7 (01/29/25 0309)    Pressure Support (cm H20): 7 cm H2O (01/29/25 0309)         Set Rate (Breaths/Min): 14 breaths/min (01/29/25 0309) Respiratory Rate Total (Breaths/Min): 18 breaths/min (01/29/25 0309)         Tidal Volume (mL): 361 mL (01/29/25 0309)   Oxygen Concentration (%): 40 (01/29/25 0600)  Minute Ventilation (L/Min): 6.1 (01/29/25 0309)      Telemetry:  Rhythm: normal sinus rhythm (01/29/25 0600)         Constitutional:  No acute distress.   Cardiovascular: RRR.    Respiratory: Normal breath sounds  No adventitious sounds   Abdominal:  Soft with no tenderness.   Extremities: No Edema   Neurological:   Awake. Confusion improving.  Best Eye Response: 4-->(E4) spontaneous (01/29/25 0600)  Best Motor Response: 6-->(M6) obeys commands (01/29/25 0600)  Best Verbal Response: 4-->(V4) confused (01/29/25 0600)  Aj Coma Scale Score: 14 (01/29/25 0600)     Results  Reviewed:  Laboratory  Microbiology  Radiology  Pathology    Hematology:  Results from last 7 days   Lab Units 01/29/25  0426 01/28/25  0527 01/27/25  0538   WBC 10*3/mm3 9.45 8.86 8.27   HEMOGLOBIN g/dL 11.0* 11.6* 11.8*   MCV fL 95.4 95.4 97.2*   PLATELETS 10*3/mm3 193 158 137*     Results from last 7 days   Lab Units 01/29/25 0426 01/28/25 0527 01/27/25  0538   NEUTROS ABS 10*3/mm3 6.14 6.30 6.07   LYMPHS ABS 10*3/mm3 2.21 1.55 1.41   EOS ABS 10*3/mm3 0.19 0.25 0.20     Chemistry:  Estimated Creatinine Clearance: 58 mL/min (by C-G formula based on SCr of 0.79 mg/dL).    Results from last 7 days   Lab Units 01/29/25 0426 01/28/25  0527   SODIUM mmol/L 142 140   POTASSIUM mmol/L 4.8 5.3*   CHLORIDE mmol/L 101 97*   CO2 mmol/L 39.0* 39.0*   BUN mg/dL 32* 34*   CREATININE mg/dL 0.79 0.58   GLUCOSE mg/dL 84 124*     Results from last 7 days   Lab Units 01/29/25  0426 01/28/25  0527 01/27/25  0538 01/26/25  0542   CALCIUM mg/dL 9.6 9.6 9.7 9.2   MAGNESIUM mg/dL 2.0  --  2.4 1.8   PHOSPHORUS mg/dL  --   --  2.3* 2.4*     Cardiac Labs:  Results from last 7 days   Lab Units 01/28/25  0527   PROBNP pg/mL 8,074.0*     Biomarkers:  Results from last 7 days   Lab Units 01/28/25 0527 01/27/25  0538   CRP mg/dL  --  1.44*   PROCALCITONIN ng/mL 0.05  --      COVID-19  Lab Results   Component Value Date    COVID19 Not Detected 01/21/2025    COVID19 Not Detected 07/12/2024    COVID19 Not Detected 09/18/2023    COVID19 Not Detected 09/07/2023     Images:  No radiology results for the last day    Echo:  Results for orders placed during the hospital encounter of 08/24/23    Adult Transthoracic Echo Complete W/ Cont if Necessary Per Protocol    Interpretation Summary    Left ventricular systolic function is normal. Left ventricular ejection fraction appears to be 56 - 60%.    Left ventricular diastolic function was normal.    Mild aortic valve stenosis is present. Aortic valve area is 1.2 cm2.    Peak velocity of the flow distal  to the aortic valve is 267.2 cm/s. Aortic valve mean pressure gradient is 16 mmHg.      Results: Reviewed.  I reviewed the patient's new laboratory and imaging results.  I independently reviewed the patient's new images.    Medications: Reviewed.    Assessment   A/P   Assessment/Management/Treatment Plan:  Hospital:  LOS: 8 days   ICU: 7d 9h     Active Hospital Problems    Diagnosis  POA    **Respiratory failure with hypercapnia [J96.92]  Yes    Acute on chronic respiratory failure with hypoxia and hypercapnia [J96.21, J96.22]  Yes    History of Hodgkin's disease [Z85.71]  Not Applicable    Acute on chronic hypoxic respiratory failure [J96.21]  Yes    Stage 3b chronic kidney disease [N18.32]  Yes    Chronic respiratory failure with hypoxia and hypercapnia [J96.11, J96.12]  Yes    HTN (hypertension) [I10]  Yes    HLD (hyperlipidemia) [E78.5]  Yes    A-fib [I48.91]  Yes    MAKAYLA (obstructive sleep apnea) [G47.33]  Yes    COPD (chronic obstructive pulmonary disease) [J44.9]  Yes     Tessa is a 79 y.o. female admitted on 1/21/2025 with altered mental status and Respiratory failure with hypercapnia [J96.92]    Her significant other is also admitted to [facility]. She was found at home by a neighbor and was noted to be encephalopathic. In the ED, she was found to be hypercapnic and did not improve with breathing treatments and was intubated with subsequent improvement in pCO2.      She was admitted to the ICU for ongoing care. She completed a course of Rocephin and Azithromycin. She remains on Prednisone 40mg daily.      She was extubated on 1/23/25.     Comorbidities: COPD on home O2, [facility], Afib (on Eliquis), HLD, Depression, HTN, and TIA    A/C respiratory failure  S/P Invasive Mechanical Ventilation - Extubated 01/23/25  Compensated respiratory acidosis.  NIV  COPD on home oxygen  Prior RUL lobectomy for NSCLC  Sleep Medicine  MAKAYLA  Cardiovascular  A Fib - APIxaban   HTN  Dyslipidemia    Depression  CKD 3b  Nutrition Support   Patient isn't on Tube Feeding  [REMOVED] NG/OG Tube Orogastric 16 Fr Right mouth-Tube Feeding Rate (mL/hr): 20 mL/HR  [REMOVED] NG/OG Tube Nasogastric 10 Fr Left nostril-Tube Feeding Rate (mL/hr): 60 mL/HR  [REMOVED] NG/OG Tube Nasoduodenal Left nostril-Tube Feeding Rate (mL/hr): 60 mL/HR (Based on a feeding duration of 20 h/d)    Lab Results   Component Value Date    PREALBUMIN 16.2 (L) 01/27/2025    PREALBUMIN 6.5 (L) 01/22/2025    CRP 1.44 (H) 01/27/2025    CRP 3.06 (H) 01/22/2025     Endocrine   Body mass index is 24.06 kg/m². Normal: 18.5-24.9kg/m2  Prediabetes (A1C 5.7%-6.4%)    Lab Results   Lab Value Date/Time    HGBA1C 5.80 (H) 01/22/2025 0418    HGBA1C 5.80 (H) 07/12/2024 1352     Results from last 7 days   Lab Units 01/29/25  0643 01/29/25  0533 01/29/25  0532 01/29/25  0421 01/28/25  2351 01/28/25  2333 01/28/25  1904 01/28/25  1859   GLUCOSE mg/dL 77 47* 46* 85 70 58* 101 48*       Diet: Diet: Cardiac; Healthy Heart (2-3 Na+); No Mixed Consistencies, Feeding Assistance - Nursing; Texture: Soft to Chew (NDD 3); Soft to Chew: Chopped Meat; Fluid Consistency: Nectar Thick   Advance Directives: Code Status and Medical Interventions: CPR (Attempt to Resuscitate); Full Support   Ordered at: 01/21/25 2021     Code Status (Patient has no pulse and is not breathing):    CPR (Attempt to Resuscitate)     Medical Interventions (Patient has pulse or is breathing):    Full Support        VTE Prophylaxis:  Pharmacologic & mechanical VTE prophylaxis orders are present.         In brief:  Discussed with patient and family at bedside.  LABA, LAMA , Corticosteroids nebs and WYATT prn   Continue clonidine (an alpha-2 agonist). Dexmedetomidine (an alpha-2 adrenergic receptor agonist) now off.  SLT: OK  to start PO diet.   Resume Myrbetriq (her own supply).  Disposition: Transfer to Telemetry Unit    Plan of care and goals reviewed during interdisciplinary rounds.  I discussed the  patient's findings and my recommendations with patient, family, and nursing staff    MDM:    Problem(s) High due to: Acute or Chronic illness or injury that may poses a threat to life or bodily function  Data: Moderate due to: Review of prior external records from each unique source, Review or results of each unique test, and Ordering of each unique test    Moderate    [x] Primary Attending Intensive Care Medicine - Nutrition Support   [] Consultant    Copied text in this note has been reviewed and is accurate as of 01/29/25

## 2025-01-29 NOTE — PLAN OF CARE
Goal Outcome Evaluation:  Plan of Care Reviewed With: patient, family        Progress: improving     VSS. Remains off all gtts. On baseline 2L NC. Continues to have some situational confusion but has been calm and cooperative. Worked with therapy and got up to chair and BSC. Tolerating PO diet.

## 2025-01-29 NOTE — THERAPY TREATMENT NOTE
Patient Name: Tessa Bradley  : 1945    MRN: [id number]                              Today's Date: 2025       Admit Date: 2025    Visit Dx:     ICD-10-CM ICD-9-CM   1. Acute on chronic respiratory failure with hypoxia and hypercapnia  J96.21 518.84    J96.22 786.09     799.02   2. Altered mental status, unspecified altered mental status type  R41.82 780.97   3. Acute exacerbation of chronic obstructive pulmonary disease (COPD)  J44.1 491.21   4. Acute cystitis without hematuria  N30.00 595.0   5. Dysphagia, unspecified type  R13.10 787.20     Patient Active Problem List   Diagnosis    COPD (chronic obstructive pulmonary disease)    Elevated serum creatinine    Hypotension    Dyspnea    MAKAYLA (obstructive sleep apnea)    HEATHER (acute kidney injury)    Leukocytosis    HTN (hypertension)    HLD (hyperlipidemia)    A-fib    Hodgkin disease    Arthritis    AMS (altered mental status)    Chronic respiratory failure with hypoxia and hypercapnia    Pulmonary nodule    History of TIA (transient ischemic attack)    Tobacco abuse    Acute on chronic hypoxic respiratory failure    History of Hodgkin's disease    Overactive bladder    Stage 3b chronic kidney disease    Acute on chronic respiratory failure with hypoxia    Acute on chronic respiratory failure    Respiratory failure with hypercapnia    Acute on chronic respiratory failure with hypoxia and hypercapnia     Past Medical History:   Diagnosis Date    Atrial fibrillation     Cancer     Cluster headache     COPD (chronic obstructive pulmonary disease)     Difficulty walking     Sciatica and pinched nerves in bsck    Fibromyalgia, primary     Hyperlipidemia     Hypertension     Peripheral neuropathy     TIA (transient ischemic attack)      Past Surgical History:   Procedure Laterality Date    ABDOMINAL SURGERY      HYSTERECTOMY      OOPHORECTOMY        General Information       Row Name 25 1441          Physical Therapy Time and  Intention    Document Type therapy note (daily note)  -ES     Mode of Treatment physical therapy  -ES       Row Name 01/29/25 1441          General Information    Patient Profile Reviewed yes  -ES     Existing Precautions/Restrictions fall;oxygen therapy device and L/min;other (see comments)  situational confusion  -ES     Barriers to Rehab medically complex;cognitive status  -ES       Row Name 01/29/25 1441          Cognition    Orientation Status (Cognition) oriented to;person;place;time  -ES       Row Name 01/29/25 1441          Safety Issues/Impairments Affecting Functional Mobility    Safety Issues Affecting Function (Mobility) awareness of need for assistance;insight into deficits/self-awareness;safety precaution awareness;safety precautions follow-through/compliance  -ES     Impairments Affecting Function (Mobility) balance;cognition;coordination;endurance/activity tolerance;motor control;motor planning;postural/trunk control;shortness of breath;strength  -ES     Cognitive Impairments, Mobility Safety/Performance awareness, need for assistance;insight into deficits/self-awareness;safety precaution awareness;safety precaution follow-through  -ES               User Key  (r) = Recorded By, (t) = Taken By, (c) = Cosigned By      Initials Name Provider Type    ES Anayeli Ambrose PT Physical Therapist                   Mobility       Row Name 01/29/25 1442          Bed Mobility    Comment, (Bed Mobility) [facility]  -ES       Row Name 01/29/25 1442          Bed-Chair Transfer    Bed-Chair Nye (Transfers) 2 person assist;verbal cues;minimum assist (75% patient effort)  -ES     Assistive Device (Bed-Chair Transfers) other (see comments)  BUE support  -ES     Comment, (Bed-Chair Transfer) SPT from chair>BSC.  -ES       Row Name 01/29/25 1442          Sit-Stand Transfer    Sit-Stand Nye (Transfers) minimum assist (75% patient effort);2 person assist;verbal cues  -ES     Assistive Device (Sit-Stand  Transfers) other (see comments)  BUE support  -ES       Row Name 01/29/25 1442          Gait/Stairs (Locomotion)    Toole Level (Gait) moderate assist (50% patient effort);verbal cues  -ES     Assistive Device (Gait) other (see comments)  BUE support  -ES     Patient was able to Ambulate yes  -ES     Distance in Feet (Gait) 5  -ES     Deviations/Abnormal Patterns (Gait) bilateral deviations;base of support, narrow;stride length decreased;gait speed decreased  -ES     Bilateral Gait Deviations forward flexed posture  -ES     Comment, (Gait/Stairs) Pt amb from BSC>chair w/ modA and BUE support. Demo'd forward flexed posture with posterior lean and narrow SOHAIL. v/c to shift weight anteriorly. Further mob limited by weakness/fatigue.  -ES               User Key  (r) = Recorded By, (t) = Taken By, (c) = Cosigned By      Initials Name Provider Type    ES Anayeli Ambrose, PT Physical Therapist                   Obj/Interventions       Row Name 01/29/25 1443          Motor Skills    Therapeutic Exercise hip;knee;ankle  -ES       Row Name 01/29/25 1443          Hip (Therapeutic Exercise)    Hip (Therapeutic Exercise) strengthening exercise  -ES     Hip Strengthening (Therapeutic Exercise) bilateral;marching while seated;10 repetitions  -ES       Row Name 01/29/25 1443          Knee (Therapeutic Exercise)    Knee (Therapeutic Exercise) strengthening exercise  -ES     Knee Strengthening (Therapeutic Exercise) bilateral;marching while seated;SLR (straight leg raise);LAQ (long arc quad);10 repetitions  -ES       Row Name 01/29/25 1443          Ankle (Therapeutic Exercise)    Ankle (Therapeutic Exercise) AROM (active range of motion)  -ES     Ankle AROM (Therapeutic Exercise) bilateral;dorsiflexion;plantarflexion;10 repetitions  -ES       Row Name 01/29/25 1443          Balance    Balance Assessment sitting static balance;sitting dynamic balance;standing static balance;standing dynamic balance  -ES     Static Sitting  Balance contact guard  -ES     Dynamic Sitting Balance contact guard;verbal cues  -ES     Position, Sitting Balance supported;sitting in chair;other (see comments)  bsc  -ES     Static Standing Balance minimal assist;2-person assist  -ES     Dynamic Standing Balance moderate assist;verbal cues  -ES     Position/Device Used, Standing Balance supported  -ES     Balance Interventions sitting;standing;sit to stand;supported;static;dynamic;occupation based/functional task  -ES               User Key  (r) = Recorded By, (t) = Taken By, (c) = Cosigned By      Initials Name Provider Type    ES Anayeli Ambrose, PT Physical Therapist                   Goals/Plan    No documentation.                  Clinical Impression       Row Name 01/29/25 1444          Pain    Pretreatment Pain Rating 0/10 - no pain  -ES     Posttreatment Pain Rating 0/10 - no pain  -ES     Pre/Posttreatment Pain Comment tolerated  -ES       Row Name 01/29/25 1444          Plan of Care Review    Plan of Care Reviewed With patient  -ES     Progress improving  -ES     Outcome Evaluation Pt demonstrated improved mobility this date, ambulating 5' with modA and BUE support. Pt continues to be limited by generalized weakness, balance deficits, and situational confusion. PT rec IRF at d/c.  -ES       Row Name 01/29/25 1444          Therapy Assessment/Plan (PT)    Rehab Potential (PT) good  -ES     Criteria for Skilled Interventions Met (PT) yes;skilled treatment is necessary  -ES     Therapy Frequency (PT) daily  -ES     Predicted Duration of Therapy Intervention (PT) 10 days  -ES       Row Name 01/29/25 1444          Vital Signs    Pre Systolic BP Rehab 106  -ES     Pre Treatment Diastolic BP 47  -ES     Post Systolic BP Rehab 103  -ES     Post Treatment Diastolic BP 63  -ES     Pretreatment Heart Rate (beats/min) 84  -ES     Posttreatment Heart Rate (beats/min) 81  -ES     Pre SpO2 (%) 99  -ES     O2 Delivery Pre Treatment nasal cannula  -ES     O2 Delivery  Intra Treatment nasal cannula  -ES     Post SpO2 (%) 98  -ES     O2 Delivery Post Treatment nasal cannula  -ES     Pre Patient Position Sitting  -ES     Intra Patient Position Standing  -ES     Post Patient Position Sitting  -ES       Row Name 01/29/25 1444          Positioning and Restraints    Pre-Treatment Position sitting in chair/recliner  -ES     Post Treatment Position chair  -ES     In Chair notified nsg;reclined;sitting;call light within reach;encouraged to call for assist;exit alarm on;waffle cushion;legs elevated;heels elevated  -ES               User Key  (r) = Recorded By, (t) = Taken By, (c) = Cosigned By      Initials Name Provider Type    Anayeli Sweeney, PT Physical Therapist                   Outcome Measures       Row Name 01/29/25 1445          How much help from another person do you currently need...    Turning from your back to your side while in flat bed without using bedrails? 3  -ES     Moving from lying on back to sitting on the side of a flat bed without bedrails? 3  -ES     Moving to and from a bed to a chair (including a wheelchair)? 2  -ES     Standing up from a chair using your arms (e.g., wheelchair, bedside chair)? 3  -ES     Climbing 3-5 steps with a railing? 2  -ES     To walk in hospital room? 2  -ES     AM-PAC 6 Clicks Score (PT) 15  -ES     Highest Level of Mobility Goal 4 --> Transfer to chair/commode  -ES       Row Name 01/29/25 1445          Functional Assessment    Outcome Measure Options AM-PAC 6 Clicks Basic Mobility (PT)  -ES               User Key  (r) = Recorded By, (t) = Taken By, (c) = Cosigned By      Initials Name Provider Type    Anayeli Sweeney PT Physical Therapist                                 Physical Therapy Education       Title: PT OT SLP Therapies (In Progress)       Topic: Physical Therapy (In Progress)       Point: Mobility training (In Progress)       Learning Progress Summary            Patient Acceptance, E, NR by KG at 1/26/2025 1001                       Point: Home exercise program (Not Started)       Learner Progress:  Not documented in this visit.              Point: Body mechanics (In Progress)       Learning Progress Summary            Patient Acceptance, E, NR by KG at 1/26/2025 1001                      Point: Precautions (In Progress)       Learning Progress Summary            Patient Acceptance, E, NR by KG at 1/26/2025 1001                                      User Key       Initials Effective Dates Name Provider Type Discipline    KG 05/22/20 -  Suzanne Fonseca PT Physical Therapist PT                  PT Recommendation and Plan     Progress: improving  Outcome Evaluation: Pt demonstrated improved mobility this date, ambulating 5' with modA and BUE support. Pt continues to be limited by generalized weakness, balance deficits, and situational confusion. PT rec IRF at d/c.     Time Calculation:         PT Charges       Row Name 01/29/25 1446             Time Calculation    Start Time 1340  -ES      PT Received On 01/29/25  -ES      PT Goal Re-Cert Due Date 02/05/25  -ES         Time Calculation- PT    Total Timed Code Minutes- PT 31 minute(s)  -ES         Timed Charges    81885 - PT Therapeutic Exercise Minutes 15  -ES      57970 - PT Therapeutic Activity Minutes 16  -ES         Total Minutes    Timed Charges Total Minutes 31  -ES       Total Minutes 31  -ES                User Key  (r) = Recorded By, (t) = Taken By, (c) = Cosigned By      Initials Name Provider Type    ES Anayeli Ambrose PT Physical Therapist                  Therapy Charges for Today       Code Description Service Date Service Provider Modifiers Qty    23928790220 HC PT THER PROC EA 15 MIN 1/29/2025 Anayeli Ambrose, PT GP 1    18541776160 HC PT THERAPEUTIC ACT EA 15 MIN 1/29/2025 Anayeli Ambrose PT GP 1            PT G-Codes  Outcome Measure Options: AM-PAC 6 Clicks Basic Mobility (PT)  AM-PAC 6 Clicks Score (PT): 15  AM-PAC 6 Clicks Score (OT): 13  PT  Discharge Summary  Anticipated Discharge Disposition (PT): inpatient rehabilitation facility    Anayeli Ambrose, PT  1/29/2025

## 2025-01-29 NOTE — PLAN OF CARE
Goal Outcome Evaluation:  Plan of Care Reviewed With: patient        Progress: improving  Outcome Evaluation: VSS. Oriented x4. No periods of restlessness. Unable to sleep and tolerate BiPAP due to being uncomfortable. PRN atarax ordered, pt now resting on BiPAP. On 2L NC when not on BiPAP (baseline per pt). UOP adequate. Eliquis restarted, no signs of bleeding.

## 2025-01-29 NOTE — PLAN OF CARE
Goal Outcome Evaluation:  Plan of Care Reviewed With: patient        Progress: improving  Outcome Evaluation: Pt demonstrated improved mobility this date, ambulating 5' with modA and BUE support. Pt continues to be limited by generalized weakness, balance deficits, and situational confusion. PT rec IRF at d/c.    Anticipated Discharge Disposition (PT): inpatient rehabilitation facility

## 2025-01-29 NOTE — CASE MANAGEMENT/SOCIAL WORK
Continued Stay Note  [facility]     Patient Name: Tessa Bradley  MRN: [id number]  Today's Date: 1/29/2025    Admit Date: 1/21/2025    Plan: inpatient rehab   Discharge Plan       Row Name 01/29/25 1330       Plan    Plan inpatient rehab    Patient/Family in Agreement with Plan yes    Plan Comments Discussed in MDR, patient is  w/o sitter, alert and oriented today, off Precedex. I spoke w/patient and discussed initial therapy recs of inpatient rehab. She is agreeable to inpatient rehab @ [facility] when medically ready. She also asked about her home Oxygen, she stated that it is thru [facility]. I called [facility] w/Jose, he confirmed that she is current w/home O2 thru them. She verbalized that she didn't feel that her home O2 is working correctly. Per [facility], she will  need to call office when she is discharged to home and schedule a time for them to come to the home, but he will flag the account. I called referral to Kaila @ [facility] to follow thru Epic. Mrs. Bradley asked if I would call her spouse Yenifer and update, she thinks Yenifer is discharging from [facility] to home tomorrow. CM will continue to follow, will need [facility] transport to [facility] @ d/c.    Final Discharge Disposition Code 62 - inpatient rehab facility                   Discharge Codes    No documentation.                 Expected Discharge Date and Time       Expected Discharge Date Expected Discharge Time    Jan 29, 2025               Vasile Euceda RN

## 2025-01-30 LAB
ANION GAP SERPL CALCULATED.3IONS-SCNC: 8 MMOL/L (ref 5–15)
BUN SERPL-MCNC: 25 MG/DL (ref 8–23)
BUN/CREAT SERPL: 36.2 (ref 7–25)
CALCIUM SPEC-SCNC: 9.8 MG/DL (ref 8.6–10.5)
CHLORIDE SERPL-SCNC: 98 MMOL/L (ref 98–107)
CO2 SERPL-SCNC: 38 MMOL/L (ref 22–29)
CREAT SERPL-MCNC: 0.69 MG/DL (ref 0.57–1)
EGFRCR SERPLBLD CKD-EPI 2021: 89.5 ML/MIN/1.73
GLUCOSE SERPL-MCNC: 117 MG/DL (ref 65–99)
POTASSIUM SERPL-SCNC: 5 MMOL/L (ref 3.5–5.2)
SODIUM SERPL-SCNC: 144 MMOL/L (ref 136–145)

## 2025-01-30 PROCEDURE — 25810000003 SODIUM CHLORIDE 0.9 % SOLUTION: Performed by: INTERNAL MEDICINE

## 2025-01-30 PROCEDURE — P9047 ALBUMIN (HUMAN), 25%, 50ML: HCPCS

## 2025-01-30 PROCEDURE — 63710000001 REVEFENACIN 175 MCG/3ML SOLUTION: Performed by: INTERNAL MEDICINE

## 2025-01-30 PROCEDURE — 25010000002 ALBUMIN HUMAN 25% PER 50 ML

## 2025-01-30 PROCEDURE — 94799 UNLISTED PULMONARY SVC/PX: CPT

## 2025-01-30 PROCEDURE — 94660 CPAP INITIATION&MGMT: CPT

## 2025-01-30 PROCEDURE — 80048 BASIC METABOLIC PNL TOTAL CA: CPT | Performed by: STUDENT IN AN ORGANIZED HEALTH CARE EDUCATION/TRAINING PROGRAM

## 2025-01-30 PROCEDURE — 94761 N-INVAS EAR/PLS OXIMETRY MLT: CPT

## 2025-01-30 PROCEDURE — 94664 DEMO&/EVAL PT USE INHALER: CPT

## 2025-01-30 PROCEDURE — 99232 SBSQ HOSP IP/OBS MODERATE 35: CPT | Performed by: INTERNAL MEDICINE

## 2025-01-30 RX ORDER — ALBUMIN (HUMAN) 12.5 G/50ML
12.5 SOLUTION INTRAVENOUS ONCE
Status: COMPLETED | OUTPATIENT
Start: 2025-01-30 | End: 2025-01-30

## 2025-01-30 RX ORDER — MIDODRINE HYDROCHLORIDE 10 MG/1
10 TABLET ORAL ONCE
Status: COMPLETED | OUTPATIENT
Start: 2025-01-30 | End: 2025-01-30

## 2025-01-30 RX ADMIN — SODIUM CHLORIDE 1000 ML: 9 INJECTION, SOLUTION INTRAVENOUS at 18:46

## 2025-01-30 RX ADMIN — APIXABAN 5 MG: 5 TABLET, FILM COATED ORAL at 21:19

## 2025-01-30 RX ADMIN — Medication 10 ML: at 21:19

## 2025-01-30 RX ADMIN — Medication 10 ML: at 09:12

## 2025-01-30 RX ADMIN — BUDESONIDE 0.5 MG: 0.5 INHALANT RESPIRATORY (INHALATION) at 11:15

## 2025-01-30 RX ADMIN — MIDODRINE HYDROCHLORIDE 10 MG: 10 TABLET ORAL at 18:46

## 2025-01-30 RX ADMIN — BUDESONIDE 0.5 MG: 0.5 INHALANT RESPIRATORY (INHALATION) at 19:37

## 2025-01-30 RX ADMIN — GABAPENTIN 300 MG: 300 CAPSULE ORAL at 09:10

## 2025-01-30 RX ADMIN — NICOTINE 1 PATCH: 21 PATCH TRANSDERMAL at 09:11

## 2025-01-30 RX ADMIN — ALBUMIN (HUMAN) 12.5 G: 0.25 INJECTION, SOLUTION INTRAVENOUS at 23:05

## 2025-01-30 RX ADMIN — ARFORMOTEROL TARTRATE 15 MCG: 15 SOLUTION RESPIRATORY (INHALATION) at 11:15

## 2025-01-30 RX ADMIN — MIRABEGRON 50 MG: 50 TABLET, FILM COATED, EXTENDED RELEASE ORAL at 09:11

## 2025-01-30 RX ADMIN — REVEFENACIN 175 MCG: 175 SOLUTION RESPIRATORY (INHALATION) at 11:15

## 2025-01-30 RX ADMIN — SENNOSIDES AND DOCUSATE SODIUM 2 TABLET: 50; 8.6 TABLET ORAL at 21:19

## 2025-01-30 RX ADMIN — CLONIDINE HYDROCHLORIDE 0.2 MG: 0.2 TABLET ORAL at 05:34

## 2025-01-30 RX ADMIN — ATORVASTATIN CALCIUM 80 MG: 40 TABLET, FILM COATED ORAL at 21:19

## 2025-01-30 RX ADMIN — SERTRALINE HYDROCHLORIDE 200 MG: 100 TABLET ORAL at 09:10

## 2025-01-30 RX ADMIN — APIXABAN 5 MG: 5 TABLET, FILM COATED ORAL at 09:10

## 2025-01-30 RX ADMIN — TRAZODONE HYDROCHLORIDE 50 MG: 50 TABLET ORAL at 21:19

## 2025-01-30 RX ADMIN — ARFORMOTEROL TARTRATE 15 MCG: 15 SOLUTION RESPIRATORY (INHALATION) at 19:37

## 2025-01-30 NOTE — CASE MANAGEMENT/SOCIAL WORK
Continued Stay Note  [facility]     Patient Name: Tessa Bradley  MRN: [id number]  Today's Date: 1/30/2025    Admit Date: 1/21/2025    Plan: [facility]   Discharge Plan       Row Name 01/30/25 1327       Plan    Plan [facility]    Plan Comments Pt walked 5ft with moderate assist yesterday. She is on 3L NC (wears 2L at baseline). Per Ness, [facility] is reviewing. I spoke with Pt and family, in room, who requested information about LifeAlert device. Pt also reports her spouse recently had an admission for a similar illness and is suspicious of a potential leak from a gas fireplace at their home. Pt was given the contact number for [employer] ([phone]), Life Alert number ([phone]), and told the local fire department may be able to offer detection. CM will continue to follow for medical readiness.    Final Discharge Disposition Code 62 - inpatient rehab facility                   Discharge Codes    No documentation.                 Expected Discharge Date and Time       Expected Discharge Date Expected Discharge Time    Jan 31, 2025               Ursula Weinstein RN

## 2025-01-30 NOTE — PROGRESS NOTES
[facility] Services  PROGRESS NOTE    Patient Name: Tessa Bradley  : 1945  MRN: [id number]    Date of Admission: 2025  Primary Care Physician: Sunni Santoro MD    Subjective   Subjective     CC:  Follow-up respiratory failure    HPI:  Patient was seen and examined this morning.  Comfortable in bed.  No shortness of breath or chest pain.  Awaiting rehab      Objective   Objective     Vital Signs:   Temp:  [97.9 °F (36.6 °C)-98.4 °F (36.9 °C)] 98 °F (36.7 °C)  Heart Rate:  [50-85] 63  Resp:  [16-20] 20  BP: ()/(47-76) 102/57  Flow (L/min) (Oxygen Therapy):  [2-3] 3     Physical Exam:  General: Chronically ill and frail looking.  Head: Atraumatic and normocephalic  Eyes: No Icterus. No pallor  Ears:  Ears appear intact with no abnormalities noted  Throat: No oral lesions, no thrush  Neck: Supple, trachea midline  Lungs: Diminished air entry both lung fields.  No crackles or wheezing  Heart:  Normal S1 and S2, no murmur, no gallop, No JVD, no lower extremity swelling  Abdomen:  Soft, no tenderness, no organomegaly, normal bowel sounds, no organomegaly  Extremities: pulses equal bilaterally  Skin: No bleeding, bruising or rash, normal skin turgor and elasticity  Neurologic: Cranial nerves appear intact with no evidence of facial asymmetry, normal motor and sensory functions in all 4 extremities  Psych: Alert and oriented x 3, normal mood    Results Reviewed:  LAB RESULTS:      Lab 25  0426 25  0527 25  0538 25  0542 25  0551   WBC 9.45 8.86 8.27 9.62 12.66*   HEMOGLOBIN 11.0* 11.6* 11.8* 11.7* 11.5*   HEMATOCRIT 37.7 39.6 41.7 42.5 42.0   PLATELETS 193 158 137* 125* 148   NEUTROS ABS 6.14 6.30 6.07  --   --    IMMATURE GRANS (ABS) 0.03 0.03 0.02  --   --    LYMPHS ABS 2.21 1.55 1.41  --   --    MONOS ABS 0.87 0.71 0.56  --   --    EOS ABS 0.19 0.25 0.20  --   --    MCV 95.4 95.4 97.2* 101.4* 101.4*   CRP  --   --  1.44*  --   --     PROCALCITONIN  --  0.05  --   --   --          Lab 01/29/25  0426 01/28/25  0527 01/27/25  0538 01/26/25  0542 01/25/25  0551 01/24/25  0328   SODIUM 142 140 136 139 140 138   POTASSIUM 4.8 5.3* 5.6* 5.0 4.4 4.7   CHLORIDE 101 97* 100 102 101 99   CO2 39.0* 39.0* 32.0* 33.0* 32.0* 32.0*   ANION GAP 2.0* 4.0* 4.0* 4.0* 7.0 7.0   BUN 32* 34* 35* 31* 26* 23   CREATININE 0.79 0.58 0.57 0.59 0.73 0.94   EGFR 77.2 93.3 93.7 93.0 84.8 62.6   GLUCOSE 84 124* 125* 118* 93 154*   CALCIUM 9.6 9.6 9.7 9.2 9.0 9.2   MAGNESIUM 2.0  --  2.4 1.8 2.0 2.2   PHOSPHORUS  --   --  2.3* 2.4* 2.8 3.6         Lab 01/27/25  0538   TOTAL PROTEIN 5.5*   ALBUMIN 2.9*   GLOBULIN 2.6   ALT (SGPT) 8   AST (SGOT) 14   BILIRUBIN 0.4   ALK PHOS 75         Lab 01/28/25  0527   PROBNP 8,074.0*         Lab 01/27/25  0538   TRIGLYCERIDES 102             Lab 01/27/25  0446 01/26/25  0415 01/25/25  0352   PH, ARTERIAL 7.374 7.376 7.340*   PCO2, ARTERIAL 69.7* 66.3* 69.2*   PO2 ART 72.9* 81.0* 96.7   FIO2 40 35 21   HCO3 ART 40.6* 38.9* 37.3*   BASE EXCESS ART 12.6* 11.3* 9.2*   CARBOXYHEMOGLOBIN 1.5 1.5 1.4     Brief Urine Lab Results  (Last result in the past 365 days)        Color   Clarity   Blood   Leuk Est   Nitrite   Protein   CREAT   Urine HCG        01/21/25 2159 Yellow   Clear   Negative   Negative   Positive   Negative                   Microbiology Results Abnormal       Procedure Component Value - Date/Time    Urine Culture - Urine, Urine, Clean Catch [[id number]]  (Abnormal)  (Susceptibility) Collected: 01/21/25 1418    Lab Status: Final result Specimen: Urine, Clean Catch Updated: 01/23/25 0929     Urine Culture >100,000 CFU/mL Escherichia coli    Narrative:      Colonization of the urinary tract without infection is common. Treatment is discouraged unless the patient is symptomatic, pregnant, or undergoing an invasive urologic procedure.    Susceptibility        Escherichia coli      AUTMUN      Amoxicillin + Clavulanate Susceptible       Ampicillin Susceptible      Ampicillin + Sulbactam Susceptible      Cefazolin (Urine) Susceptible      Cefepime Susceptible      Ceftazidime Susceptible      Ceftriaxone Susceptible      Gentamicin Susceptible      Levofloxacin Susceptible      Nitrofurantoin Susceptible      Piperacillin + Tazobactam Susceptible      Trimethoprim + Sulfamethoxazole Susceptible                                   SLP FEES - Fiberoptic Endo Eval Swallow    Result Date: 1/28/2025  This procedure was auto-finalized with no dictation required.     Results for orders placed during the hospital encounter of 08/24/23    Adult Transthoracic Echo Complete W/ Cont if Necessary Per Protocol    Interpretation Summary    Left ventricular systolic function is normal. Left ventricular ejection fraction appears to be 56 - 60%.    Left ventricular diastolic function was normal.    Mild aortic valve stenosis is present. Aortic valve area is 1.2 cm2.    Peak velocity of the flow distal to the aortic valve is 267.2 cm/s. Aortic valve mean pressure gradient is 16 mmHg.      Current medications:  Scheduled Meds:apixaban, 5 mg, Oral, Q12H  arformoterol, 15 mcg, Nebulization, BID - RT  atorvastatin, 80 mg, Oral, Nightly  budesonide, 0.5 mg, Nebulization, BID - RT  cloNIDine, 0.2 mg, Oral, Q8H  gabapentin, 300 mg, Oral, Daily  Mirabegron ER, 50 mg, Oral, Daily  nicotine, 1 patch, Transdermal, Q24H  revefenacin, 175 mcg, Nebulization, Daily - RT  senna-docusate sodium, 2 tablet, Oral, BID  sertraline, 200 mg, Oral, Daily  sodium chloride, 10 mL, Intravenous, Q12H      Continuous Infusions:   PRN Meds:.  acetaminophen **OR** [DISCONTINUED] acetaminophen    Albuterol Sulfate NEB Orderable    dextrose    dextrose    glucagon (human recombinant)    [DISCONTINUED] ondansetron ODT **OR** ondansetron    sodium chloride    sodium chloride    traZODone    Assessment & Plan   Assessment & Plan     Active Hospital Problems    Diagnosis  POA    **Respiratory failure with  hypercapnia [J96.92]  Yes    Acute on chronic respiratory failure with hypoxia and hypercapnia [J96.21, J96.22]  Yes    History of Hodgkin's disease [Z85.71]  Not Applicable    Acute on chronic hypoxic respiratory failure [J96.21]  Yes    Stage 3b chronic kidney disease [N18.32]  Yes    Chronic respiratory failure with hypoxia and hypercapnia [J96.11, J96.12]  Yes    HTN (hypertension) [I10]  Yes    HLD (hyperlipidemia) [E78.5]  Yes    A-fib [I48.91]  Yes    MAKAYLA (obstructive sleep apnea) [G47.33]  Yes    COPD (chronic obstructive pulmonary disease) [J44.9]  Yes      Resolved Hospital Problems   No resolved problems to display.        Brief Hospital Course to date:  Woodrow Bradley is a 80yo F with a history of COPD on home O2, [facility], Afib (on Eliquis), HLD, Depression, HTN, and TIA who presented to [facility] on 1/21/25 with altered mental status. In the ED, she was found to be hypercapnic and did not improve with breathing treatments and was intubated with subsequent improvement in pCO2.      Acute hypoxic hypercapnic respiratory failure  Severe COPD exacerbation  Obstructive sleep apnea  Patient presented to the hospital with acute encephalopathy likely secondary to CO2 narcosis and acute hypoxic hypercapnic respiratory failure  CT head was negative  Required intubation and mechanical ventilation on day of admission 1/21/2025 and extubated on 1/23/2025  Status post IV Rocephin and azithromycin  Status post prednisone  Continue BiPAP at nighttime  Continue Pulmicort, Yupelri, and Brovana  Continue DuoNebs    Hypertension  Holding clonidine given her borderline blood pressure    TIA  Dyslipidemia  Paroxysmal continue A-fib  Continue Eliquis and statins    Acute on chronic debility  PT and OT recommended inpatient rehab      Expected Discharge Location and Transportation: [facility]  Expected Discharge   Expected Discharge Date: 1/31/2025; Expected Discharge Time:      VTE  Prophylaxis:  Pharmacologic & mechanical VTE prophylaxis orders are present.         AM-PAC 6 Clicks Score (PT): 15 (01/29/25 3315)    CODE STATUS:   Code Status and Medical Interventions: CPR (Attempt to Resuscitate); Full Support   Ordered at: 01/21/25 2021     Code Status (Patient has no pulse and is not breathing):    CPR (Attempt to Resuscitate)     Medical Interventions (Patient has pulse or is breathing):    Full Support       Eliezer Miller MD  01/30/25

## 2025-01-30 NOTE — PLAN OF CARE
Goal Outcome Evaluation:         Extubated in icu

## 2025-01-31 ENCOUNTER — APPOINTMENT (OUTPATIENT)
Dept: GENERAL RADIOLOGY | Facility: HOSPITAL | Age: 80
End: 2025-01-31
Payer: MEDICARE

## 2025-01-31 LAB
ALBUMIN SERPL-MCNC: 3.2 G/DL (ref 3.5–5.2)
ALBUMIN/GLOB SERPL: 1.4 G/DL
ALP SERPL-CCNC: 52 U/L (ref 39–117)
ALT SERPL W P-5'-P-CCNC: 13 U/L (ref 1–33)
ANION GAP SERPL CALCULATED.3IONS-SCNC: 9 MMOL/L (ref 5–15)
AST SERPL-CCNC: 25 U/L (ref 1–32)
BASOPHILS # BLD AUTO: 0.02 10*3/MM3 (ref 0–0.2)
BASOPHILS NFR BLD AUTO: 0.2 % (ref 0–1.5)
BILIRUB SERPL-MCNC: 0.4 MG/DL (ref 0–1.2)
BUN SERPL-MCNC: 18 MG/DL (ref 8–23)
BUN/CREAT SERPL: 28.6 (ref 7–25)
CALCIUM SPEC-SCNC: 9.3 MG/DL (ref 8.6–10.5)
CHLORIDE SERPL-SCNC: 101 MMOL/L (ref 98–107)
CO2 SERPL-SCNC: 32 MMOL/L (ref 22–29)
CORTIS SERPL-MCNC: 1.82 MCG/DL
CORTIS SERPL-MCNC: 7.06 MCG/DL
CREAT SERPL-MCNC: 0.63 MG/DL (ref 0.57–1)
D DIMER PPP FEU-MCNC: 0.56 MCGFEU/ML (ref 0–0.77)
DEPRECATED RDW RBC AUTO: 57.7 FL (ref 37–54)
EGFRCR SERPLBLD CKD-EPI 2021: 91.5 ML/MIN/1.73
EOSINOPHIL # BLD AUTO: 0.04 10*3/MM3 (ref 0–0.4)
EOSINOPHIL NFR BLD AUTO: 0.4 % (ref 0.3–6.2)
ERYTHROCYTE [DISTWIDTH] IN BLOOD BY AUTOMATED COUNT: 15.9 % (ref 12.3–15.4)
GLOBULIN UR ELPH-MCNC: 2.3 GM/DL
GLUCOSE SERPL-MCNC: 84 MG/DL (ref 65–99)
HCT VFR BLD AUTO: 39 % (ref 34–46.6)
HGB BLD-MCNC: 10.9 G/DL (ref 12–15.9)
HYPOCHROMIA BLD QL: NORMAL
IMM GRANULOCYTES # BLD AUTO: 0.05 10*3/MM3 (ref 0–0.05)
IMM GRANULOCYTES NFR BLD AUTO: 0.5 % (ref 0–0.5)
LYMPHOCYTES # BLD AUTO: 0.93 10*3/MM3 (ref 0.7–3.1)
LYMPHOCYTES NFR BLD AUTO: 9.3 % (ref 19.6–45.3)
MCH RBC QN AUTO: 27.6 PG (ref 26.6–33)
MCHC RBC AUTO-ENTMCNC: 27.9 G/DL (ref 31.5–35.7)
MCV RBC AUTO: 98.7 FL (ref 79–97)
MONOCYTES # BLD AUTO: 0.38 10*3/MM3 (ref 0.1–0.9)
MONOCYTES NFR BLD AUTO: 3.8 % (ref 5–12)
NEUTROPHILS NFR BLD AUTO: 8.58 10*3/MM3 (ref 1.7–7)
NEUTROPHILS NFR BLD AUTO: 85.8 % (ref 42.7–76)
NRBC BLD AUTO-RTO: 0 /100 WBC (ref 0–0.2)
PLAT MORPH BLD: NORMAL
PLATELET # BLD AUTO: 262 10*3/MM3 (ref 140–450)
PMV BLD AUTO: 10.5 FL (ref 6–12)
POTASSIUM SERPL-SCNC: 4.9 MMOL/L (ref 3.5–5.2)
PROCALCITONIN SERPL-MCNC: 0.05 NG/ML (ref 0–0.25)
PROT SERPL-MCNC: 5.5 G/DL (ref 6–8.5)
RBC # BLD AUTO: 3.95 10*6/MM3 (ref 3.77–5.28)
SODIUM SERPL-SCNC: 142 MMOL/L (ref 136–145)
WBC MORPH BLD: NORMAL
WBC NRBC COR # BLD AUTO: 10 10*3/MM3 (ref 3.4–10.8)

## 2025-01-31 PROCEDURE — 94799 UNLISTED PULMONARY SVC/PX: CPT

## 2025-01-31 PROCEDURE — 25010000002 DEXAMETHASONE PER 1 MG: Performed by: INTERNAL MEDICINE

## 2025-01-31 PROCEDURE — 94664 DEMO&/EVAL PT USE INHALER: CPT

## 2025-01-31 PROCEDURE — 82533 TOTAL CORTISOL: CPT | Performed by: INTERNAL MEDICINE

## 2025-01-31 PROCEDURE — 85025 COMPLETE CBC W/AUTO DIFF WBC: CPT | Performed by: INTERNAL MEDICINE

## 2025-01-31 PROCEDURE — 71045 X-RAY EXAM CHEST 1 VIEW: CPT

## 2025-01-31 PROCEDURE — 25010000002 ALBUMIN HUMAN 25% PER 50 ML: Performed by: INTERNAL MEDICINE

## 2025-01-31 PROCEDURE — 84145 PROCALCITONIN (PCT): CPT | Performed by: INTERNAL MEDICINE

## 2025-01-31 PROCEDURE — 85379 FIBRIN DEGRADATION QUANT: CPT | Performed by: INTERNAL MEDICINE

## 2025-01-31 PROCEDURE — 63710000001 REVEFENACIN 175 MCG/3ML SOLUTION: Performed by: INTERNAL MEDICINE

## 2025-01-31 PROCEDURE — 80053 COMPREHEN METABOLIC PANEL: CPT | Performed by: INTERNAL MEDICINE

## 2025-01-31 PROCEDURE — 99232 SBSQ HOSP IP/OBS MODERATE 35: CPT | Performed by: INTERNAL MEDICINE

## 2025-01-31 PROCEDURE — 92526 ORAL FUNCTION THERAPY: CPT

## 2025-01-31 PROCEDURE — 92611 MOTION FLUOROSCOPY/SWALLOW: CPT

## 2025-01-31 PROCEDURE — 94761 N-INVAS EAR/PLS OXIMETRY MLT: CPT

## 2025-01-31 PROCEDURE — 25810000003 SODIUM CHLORIDE 0.9 % SOLUTION: Performed by: INTERNAL MEDICINE

## 2025-01-31 PROCEDURE — 85007 BL SMEAR W/DIFF WBC COUNT: CPT | Performed by: INTERNAL MEDICINE

## 2025-01-31 PROCEDURE — 74230 X-RAY XM SWLNG FUNCJ C+: CPT

## 2025-01-31 PROCEDURE — P9047 ALBUMIN (HUMAN), 25%, 50ML: HCPCS | Performed by: INTERNAL MEDICINE

## 2025-01-31 PROCEDURE — 25010000002 PROMETHAZINE PER 50 MG: Performed by: INTERNAL MEDICINE

## 2025-01-31 RX ORDER — ALBUMIN (HUMAN) 12.5 G/50ML
25 SOLUTION INTRAVENOUS
Status: COMPLETED | OUTPATIENT
Start: 2025-01-31 | End: 2025-01-31

## 2025-01-31 RX ORDER — DEXAMETHASONE SODIUM PHOSPHATE 4 MG/ML
2 INJECTION, SOLUTION INTRA-ARTICULAR; INTRALESIONAL; INTRAMUSCULAR; INTRAVENOUS; SOFT TISSUE ONCE
Status: COMPLETED | OUTPATIENT
Start: 2025-01-31 | End: 2025-01-31

## 2025-01-31 RX ORDER — MIDODRINE HYDROCHLORIDE 10 MG/1
10 TABLET ORAL
Status: DISCONTINUED | OUTPATIENT
Start: 2025-01-31 | End: 2025-02-01

## 2025-01-31 RX ADMIN — SODIUM CHLORIDE 1000 ML: 9 INJECTION, SOLUTION INTRAVENOUS at 17:10

## 2025-01-31 RX ADMIN — ALBUMIN (HUMAN) 25 G: 0.25 INJECTION, SOLUTION INTRAVENOUS at 16:54

## 2025-01-31 RX ADMIN — APIXABAN 5 MG: 5 TABLET, FILM COATED ORAL at 08:17

## 2025-01-31 RX ADMIN — DEXAMETHASONE SODIUM PHOSPHATE 2 MG: 4 INJECTION, SOLUTION INTRA-ARTICULAR; INTRALESIONAL; INTRAMUSCULAR; INTRAVENOUS; SOFT TISSUE at 08:17

## 2025-01-31 RX ADMIN — MIDODRINE HYDROCHLORIDE 10 MG: 10 TABLET ORAL at 17:11

## 2025-01-31 RX ADMIN — GABAPENTIN 300 MG: 300 CAPSULE ORAL at 08:17

## 2025-01-31 RX ADMIN — SENNOSIDES AND DOCUSATE SODIUM 2 TABLET: 50; 8.6 TABLET ORAL at 21:06

## 2025-01-31 RX ADMIN — NICOTINE 1 PATCH: 21 PATCH TRANSDERMAL at 08:19

## 2025-01-31 RX ADMIN — PROMETHAZINE HYDROCHLORIDE 12.5 MG: 25 INJECTION, SOLUTION INTRAMUSCULAR; INTRAVENOUS at 10:45

## 2025-01-31 RX ADMIN — BUDESONIDE 0.5 MG: 0.5 INHALANT RESPIRATORY (INHALATION) at 20:37

## 2025-01-31 RX ADMIN — ARFORMOTEROL TARTRATE 15 MCG: 15 SOLUTION RESPIRATORY (INHALATION) at 20:37

## 2025-01-31 RX ADMIN — BARIUM SULFATE 100 ML: 0.81 POWDER, FOR SUSPENSION ORAL at 15:10

## 2025-01-31 RX ADMIN — ALBUMIN (HUMAN) 25 G: 0.25 INJECTION, SOLUTION INTRAVENOUS at 17:12

## 2025-01-31 RX ADMIN — BARIUM SULFATE 20 ML: 400 PASTE ORAL at 15:10

## 2025-01-31 RX ADMIN — REVEFENACIN 175 MCG: 175 SOLUTION RESPIRATORY (INHALATION) at 07:54

## 2025-01-31 RX ADMIN — APIXABAN 5 MG: 5 TABLET, FILM COATED ORAL at 21:06

## 2025-01-31 RX ADMIN — Medication 10 ML: at 08:19

## 2025-01-31 RX ADMIN — Medication 10 ML: at 21:06

## 2025-01-31 RX ADMIN — SERTRALINE HYDROCHLORIDE 200 MG: 100 TABLET ORAL at 08:17

## 2025-01-31 RX ADMIN — BUDESONIDE 0.5 MG: 0.5 INHALANT RESPIRATORY (INHALATION) at 07:55

## 2025-01-31 RX ADMIN — ATORVASTATIN CALCIUM 80 MG: 40 TABLET, FILM COATED ORAL at 21:06

## 2025-01-31 RX ADMIN — ARFORMOTEROL TARTRATE 15 MCG: 15 SOLUTION RESPIRATORY (INHALATION) at 07:54

## 2025-01-31 RX ADMIN — TRAZODONE HYDROCHLORIDE 50 MG: 50 TABLET ORAL at 21:06

## 2025-01-31 NOTE — MBS/VFSS/FEES
Acute Care - Speech Language Pathology   Swallow Re-Evaluation  [place]  Modified Barium Swallow Study (MBS)         Patient Name: Tessa Bradley  : 1945  MRN: [id number]  Today's Date: 2025               Admit Date: 2025    Visit Dx:     ICD-10-CM ICD-9-CM   1. Acute on chronic respiratory failure with hypoxia and hypercapnia  J96.21 518.84    J96.22 786.09     799.02   2. Altered mental status, unspecified altered mental status type  R41.82 780.97   3. Acute exacerbation of chronic obstructive pulmonary disease (COPD)  J44.1 491.21   4. Acute cystitis without hematuria  N30.00 595.0   5. Dysphagia, unspecified type  R13.10 787.20     Patient Active Problem List   Diagnosis    COPD (chronic obstructive pulmonary disease)    Elevated serum creatinine    Hypotension    Dyspnea    MAKAYLA (obstructive sleep apnea)    HEATHER (acute kidney injury)    Leukocytosis    HTN (hypertension)    HLD (hyperlipidemia)    A-fib    Hodgkin disease    Arthritis    AMS (altered mental status)    Chronic respiratory failure with hypoxia and hypercapnia    Pulmonary nodule    History of TIA (transient ischemic attack)    Tobacco abuse    Acute on chronic hypoxic respiratory failure    History of Hodgkin's disease    Overactive bladder    Stage 3b chronic kidney disease    Acute on chronic respiratory failure with hypoxia    Acute on chronic respiratory failure    Respiratory failure with hypercapnia    Acute on chronic respiratory failure with hypoxia and hypercapnia     Past Medical History:   Diagnosis Date    Atrial fibrillation     Cancer     Cluster headache     COPD (chronic obstructive pulmonary disease)     Difficulty walking     Sciatica and pinched nerves in bsck    Fibromyalgia, primary     Hyperlipidemia     Hypertension     Peripheral neuropathy     TIA (transient ischemic attack)      Past Surgical History:   Procedure Laterality Date    ABDOMINAL SURGERY      HYSTERECTOMY       OOPHORECTOMY         SLP Recommendation and Plan  SLP Swallowing Diagnosis: mild, oral dysphagia, pharyngeal dysphagia (01/31/25 1440)  SLP Diet Recommendation: soft to chew textures, chopped, thin liquids (01/31/25 1440)  Recommended Precautions and Strategies: general aspiration precautions, small bites of food and sips of liquid, assist with feeding (01/31/25 1440)  SLP Rec. for Method of Medication Administration: meds whole, meds crushed, with puree, as tolerated (01/31/25 1440)     Monitor for Signs of Aspiration: yes, notify SLP if any concerns (01/31/25 1440)  Recommended Diagnostics: reassess via VFSS (MBS) (01/31/25 1113)  Swallow Criteria for Skilled Therapeutic Interventions Met: demonstrates skilled criteria (01/31/25 1440)  Anticipated Discharge Disposition (SLP): inpatient rehabilitation facility (01/31/25 1440)  Rehab Potential/Prognosis, Swallowing: good, to achieve stated therapy goals (01/31/25 1440)  Therapy Frequency (Swallow): 5 days per week (01/31/25 1440)  Predicted Duration Therapy Intervention (Days): 1 week (01/31/25 1440)  Oral Care Recommendations: Oral Care BID/PRN, Suction toothbrush (01/31/25 1440)        Daily Summary of Progress (SLP): progress toward functional goals is good (01/31/25 1113)               Treatment Assessment (SLP): toleration of diet, no clinical signs of, aspiration (01/31/25 1113)  Treatment Assessment Comments (SLP): Pt seen awake and alert sitting upright in bed, cooperative and pleasant. Pt reported palatal dislike for nectar thick liquids. Pt accepted ice chips and thin via spoon, cup, and straw with throat clear following initial trial of thin via spoon only. Pt completed exercises with cues. SLP recs MBS to further evaluate pt's swallow function. (01/31/25 1113)  Plan for Continued Treatment (SLP): continue treatment per plan of care (01/31/25 1113)         Progress: improving      SWALLOW EVALUATION (Last 72 Hours)       SLP Adult Swallow Evaluation        Row Name 01/31/25 1440 01/31/25 1113                Rehab Evaluation    Document Type re-evaluation;other (see comments)  MBS  -MM therapy note (daily note)  -MM       Subjective Information no complaints  -MM no complaints  -MM       Patient Observations alert;cooperative  -MM alert;cooperative  -MM       Patient/Family/Caregiver Comments/Observations No family present  -MM No family present  -MM       Patient Effort good  -MM good  -MM       Symptoms Noted During/After Treatment none  -MM none  -MM          General Information    Patient Profile Reviewed yes  -MM yes  -MM       Pertinent History Of Current Problem See initial eval  -MM See initial eval  -MM       Current Method of Nutrition soft to chew textures;chopped;nectar/syrup-thick liquids  -MM --       Precautions/Limitations, Vision WFL;for purposes of eval  -MM --       Precautions/Limitations, Hearing WFL;for purposes of eval  -MM --       Prior Level of Function-Communication unknown  -MM --       Prior Level of Function-Swallowing unknown  -MM --       Plans/Goals Discussed with patient;agreed upon  -MM --       Barriers to Rehab medically complex;cognitive status  -MM --       Patient's Goals for Discharge --  return to thin liquids  -MM --          Pain    Pretreatment Pain Rating 0/10 - no pain  -MM 0/10 - no pain  -MM       Posttreatment Pain Rating 0/10 - no pain  -MM 0/10 - no pain  -MM          MBS/VFSS    Utensils Used spoon;cup;straw  -MM --       Consistencies Trialed pureed;thin liquids  -MM --          MBS/VFSS Interpretation    Oral Prep Phase impaired oral phase of swallowing  -MM --       Oral Transit Phase impaired  -MM --       Oral Residue WFL  -MM --          Oral Preparatory Phase    Oral Preparatory Phase prolonged manipulation  -MM --       Prolonged Manipulation pudding/puree  -MM --          Oral Transit Phase    Impaired Oral Transit Phase increased A-P transit time  -MM --       Increased A-P Transit Time  pudding/puree;secondary to reduced lingual control  -MM --          Initiation of Pharyngeal Swallow    Initiation of Pharyngeal Swallow bolus in valleculae  -MM --       Pharyngeal Phase impaired pharyngeal phase of swallowing  -MM --       Anatomical abnormalities noted prominent cricopharyngeous/ cricopharyngeal bar;other (see comments)  see radiology report for more details  -MM --       Penetration During the Swallow thin liquids;secondary to delayed swallow initiation or mistiming  -MM --       Response to Penetration Yes  -MM --       Responsed to penetration with throat clear  -MM --       Rosenbek's Scale thin:;2--->level 2  -MM --       Pharyngeal Residue other (see comments)  trace residue in pyriform sinuses and on ?cricopharyngeal bar  -MM --       Response to Residue partial residue clearance  -MM --       Pharyngeal Phase, Comment Pt presents with mild pharyngeal dysphagia characterized by reduced hyolaryngeal elevation and excursion and mildly reduced pharyngeal stripping wave. Pt demonstrated trace, transient penetration x1 with thin via rapid consecutive straw sips. SLP recs diet upgrade to thin liquids and will continue to follow.  -MM --          SLP Evaluation Clinical Impression    SLP Swallowing Diagnosis mild;oral dysphagia;pharyngeal dysphagia  -MM --       Rehab Potential/Prognosis, Swallowing good, to achieve stated therapy goals  -MM --       Swallow Criteria for Skilled Therapeutic Interventions Met demonstrates skilled criteria  -MM --          SLP Treatment Clinical Impressions    Treatment Assessment (SLP) -- toleration of diet;no clinical signs of;aspiration  -MM       Treatment Assessment Comments (SLP) -- Pt seen awake and alert sitting upright in bed, cooperative and pleasant. Pt reported palatal dislike for nectar thick liquids. Pt accepted ice chips and thin via spoon, cup, and straw with throat clear following initial trial of thin via spoon only. Pt completed exercises with  cues. SLP recs MBS to further evaluate pt's swallow function.  -MM       Daily Summary of Progress (SLP) -- progress toward functional goals is good  -MM       Barriers to Overall Progress (SLP) -- Medically complex  -MM       Plan for Continued Treatment (SLP) -- continue treatment per plan of care  -MM       Care Plan Review -- care plan/treatment goals reviewed;risks/benefits reviewed;current/potential barriers reviewed;patient/other agree to care plan  -MM          Recommendations    Therapy Frequency (Swallow) 5 days per week  -MM 5 days per week  -MM       Predicted Duration Therapy Intervention (Days) 1 week  -MM 1 week  -MM       SLP Diet Recommendation soft to chew textures;chopped;thin liquids  -MM soft to chew textures;chopped;nectar thick liquids;no mixed consistencies  -MM       Recommended Diagnostics -- reassess via VFSS (MBS)  -MM       Recommended Precautions and Strategies general aspiration precautions;small bites of food and sips of liquid;assist with feeding  -MM general aspiration precautions;small bites of food and sips of liquid;assist with feeding  -MM       Oral Care Recommendations Oral Care BID/PRN;Suction toothbrush  -MM Oral Care BID/PRN;Suction toothbrush  -MM       SLP Rec. for Method of Medication Administration meds whole;meds crushed;with puree;as tolerated  -MM meds whole;meds crushed;with puree;as tolerated  -MM       Monitor for Signs of Aspiration yes;notify SLP if any concerns  -MM yes;notify SLP if any concerns  -MM       Anticipated Discharge Disposition (SLP) inpatient rehabilitation facility  -MM inpatient rehabilitation facility  -MM                 User Key  (r) = Recorded By, (t) = Taken By, (c) = Cosigned By      Initials Name Effective Dates    Candi Naylor MS CCC-SLP 08/30/24 -                     EDUCATION  The patient has been educated in the following areas:   Dysphagia (Swallowing Impairment).        SLP GOALS       Row Name 01/31/25 1440 01/31/25 1111           (LTG) Patient will demonstrate functional swallow for    Diet Texture (Demonstrate functional swallow) regular textures  -MM regular textures  -MM     Liquid viscosity (Demonstrate functional swallow) thin liquids  -MM thin liquids  -MM     [place] (Demonstrate functional swallow) with minimal cues (75-90% accuracy)  -MM with minimal cues (75-90% accuracy)  -MM     Time Frame (Demonstrate functional swallow) 1 week  -MM 1 week  -MM     Progress/Outcomes (Demonstrate functional swallow) good progress toward goal  -MM good progress toward goal  -MM     Comment (Demonstrate functional swallow) Diet upgraded to soft to chew and thin liquids  -MM Pt tolerating current diet of soft to chew and nectar thick liquids  -MM        (STG) Patient will tolerate trials of    Consistencies Trialed (Tolerate trials) soft to chew (chopped) textures;thin liquids  -MM soft to chew (chopped) textures;nectar/ mildly thick liquids  -MM     Desired Outcome (Tolerate trials) without signs/symptoms of aspiration  -MM without signs/symptoms of aspiration  -MM     [place] (Tolerate trials) with minimal cues (75-90% accuracy)  -MM with minimal cues (75-90% accuracy)  -MM     Time Frame (Tolerate trials) 1 week  -MM 1 week  -MM     Progress/Outcomes (Tolerate trials) goal revised this date  -MM good progress toward goal  -MM        (STG) Lingual Strengthening Goal 1 (SLP)    Activity (Lingual Strengthening Goal 1, SLP) increase lingual tone/sensation/control/coordination/movement;increase tongue back strength  -MM increase lingual tone/sensation/control/coordination/movement;increase tongue back strength  -MM     Increase Lingual Tone/Sensation/Control/Coordination/Movement swallow trials;lingual resistance exercises  -MM swallow trials;lingual resistance exercises  -MM     Increase Tongue Back Strength lingual resistance exercises  -MM lingual resistance exercises  -MM     [place]/Accuracy (Lingual Strengthening Goal 1, SLP)  with minimal cues (75-90% accuracy)  -MM with minimal cues (75-90% accuracy)  -MM     Time Frame (Lingual Strengthening Goal 1, SLP) 1 week  -MM 1 week  -MM     Progress/Outcomes (Lingual Strengthening Goal 1, SLP) goal ongoing  -MM good progress toward goal  -MM     Comment (Lingual Strengthening Goal 1, SLP) -- Completed with cues  -MM        (STG) Pharyngeal Strengthening Exercise Goal 1 (SLP)    Activity (Pharyngeal Strengthening Goal 1, SLP) increase timing;increase superior movement of the hyolaryngeal complex;increase anterior movement of the hyolaryngeal complex;increase closure at entrance to airway/closure of airway at glottis;increase squeeze/positive pressure generation  -MM increase timing;increase superior movement of the hyolaryngeal complex;increase anterior movement of the hyolaryngeal complex;increase closure at entrance to airway/closure of airway at glottis;increase squeeze/positive pressure generation  -MM     Increase Timing prepping - 3 second prep or suck swallow or 3-step swallow  -MM prepping - 3 second prep or suck swallow or 3-step swallow  -MM     Increase Superior Movement of the Hyolaryngeal Complex effortful pitch glide (falsetto + pharyngeal squeeze)  -MM effortful pitch glide (falsetto + pharyngeal squeeze)  -MM     Increase Anterior Movement of the Hyolaryngeal Complex chin tuck against resistance (CTAR)  -MM chin tuck against resistance (CTAR)  -MM     Increase Closure at Entrance to Airway/Closure of Airway at Glottis supraglottic swallow  -MM supraglottic swallow  -MM     Increase Squeeze/Positive Pressure Generation hard effortful swallow  -MM hard effortful swallow  -MM     [place]/Accuracy (Pharyngeal Strengthening Goal 1, SLP) with minimal cues (75-90% accuracy)  -MM with minimal cues (75-90% accuracy)  -MM     Time Frame (Pharyngeal Strengthening Goal 1, SLP) 1 week  -MM 1 week  -MM     Progress/Outcomes (Pharyngeal Strengthening Goal 1, SLP) goal ongoing  -MM good  progress toward goal  -MM     Comment (Pharyngeal Strengthening Goal 1, SLP) -- Completed with cues  -MM               User Key  (r) = Recorded By, (t) = Taken By, (c) = Cosigned By      Initials Name Provider Type    Candi Naylor MS CCC-SLP Speech and Language Pathologist                         Time Calculation:    Time Calculation- SLP       Row Name 01/31/25 1606 01/31/25 1242          Time Calculation- SLP    SLP Start Time 1440  -MM 1113  -MM     SLP Received On 01/31/25  -MM 01/31/25  -MM        Untimed Charges    51785-DK Motion Fluoro Eval Swallow Minutes 55  -MM --     08025-SY Treatment Swallow Minutes -- 40  -MM        Total Minutes    Untimed Charges Total Minutes 55  -MM 40  -MM      Total Minutes 55  -MM 40  -MM               User Key  (r) = Recorded By, (t) = Taken By, (c) = Cosigned By      Initials Name Provider Type    Candi Naylor MS CCC-SLP Speech and Language Pathologist                    Therapy Charges for Today       Code Description Service Date Service Provider Modifiers Qty    76566757532 HC ST TREATMENT SWALLOW 3 1/31/2025 Candi Crowley MS CCC-SLP GN 1    58090999208 HC ST MOTION FLUORO EVAL SWALLOW 4 1/31/2025 Candi Crowley MS CCC-SLP GN 1                 MS ROSEMARIE Mcnulty  1/31/2025

## 2025-01-31 NOTE — PLAN OF CARE
Goal Outcome Evaluation:  Plan of Care Reviewed With: patient        Progress: improving       Anticipated Discharge Disposition (SLP): inpatient rehabilitation facility             Treatment Assessment (SLP): toleration of diet, no clinical signs of, aspiration (01/31/25 1113)  Treatment Assessment Comments (SLP): Pt seen awake and alert sitting upright in bed, cooperative and pleasant. Pt reported palatal dislike for nectar thick liquids. Pt accepted ice chips and thin via spoon, cup, and straw with throat clear following initial trial of thin via spoon only. Pt completed exercises with cues. SLP recs MBS to further evaluate pt's swallow function. (01/31/25 1113)  Plan for Continued Treatment (SLP): continue treatment per plan of care (01/31/25 1113)    MBS planned for today

## 2025-01-31 NOTE — THERAPY TREATMENT NOTE
Acute Care - Speech Language Pathology   Swallow Treatment Note [facility]     Patient Name: Tessa Bradley  : 1945  MRN: [id number]  Today's Date: 2025               Admit Date: 2025    Visit Dx:     ICD-10-CM ICD-9-CM   1. Acute on chronic respiratory failure with hypoxia and hypercapnia  J96.21 518.84    J96.22 786.09     799.02   2. Altered mental status, unspecified altered mental status type  R41.82 780.97   3. Acute exacerbation of chronic obstructive pulmonary disease (COPD)  J44.1 491.21   4. Acute cystitis without hematuria  N30.00 595.0   5. Dysphagia, unspecified type  R13.10 787.20     Patient Active Problem List   Diagnosis    COPD (chronic obstructive pulmonary disease)    Elevated serum creatinine    Hypotension    Dyspnea    MAKAYLA (obstructive sleep apnea)    HEATHER (acute kidney injury)    Leukocytosis    HTN (hypertension)    HLD (hyperlipidemia)    A-fib    Hodgkin disease    Arthritis    AMS (altered mental status)    Chronic respiratory failure with hypoxia and hypercapnia    Pulmonary nodule    History of TIA (transient ischemic attack)    Tobacco abuse    Acute on chronic hypoxic respiratory failure    History of Hodgkin's disease    Overactive bladder    Stage 3b chronic kidney disease    Acute on chronic respiratory failure with hypoxia    Acute on chronic respiratory failure    Respiratory failure with hypercapnia    Acute on chronic respiratory failure with hypoxia and hypercapnia     Past Medical History:   Diagnosis Date    Atrial fibrillation     Cancer     Cluster headache     COPD (chronic obstructive pulmonary disease)     Difficulty walking     Sciatica and pinched nerves in bsck    Fibromyalgia, primary     Hyperlipidemia     Hypertension     Peripheral neuropathy     TIA (transient ischemic attack)      Past Surgical History:   Procedure Laterality Date    ABDOMINAL SURGERY      HYSTERECTOMY      OOPHORECTOMY         SLP Recommendation and Plan      SLP Diet Recommendation: soft to chew textures, chopped, nectar thick liquids, no mixed consistencies (01/31/25 1113)  Recommended Precautions and Strategies: general aspiration precautions, small bites of food and sips of liquid, assist with feeding (01/31/25 1113)  SLP Rec. for Method of Medication Administration: meds whole, meds crushed, with puree, as tolerated (01/31/25 1113)     Monitor for Signs of Aspiration: yes, notify SLP if any concerns (01/31/25 1113)  Recommended Diagnostics: reassess via VFSS (MBS) (01/31/25 1113)     Anticipated Discharge Disposition (SLP): inpatient rehabilitation facility (01/31/25 1113)     Therapy Frequency (Swallow): 5 days per week (01/31/25 1113)  Predicted Duration Therapy Intervention (Days): 1 week (01/31/25 1113)  Oral Care Recommendations: Oral Care BID/PRN, Suction toothbrush (01/31/25 1113)        Daily Summary of Progress (SLP): progress toward functional goals is good (01/31/25 1113)               Treatment Assessment (SLP): toleration of diet, no clinical signs of, aspiration (01/31/25 1113)  Treatment Assessment Comments (SLP): Pt seen awake and alert sitting upright in bed, cooperative and pleasant. Pt reported palatal dislike for nectar thick liquids. Pt accepted ice chips and thin via spoon, cup, and straw with throat clear following initial trial of thin via spoon only. Pt completed exercises with cues. SLP recs MBS to further evaluate pt's swallow function. (01/31/25 1113)  Plan for Continued Treatment (SLP): continue treatment per plan of care (01/31/25 1113)         Progress: improving      SWALLOW EVALUATION (Last 72 Hours)       SLP Adult Swallow Evaluation       Row Name 01/31/25 1113 01/28/25 7172                Rehab Evaluation    Document Type therapy note (daily note)  -MM evaluation  -MH       Subjective Information no complaints  -MM no complaints  -MH       Patient Observations alert;cooperative  -MM alert;cooperative  -        Patient/Family/Caregiver Comments/Observations No family present  - family present  -       Patient Effort good  - good  -       Symptoms Noted During/After Treatment none  - none  -          General Information    Patient Profile Reviewed yes  -MM yes  -       Pertinent History Of Current Problem See initial eval  - See initial eval, pt referred for FEES  -       Current Method of Nutrition -- NPO;nasogastric feedings;small-bore  -       Precautions/Limitations, Vision -- WFL;for purposes of eval  -       Precautions/Limitations, Hearing -- WFL;for purposes of eval  -       Prior Level of Function-Communication -- unknown  -       Prior Level of Function-Swallowing -- unknown  -       Plans/Goals Discussed with -- patient;agreed upon  [facility]       Barriers to Rehab -- medically complex;cognitive status  -       Patient's Goals for Discharge -- patient did not state  -       Family Goals for Discharge -- family did not state  -          Pain    Pretreatment Pain Rating 0/10 - no pain  -MM --       Posttreatment Pain Rating 0/10 - no pain  -MM --       Additional Documentation -- Pain Scale: FACES Pre/Post-Treatment (Group)  -          Pain Scale: FACES Pre/Post-Treatment    Pain: FACES Scale, Pretreatment -- 0-->no hurt  -       Posttreatment Pain Rating -- 0-->no hurt  [facility]          Fiberoptic Endoscopic Evaluation of Swallowing (FEES)    Risks/Benefits Reviewed -- risks/benefits explained;patient;agreed to [facility]  -       Nasal Entry -- right:  -       Scope serial number/identification -- [id number]  -          Anatomy and Physiology    Anatomic Considerations -- no anatomic structural deviation  -       Velopharyngeal -- WFL  -       Base of Tongue -- symmetrical  -       Epiglottis -- WFL  -       Laryngeal Function Breathing -- symmetrical  -       Laryngeal Function Phonation -- [facility]  -       Laryngeal Function to Breath Hold -- TVF/FVF/Arytenoid;sustained  closure  -       Secretion Rating Scale (Edgar et al. 1996) -- 0- normal, no visible secretions  -       Ice Chips -- DNA  -       Spontaneous Swallow -- frequency adequate  -       Sensory -- sensed scope  -       Utensils Used -- Spoon;Cup;Straw  -       Consistencies Trialed -- spoon;cup;straw;thin liquids;nectar-thick liquids;pudding/puree;regular textures  -          FEES Interpretation    Oral Phase -- oral residue present;increased A-P transit time;prespill of liquids into pharynx  -          Initiation of Pharyngeal Swallow    Initiation of Pharyngeal Swallow -- bolus in pyriform sinuses  -       Pharyngeal Phase -- impaired pharyngeal phase of swallowing  -       Penetration During the Swallow -- thin liquids;secondary to reduced vestibular closure;secondary to reduced laryngeal elevation;secondary to delayed swallow initiation or mistiming  -       Penetration After the Swallow -- thin liquids;secondary to residue;in valleculae;in pyriform sinuses  -       Depth of Penetration -- deep  -       Response to Penetration -- No  -       No spontaneous response to penetration and -- non-effective laryngeal clearance with cue (see comments);other (see comments)  cued cough  -       Response to Aspiration -- No  -       No spontaneous response to aspiration with -- non-effective subglottic clearance with cue (see comments);other (see comments)  cued cough  -       Rosenbek's Scale -- thin:;5-->Level 5;nectar:;pudding/puree:;regular textures:;1-->Level 1  -       Residue -- all consistencies tested;diffuse within pharynx;secondary to reduced hyolaryngeal excursion;secondary to reduced laryngeal elevation;secondary to reduced posterior pharyngeal wall stripping;other (see comments)  worse w/ thins  -       Response to Residue -- partial residue clearance;with cued swallow;with liquid wash  -       Attempted Compensatory Maneuvers -- bolus size;bolus presentation  style;additional subsequent swallow;chin tuck;breath hold  -       Response to Attempted Compensatory Maneuvers -- did not prevent penetration;reduced residue  -       Successful Compensatory Maneuver Competency -- patient able to;demonstrate compensations;with cues  -       FEES Summary -- Mild to moderate pharyngeal dysphagia. Prespill w/ thins to the pyriforms. Deep penetration w/ thins during the swallow w/ continued penetration aspiration after the swallow. Given depth of penetration & inability to clear, pt @ risk of aspiration w/ thins over course of a meal. No penetration/aspiration w/ NTL, pudding or regular solid consistencies. Recommend soft/chopped solid diet w/ thin liquids, no mixed consistencies, small/single bites & sips, assist w/ feeding PRN  -          SLP Evaluation Clinical Impression    SLP Swallowing Diagnosis -- mild-moderate;pharyngeal dysphagia  -       Functional Impact -- risk of aspiration/pneumonia  -       Rehab Potential/Prognosis, Swallowing -- good, to achieve stated therapy goals  -       Swallow Criteria for Skilled Therapeutic Interventions Met -- demonstrates skilled criteria  -          SLP Treatment Clinical Impressions    Treatment Assessment (SLP) toleration of diet;no clinical signs of;aspiration  -MM --       Treatment Assessment Comments (SLP) Pt seen awake and alert sitting upright in bed, cooperative and pleasant. Pt reported palatal dislike for nectar thick liquids. Pt accepted ice chips and thin via spoon, cup, and straw with throat clear following initial trial of thin via spoon only. Pt completed exercises with cues. SLP recs MBS to further evaluate pt's swallow function.  -MM --       Daily Summary of Progress (SLP) progress toward functional goals is good  -MM --       Barriers to Overall Progress (SLP) Medically complex  -MM --       Plan for Continued Treatment (SLP) continue treatment per plan of care  - --       Care Plan Review care  plan/treatment goals reviewed;risks/benefits reviewed;current/potential barriers reviewed;patient/other agree to care plan  - --          Recommendations    Therapy Frequency (Swallow) 5 days per week  - 5 days per week  -       Predicted Duration Therapy Intervention (Days) 1 week  - 1 week  -       SLP Diet Recommendation soft to chew textures;chopped;nectar thick liquids;no mixed consistencies  - soft to chew textures;chopped;nectar thick liquids;no mixed consistencies  -       Recommended Diagnostics reassess via VFSS ([facility])  - --       Recommended Precautions and Strategies general aspiration precautions;small bites of food and sips of liquid;assist with feeding  - general aspiration precautions;small bites of food and sips of liquid;assist with feeding  -       Oral Care Recommendations Oral Care BID/PRN;Suction toothbrush  - Oral Care BID/PRN;Suction toothbrush  -       SLP Rec. for Method of Medication Administration meds whole;meds crushed;with puree;as tolerated  - meds whole;meds crushed;with puree;as tolerated  -       Monitor for Signs of Aspiration yes;notify SLP if any concerns  - yes;notify SLP if any concerns  -       Anticipated Discharge Disposition (SLP) inpatient rehabilitation facility  - inpatient rehabilitation facility  -                 User Key  (r) = Recorded By, (t) = Taken By, (c) = Cosigned By      Initials Name Effective Dates     Kady Treviño MS [facility]-SLP 05/12/23 -     MM Candi Crowley MS CCC-SLP 08/30/24 -                     EDUCATION  The patient has been educated in the following areas:   Dysphagia (Swallowing Impairment).        SLP GOALS       Row Name 01/31/25 1113 01/28/25 1535          (LTG) Patient will demonstrate functional swallow for    Diet Texture (Demonstrate functional swallow) regular textures  - regular textures  -     Liquid viscosity (Demonstrate functional swallow) thin liquids  - thin liquids  -      [place] (Demonstrate functional swallow) with minimal cues (75-90% accuracy)  -MM with minimal cues (75-90% accuracy)  -     Time Frame (Demonstrate functional swallow) 1 week  -MM 1 week  -     Progress/Outcomes (Demonstrate functional swallow) good progress toward goal  -MM new goal  -     Comment (Demonstrate functional swallow) Pt tolerating current diet of soft to chew and nectar thick liquids  -MM --        (STG) Patient will tolerate trials of    Consistencies Trialed (Tolerate trials) soft to chew (chopped) textures;nectar/ mildly thick liquids  -MM soft to chew (chopped) textures;nectar/ mildly thick liquids  -     Desired Outcome (Tolerate trials) without signs/symptoms of aspiration  -MM without signs/symptoms of aspiration  -     [place] (Tolerate trials) with minimal cues (75-90% accuracy)  -MM with minimal cues (75-90% accuracy)  -     Time Frame (Tolerate trials) 1 week  -MM 1 week  -     Progress/Outcomes (Tolerate trials) good progress toward goal  -MM new goal  -        ([facility]) Lingual Strengthening Goal 1 (SLP)    Activity (Lingual Strengthening Goal 1, SLP) increase lingual tone/sensation/control/coordination/movement;increase tongue back strength  - increase lingual tone/sensation/control/coordination/movement;increase tongue back strength  -     Increase Lingual Tone/Sensation/Control/Coordination/Movement swallow trials;lingual resistance exercises  - swallow trials;lingual resistance exercises  -     Increase Tongue Back Strength lingual resistance exercises  -MM lingual resistance exercises  -     [place]/Accuracy (Lingual Strengthening Goal 1, SLP) with minimal cues (75-90% accuracy)  -MM with minimal cues (75-90% accuracy)  -     Time Frame (Lingual Strengthening Goal 1, SLP) 1 week  -MM 1 week  -     Progress/Outcomes (Lingual Strengthening Goal 1, SLP) good progress toward goal  -MM new goal  -     Comment (Lingual Strengthening Goal 1, SLP)  Completed with cues  -MM --        (STG) Pharyngeal Strengthening Exercise Goal 1 (SLP)    Activity (Pharyngeal Strengthening Goal 1, SLP) increase timing;increase superior movement of the hyolaryngeal complex;increase anterior movement of the hyolaryngeal complex;increase closure at entrance to airway/closure of airway at glottis;increase squeeze/positive pressure generation  -MM increase timing;increase superior movement of the hyolaryngeal complex;increase anterior movement of the hyolaryngeal complex;increase closure at entrance to airway/closure of airway at glottis;increase squeeze/positive pressure generation  -     Increase Timing prepping - 3 second prep or suck swallow or 3-step swallow  -MM prepping - 3 second prep or suck swallow or 3-step swallow  -MH     Increase Superior Movement of the Hyolaryngeal Complex effortful pitch glide (falsetto + pharyngeal squeeze)  -MM effortful pitch glide (falsetto + pharyngeal squeeze)  -     Increase Anterior Movement of the Hyolaryngeal Complex chin tuck against resistance (CTAR)  -MM chin tuck against resistance (CTAR)  -     Increase Closure at Entrance to Airway/Closure of Airway at Glottis supraglottic swallow  -MM supraglottic swallow  -     Increase Squeeze/Positive Pressure Generation hard effortful swallow  -MM hard effortful swallow  -     [place]/Accuracy (Pharyngeal Strengthening Goal 1, SLP) with minimal cues (75-90% accuracy)  -MM with minimal cues (75-90% accuracy)  -     Time Frame (Pharyngeal Strengthening Goal 1, SLP) 1 week  -MM 1 week  -     Progress/Outcomes (Pharyngeal Strengthening Goal 1, SLP) good progress toward goal  -MM new goal  -     Comment (Pharyngeal Strengthening Goal 1, SLP) Completed with cues  -MM --               User Key  (r) = Recorded By, (t) = Taken By, (c) = Cosigned By      Initials Name Provider Type    Kady Vasques, MS CCC-SLP Speech and Language Pathologist    Candi Naylor, MS CCC-SLP  Speech and Language Pathologist                         Time Calculation:    Time Calculation- SLP       Row Name 01/31/25 1242             Time Calculation- SLP    SLP Start Time 1113  -MM      SLP Received On 01/31/25  -MM         Untimed Charges    30782-KO Treatment Swallow Minutes 40  -MM         Total Minutes    Untimed Charges Total Minutes 40  -MM       Total Minutes 40  -MM                User Key  (r) = Recorded By, (t) = Taken By, (c) = Cosigned By      Initials Name Provider Type    Candi Naylor, MS CCC-SLP Speech and Language Pathologist                    Therapy Charges for Today       Code Description Service Date Service Provider Modifiers Qty    [id number]  ST TREATMENT SWALLOW 3 1/31/2025 Candi Crowley MS CCC-SLP GN 1                 Candi Crowley MS CCC-SLP  1/31/2025

## 2025-01-31 NOTE — PLAN OF CARE
Goal Outcome Evaluation:  Plan of Care Reviewed With: patient        Progress: improving       Anticipated Discharge Disposition (SLP): inpatient rehabilitation facility          SLP Swallowing Diagnosis: mild, oral dysphagia, pharyngeal dysphagia (01/31/25 1440)  Plan for Continued Treatment (SLP): continue treatment per plan of care (01/31/25 1113)  MBS completed on this date; see note for more information

## 2025-01-31 NOTE — PLAN OF CARE
Problem: Mechanical Ventilation Invasive  Goal: Effective Communication  Intervention: Ensure Effective Communication  Recent Flowsheet Documentation  Taken 1/30/2025 2000 by Elizabeth Fajardo RN  Trust Relationship/Rapport:   care explained   questions answered  Diversional Activities:   smartphone   television  Communication Enhancement Strategies:   call light answered in person   device use encouraged  Goal: Optimal Device Function  Intervention: Optimize Device Care and Function  Recent Flowsheet Documentation  Taken 1/30/2025 2000 by Elizabeth Fajardo RN  Airway Safety Measures: oxygen flowmeter at bedside  Goal: Mechanical Ventilation Liberation  Intervention: Promote Extubation and Mechanical Ventilation Liberation  Recent Flowsheet Documentation  Taken 1/30/2025 2000 by Elizabeth Fajardo RN  Environmental Support: calm environment promoted  Goal: Absence of Device-Related Skin and Tissue Injury  Intervention: Maintain Skin and Tissue Health  Recent Flowsheet Documentation  Taken 1/31/2025 0400 by Elizabeth Fajardo RN  Device Skin Pressure Protection:   positioning supports utilized   tubing/devices free from skin contact  Taken 1/31/2025 0151 by Elizabeth Fajardo RN  Device Skin Pressure Protection:   positioning supports utilized   tubing/devices free from skin contact  Taken 1/31/2025 0000 by Elizabeth Fajardo RN  Device Skin Pressure Protection:   positioning supports utilized   tubing/devices free from skin contact  Taken 1/30/2025 2245 by Elizabeth Fajardo RN  Device Skin Pressure Protection:   positioning supports utilized   tubing/devices free from skin contact  Taken 1/30/2025 2000 by Elizabeth Fajardo RN  Device Skin Pressure Protection:   positioning supports utilized   tubing/devices free from skin contact  Goal: Absence of Ventilator-Induced Lung Injury  Intervention: Prevent Ventilator-Associated Pneumonia  Recent Flowsheet Documentation  Taken 1/31/2025 0400 by Elizabeth Fajardo RN  Head of Bed (HOB)  Positioning: HOB elevated  Taken 1/31/2025 0151 by Elizbaeth Fajardo RN  Head of Bed (HOB) Positioning: HOB elevated  Taken 1/31/2025 0000 by Elizabeth Fajardo RN  Head of Bed (HOB) Positioning: HOB elevated  Taken 1/30/2025 2245 by Elizabeth Fajardo RN  Head of Bed (HOB) Positioning: HOB elevated  Taken 1/30/2025 2000 by Elizabeth Fajardo RN  Head of Bed (HOB) Positioning: HOB elevated     Problem: Adult Inpatient Plan of Care  Goal: Absence of Hospital-Acquired Illness or Injury  Intervention: Identify and Manage Fall Risk  Recent Flowsheet Documentation  Taken 1/31/2025 0400 by Elizabeth Fajardo RN  Safety Promotion/Fall Prevention:   activity supervised   assistive device/personal items within reach   clutter free environment maintained   lighting adjusted   nonskid shoes/slippers when out of bed   room organization consistent   safety round/check completed  Taken 1/31/2025 0151 by Elizabeth Fajardo RN  Safety Promotion/Fall Prevention:   activity supervised   assistive device/personal items within reach   clutter free environment maintained   lighting adjusted   nonskid shoes/slippers when out of bed   room organization consistent   safety round/check completed  Taken 1/31/2025 0000 by Elizabeth Fajardo RN  Safety Promotion/Fall Prevention:   activity supervised   assistive device/personal items within reach   clutter free environment maintained   lighting adjusted   nonskid shoes/slippers when out of bed   room organization consistent   safety round/check completed  Taken 1/30/2025 2245 by Elizabeth Fajardo RN  Safety Promotion/Fall Prevention:   activity supervised   assistive device/personal items within reach   clutter free environment maintained   lighting adjusted   nonskid shoes/slippers when out of bed   room organization consistent   safety round/check completed  Taken 1/30/2025 2000 by Elizabeth Fajardo RN  Safety Promotion/Fall Prevention:   activity supervised   assistive device/personal items within reach   clutter  free environment maintained   lighting adjusted   nonskid shoes/slippers when out of bed   room organization consistent   safety round/check completed  Intervention: Prevent Skin Injury  Recent Flowsheet Documentation  Taken 1/31/2025 0400 by Elizabeth Fajardo RN  Body Position: position changed independently  Skin Protection:   incontinence pads utilized   transparent dressing maintained   silicone foam dressing in place  Taken 1/31/2025 0151 by Elizabeth Fajardo RN  Body Position: position changed independently  Skin Protection:   incontinence pads utilized   transparent dressing maintained   silicone foam dressing in place  Taken 1/31/2025 0000 by Elizabeth Fajardo RN  Body Position: position changed independently  Skin Protection:   incontinence pads utilized   transparent dressing maintained   silicone foam dressing in place  Taken 1/30/2025 2245 by Elizabeth Fajardo RN  Body Position: position changed independently  Skin Protection:   incontinence pads utilized   transparent dressing maintained   silicone foam dressing in place  Taken 1/30/2025 2000 by Elizabeth Fajardo RN  Body Position: position changed independently  Skin Protection:   incontinence pads utilized   silicone foam dressing in place   transparent dressing maintained  Intervention: Prevent Infection  Recent Flowsheet Documentation  Taken 1/31/2025 0400 by Elizabeth Fajardo RN  Infection Prevention: rest/sleep promoted  Taken 1/31/2025 0151 by Elizabeth Fajardo RN  Infection Prevention: rest/sleep promoted  Taken 1/31/2025 0000 by Elizabeth Fajardo RN  Infection Prevention: rest/sleep promoted  Taken 1/30/2025 2245 by Elizabeth Fajardo RN  Infection Prevention: rest/sleep promoted  Taken 1/30/2025 2000 by Elizabeth Fajardo RN  Infection Prevention: single patient room provided  Goal: Optimal Comfort and Wellbeing  Intervention: Provide Person-Centered Care  Recent Flowsheet Documentation  Taken 1/30/2025 2000 by Elizabeth Fajardo RN  Trust Relationship/Rapport:   care  explained   questions answered     Problem: Fall Injury Risk  Goal: Absence of Fall and Fall-Related Injury  Intervention: Promote Injury-Free Environment  Recent Flowsheet Documentation  Taken 1/31/2025 0400 by Elizabeth Fajardo RN  Safety Promotion/Fall Prevention:   activity supervised   assistive device/personal items within reach   clutter free environment maintained   lighting adjusted   nonskid shoes/slippers when out of bed   room organization consistent   safety round/check completed  Taken 1/31/2025 0151 by Elizabeth Fajardo RN  Safety Promotion/Fall Prevention:   activity supervised   assistive device/personal items within reach   clutter free environment maintained   lighting adjusted   nonskid shoes/slippers when out of bed   room organization consistent   safety round/check completed  Taken 1/31/2025 0000 by Elizabeth Fajardo RN  Safety Promotion/Fall Prevention:   activity supervised   assistive device/personal items within reach   clutter free environment maintained   lighting adjusted   nonskid shoes/slippers when out of bed   room organization consistent   safety round/check completed  Taken 1/30/2025 2245 by Elizabeth Fajardo RN  Safety Promotion/Fall Prevention:   activity supervised   assistive device/personal items within reach   clutter free environment maintained   lighting adjusted   nonskid shoes/slippers when out of bed   room organization consistent   safety round/check completed  Taken 1/30/2025 2000 by Elizabeth Fajardo RN  Safety Promotion/Fall Prevention:   activity supervised   assistive device/personal items within reach   clutter free environment maintained   lighting adjusted   nonskid shoes/slippers when out of bed   room organization consistent   safety round/check completed     Problem: Skin Injury Risk Increased  Goal: Skin Health and Integrity  Intervention: Optimize Skin Protection  Recent Flowsheet Documentation  Taken 1/31/2025 0400 by Elizabeth Fajardo, RN  Activity Management:  activity minimized  Pressure Reduction Techniques:   frequent weight shift encouraged   pressure points protected  Head of Bed (HOB) Positioning: HOB elevated  Pressure Reduction Devices: pressure-redistributing mattress utilized  Skin Protection:   incontinence pads utilized   transparent dressing maintained   silicone foam dressing in place  Taken 1/31/2025 0151 by Elizabeth Fajardo RN  Activity Management: activity minimized  Pressure Reduction Techniques:   frequent weight shift encouraged   pressure points protected  Head of Bed (HOB) Positioning: [facility] elevated  Pressure Reduction Devices: pressure-redistributing mattress utilized  Skin Protection:   incontinence pads utilized   transparent dressing maintained   silicone foam dressing in place  Taken 1/31/2025 0000 by Elizabeht Fajardo RN  Activity Management: activity minimized  Pressure Reduction Techniques:   frequent weight shift encouraged   pressure points protected  Head of Bed (HOB) Positioning: [facility] elevated  Pressure Reduction Devices: pressure-redistributing mattress utilized  Skin Protection:   incontinence pads utilized   transparent dressing maintained   silicone foam dressing in place  Taken 1/30/2025 2245 by Elizabeth Fajardo RN  Activity Management: activity minimized  Pressure Reduction Techniques:   frequent weight shift encouraged   pressure points protected  Head of Bed (HOB) Positioning: [facility] elevated  Pressure Reduction Devices: pressure-redistributing mattress utilized  Skin Protection:   incontinence pads utilized   transparent dressing maintained   silicone foam dressing in place  Taken 1/30/2025 2000 by Elizabeth Fajardo RN  Activity Management: activity encouraged  Pressure Reduction Techniques:   frequent weight shift encouraged   pressure points protected  Head of Bed (HOB) Positioning: [facility] elevated  Pressure Reduction Devices: pressure-redistributing mattress utilized  Skin Protection:   incontinence pads utilized   silicone foam dressing in  place   transparent dressing maintained     Problem: Restraint, Nonviolent  Goal: Absence of Harm or Injury  Intervention: Implement Least Restrictive Safety Strategies  Recent Flowsheet Documentation  Taken 1/30/2025 2000 by Elizabeth Fajardo RN  Diversional Activities:   smartphone   television  Intervention: Protect Dignity, Rights and Personal Wellbeing  Recent Flowsheet Documentation  Taken 1/30/2025 2000 by Elizabeth Fajardo RN  Trust Relationship/Rapport:   care explained   questions answered  Intervention: Protect Skin and Joint Integrity  Recent Flowsheet Documentation  Taken 1/31/2025 0400 by Elizabeth aFjardo RN  Body Position: position changed independently  Skin Protection:   incontinence pads utilized   transparent dressing maintained   silicone foam dressing in place  Taken 1/31/2025 0151 by Elizabeth Fajardo RN  Body Position: position changed independently  Skin Protection:   incontinence pads utilized   transparent dressing maintained   silicone foam dressing in place  Taken 1/31/2025 0000 by Elizabeth Fajardo RN  Body Position: position changed independently  Skin Protection:   incontinence pads utilized   transparent dressing maintained   silicone foam dressing in place  Taken 1/30/2025 2245 by Elizabeth Fajardo RN  Body Position: position changed independently  Skin Protection:   incontinence pads utilized   transparent dressing maintained   silicone foam dressing in place  Taken 1/30/2025 2000 by Elizabeth Fajardo RN  Body Position: position changed independently  Skin Protection:   incontinence pads utilized   silicone foam dressing in place   transparent dressing maintained     Problem: Violence Risk or Actual  Goal: Anger and Impulse Control  Intervention: Minimize Safety Risk  Recent Flowsheet Documentation  Taken 1/31/2025 0400 by Elizabeth Fajardo RN  Enhanced Safety Measures:   bed alarm set   room near unit station  Taken 1/31/2025 0151 by Elizabeth Fajardo RN  Enhanced Safety Measures:   bed alarm set    room near unit station  Taken 1/31/2025 0000 by Elizabeth Fajardo RN  Enhanced Safety Measures:   bed alarm set   room near unit station  Taken 1/30/2025 2245 by Elizabeth Fajardo RN  Enhanced Safety Measures:   bed alarm set   room near unit station  Taken 1/30/2025 2000 by Elizabeth Fajardo RN  Sensory Stimulation Regulation: care clustered  Enhanced Safety Measures:   bed alarm set   room near unit station  Intervention: Promote Self-Control  Recent Flowsheet Documentation  Taken 1/30/2025 2000 by Elizabeth Fajardo RN  Environmental Support: calm environment promoted     Problem: Breathing Pattern Ineffective  Goal: Effective Breathing Pattern  Intervention: Promote Improved Breathing Pattern  Recent Flowsheet Documentation  Taken 1/31/2025 0400 by Elizabeth Fajardo RN  Head of Bed (HOB) Positioning: HOB elevated  Taken 1/31/2025 0151 by Elizabeth Fajardo RN  Head of Bed (HOB) Positioning: HOB elevated  Taken 1/31/2025 0000 by Elizabeth Fajardo RN  Head of Bed (HOB) Positioning: HOB elevated  Taken 1/30/2025 2245 by Elizabeth Fajardo RN  Head of Bed (HOB) Positioning: HOB elevated  Taken 1/30/2025 2000 by Elizabeth Fajardo RN  Head of Bed (HOB) Positioning: HOB elevated     Problem: Confusion Acute  Goal: Optimal Cognitive Function  Intervention: Minimize Contributing Factors  Recent Flowsheet Documentation  Taken 1/30/2025 2000 by Elizabeth Fajardo RN  Sensory Stimulation Regulation: care clustered   Goal Outcome Evaluation:

## 2025-01-31 NOTE — PROGRESS NOTES
[facility] Medicine Services  PROGRESS NOTE    Patient Name: Tessa Bradley  : 1945  MRN: [id number]    Date of Admission: 2025  Primary Care Physician: Sunni Santoro MD    Subjective   Subjective     CC:  Follow-up respiratory failure    HPI:  Patient was seen and examined this morning.  Was hypotensive last evening and somewhat improved IV fluid but still with systolic in the 90s.  Feeling slightly dizzy and nauseous    Objective   Objective     Vital Signs:   Temp:  [97.8 °F (36.6 °C)-98 °F (36.7 °C)] 98 °F (36.7 °C)  Heart Rate:  [59-70] 70  Resp:  [16-18] 18  BP: (75-88)/(35-52) 84/52  Flow (L/min) (Oxygen Therapy):  [3] 3     Physical Exam:  General: Chronically ill and frail looking.  Head: Atraumatic and normocephalic  Eyes: No Icterus. No pallor  Ears:  Ears appear intact with no abnormalities noted  Throat: No oral lesions, no thrush  Neck: Supple, trachea midline  Lungs: Diminished air entry both lung fields.  No crackles or wheezing  Heart:  Normal S1 and S2, no murmur, no gallop, No JVD, no lower extremity swelling  Abdomen:  Soft, no tenderness, no organomegaly, normal bowel sounds, no organomegaly  Extremities: pulses equal bilaterally  Skin: No bleeding, bruising or rash, normal skin turgor and elasticity  Neurologic: Cranial nerves appear intact with no evidence of facial asymmetry, normal motor and sensory functions in all 4 extremities  Psych: Alert and oriented x 3, normal mood    Results Reviewed:  LAB RESULTS:      Lab 25  1053 25  0426 25  0527 25  0538 25  0542 25  0551   WBC  --  9.45 8.86 8.27 9.62 12.66*   HEMOGLOBIN  --  11.0* 11.6* 11.8* 11.7* 11.5*   HEMATOCRIT  --  37.7 39.6 41.7 42.5 42.0   PLATELETS  --  193 158 137* 125* 148   NEUTROS ABS  --  6.14 6.30 6.07  --   --    IMMATURE GRANS (ABS)  --  0.03 0.03 0.02  --   --    LYMPHS ABS  --  2.21 1.55 1.41  --   --    MONOS ABS  --  0.87 0.71 0.56  --   --     EOS ABS  --  0.19 0.25 0.20  --   --    MCV  --  95.4 95.4 97.2* 101.4* 101.4*   CRP  --   --   --  1.44*  --   --    PROCALCITONIN  --   --  0.05  --   --   --    D DIMER QUANT 0.56  --   --   --   --   --          Lab 01/30/25  1100 01/29/25  0426 01/28/25  0527 01/27/25  0538 01/26/25  0542 01/25/25  0551   SODIUM 144 142 140 136 139 140   POTASSIUM 5.0 4.8 5.3* 5.6* 5.0 4.4   CHLORIDE 98 101 97* 100 102 101   CO2 38.0* 39.0* 39.0* 32.0* 33.0* 32.0*   ANION GAP 8.0 2.0* 4.0* 4.0* 4.0* 7.0   BUN 25* 32* 34* 35* 31* 26*   CREATININE 0.69 0.79 0.58 0.57 0.59 0.73   EGFR 89.5 77.2 93.3 93.7 93.0 84.8   GLUCOSE 117* 84 124* 125* 118* 93   CALCIUM 9.8 9.6 9.6 9.7 9.2 9.0   MAGNESIUM  --  2.0  --  2.4 1.8 2.0   PHOSPHORUS  --   --   --  2.3* 2.4* 2.8         Lab 01/27/25  0538   TOTAL PROTEIN 5.5*   ALBUMIN 2.9*   GLOBULIN 2.6   ALT (SGPT) 8   AST (SGOT) 14   BILIRUBIN 0.4   ALK PHOS 75         Lab 01/28/25  0527   PROBNP 8,074.0*         Lab 01/27/25  0538   TRIGLYCERIDES 102             Lab 01/27/25  0446 01/26/25  0415 01/25/25  0352   PH, ARTERIAL 7.374 7.376 7.340*   PCO2, ARTERIAL 69.7* 66.3* 69.2*   PO2 ART 72.9* 81.0* 96.7   FIO2 40 35 21   HCO3 ART 40.6* 38.9* 37.3*   BASE EXCESS ART 12.6* 11.3* 9.2*   CARBOXYHEMOGLOBIN 1.5 1.5 1.4     Brief Urine Lab Results  (Last result in the past 365 days)        Color   Clarity   Blood   Leuk Est   Nitrite   Protein   CREAT   Urine HCG        01/21/25 2159 Yellow   Clear   Negative   Negative   Positive   Negative                   Microbiology Results Abnormal       Procedure Component Value - Date/Time    Urine Culture - Urine, Urine, Clean Catch [[id number]]  (Abnormal)  (Susceptibility) Collected: 01/21/25 1418    Lab Status: Final result Specimen: Urine, Clean Catch Updated: 01/23/25 0954     Urine Culture >100,000 CFU/mL Escherichia coli    Narrative:      Colonization of the urinary tract without infection is common. Treatment is discouraged unless the patient is  symptomatic, pregnant, or undergoing an invasive urologic procedure.    Susceptibility        Escherichia coli      AUTUMN      Amoxicillin + Clavulanate Susceptible      Ampicillin Susceptible      Ampicillin + Sulbactam Susceptible      Cefazolin (Urine) Susceptible      Cefepime Susceptible      Ceftazidime Susceptible      Ceftriaxone Susceptible      Gentamicin Susceptible      Levofloxacin Susceptible      Nitrofurantoin Susceptible      Piperacillin + Tazobactam Susceptible      Trimethoprim + Sulfamethoxazole Susceptible                                   No radiology results from the last 24 hrs    Results for orders placed during the hospital encounter of 08/24/23    Adult Transthoracic Echo Complete W/ Cont if Necessary Per Protocol    Interpretation Summary    Left ventricular systolic function is normal. Left ventricular ejection fraction appears to be 56 - 60%.    Left ventricular diastolic function was normal.    Mild aortic valve stenosis is present. Aortic valve area is 1.2 cm2.    Peak velocity of the flow distal to the aortic valve is 267.2 cm/s. Aortic valve mean pressure gradient is 16 mmHg.      Current medications:  Scheduled Meds:albumin human, 50 g, Intravenous, Once  apixaban, 5 mg, Oral, Q12H  arformoterol, 15 mcg, Nebulization, BID - RT  atorvastatin, 80 mg, Oral, Nightly  budesonide, 0.5 mg, Nebulization, BID - RT  gabapentin, 300 mg, Oral, Daily  midodrine, 10 mg, Oral, TID AC  Mirabegron ER, 50 mg, Oral, Daily  nicotine, 1 patch, Transdermal, Q24H  revefenacin, 175 mcg, Nebulization, Daily - RT  senna-docusate sodium, 2 tablet, Oral, BID  sertraline, 200 mg, Oral, Daily  sodium chloride, 1,000 mL, Intravenous, Once  sodium chloride, 10 mL, Intravenous, Q12H      Continuous Infusions:   PRN Meds:.  acetaminophen **OR** [DISCONTINUED] acetaminophen    Albuterol Sulfate NEB Orderable    dextrose    dextrose    glucagon (human recombinant)    [DISCONTINUED] ondansetron ODT **OR** ondansetron     promethazine    sodium chloride    sodium chloride    traZODone    Assessment & Plan   Assessment & Plan     Active Hospital Problems    Diagnosis  POA    **Respiratory failure with hypercapnia [J96.92]  Yes    Acute on chronic respiratory failure with hypoxia and hypercapnia [J96.21, J96.22]  Yes    History of Hodgkin's disease [Z85.71]  Not Applicable    Acute on chronic hypoxic respiratory failure [J96.21]  Yes    Stage 3b chronic kidney disease [N18.32]  Yes    Chronic respiratory failure with hypoxia and hypercapnia [J96.11, J96.12]  Yes    HTN (hypertension) [I10]  Yes    HLD (hyperlipidemia) [E78.5]  Yes    A-fib [I48.91]  Yes    MAKAYLA (obstructive sleep apnea) [G47.33]  Yes    COPD (chronic obstructive pulmonary disease) [J44.9]  Yes      Resolved Hospital Problems   No resolved problems to display.        Brief Hospital Course to date:  Woodrow Bradley is a 80yo F with a history of COPD on home O2, [facility], Afib (on Eliquis), HLD, Depression, HTN, and TIA who [facility] on 1/[facility] with altered mental status. In the ED, she was found to be hypercapnic and did not improve with breathing treatments and was intubated with subsequent improvement in pCO2.      Acute hypoxic hypercapnic respiratory failure  Severe COPD exacerbation  Obstructive sleep apnea  Patient presented to the hospital with acute encephalopathy likely secondary to CO2 narcosis and acute hypoxic hypercapnic respiratory failure  CT head was negative  Required intubation and mechanical ventilation on day of admission 1/21/2025 and extubated on 1/23/2025  Status post IV Rocephin and azithromycin  Status post prednisone  Continue BiPAP at nighttime  Continue Pulmicort, Yupelri, and Brovana  Continue DuoNebs    Hypotension  Unclear etiology  Start midodrine 10 mg 3 times daily  IV fluids and IV albumin ordered  Check echo  Check x-ray  Check serum cortisol level    TIA  Dyslipidemia  Paroxysmal continue  A-fib  Continue Eliquis and statins    Acute on chronic debility  PT and OT recommended inpatient rehab      Expected Discharge Location and Transportation: IRF  Expected Discharge   Expected Discharge Date: 2/1/2025; Expected Discharge Time:      VTE Prophylaxis:  Pharmacologic & mechanical VTE prophylaxis orders are present.         AM-PAC 6 Clicks Score (PT): 15 (01/30/25 2000)    CODE STATUS:   Code Status and Medical Interventions: CPR (Attempt to Resuscitate); Full Support   Ordered at: 01/21/25 2021     Code Status (Patient has no pulse and is not breathing):    CPR (Attempt to Resuscitate)     Medical Interventions (Patient has pulse or is breathing):    Full Support       Eliezer Miller MD  01/31/25

## 2025-01-31 NOTE — CASE MANAGEMENT/SOCIAL WORK
Continued Stay Note  [facility]     Patient Name: Tessa Bradley  MRN: [id number]  Today's Date: 1/31/2025    Admit Date: 1/21/2025    Plan: [facility]   Discharge Plan       Row Name 01/31/25 1249       Plan    Plan [facility]    Plan Comments Per discussion in MDR, Pt was hypotensive overnight. She c/o nausea and is on 3L NC (uses 2L at baseline). Pt is currently awaiting a bed at [facility]. Tentative transport is arranged via [facility] van tomorrow @ 1330. Weekend  will check with [facility] tomorrow regarding bed availability and provide update.  provided update to Pt, in room.    Addendum - Per April at [facility] , since Pt is requiring a swallow study today (and not medically ready), facility will not be able to accept over the weekend. Transportation which was arranged for tomorrow has been cancelled.                    Discharge Codes    No documentation.                 Expected Discharge Date and Time       Expected Discharge Date Expected Discharge Time    Jan 31, 2025               Ursula Weinstein RN

## 2025-02-01 ENCOUNTER — APPOINTMENT (OUTPATIENT)
Dept: CARDIOLOGY | Facility: HOSPITAL | Age: 80
End: 2025-02-01
Payer: MEDICARE

## 2025-02-01 LAB
ALBUMIN SERPL-MCNC: 4 G/DL (ref 3.5–5.2)
ALBUMIN/GLOB SERPL: 1.8 G/DL
ALP SERPL-CCNC: 52 U/L (ref 39–117)
ALT SERPL W P-5'-P-CCNC: 12 U/L (ref 1–33)
ANION GAP SERPL CALCULATED.3IONS-SCNC: 6 MMOL/L (ref 5–15)
AST SERPL-CCNC: 19 U/L (ref 1–32)
AV MEAN PRESS GRAD SYS DOP V1V2: 19.5 MMHG
AV VMAX SYS DOP: 329 CM/SEC
BASOPHILS # BLD AUTO: 0.03 10*3/MM3 (ref 0–0.2)
BASOPHILS NFR BLD AUTO: 0.3 % (ref 0–1.5)
BH CV ECHO MEAS - AO MAX PG: 43.6 MMHG
BH CV ECHO MEAS - AO ROOT DIAM: 2.7 CM
BH CV ECHO MEAS - AO V2 VTI: 77.3 CM
BH CV ECHO MEAS - AVA(I,D): 0.8 CM2
BH CV ECHO MEAS - EDV(CUBED): 125 ML
BH CV ECHO MEAS - EDV(MOD-SP2): 123 ML
BH CV ECHO MEAS - EDV(MOD-SP4): 103 ML
BH CV ECHO MEAS - EF(MOD-SP2): 51.1 %
BH CV ECHO MEAS - EF(MOD-SP4): 37 %
BH CV ECHO MEAS - ESV(CUBED): 39.3 ML
BH CV ECHO MEAS - ESV(MOD-SP2): 60.1 ML
BH CV ECHO MEAS - ESV(MOD-SP4): 64.9 ML
BH CV ECHO MEAS - FS: 32 %
BH CV ECHO MEAS - IVS/LVPW: 1 CM
BH CV ECHO MEAS - IVSD: 1.2 CM
BH CV ECHO MEAS - LA DIMENSION: 3.9 CM
BH CV ECHO MEAS - LAT PEAK E' VEL: 7.2 CM/SEC
BH CV ECHO MEAS - LV MASS(C)D: 233.7 GRAMS
BH CV ECHO MEAS - LV MAX PG: 2.8 MMHG
BH CV ECHO MEAS - LV MEAN PG: 1 MMHG
BH CV ECHO MEAS - LV V1 MAX: 83.2 CM/SEC
BH CV ECHO MEAS - LV V1 VTI: 19.6 CM
BH CV ECHO MEAS - LVIDD: 5 CM
BH CV ECHO MEAS - LVIDS: 3.4 CM
BH CV ECHO MEAS - LVOT AREA: 3.1 CM2
BH CV ECHO MEAS - LVOT DIAM: 2 CM
BH CV ECHO MEAS - LVPWD: 1.2 CM
BH CV ECHO MEAS - MED PEAK E' VEL: 4.8 CM/SEC
BH CV ECHO MEAS - MV A MAX VEL: 88 CM/SEC
BH CV ECHO MEAS - MV DEC SLOPE: 357 CM/SEC2
BH CV ECHO MEAS - MV DEC TIME: 0.15 SEC
BH CV ECHO MEAS - MV E MAX VEL: 117 CM/SEC
BH CV ECHO MEAS - MV E/A: 1.33
BH CV ECHO MEAS - MV MAX PG: 8.5 MMHG
BH CV ECHO MEAS - MV MEAN PG: 2.8 MMHG
BH CV ECHO MEAS - MV P1/2T: 105 MSEC
BH CV ECHO MEAS - MV V2 VTI: 39.8 CM
BH CV ECHO MEAS - MVA(P1/2T): 2.09 CM2
BH CV ECHO MEAS - MVA(VTI): 1.55 CM2
BH CV ECHO MEAS - RAP SYSTOLE: 8 MMHG
BH CV ECHO MEAS - RVSP: 41 MMHG
BH CV ECHO MEAS - SV(LVOT): 61.6 ML
BH CV ECHO MEAS - SV(MOD-SP2): 62.9 ML
BH CV ECHO MEAS - SV(MOD-SP4): 38.1 ML
BH CV ECHO MEAS - TAPSE (>1.6): 1.83 CM
BH CV ECHO MEAS - TR MAX PG: 29.4 MMHG
BH CV ECHO MEAS - TR MAX VEL: 270.8 CM/SEC
BH CV ECHO MEASUREMENTS AVERAGE E/E' RATIO: 19.5
BH CV XLRA - RV BASE: 4.4 CM
BH CV XLRA - RV LENGTH: 7.2 CM
BH CV XLRA - RV MID: 3.3 CM
BH CV XLRA - TDI S': 11.7 CM/SEC
BILIRUB SERPL-MCNC: 0.5 MG/DL (ref 0–1.2)
BUN SERPL-MCNC: 15 MG/DL (ref 8–23)
BUN/CREAT SERPL: 23.1 (ref 7–25)
CALCIUM SPEC-SCNC: 9.5 MG/DL (ref 8.6–10.5)
CHLORIDE SERPL-SCNC: 102 MMOL/L (ref 98–107)
CO2 SERPL-SCNC: 36 MMOL/L (ref 22–29)
CORTIS SERPL-MCNC: 5.96 MCG/DL
CORTIS SERPL-MCNC: 6.45 MCG/DL
CREAT SERPL-MCNC: 0.65 MG/DL (ref 0.57–1)
CRP SERPL-MCNC: 0.92 MG/DL (ref 0–0.5)
DEPRECATED RDW RBC AUTO: 56.4 FL (ref 37–54)
EGFRCR SERPLBLD CKD-EPI 2021: 90.8 ML/MIN/1.73
EOSINOPHIL # BLD AUTO: 0.3 10*3/MM3 (ref 0–0.4)
EOSINOPHIL NFR BLD AUTO: 3.4 % (ref 0.3–6.2)
ERYTHROCYTE [DISTWIDTH] IN BLOOD BY AUTOMATED COUNT: 15.9 % (ref 12.3–15.4)
GEN 5 1HR TROPONIN T REFLEX: 26 NG/L
GLOBULIN UR ELPH-MCNC: 2.2 GM/DL
GLUCOSE SERPL-MCNC: 89 MG/DL (ref 65–99)
HCT VFR BLD AUTO: 37.3 % (ref 34–46.6)
HGB BLD-MCNC: 10.6 G/DL (ref 12–15.9)
IMM GRANULOCYTES # BLD AUTO: 0.03 10*3/MM3 (ref 0–0.05)
IMM GRANULOCYTES NFR BLD AUTO: 0.3 % (ref 0–0.5)
LEFT ATRIUM VOLUME INDEX: 50.8 ML/M2
LV EF BIPLANE MOD: 47.4 %
LYMPHOCYTES # BLD AUTO: 2.02 10*3/MM3 (ref 0.7–3.1)
LYMPHOCYTES NFR BLD AUTO: 22.9 % (ref 19.6–45.3)
MAGNESIUM SERPL-MCNC: 1.9 MG/DL (ref 1.6–2.4)
MCH RBC QN AUTO: 27.7 PG (ref 26.6–33)
MCHC RBC AUTO-ENTMCNC: 28.4 G/DL (ref 31.5–35.7)
MCV RBC AUTO: 97.4 FL (ref 79–97)
MONOCYTES # BLD AUTO: 0.83 10*3/MM3 (ref 0.1–0.9)
MONOCYTES NFR BLD AUTO: 9.4 % (ref 5–12)
NEUTROPHILS NFR BLD AUTO: 5.61 10*3/MM3 (ref 1.7–7)
NEUTROPHILS NFR BLD AUTO: 63.7 % (ref 42.7–76)
NRBC BLD AUTO-RTO: 0 /100 WBC (ref 0–0.2)
PHOSPHATE SERPL-MCNC: 2.8 MG/DL (ref 2.5–4.5)
PLATELET # BLD AUTO: 242 10*3/MM3 (ref 140–450)
PMV BLD AUTO: 10.4 FL (ref 6–12)
POTASSIUM SERPL-SCNC: 3.9 MMOL/L (ref 3.5–5.2)
PROT SERPL-MCNC: 6.2 G/DL (ref 6–8.5)
RBC # BLD AUTO: 3.83 10*6/MM3 (ref 3.77–5.28)
SODIUM SERPL-SCNC: 144 MMOL/L (ref 136–145)
TROPONIN T % DELTA: -4
TROPONIN T NUMERIC DELTA: -1 NG/L
TROPONIN T SERPL HS-MCNC: 27 NG/L
WBC NRBC COR # BLD AUTO: 8.82 10*3/MM3 (ref 3.4–10.8)

## 2025-02-01 PROCEDURE — 86140 C-REACTIVE PROTEIN: CPT | Performed by: INTERNAL MEDICINE

## 2025-02-01 PROCEDURE — 83735 ASSAY OF MAGNESIUM: CPT | Performed by: INTERNAL MEDICINE

## 2025-02-01 PROCEDURE — 93306 TTE W/DOPPLER COMPLETE: CPT | Performed by: INTERNAL MEDICINE

## 2025-02-01 PROCEDURE — 99232 SBSQ HOSP IP/OBS MODERATE 35: CPT | Performed by: INTERNAL MEDICINE

## 2025-02-01 PROCEDURE — 85025 COMPLETE CBC W/AUTO DIFF WBC: CPT | Performed by: INTERNAL MEDICINE

## 2025-02-01 PROCEDURE — 84100 ASSAY OF PHOSPHORUS: CPT | Performed by: INTERNAL MEDICINE

## 2025-02-01 PROCEDURE — 94799 UNLISTED PULMONARY SVC/PX: CPT

## 2025-02-01 PROCEDURE — 80053 COMPREHEN METABOLIC PANEL: CPT | Performed by: INTERNAL MEDICINE

## 2025-02-01 PROCEDURE — 93005 ELECTROCARDIOGRAM TRACING: CPT | Performed by: INTERNAL MEDICINE

## 2025-02-01 PROCEDURE — 84484 ASSAY OF TROPONIN QUANT: CPT | Performed by: INTERNAL MEDICINE

## 2025-02-01 PROCEDURE — 25010000002 ONDANSETRON PER 1 MG: Performed by: INTERNAL MEDICINE

## 2025-02-01 PROCEDURE — 25010000002 PROMETHAZINE PER 50 MG: Performed by: INTERNAL MEDICINE

## 2025-02-01 PROCEDURE — 25010000002 COSYNTROPIN PER 0.25 MG: Performed by: INTERNAL MEDICINE

## 2025-02-01 PROCEDURE — 94660 CPAP INITIATION&MGMT: CPT

## 2025-02-01 PROCEDURE — 82533 TOTAL CORTISOL: CPT | Performed by: INTERNAL MEDICINE

## 2025-02-01 PROCEDURE — 93306 TTE W/DOPPLER COMPLETE: CPT

## 2025-02-01 PROCEDURE — 93010 ELECTROCARDIOGRAM REPORT: CPT | Performed by: INTERNAL MEDICINE

## 2025-02-01 RX ORDER — COSYNTROPIN 0.25 MG/ML
0.25 INJECTION, POWDER, FOR SOLUTION INTRAMUSCULAR; INTRAVENOUS ONCE
Status: DISCONTINUED | OUTPATIENT
Start: 2025-02-02 | End: 2025-02-02

## 2025-02-01 RX ORDER — PANTOPRAZOLE SODIUM 40 MG/10ML
40 INJECTION, POWDER, LYOPHILIZED, FOR SOLUTION INTRAVENOUS EVERY 12 HOURS SCHEDULED
Status: DISCONTINUED | OUTPATIENT
Start: 2025-02-01 | End: 2025-02-04

## 2025-02-01 RX ORDER — COSYNTROPIN 0.25 MG/ML
0.25 INJECTION, POWDER, FOR SOLUTION INTRAMUSCULAR; INTRAVENOUS ONCE
Status: COMPLETED | OUTPATIENT
Start: 2025-02-01 | End: 2025-02-01

## 2025-02-01 RX ORDER — PREDNISONE 5 MG/1
5 TABLET ORAL DAILY
Status: DISCONTINUED | OUTPATIENT
Start: 2025-02-01 | End: 2025-02-01

## 2025-02-01 RX ORDER — TOLTERODINE 2 MG/1
4 CAPSULE, EXTENDED RELEASE ORAL NIGHTLY
Status: DISCONTINUED | OUTPATIENT
Start: 2025-02-01 | End: 2025-02-05 | Stop reason: HOSPADM

## 2025-02-01 RX ORDER — ALUMINA, MAGNESIA, AND SIMETHICONE 2400; 2400; 240 MG/30ML; MG/30ML; MG/30ML
30 SUSPENSION ORAL EVERY 6 HOURS PRN
Status: DISCONTINUED | OUTPATIENT
Start: 2025-02-01 | End: 2025-02-05 | Stop reason: HOSPADM

## 2025-02-01 RX ADMIN — PROMETHAZINE HYDROCHLORIDE 12.5 MG: 25 INJECTION, SOLUTION INTRAMUSCULAR; INTRAVENOUS at 15:51

## 2025-02-01 RX ADMIN — SENNOSIDES AND DOCUSATE SODIUM 2 TABLET: 50; 8.6 TABLET ORAL at 20:33

## 2025-02-01 RX ADMIN — PANTOPRAZOLE SODIUM 40 MG: 40 INJECTION, POWDER, FOR SOLUTION INTRAVENOUS at 13:31

## 2025-02-01 RX ADMIN — Medication 10 ML: at 11:44

## 2025-02-01 RX ADMIN — APIXABAN 5 MG: 5 TABLET, FILM COATED ORAL at 11:38

## 2025-02-01 RX ADMIN — NICOTINE 1 PATCH: 21 PATCH TRANSDERMAL at 11:43

## 2025-02-01 RX ADMIN — TOLTERODINE 4 MG: 2 CAPSULE, EXTENDED RELEASE ORAL at 20:33

## 2025-02-01 RX ADMIN — ATORVASTATIN CALCIUM 80 MG: 40 TABLET, FILM COATED ORAL at 20:33

## 2025-02-01 RX ADMIN — PANTOPRAZOLE SODIUM 40 MG: 40 INJECTION, POWDER, FOR SOLUTION INTRAVENOUS at 21:37

## 2025-02-01 RX ADMIN — PROMETHAZINE HYDROCHLORIDE 12.5 MG: 25 INJECTION, SOLUTION INTRAMUSCULAR; INTRAVENOUS at 23:54

## 2025-02-01 RX ADMIN — TRAZODONE HYDROCHLORIDE 50 MG: 50 TABLET ORAL at 20:33

## 2025-02-01 RX ADMIN — SERTRALINE HYDROCHLORIDE 200 MG: 100 TABLET ORAL at 11:38

## 2025-02-01 RX ADMIN — Medication 10 ML: at 21:37

## 2025-02-01 RX ADMIN — SENNOSIDES AND DOCUSATE SODIUM 2 TABLET: 50; 8.6 TABLET ORAL at 11:38

## 2025-02-01 RX ADMIN — ALUMINUM HYDROXIDE, MAGNESIUM HYDROXIDE, DIMETHICONE 30 ML: 400; 400; 40 SUSPENSION ORAL at 13:35

## 2025-02-01 RX ADMIN — APIXABAN 5 MG: 5 TABLET, FILM COATED ORAL at 20:33

## 2025-02-01 RX ADMIN — MIDODRINE HYDROCHLORIDE 10 MG: 10 TABLET ORAL at 11:38

## 2025-02-01 RX ADMIN — COSYNTROPIN 0.25 MG: 0.25 INJECTION, POWDER, LYOPHILIZED, FOR SOLUTION INTRAMUSCULAR; INTRAVENOUS at 07:58

## 2025-02-01 RX ADMIN — ONDANSETRON 4 MG: 2 INJECTION INTRAMUSCULAR; INTRAVENOUS at 12:35

## 2025-02-01 RX ADMIN — ONDANSETRON 4 MG: 2 INJECTION INTRAMUSCULAR; INTRAVENOUS at 19:19

## 2025-02-01 RX ADMIN — GABAPENTIN 300 MG: 300 CAPSULE ORAL at 11:38

## 2025-02-01 NOTE — PROGRESS NOTES
[facility] Medicine Services  PROGRESS NOTE    Patient Name: Tessa Bradley  : 1945  MRN: [id number]    Date of Admission: 2025  Primary Care Physician: Sunni Santoro MD    Subjective   Subjective     CC:  Follow-up respiratory failure    HPI:  Patient was seen and examined this morning.  Hypotension improved after 1 dose of IV Decadron yesterday.  She is feeling better in terms of her nausea and strength.  Cosyntropin test was performed impprobably this morning and cannot be interpreted    Objective   Objective     Vital Signs:   Temp:  [98 °F (36.7 °C)-99 °F (37.2 °C)] 98.1 °F (36.7 °C)  Heart Rate:  [65-73] 68  Resp:  [18] 18  BP: ()/(50-84) 150/73  Flow (L/min) (Oxygen Therapy):  [1-3] 1.5     Physical Exam:  General: Chronically ill and frail looking.  Head: Atraumatic and normocephalic  Eyes: No Icterus. No pallor  Ears:  Ears appear intact with no abnormalities noted  Throat: No oral lesions, no thrush  Neck: Supple, trachea midline  Lungs: Diminished air entry both lung fields.  No crackles or wheezing  Heart:  Normal S1 and S2, no murmur, no gallop, No JVD, no lower extremity swelling  Abdomen:  Soft, no tenderness, no organomegaly, normal bowel sounds, no organomegaly  Extremities: pulses equal bilaterally  Skin: No bleeding, bruising or rash, normal skin turgor and elasticity  Neurologic: Cranial nerves appear intact with no evidence of facial asymmetry, normal motor and sensory functions in all 4 extremities  Psych: Alert and oriented x 3, normal mood    Results Reviewed:  LAB RESULTS:      Lab 25  0458 25  1609 25  1053 25  0426 25  0527 25  0538   WBC 8.82 10.00  --  9.45 8.86 8.27   HEMOGLOBIN 10.6* 10.9*  --  11.0* 11.6* 11.8*   HEMATOCRIT 37.3 39.0  --  37.7 39.6 41.7   PLATELETS 242 262  --  193 158 137*   NEUTROS ABS 5.61 8.58*  --  6.14 6.30 6.07   IMMATURE GRANS (ABS) 0.03 0.05  --  0.03 0.03 0.02   LYMPHS ABS  2.02 0.93  --  2.21 1.55 1.41   MONOS ABS 0.83 0.38  --  0.87 0.71 0.56   EOS ABS 0.30 0.04  --  0.19 0.25 0.20   MCV 97.4* 98.7*  --  95.4 95.4 97.2*   CRP 0.92*  --   --   --   --  1.44*   PROCALCITONIN  --   --  0.05  --  0.05  --    D DIMER QUANT  --   --  0.56  --   --   --          Lab 02/01/25  0458 01/31/25  1053 01/30/25  1100 01/29/25  0426 01/28/25  0527 01/27/25  0538 01/26/25  0542   SODIUM 144 142 144 142 140 136 139   POTASSIUM 3.9 4.9 5.0 4.8 5.3* 5.6* 5.0   CHLORIDE 102 101 98 101 97* 100 102   CO2 36.0* 32.0* 38.0* 39.0* 39.0* 32.0* 33.0*   ANION GAP 6.0 9.0 8.0 2.0* 4.0* 4.0* 4.0*   BUN 15 18 25* 32* 34* 35* 31*   CREATININE 0.65 0.63 0.69 0.79 0.58 0.57 0.59   EGFR 90.8 91.5 89.5 77.2 93.3 93.7 93.0   GLUCOSE 89 84 117* 84 124* 125* 118*   CALCIUM 9.5 9.3 9.8 9.6 9.6 9.7 9.2   MAGNESIUM 1.9  --   --  2.0  --  2.4 1.8   PHOSPHORUS 2.8  --   --   --   --  2.3* 2.4*         Lab 02/01/25  0458 01/31/25  1053 01/27/25  0538   TOTAL PROTEIN 6.2 5.5* 5.5*   ALBUMIN 4.0 3.2* 2.9*   GLOBULIN 2.2 2.3 2.6   ALT (SGPT) 12 13 8   AST (SGOT) 19 25 14   BILIRUBIN 0.5 0.4 0.4   ALK PHOS 52 52 75         Lab 01/28/25  0527   PROBNP 8,074.0*         Lab 01/27/25  0538   TRIGLYCERIDES 102             Lab 01/27/25  0446 01/26/25  0415   PH, ARTERIAL 7.374 7.376   PCO2, ARTERIAL 69.7* 66.3*   PO2 ART 72.9* 81.0*   FIO2 40 35   HCO3 ART 40.6* 38.9*   BASE EXCESS ART 12.6* 11.3*   CARBOXYHEMOGLOBIN 1.5 1.5     Brief Urine Lab Results  (Last result in the past 365 days)        Color   Clarity   Blood   Leuk Est   Nitrite   Protein   CREAT   Urine HCG        01/21/25 2159 Yellow   Clear   Negative   Negative   Positive   Negative                   Microbiology Results Abnormal       Procedure Component Value - Date/Time    Urine Culture - Urine, Urine, Clean Catch [[id number]]  (Abnormal)  (Susceptibility) Collected: 01/21/25 1418    Lab Status: Final result Specimen: Urine, Clean Catch Updated: 01/23/25 0954     Urine  Culture >100,000 CFU/mL Escherichia coli    Narrative:      Colonization of the urinary tract without infection is common. Treatment is discouraged unless the patient is symptomatic, pregnant, or undergoing an invasive urologic procedure.    Susceptibility        Escherichia coli      AUTUMN      Amoxicillin + Clavulanate Susceptible      Ampicillin Susceptible      Ampicillin + Sulbactam Susceptible      Cefazolin (Urine) Susceptible      Cefepime Susceptible      Ceftazidime Susceptible      Ceftriaxone Susceptible      Gentamicin Susceptible      Levofloxacin Susceptible      Nitrofurantoin Susceptible      Piperacillin + Tazobactam Susceptible      Trimethoprim + Sulfamethoxazole Susceptible                                   FL Video Swallow With Speech Single Contrast    Result Date: 1/31/2025  FL VIDEO SWALLOW W SPEECH SINGLE-CONTRAST Date of Exam: 1/31/2025 2:37 PM EST Indication: dysphagia.   Comparison: None available. Technique:   The speech pathologist administered food and/or liquid mixed with barium to the patient with cine/video imaging.  Imaging assistance was provided to the speech pathologist and an image was saved. Fluoroscopic Time: 1 minute and 18 seconds Number of Images: 6 associated fluoroscopic loops were saved Findings: No aspiration was seen during fluoroscopic guided modified barium swallowing series. Please see speech therapy report for full details and recommendations.     Impression: Impression: Fluoroscopy provided for a modified barium swallow. No aspiration was seen during swallowing evaluation. Please see speech therapy report for full details and recommendations. Report dictated by: Vy Roca PA-c  I have personally reviewed this case and agree with the findings above: Electronically Signed: Jamison Rueda MD  1/31/2025 5:15 PM EST  Workstation ID: [id number]    XR Chest 1 View    Result Date: 1/31/2025  XR CHEST 1 VW Date of Exam: 1/31/2025 2:51 PM EST Indication: r/o PNA Comparison:  1/24/2025 at 0205 hours Findings: There is improvement in aeration throughout the lungs bilaterally. Residual atelectasis is noted within the lung bases. There may be a small left pleural effusion. The cardiac silhouette and mediastinum are stable. Postsurgical changes are noted at the superior margin of the right lung.     Impression: Impression: 1.Improved aeration throughout the lungs bilaterally. 2.Mild residual atelectasis in the lung bases. There is a small left pleural effusion. Electronically Signed: Juancarlos Mcclendon MD  1/31/2025 4:05 PM EST  Workstation ID: [id number]     Results for orders placed during the hospital encounter of 08/24/23    Adult Transthoracic Echo Complete W/ Cont if Necessary Per Protocol    Interpretation Summary    Left ventricular systolic function is normal. Left ventricular ejection fraction appears to be 56 - 60%.    Left ventricular diastolic function was normal.    Mild aortic valve stenosis is present. Aortic valve area is 1.2 cm2.    Peak velocity of the flow distal to the aortic valve is 267.2 cm/s. Aortic valve mean pressure gradient is 16 mmHg.      Current medications:  Scheduled Meds:apixaban, 5 mg, Oral, Q12H  arformoterol, 15 mcg, Nebulization, BID - RT  atorvastatin, 80 mg, Oral, Nightly  budesonide, 0.5 mg, Nebulization, BID - RT  [START ON 2/2/2025] cosyntropin, 0.25 mg, Intravenous, Once  gabapentin, 300 mg, Oral, Daily  Mirabegron ER, 50 mg, Oral, Daily  nicotine, 1 patch, Transdermal, Q24H  pantoprazole, 40 mg, Intravenous, Q12H  revefenacin, 175 mcg, Nebulization, Daily - RT  senna-docusate sodium, 2 tablet, Oral, BID  sertraline, 200 mg, Oral, Daily  sodium chloride, 10 mL, Intravenous, Q12H  tolterodine LA, 4 mg, Oral, Nightly      Continuous Infusions:   PRN Meds:.  acetaminophen **OR** [DISCONTINUED] acetaminophen    Albuterol Sulfate NEB Orderable    aluminum-magnesium hydroxide-simethicone    dextrose    dextrose    glucagon (human recombinant)     [DISCONTINUED] ondansetron ODT **OR** ondansetron    promethazine    sodium chloride    sodium chloride    traZODone    Assessment & Plan   Assessment & Plan     Active Hospital Problems    Diagnosis  POA    **Respiratory failure with hypercapnia [J96.92]  Yes    Acute on chronic respiratory failure with hypoxia and hypercapnia [J96.21, J96.22]  Yes    History of Hodgkin's disease [Z85.71]  Not Applicable    Acute on chronic hypoxic respiratory failure [J96.21]  Yes    Stage 3b chronic kidney disease [N18.32]  Yes    Chronic respiratory failure with hypoxia and hypercapnia [J96.11, J96.12]  Yes    HTN (hypertension) [I10]  Yes    HLD (hyperlipidemia) [E78.5]  Yes    A-fib [I48.91]  Yes    MAKYALA (obstructive sleep apnea) [G47.33]  Yes    COPD (chronic obstructive pulmonary disease) [J44.9]  Yes      Resolved Hospital Problems   No resolved problems to display.        Brief Hospital Course to date:  Woodrow Bradley is a 78yo F with a history of COPD on home O2, [facility], Afib (on Eliquis), HLD, Depression, HTN, and TIA who presented to [facility] on 1/21/25 with altered mental status. In the ED, she was found to be hypercapnic and did not improve with breathing treatments and was intubated with subsequent improvement in pCO2.      Acute hypoxic hypercapnic respiratory failure  Severe COPD exacerbation  Obstructive sleep apnea  Patient presented to the hospital with acute encephalopathy likely secondary to CO2 narcosis and acute hypoxic hypercapnic respiratory failure  CT head was negative  Required intubation and mechanical ventilation on day of admission 1/21/2025 and extubated on 1/23/2025  Status post IV Rocephin and azithromycin  Status post prednisone  Continue BiPAP at nighttime  Continue Pulmicort, Yupelri, and Brovana  Continue DuoNebs    Hypotension  Suspect adrenal insufficiency  Did not improve with IV fluids and albumin   Blood pressure was as low as 80 systolic.  Improved after  1 dose of Decadron  Cortisol level is low.  Cosyntropin test was improperly done this morning and cannot be interpreted.  Will repeat in the a.m.  Check echo    TIA  Dyslipidemia  Paroxysmal continue A-fib  Continue Eliquis and statins    Acute on chronic debility  PT and OT recommended inpatient rehab      Expected Discharge Location and Transportation: IRF  Expected Discharge   Expected Discharge Date: 2/3/2025; Expected Discharge Time:      VTE Prophylaxis:  Pharmacologic & mechanical VTE prophylaxis orders are present.         AM-PAC 6 Clicks Score (PT): 15 (01/31/25 2037)    CODE STATUS:   Code Status and Medical Interventions: CPR (Attempt to Resuscitate); Full Support   Ordered at: 01/21/25 2021     Code Status (Patient has no pulse and is not breathing):    CPR (Attempt to Resuscitate)     Medical Interventions (Patient has pulse or is breathing):    Full Support       Eliezer Miller MD  02/01/25

## 2025-02-01 NOTE — PLAN OF CARE
Problem: Mechanical Ventilation Invasive  Goal: Effective Communication  Intervention: Ensure Effective Communication  Recent Flowsheet Documentation  Taken 2/1/2025 0600 by Elizabeth Fajardo RN  Trust Relationship/Rapport: care explained  Diversional Activities:   television   smartphone  Taken 1/31/2025 2037 by Elizabeth Fajardo RN  Trust Relationship/Rapport:   care explained   questions answered  Diversional Activities: television  Communication Enhancement Strategies:   call light answered in person   device use encouraged  Goal: Mechanical Ventilation Liberation  Intervention: Promote Extubation and Mechanical Ventilation Liberation  Recent Flowsheet Documentation  Taken 1/31/2025 2037 by Elizabeth Fajardo RN  Medication Review/Management: medications reviewed  Goal: Absence of Device-Related Skin and Tissue Injury  Intervention: Maintain Skin and Tissue Health  Recent Flowsheet Documentation  Taken 1/31/2025 2037 by Elizabeth Fajardo RN  Device Skin Pressure Protection: positioning supports utilized  Goal: Absence of Ventilator-Induced Lung Injury  Intervention: Prevent Ventilator-Associated Pneumonia  Recent Flowsheet Documentation  Taken 2/1/2025 0600 by Elizabeth Fajardo RN  Head of Bed (HOB) Positioning: HOB elevated  Taken 2/1/2025 0400 by Elizabeth Fajardo RN  Head of Bed (HOB) Positioning: HOB elevated  Taken 2/1/2025 0200 by Elizabeth Fajardo RN  Head of Bed (HOB) Positioning: HOB elevated  Taken 2/1/2025 0000 by Elizabeth Fajardo RN  Head of Bed (HOB) Positioning: HOB elevated  Taken 1/31/2025 2200 by Elizabeth Fajardo RN  Head of Bed (HOB) Positioning: HOB elevated  Taken 1/31/2025 2037 by Elizabeth Fajardo RN  Head of Bed (HOB) Positioning: HOB elevated     Problem: Adult Inpatient Plan of Care  Goal: Absence of Hospital-Acquired Illness or Injury  Intervention: Identify and Manage Fall Risk  Recent Flowsheet Documentation  Taken 2/1/2025 0600 by Elizabeth Fajardo RN  Safety Promotion/Fall Prevention:   activity  supervised   assistive device/personal items within reach   clutter free environment maintained   lighting adjusted   nonskid shoes/slippers when out of bed   room organization consistent   safety round/check completed  Taken 2/1/2025 0400 by Elizabeth Fajardo RN  Safety Promotion/Fall Prevention:   activity supervised   assistive device/personal items within reach   clutter free environment maintained   lighting adjusted   nonskid shoes/slippers when out of bed   room organization consistent   safety round/check completed  Taken 2/1/2025 0200 by Elizabeth Fajardo RN  Safety Promotion/Fall Prevention:   activity supervised   assistive device/personal items within reach   clutter free environment maintained   lighting adjusted   nonskid shoes/slippers when out of bed   room organization consistent   safety round/check completed  Taken 2/1/2025 0000 by Elizabeth Fajardo RN  Safety Promotion/Fall Prevention:   activity supervised   assistive device/personal items within reach   clutter free environment maintained   lighting adjusted   nonskid shoes/slippers when out of bed   room organization consistent   safety round/check completed  Taken 1/31/2025 2200 by Elizabeth Fajardo RN  Safety Promotion/Fall Prevention:   activity supervised   assistive device/personal items within reach   clutter free environment maintained   lighting adjusted   nonskid shoes/slippers when out of bed   room organization consistent   safety round/check completed  Taken 1/31/2025 2037 by Elizabeth Fajardo RN  Safety Promotion/Fall Prevention: activity supervised  Intervention: Prevent Skin Injury  Recent Flowsheet Documentation  Taken 2/1/2025 0600 by Elizabeth Fajardo RN  Body Position: position changed independently  Taken 2/1/2025 0400 by Elizabeth Fajardo RN  Body Position: position changed independently  Taken 2/1/2025 0200 by Elizabeth Fajardo RN  Body Position: position changed independently  Taken 2/1/2025 0000 by Elizabeth Fajardo RN  Body Position:  position changed independently  Taken 1/31/2025 2200 by Elizabeth Fajardo RN  Body Position: position changed independently  Taken 1/31/2025 2037 by Elizabeth Fajardo RN  Body Position: position changed independently  Skin Protection:   incontinence pads utilized   transparent dressing maintained  Intervention: Prevent and Manage VTE (Venous Thromboembolism) Risk  Recent Flowsheet Documentation  Taken 1/31/2025 2037 by Elizabeth Fajardo RN  VTE Prevention/Management: (MAR) other (see comments)  Intervention: Prevent Infection  Recent Flowsheet Documentation  Taken 2/1/2025 0600 by Elizabeth Fajardo RN  Infection Prevention: rest/sleep promoted  Taken 2/1/2025 0400 by Elizabeth Fajardo RN  Infection Prevention: rest/sleep promoted  Taken 2/1/2025 0200 by Elizabeth Fajardo RN  Infection Prevention: rest/sleep promoted  Taken 2/1/2025 0000 by Elizabeth Fajardo RN  Infection Prevention: rest/sleep promoted  Taken 1/31/2025 2200 by Elizabeth Fajardo RN  Infection Prevention: rest/sleep promoted  Taken 1/31/2025 2037 by Elizabeth Fajardo RN  Infection Prevention: single patient room provided  Goal: Optimal Comfort and Wellbeing  Intervention: Provide Person-Centered Care  Recent Flowsheet Documentation  Taken 2/1/2025 0600 by Elizabeth Fajardo RN  Trust Relationship/Rapport: care explained  Taken 1/31/2025 2037 by Elizabeth Fajardo RN  Trust Relationship/Rapport:   care explained   questions answered     Problem: Fall Injury Risk  Goal: Absence of Fall and Fall-Related Injury  Intervention: Identify and Manage Contributors  Recent Flowsheet Documentation  Taken 1/31/2025 2037 by Elizabeth Fajardo RN  Medication Review/Management: medications reviewed  Intervention: Promote Injury-Free Environment  Recent Flowsheet Documentation  Taken 2/1/2025 0600 by Elizabeth Fajardo RN  Safety Promotion/Fall Prevention:   activity supervised   assistive device/personal items within reach   clutter free environment maintained   lighting adjusted   nonskid  shoes/slippers when out of bed   room organization consistent   safety round/check completed  Taken 2/1/2025 0400 by Elizabeth Fajardo RN  Safety Promotion/Fall Prevention:   activity supervised   assistive device/personal items within reach   clutter free environment maintained   lighting adjusted   nonskid shoes/slippers when out of bed   room organization consistent   safety round/check completed  Taken 2/1/2025 0200 by Elizabeth Fajardo RN  Safety Promotion/Fall Prevention:   activity supervised   assistive device/personal items within reach   clutter free environment maintained   lighting adjusted   nonskid shoes/slippers when out of bed   room organization consistent   safety round/check completed  Taken 2/1/2025 0000 by Elizabeth Fajardo RN  Safety Promotion/Fall Prevention:   activity supervised   assistive device/personal items within reach   clutter free environment maintained   lighting adjusted   nonskid shoes/slippers when out of bed   room organization consistent   safety round/check completed  Taken 1/31/2025 2200 by Elizabeth Fajardo RN  Safety Promotion/Fall Prevention:   activity supervised   assistive device/personal items within reach   clutter free environment maintained   lighting adjusted   nonskid shoes/slippers when out of bed   room organization consistent   safety round/check completed  Taken 1/31/2025 2037 by Elizabeth Fajardo RN  Safety Promotion/Fall Prevention: activity supervised     Problem: Skin Injury Risk Increased  Goal: Skin Health and Integrity  Intervention: Optimize Skin Protection  Recent Flowsheet Documentation  Taken 2/1/2025 0600 by Elizabeth Fajardo RN  Activity Management: activity minimized  Head of Bed (HOB) Positioning: HOB elevated  Taken 2/1/2025 0400 by Elizabeth Fajardo RN  Activity Management: activity minimized  Head of Bed (HOB) Positioning: HOB elevated  Taken 2/1/2025 0200 by Elizabeth Fajardo RN  Activity Management: activity minimized  Head of Bed (HOB) Positioning: HOB  elevated  Taken 2/1/2025 0000 by Elizabeth Fajardo RN  Activity Management: activity minimized  Head of Bed (HOB) Positioning: HOB elevated  Taken 1/31/2025 2200 by Elizabeth Fajardo RN  Activity Management: activity minimized  Head of Bed (HOB) Positioning: HOB elevated  Taken 1/31/2025 2037 by Elizabeth Fajardo RN  Activity Management: activity encouraged  Pressure Reduction Techniques: frequent weight shift encouraged  Head of Bed (HOB) Positioning: HOB elevated  Pressure Reduction Devices: pressure-redistributing mattress utilized  Skin Protection:   incontinence pads utilized   transparent dressing maintained     Problem: Restraint, Nonviolent  Goal: Absence of Harm or Injury  Intervention: Implement Least Restrictive Safety Strategies  Recent Flowsheet Documentation  Taken 2/1/2025 0600 by Elizabeth Fajardo RN  Diversional Activities:   television   smartphone  Taken 1/31/2025 2037 by Elizabeth Fajardo RN  Diversional Activities: television  Intervention: Protect Dignity, Rights and Personal Wellbeing  Recent Flowsheet Documentation  Taken 2/1/2025 0600 by Elizabeth Fajardo RN  Trust Relationship/Rapport: care explained  Taken 1/31/2025 2037 by Elizabeth Fajardo RN  Trust Relationship/Rapport:   care explained   questions answered  Intervention: Protect Skin and Joint Integrity  Recent Flowsheet Documentation  Taken 2/1/2025 0600 by Elizabeth Fajardo RN  Body Position: position changed independently  Taken 2/1/2025 0400 by Elizabeth Fajardo RN  Body Position: position changed independently  Taken 2/1/2025 0200 by Elizabeth Fajardo RN  Body Position: position changed independently  Taken 2/1/2025 0000 by Elizabeth Fajardo RN  Body Position: position changed independently  Taken 1/31/2025 2200 by Elizabeth Fajardo RN  Body Position: position changed independently  Taken 1/31/2025 2037 by Elizabeth Fajardo RN  Body Position: position changed independently  Skin Protection:   incontinence pads utilized   transparent dressing maintained      Problem: Violence Risk or Actual  Goal: Anger and Impulse Control  Intervention: Minimize Safety Risk  Recent Flowsheet Documentation  Taken 2/1/2025 0600 by Elizabeth Fajardo RN  Enhanced Safety Measures:   bed alarm set   room near unit station  Taken 2/1/2025 0400 by Elizabeth Fajardo RN  Enhanced Safety Measures:   bed alarm set   room near unit station  Taken 2/1/2025 0200 by Elizabeth Fajardo RN  Enhanced Safety Measures:   bed alarm set   room near unit station  Taken 2/1/2025 0000 by Elizabeth Fajardo RN  Enhanced Safety Measures:   bed alarm set   room near unit station  Taken 1/31/2025 2200 by Elizabeth Fajardo RN  Enhanced Safety Measures:   bed alarm set   room near unit station  Taken 1/31/2025 2037 by Elizabeth Fajardo RN  Enhanced Safety Measures: bed alarm set     Problem: Comorbidity Management  Goal: Maintenance of COPD Symptom Control  Intervention: Maintain COPD (Chronic Obstructive Pulmonary Disease) Symptom Control  Recent Flowsheet Documentation  Taken 1/31/2025 2037 by Elizabeth Fajardo RN  Medication Review/Management: medications reviewed  Goal: Blood Glucose Level Within Target Range  Intervention: Monitor and Manage Glycemia  Recent Flowsheet Documentation  Taken 1/31/2025 2037 by Elizabeth Fajardo RN  Medication Review/Management: medications reviewed  Goal: Blood Pressure in Desired Range  Intervention: Maintain Blood Pressure Management  Recent Flowsheet Documentation  Taken 1/31/2025 2037 by Elizabeth Fajardo RN  Medication Review/Management: medications reviewed     Problem: Breathing Pattern Ineffective  Goal: Effective Breathing Pattern  Intervention: Promote Improved Breathing Pattern  Recent Flowsheet Documentation  Taken 2/1/2025 0600 by Elizabeth Fajardo RN  Head of Bed (HOB) Positioning: HOB elevated  Taken 2/1/2025 0400 by Elizabeth Fajardo RN  Head of Bed (HOB) Positioning: HOB elevated  Taken 2/1/2025 0200 by Elizabeth Fajardo RN  Head of Bed (HOB) Positioning: HOB elevated  Taken 2/1/2025 0000  by Elizabeth Fajardo, RN  Head of Bed (HOB) Positioning: HOB elevated  Taken 1/31/2025 2200 by Elizabeth Fajardo, RN  Head of Bed (HOB) Positioning: HOB elevated  Taken 1/31/2025 2037 by Elizabeth Fajardo RN  Head of Bed ([facility]) Positioning: HOB elevated   Goal Outcome Evaluation:

## 2025-02-02 ENCOUNTER — APPOINTMENT (OUTPATIENT)
Dept: GENERAL RADIOLOGY | Facility: HOSPITAL | Age: 80
End: 2025-02-02
Payer: MEDICARE

## 2025-02-02 LAB
ALBUMIN SERPL-MCNC: 4.3 G/DL (ref 3.5–5.2)
ALBUMIN/GLOB SERPL: 1.6 G/DL
ALP SERPL-CCNC: 64 U/L (ref 39–117)
ALT SERPL W P-5'-P-CCNC: 14 U/L (ref 1–33)
ANION GAP SERPL CALCULATED.3IONS-SCNC: 13 MMOL/L (ref 5–15)
ARTERIAL PATENCY WRIST A: ABNORMAL
ARTERIAL PATENCY WRIST A: ABNORMAL
AST SERPL-CCNC: 21 U/L (ref 1–32)
ATMOSPHERIC PRESS: ABNORMAL MM[HG]
ATMOSPHERIC PRESS: ABNORMAL MM[HG]
BASE EXCESS BLDA CALC-SCNC: 6.3 MMOL/L (ref 0–2)
BASE EXCESS BLDA CALC-SCNC: 9.6 MMOL/L (ref 0–2)
BDY SITE: ABNORMAL
BDY SITE: ABNORMAL
BILIRUB SERPL-MCNC: 0.5 MG/DL (ref 0–1.2)
BODY TEMPERATURE: 37
BODY TEMPERATURE: 37
BUN SERPL-MCNC: 15 MG/DL (ref 8–23)
BUN/CREAT SERPL: 22.4 (ref 7–25)
CALCIUM SPEC-SCNC: 10.5 MG/DL (ref 8.6–10.5)
CHLORIDE SERPL-SCNC: 99 MMOL/L (ref 98–107)
CO2 BLDA-SCNC: 41 MMOL/L (ref 22–33)
CO2 BLDA-SCNC: 42 MMOL/L (ref 22–33)
CO2 SERPL-SCNC: 33 MMOL/L (ref 22–29)
COHGB MFR BLD: 1.8 % (ref 0–2)
COHGB MFR BLD: 1.8 % (ref 0–2)
CORTIS SERPL-MCNC: 22.69 MCG/DL
CREAT SERPL-MCNC: 0.67 MG/DL (ref 0.57–1)
CRP SERPL-MCNC: 1.39 MG/DL (ref 0–0.5)
D DIMER PPP FEU-MCNC: 0.65 MCGFEU/ML (ref 0–0.77)
EGFRCR SERPLBLD CKD-EPI 2021: 90.2 ML/MIN/1.73
EPAP: 0
EPAP: 8
GLOBULIN UR ELPH-MCNC: 2.7 GM/DL
GLUCOSE SERPL-MCNC: 94 MG/DL (ref 65–99)
HCO3 BLDA-SCNC: 37.8 MMOL/L (ref 20–26)
HCO3 BLDA-SCNC: 39.3 MMOL/L (ref 20–26)
HCT VFR BLD CALC: 34.2 % (ref 38–51)
HCT VFR BLD CALC: 35.5 % (ref 38–51)
HGB BLDA-MCNC: 11.2 G/DL (ref 14–18)
HGB BLDA-MCNC: 11.6 G/DL (ref 14–18)
INHALED O2 CONCENTRATION: 36 %
INHALED O2 CONCENTRATION: 40 %
IPAP: 0
IPAP: 20
Lab: ABNORMAL
Lab: ABNORMAL
MAGNESIUM SERPL-MCNC: 2.2 MG/DL (ref 1.6–2.4)
METHGB BLD QL: 0.6 % (ref 0–1.5)
METHGB BLD QL: 0.7 % (ref 0–1.5)
MODALITY: ABNORMAL
MODALITY: ABNORMAL
NOTIFIED BY: ABNORMAL
NOTIFIED BY: ABNORMAL
NOTIFIED WHO: ABNORMAL
NOTIFIED WHO: ABNORMAL
OXYHGB MFR BLDV: 91.7 % (ref 94–99)
OXYHGB MFR BLDV: 96 % (ref 94–99)
PAW @ PEAK INSP FLOW SETTING VENT: 0 CMH2O
PCO2 BLDA: 104 MM HG (ref 35–45)
PCO2 BLDA: 86.3 MM HG (ref 35–45)
PCO2 TEMP ADJ BLD: 104 MM HG (ref 35–45)
PCO2 TEMP ADJ BLD: 86.3 MM HG (ref 35–45)
PH BLDA: 7.17 PH UNITS (ref 7.35–7.45)
PH BLDA: 7.27 PH UNITS (ref 7.35–7.45)
PH, TEMP CORRECTED: 7.17 PH UNITS
PH, TEMP CORRECTED: 7.27 PH UNITS
PO2 BLDA: 133 MM HG (ref 83–108)
PO2 BLDA: 76.6 MM HG (ref 83–108)
PO2 TEMP ADJ BLD: 133 MM HG (ref 83–108)
PO2 TEMP ADJ BLD: 76.6 MM HG (ref 83–108)
POTASSIUM SERPL-SCNC: 4.9 MMOL/L (ref 3.5–5.2)
PROCALCITONIN SERPL-MCNC: 0.05 NG/ML (ref 0–0.25)
PROT SERPL-MCNC: 7 G/DL (ref 6–8.5)
PSV: 12 CMH2O
QT INTERVAL: 354 MS
QTC INTERVAL: 379 MS
SET MECH RESP RATE: 22
SODIUM SERPL-SCNC: 145 MMOL/L (ref 136–145)
TOTAL RATE: 0 BREATHS/MINUTE
TOTAL RATE: 23 BREATHS/MINUTE
TROPONIN T SERPL HS-MCNC: 35 NG/L

## 2025-02-02 PROCEDURE — 94799 UNLISTED PULMONARY SVC/PX: CPT

## 2025-02-02 PROCEDURE — 84145 PROCALCITONIN (PCT): CPT | Performed by: INTERNAL MEDICINE

## 2025-02-02 PROCEDURE — 94660 CPAP INITIATION&MGMT: CPT

## 2025-02-02 PROCEDURE — 94664 DEMO&/EVAL PT USE INHALER: CPT

## 2025-02-02 PROCEDURE — 25010000002 ONDANSETRON PER 1 MG: Performed by: INTERNAL MEDICINE

## 2025-02-02 PROCEDURE — 82805 BLOOD GASES W/O2 SATURATION: CPT

## 2025-02-02 PROCEDURE — 85379 FIBRIN DEGRADATION QUANT: CPT | Performed by: INTERNAL MEDICINE

## 2025-02-02 PROCEDURE — 84484 ASSAY OF TROPONIN QUANT: CPT | Performed by: INTERNAL MEDICINE

## 2025-02-02 PROCEDURE — 82533 TOTAL CORTISOL: CPT | Performed by: INTERNAL MEDICINE

## 2025-02-02 PROCEDURE — 82375 ASSAY CARBOXYHB QUANT: CPT

## 2025-02-02 PROCEDURE — 99233 SBSQ HOSP IP/OBS HIGH 50: CPT | Performed by: INTERNAL MEDICINE

## 2025-02-02 PROCEDURE — 83050 HGB METHEMOGLOBIN QUAN: CPT

## 2025-02-02 PROCEDURE — 86140 C-REACTIVE PROTEIN: CPT | Performed by: INTERNAL MEDICINE

## 2025-02-02 PROCEDURE — 63710000001 REVEFENACIN 175 MCG/3ML SOLUTION: Performed by: INTERNAL MEDICINE

## 2025-02-02 PROCEDURE — 36600 WITHDRAWAL OF ARTERIAL BLOOD: CPT

## 2025-02-02 PROCEDURE — 80053 COMPREHEN METABOLIC PANEL: CPT | Performed by: INTERNAL MEDICINE

## 2025-02-02 PROCEDURE — 83735 ASSAY OF MAGNESIUM: CPT | Performed by: INTERNAL MEDICINE

## 2025-02-02 PROCEDURE — 25010000002 PROMETHAZINE PER 50 MG: Performed by: INTERNAL MEDICINE

## 2025-02-02 PROCEDURE — 71045 X-RAY EXAM CHEST 1 VIEW: CPT

## 2025-02-02 PROCEDURE — 25010000002 FUROSEMIDE PER 20 MG: Performed by: INTERNAL MEDICINE

## 2025-02-02 PROCEDURE — 25010000002 DEXAMETHASONE PER 1 MG: Performed by: INTERNAL MEDICINE

## 2025-02-02 RX ORDER — PROCHLORPERAZINE EDISYLATE 5 MG/ML
10 INJECTION INTRAMUSCULAR; INTRAVENOUS EVERY 4 HOURS PRN
Status: DISCONTINUED | OUTPATIENT
Start: 2025-02-02 | End: 2025-02-05 | Stop reason: HOSPADM

## 2025-02-02 RX ORDER — DEXAMETHASONE SODIUM PHOSPHATE 10 MG/ML
6 INJECTION INTRAMUSCULAR; INTRAVENOUS ONCE
Status: COMPLETED | OUTPATIENT
Start: 2025-02-02 | End: 2025-02-02

## 2025-02-02 RX ORDER — FUROSEMIDE 10 MG/ML
40 INJECTION INTRAMUSCULAR; INTRAVENOUS ONCE
Status: COMPLETED | OUTPATIENT
Start: 2025-02-02 | End: 2025-02-02

## 2025-02-02 RX ADMIN — PANTOPRAZOLE SODIUM 40 MG: 40 INJECTION, POWDER, FOR SOLUTION INTRAVENOUS at 21:30

## 2025-02-02 RX ADMIN — PANTOPRAZOLE SODIUM 40 MG: 40 INJECTION, POWDER, FOR SOLUTION INTRAVENOUS at 09:35

## 2025-02-02 RX ADMIN — Medication 10 ML: at 21:30

## 2025-02-02 RX ADMIN — DEXAMETHASONE SODIUM PHOSPHATE 6 MG: 10 INJECTION INTRAMUSCULAR; INTRAVENOUS at 18:08

## 2025-02-02 RX ADMIN — REVEFENACIN 175 MCG: 175 SOLUTION RESPIRATORY (INHALATION) at 10:47

## 2025-02-02 RX ADMIN — TOLTERODINE 4 MG: 2 CAPSULE, EXTENDED RELEASE ORAL at 21:30

## 2025-02-02 RX ADMIN — APIXABAN 5 MG: 5 TABLET, FILM COATED ORAL at 21:30

## 2025-02-02 RX ADMIN — ONDANSETRON 4 MG: 2 INJECTION INTRAMUSCULAR; INTRAVENOUS at 04:43

## 2025-02-02 RX ADMIN — FUROSEMIDE 40 MG: 10 INJECTION, SOLUTION INTRAMUSCULAR; INTRAVENOUS at 09:35

## 2025-02-02 RX ADMIN — ARFORMOTEROL TARTRATE 15 MCG: 15 SOLUTION RESPIRATORY (INHALATION) at 19:57

## 2025-02-02 RX ADMIN — PROMETHAZINE HYDROCHLORIDE 12.5 MG: 25 INJECTION, SOLUTION INTRAMUSCULAR; INTRAVENOUS at 18:08

## 2025-02-02 RX ADMIN — APIXABAN 5 MG: 5 TABLET, FILM COATED ORAL at 09:34

## 2025-02-02 RX ADMIN — ARFORMOTEROL TARTRATE 15 MCG: 15 SOLUTION RESPIRATORY (INHALATION) at 10:46

## 2025-02-02 RX ADMIN — Medication 10 ML: at 09:35

## 2025-02-02 RX ADMIN — MIRABEGRON 50 MG: 50 TABLET, FILM COATED, EXTENDED RELEASE ORAL at 09:34

## 2025-02-02 RX ADMIN — SERTRALINE HYDROCHLORIDE 200 MG: 100 TABLET ORAL at 09:34

## 2025-02-02 RX ADMIN — BUDESONIDE 0.5 MG: 0.5 INHALANT RESPIRATORY (INHALATION) at 10:48

## 2025-02-02 RX ADMIN — GABAPENTIN 300 MG: 300 CAPSULE ORAL at 09:34

## 2025-02-02 RX ADMIN — BUDESONIDE 0.5 MG: 0.5 INHALANT RESPIRATORY (INHALATION) at 19:57

## 2025-02-02 RX ADMIN — SENNOSIDES AND DOCUSATE SODIUM 2 TABLET: 50; 8.6 TABLET ORAL at 21:29

## 2025-02-02 RX ADMIN — ATORVASTATIN CALCIUM 80 MG: 40 TABLET, FILM COATED ORAL at 21:30

## 2025-02-02 RX ADMIN — NICOTINE 1 PATCH: 21 PATCH TRANSDERMAL at 09:35

## 2025-02-02 RX ADMIN — TRAZODONE HYDROCHLORIDE 50 MG: 50 TABLET ORAL at 21:42

## 2025-02-02 RX ADMIN — ONDANSETRON 4 MG: 2 INJECTION INTRAMUSCULAR; INTRAVENOUS at 15:46

## 2025-02-02 NOTE — PROGRESS NOTES
[facility]  PROGRESS NOTE    Patient Name: Tessa Bradley  : 1945  MRN: [id number]    Date of Admission: 2025  Primary Care Physician: Sunni Santoro MD    Subjective   Subjective     CC:  Follow-up respiratory failure    HPI:  Patient was seen and examined this morning.  Patient became very obtunded overnight.  Blood gas with severe CO2 narcosis and pH of 7.1.  Improved with BiPAP and currently fully awake and alert.  Still having some nausea.  Blood pressure is running on the higher side with systolic in the 150s    Objective   Objective     Vital Signs:   Temp:  [98 °F (36.7 °C)-98.5 °F (36.9 °C)] 98.1 °F (36.7 °C)  Heart Rate:  [] 100  Resp:  [16-18] 18  BP: (102-167)/(50-96) 127/91  Flow (L/min) (Oxygen Therapy):  [1.5-15] 5     Physical Exam:  General: Chronically ill and frail looking.  Head: Atraumatic and normocephalic  Eyes: No Icterus. No pallor  Ears:  Ears appear intact with no abnormalities noted  Throat: No oral lesions, no thrush  Neck: Supple, trachea midline  Lungs: Diminished air entry both lung fields.  No crackles or wheezing  Heart:  Normal S1 and S2, no murmur, no gallop, No JVD, no lower extremity swelling  Abdomen:  Soft, no tenderness, no organomegaly, normal bowel sounds, no organomegaly  Extremities: pulses equal bilaterally  Skin: No bleeding, bruising or rash, normal skin turgor and elasticity  Neurologic: Cranial nerves appear intact with no evidence of facial asymmetry, normal motor and sensory functions in all 4 extremities  Psych: Alert and oriented x 3, normal mood    Results Reviewed:  LAB RESULTS:      Lab 25  1659 25  1427 25  0458 25  1609 25  1053 25  0426 25  0527 25  0538   WBC  --   --  8.82 10.00  --  9.45 8.86 8.27   HEMOGLOBIN  --   --  10.6* 10.9*  --  11.0* 11.6* 11.8*   HEMATOCRIT  --   --  37.3 39.0  --  37.7 39.6 41.7   PLATELETS  --   --  242 262  --   193 158 137*   NEUTROS ABS  --   --  5.61 8.58*  --  6.14 6.30 6.07   IMMATURE GRANS (ABS)  --   --  0.03 0.05  --  0.03 0.03 0.02   LYMPHS ABS  --   --  2.02 0.93  --  2.21 1.55 1.41   MONOS ABS  --   --  0.83 0.38  --  0.87 0.71 0.56   EOS ABS  --   --  0.30 0.04  --  0.19 0.25 0.20   MCV  --   --  97.4* 98.7*  --  95.4 95.4 97.2*   CRP  --   --  0.92*  --   --   --   --  1.44*   PROCALCITONIN  --   --   --   --  0.05  --  0.05  --    D DIMER QUANT  --   --   --   --  0.56  --   --   --    HSTROP T 26* 27*  --   --   --   --   --   --          Lab 02/01/25 0458 01/31/25  1053 01/30/25  1100 01/29/25  0426 01/28/25  0527 01/27/25  0538   SODIUM 144 142 144 142 140 136   POTASSIUM 3.9 4.9 5.0 4.8 5.3* 5.6*   CHLORIDE 102 101 98 101 97* 100   CO2 36.0* 32.0* 38.0* 39.0* 39.0* 32.0*   ANION GAP 6.0 9.0 8.0 2.0* 4.0* 4.0*   BUN 15 18 25* 32* 34* 35*   CREATININE 0.65 0.63 0.69 0.79 0.58 0.57   EGFR 90.8 91.5 89.5 77.2 93.3 93.7   GLUCOSE 89 84 117* 84 124* 125*   CALCIUM 9.5 9.3 9.8 9.6 9.6 9.7   MAGNESIUM 1.9  --   --  2.0  --  2.4   PHOSPHORUS 2.8  --   --   --   --  2.3*         Lab 02/01/25  0458 01/31/25  1053 01/27/25  0538   TOTAL PROTEIN 6.2 5.5* 5.5*   ALBUMIN 4.0 3.2* 2.9*   GLOBULIN 2.2 2.3 2.6   ALT (SGPT) 12 13 8   AST (SGOT) 19 25 14   BILIRUBIN 0.5 0.4 0.4   ALK PHOS 52 52 75         Lab 02/01/25  1659 02/01/25  1427 01/28/25  0527   PROBNP  --   --  8,074.0*   HSTROP T 26* 27*  --          Lab 01/27/25  0538   TRIGLYCERIDES 102             Lab 02/02/25  0556 01/27/25  0446   PH, ARTERIAL 7.171* 7.374   PCO2, ARTERIAL 104.0* 69.7*   PO2 .0* 72.9*   FIO2 36 40   HCO3 ART 37.8* 40.6*   BASE EXCESS ART 6.3* 12.6*   CARBOXYHEMOGLOBIN 1.8 1.5     Brief Urine Lab Results  (Last result in the past 365 days)        Color   Clarity   Blood   Leuk Est   Nitrite   Protein   CREAT   Urine HCG        01/21/25 5119 Yellow   Clear   Negative   Negative   Positive   Negative                   Microbiology  Results Abnormal       Procedure Component Value - Date/Time    Urine Culture - Urine, Urine, Clean Catch [[id number]]  (Abnormal)  (Susceptibility) Collected: 01/21/25 1418    Lab Status: Final result Specimen: Urine, Clean Catch Updated: 01/23/25 0954     Urine Culture >100,000 CFU/mL Escherichia coli    Narrative:      Colonization of the urinary tract without infection is common. Treatment is discouraged unless the patient is symptomatic, pregnant, or undergoing an invasive urologic procedure.    Susceptibility        Escherichia coli      AUTUMN      Amoxicillin + Clavulanate Susceptible      Ampicillin Susceptible      Ampicillin + Sulbactam Susceptible      Cefazolin (Urine) Susceptible      Cefepime Susceptible      Ceftazidime Susceptible      Ceftriaxone Susceptible      Gentamicin Susceptible      Levofloxacin Susceptible      Nitrofurantoin Susceptible      Piperacillin + Tazobactam Susceptible      Trimethoprim + Sulfamethoxazole Susceptible                                   XR Chest 1 View    Result Date: 2/2/2025  XR CHEST 1 VW Date of Exam: 2/2/2025 5:22 AM EST Indication: increased resp effort Comparison: None available. Findings: There is scarring and suturing with pleural thickening in the right lung apex. There is interstitial prominence, which is unchanged. No pneumothorax or pleural effusion. No new lung opacity. Heart size and pulmonary vasculature appear within normal limits and there are no acute osseous abnormalities.     Impression: Impression: Stable chronic findings without acute cardiopulmonary abnormality. Electronically Signed: Curt Ng MD  2/2/2025 5:38 AM EST  Workstation ID: [id number]    FL Video Swallow With Speech Single Contrast    Result Date: 1/31/2025  FL VIDEO SWALLOW W SPEECH SINGLE-CONTRAST Date of Exam: 1/31/2025 2:37 PM EST Indication: dysphagia.   Comparison: None available. Technique:   The speech pathologist administered food and/or liquid mixed with barium to the  patient with cine/video imaging.  Imaging assistance was provided to the speech pathologist and an image was saved. Fluoroscopic Time: 1 minute and 18 seconds Number of Images: 6 associated fluoroscopic loops were saved Findings: No aspiration was seen during fluoroscopic guided modified barium swallowing series. Please see speech therapy report for full details and recommendations.     Impression: Impression: Fluoroscopy provided for a modified barium swallow. No aspiration was seen during swallowing evaluation. Please see speech therapy report for full details and recommendations. Report dictated by: Vy Roca PA-c  I have personally reviewed this case and agree with the findings above: Electronically Signed: Jamison Rueda MD  1/31/2025 5:15 PM EST  Workstation ID: [id number]    XR Chest 1 View    Result Date: 1/31/2025  XR CHEST 1 VW Date of Exam: 1/31/2025 2:51 PM EST Indication: r/o PNA Comparison: 1/24/2025 at 0205 hours Findings: There is improvement in aeration throughout the lungs bilaterally. Residual atelectasis is noted within the lung bases. There may be a small left pleural effusion. The cardiac silhouette and mediastinum are stable. Postsurgical changes are noted at the superior margin of the right lung.     Impression: Impression: 1.Improved aeration throughout the lungs bilaterally. 2.Mild residual atelectasis in the lung bases. There is a small left pleural effusion. Electronically Signed: Juancarlos Mcclendon MD  1/31/2025 4:05 PM EST  Workstation ID: [id number]     Results for orders placed during the hospital encounter of 01/21/25    Adult Transthoracic Echo Complete W/ Cont if Necessary Per Protocol    Interpretation Summary    Left ventricular systolic function is low normal. Calculated left ventricular EF = 47.4% Left ventricular ejection fraction appears to be 46 - 50%.    Left ventricular wall thickness is consistent with mild concentric hypertrophy.    Left ventricular diastolic function is  consistent with (grade II w/high LAP) pseudonormalization.    The right ventricular cavity is borderline dilated.    Left atrial volume is severely increased.    Moderate aortic valve stenosis is present. Aortic valve area is 0.8 cm2.    Peak velocity of the flow distal to the aortic valve is 329 cm/s. Aortic valve maximum pressure gradient is 44 mmHg. Aortic valve mean pressure gradient is 20 mmHg.    Moderate mitral valve regurgitation is present.    Estimated right ventricular systolic pressure from tricuspid regurgitation is mildly elevated (35-45 mmHg). Calculated right ventricular systolic pressure from tricuspid regurgitation is 41 mmHg.    Moderate pulmonic valve regurgitation is present.    Moderate aortic stenosis and moderate mitral valve regurgitation are now present compared to 8/2023.  EF is also slightly lower compared to previous echo      Current medications:  Scheduled Meds:apixaban, 5 mg, Oral, Q12H  arformoterol, 15 mcg, Nebulization, BID - RT  atorvastatin, 80 mg, Oral, Nightly  budesonide, 0.5 mg, Nebulization, BID - RT  cosyntropin, 0.25 mg, Intravenous, Once  gabapentin, 300 mg, Oral, Daily  Mirabegron ER, 50 mg, Oral, Daily  nicotine, 1 patch, Transdermal, Q24H  pantoprazole, 40 mg, Intravenous, Q12H  revefenacin, 175 mcg, Nebulization, Daily - RT  senna-docusate sodium, 2 tablet, Oral, BID  sertraline, 200 mg, Oral, Daily  sodium chloride, 10 mL, Intravenous, Q12H  tolterodine LA, 4 mg, Oral, Nightly      Continuous Infusions:   PRN Meds:.  acetaminophen **OR** [DISCONTINUED] acetaminophen    Albuterol Sulfate NEB Orderable    aluminum-magnesium hydroxide-simethicone    dextrose    dextrose    glucagon (human recombinant)    [DISCONTINUED] ondansetron ODT **OR** ondansetron    promethazine    sodium chloride    sodium chloride    traZODone    Assessment & Plan   Assessment & Plan     Active Hospital Problems    Diagnosis  POA    **Respiratory failure with hypercapnia [J96.92]  Yes    Acute  on chronic respiratory failure with hypoxia and hypercapnia [J96.21, J96.22]  Yes    History of Hodgkin's disease [Z85.71]  Not Applicable    Acute on chronic hypoxic respiratory failure [J96.21]  Yes    Stage 3b chronic kidney disease [N18.32]  Yes    Chronic respiratory failure with hypoxia and hypercapnia [J96.11, J96.12]  Yes    HTN (hypertension) [I10]  Yes    HLD (hyperlipidemia) [E78.5]  Yes    A-fib [I48.91]  Yes    MAKAYLA (obstructive sleep apnea) [G47.33]  Yes    COPD (chronic obstructive pulmonary disease) [J44.9]  Yes      Resolved Hospital Problems   No resolved problems to display.        Brief Hospital Course to date:  Woodrow Bradley is a 80yo F with a history of COPD on home O2, [facility], Afib (on Eliquis), HLD, Depression, HTN, and TIA who presented to [facility] on 1/21/25 with altered mental status. In the ED, she was found to be hypercapnic and did not improve with breathing treatments and was intubated with subsequent improvement in pCO2.      Acute hypoxic hypercapnic respiratory failure  Severe COPD exacerbation  Obstructive sleep apnea  Patient presented to the hospital with acute encephalopathy likely secondary to CO2 narcosis and acute hypoxic hypercapnic respiratory failure  CT head was negative  Required intubation and mechanical ventilation on day of admission 1/21/2025 and extubated on 1/23/2025  Status post IV Rocephin and azithromycin  Status post prednisone  Continue BiPAP at nighttime  Continue Pulmicort, Yupelri, and Brovana  Continue DuoNebs  Has slight decline with CO2 narcosis and encephalopathy on the night of 2/1/2025.  Blood gas with pH 7.1, pCO2 104.  Bedside point-of-care ultrasound with evidence of pulmonary edema even though is not showing on chest x-ray.  Significantly improved with BiPAP and 1 dose of IV Lasix 40 mg  Echocardiogram    Decompensated diastolic heart failure, improved  Patient received IV fluid boluses and albumin on 2/1/2025  for hypotension.  Early morning of 2-2-25 she developed pulmonary edema and respiratory failure  Improved with IV Lasix  Echo with mild systolic dysfunction, EF 45 to 50% and grade 2 diastolic dysfunction    Hypotension, resolved  No clear etiology for hypotension  Random cortisol level is 22 which argue against adrenal sufficiency  Echocardiogram with EF of 45 to 50%, grade 2 diastolic dysfunction and moderate aortic stenosis with valve area of 0.8 and peak velocity of 329  TIA  Dyslipidemia  Paroxysmal continue A-fib  Continue Eliquis and statins    Acute on chronic debility  PT and OT recommended inpatient rehab      Expected Discharge Location and Transportation: [facility]  Expected Discharge   Expected Discharge Date: 2/3/2025; Expected Discharge Time:      VTE Prophylaxis:  Pharmacologic & mechanical VTE prophylaxis orders are present.         AM-PAC 6 Clicks Score (PT): 16 (02/01/25 2000)    CODE STATUS:   Code Status and Medical Interventions: CPR (Attempt to Resuscitate); Full Support   Ordered at: 01/21/25 2021     Code Status (Patient has no pulse and is not breathing):    CPR (Attempt to Resuscitate)     Medical Interventions (Patient has pulse or is breathing):    Full Support       Eliezer Miller MD  02/02/25

## 2025-02-02 NOTE — SIGNIFICANT NOTE
Notified by RN staff that patient's SpO2 dropped to 58% now requiring nonrebreather from 2 L nasal cannula.  CXR and ABG ordered.  Arrived at bedside and patient was lying in bed on 4 L nasal cannula with minimal complaints of shortness of breath and persistent nausea.  Patient received Zofran and Phenergan.    CXR reveals no acute cardiopulmonary abnormality  ABG reveals pH 7.17 and CO2 104     Patient currently on BiPAP, now resting comfortably.

## 2025-02-03 ENCOUNTER — APPOINTMENT (OUTPATIENT)
Dept: GENERAL RADIOLOGY | Facility: HOSPITAL | Age: 80
End: 2025-02-03
Payer: MEDICARE

## 2025-02-03 LAB
ALBUMIN SERPL-MCNC: 4.5 G/DL (ref 3.5–5.2)
ALBUMIN/GLOB SERPL: 2 G/DL
ALP SERPL-CCNC: 66 U/L (ref 39–117)
ALT SERPL W P-5'-P-CCNC: 11 U/L (ref 1–33)
ANION GAP SERPL CALCULATED.3IONS-SCNC: 11 MMOL/L (ref 5–15)
AST SERPL-CCNC: 17 U/L (ref 1–32)
BASOPHILS # BLD AUTO: 0.02 10*3/MM3 (ref 0–0.2)
BASOPHILS NFR BLD AUTO: 0.2 % (ref 0–1.5)
BILIRUB SERPL-MCNC: 0.9 MG/DL (ref 0–1.2)
BUN SERPL-MCNC: 19 MG/DL (ref 8–23)
BUN/CREAT SERPL: 26.8 (ref 7–25)
CALCIUM SPEC-SCNC: 10.3 MG/DL (ref 8.6–10.5)
CHLORIDE SERPL-SCNC: 96 MMOL/L (ref 98–107)
CO2 SERPL-SCNC: 36 MMOL/L (ref 22–29)
CREAT SERPL-MCNC: 0.71 MG/DL (ref 0.57–1)
DEPRECATED RDW RBC AUTO: 53 FL (ref 37–54)
EGFRCR SERPLBLD CKD-EPI 2021: 87.7 ML/MIN/1.73
EOSINOPHIL # BLD AUTO: 0.06 10*3/MM3 (ref 0–0.4)
EOSINOPHIL NFR BLD AUTO: 0.5 % (ref 0.3–6.2)
ERYTHROCYTE [DISTWIDTH] IN BLOOD BY AUTOMATED COUNT: 15.1 % (ref 12.3–15.4)
GLOBULIN UR ELPH-MCNC: 2.2 GM/DL
GLUCOSE SERPL-MCNC: 76 MG/DL (ref 65–99)
HCT VFR BLD AUTO: 43 % (ref 34–46.6)
HGB BLD-MCNC: 12.5 G/DL (ref 12–15.9)
IMM GRANULOCYTES # BLD AUTO: 0.07 10*3/MM3 (ref 0–0.05)
IMM GRANULOCYTES NFR BLD AUTO: 0.6 % (ref 0–0.5)
LIPASE SERPL-CCNC: 15 U/L (ref 13–60)
LYMPHOCYTES # BLD AUTO: 1.14 10*3/MM3 (ref 0.7–3.1)
LYMPHOCYTES NFR BLD AUTO: 10 % (ref 19.6–45.3)
MAGNESIUM SERPL-MCNC: 1.9 MG/DL (ref 1.6–2.4)
MCH RBC QN AUTO: 27.7 PG (ref 26.6–33)
MCHC RBC AUTO-ENTMCNC: 29.1 G/DL (ref 31.5–35.7)
MCV RBC AUTO: 95.1 FL (ref 79–97)
MONOCYTES # BLD AUTO: 0.6 10*3/MM3 (ref 0.1–0.9)
MONOCYTES NFR BLD AUTO: 5.2 % (ref 5–12)
NEUTROPHILS NFR BLD AUTO: 83.5 % (ref 42.7–76)
NEUTROPHILS NFR BLD AUTO: 9.56 10*3/MM3 (ref 1.7–7)
NRBC BLD AUTO-RTO: 0 /100 WBC (ref 0–0.2)
PHOSPHATE SERPL-MCNC: 2.6 MG/DL (ref 2.5–4.5)
PLATELET # BLD AUTO: 286 10*3/MM3 (ref 140–450)
PMV BLD AUTO: 10.6 FL (ref 6–12)
POTASSIUM SERPL-SCNC: 4.9 MMOL/L (ref 3.5–5.2)
PROT SERPL-MCNC: 6.7 G/DL (ref 6–8.5)
RBC # BLD AUTO: 4.52 10*6/MM3 (ref 3.77–5.28)
SODIUM SERPL-SCNC: 143 MMOL/L (ref 136–145)
WBC NRBC COR # BLD AUTO: 11.45 10*3/MM3 (ref 3.4–10.8)

## 2025-02-03 PROCEDURE — 97530 THERAPEUTIC ACTIVITIES: CPT

## 2025-02-03 PROCEDURE — 25010000002 ONDANSETRON PER 1 MG: Performed by: INTERNAL MEDICINE

## 2025-02-03 PROCEDURE — 83735 ASSAY OF MAGNESIUM: CPT | Performed by: INTERNAL MEDICINE

## 2025-02-03 PROCEDURE — 97110 THERAPEUTIC EXERCISES: CPT

## 2025-02-03 PROCEDURE — 84100 ASSAY OF PHOSPHORUS: CPT | Performed by: INTERNAL MEDICINE

## 2025-02-03 PROCEDURE — 85025 COMPLETE CBC W/AUTO DIFF WBC: CPT | Performed by: INTERNAL MEDICINE

## 2025-02-03 PROCEDURE — 83690 ASSAY OF LIPASE: CPT | Performed by: INTERNAL MEDICINE

## 2025-02-03 PROCEDURE — 99232 SBSQ HOSP IP/OBS MODERATE 35: CPT | Performed by: INTERNAL MEDICINE

## 2025-02-03 PROCEDURE — 92526 ORAL FUNCTION THERAPY: CPT

## 2025-02-03 PROCEDURE — 74018 RADEX ABDOMEN 1 VIEW: CPT

## 2025-02-03 PROCEDURE — 25010000002 PROCHLORPERAZINE 10 MG/2ML SOLUTION: Performed by: INTERNAL MEDICINE

## 2025-02-03 PROCEDURE — 80053 COMPREHEN METABOLIC PANEL: CPT | Performed by: INTERNAL MEDICINE

## 2025-02-03 RX ORDER — METOPROLOL TARTRATE 50 MG
50 TABLET ORAL EVERY 12 HOURS SCHEDULED
Status: DISCONTINUED | OUTPATIENT
Start: 2025-02-03 | End: 2025-02-03

## 2025-02-03 RX ORDER — SERTRALINE HYDROCHLORIDE 100 MG/1
200 TABLET, FILM COATED ORAL DAILY
Status: DISCONTINUED | OUTPATIENT
Start: 2025-02-04 | End: 2025-02-05 | Stop reason: HOSPADM

## 2025-02-03 RX ORDER — METOPROLOL TARTRATE 25 MG/1
25 TABLET, FILM COATED ORAL EVERY 12 HOURS SCHEDULED
Status: DISCONTINUED | OUTPATIENT
Start: 2025-02-03 | End: 2025-02-04

## 2025-02-03 RX ADMIN — APIXABAN 5 MG: 5 TABLET, FILM COATED ORAL at 20:54

## 2025-02-03 RX ADMIN — TOLTERODINE 4 MG: 2 CAPSULE, EXTENDED RELEASE ORAL at 20:55

## 2025-02-03 RX ADMIN — Medication 10 ML: at 09:09

## 2025-02-03 RX ADMIN — PANTOPRAZOLE SODIUM 40 MG: 40 INJECTION, POWDER, FOR SOLUTION INTRAVENOUS at 09:08

## 2025-02-03 RX ADMIN — METOPROLOL TARTRATE 25 MG: 25 TABLET, FILM COATED ORAL at 20:54

## 2025-02-03 RX ADMIN — GABAPENTIN 300 MG: 300 CAPSULE ORAL at 09:08

## 2025-02-03 RX ADMIN — MIRABEGRON 50 MG: 50 TABLET, FILM COATED, EXTENDED RELEASE ORAL at 09:08

## 2025-02-03 RX ADMIN — NICOTINE 1 PATCH: 21 PATCH TRANSDERMAL at 09:09

## 2025-02-03 RX ADMIN — PROCHLORPERAZINE EDISYLATE 10 MG: 5 INJECTION INTRAMUSCULAR; INTRAVENOUS at 09:08

## 2025-02-03 RX ADMIN — Medication 10 ML: at 20:55

## 2025-02-03 RX ADMIN — APIXABAN 5 MG: 5 TABLET, FILM COATED ORAL at 09:08

## 2025-02-03 RX ADMIN — ONDANSETRON 4 MG: 2 INJECTION INTRAMUSCULAR; INTRAVENOUS at 05:48

## 2025-02-03 RX ADMIN — ONDANSETRON 4 MG: 2 INJECTION INTRAMUSCULAR; INTRAVENOUS at 18:39

## 2025-02-03 RX ADMIN — METOPROLOL TARTRATE 50 MG: 50 TABLET, FILM COATED ORAL at 11:13

## 2025-02-03 RX ADMIN — SERTRALINE HYDROCHLORIDE 200 MG: 100 TABLET ORAL at 09:08

## 2025-02-03 RX ADMIN — ATORVASTATIN CALCIUM 80 MG: 40 TABLET, FILM COATED ORAL at 20:54

## 2025-02-03 RX ADMIN — PANTOPRAZOLE SODIUM 40 MG: 40 INJECTION, POWDER, FOR SOLUTION INTRAVENOUS at 20:56

## 2025-02-03 NOTE — PLAN OF CARE
Goal Outcome Evaluation:              Outcome Evaluation: Patient is alert and oriented, confused at times. VSS. Bipap this am, 4L NC rest of the shift and tolerating well. Complained of consistent nausea, see MAR. Bed alarm set, in lowest position, call light within reach.

## 2025-02-03 NOTE — THERAPY TREATMENT NOTE
Patient Name: Tessa Bradley  : 1945    MRN: [id number]                              Today's Date: 2/3/2025       Admit Date: 2025    Visit Dx:     ICD-10-CM ICD-9-CM   1. Acute on chronic respiratory failure with hypoxia and hypercapnia  J96.21 518.84    J96.22 786.09     799.02   2. Altered mental status, unspecified altered mental status type  R41.82 780.97   3. Acute exacerbation of chronic obstructive pulmonary disease (COPD)  J44.1 491.21   4. Acute cystitis without hematuria  N30.00 595.0   5. Dysphagia, unspecified type  R13.10 787.20     Patient Active Problem List   Diagnosis    COPD (chronic obstructive pulmonary disease)    Elevated serum creatinine    Hypotension    Dyspnea    MAKAYLA (obstructive sleep apnea)    HEATHER (acute kidney injury)    Leukocytosis    HTN (hypertension)    HLD (hyperlipidemia)    A-fib    Hodgkin disease    Arthritis    AMS (altered mental status)    Chronic respiratory failure with hypoxia and hypercapnia    Pulmonary nodule    History of TIA (transient ischemic attack)    Tobacco abuse    Acute on chronic hypoxic respiratory failure    History of Hodgkin's disease    Overactive bladder    Stage 3b chronic kidney disease    Acute on chronic respiratory failure with hypoxia    Acute on chronic respiratory failure    Respiratory failure with hypercapnia    Acute on chronic respiratory failure with hypoxia and hypercapnia     Past Medical History:   Diagnosis Date    Atrial fibrillation     Cancer     Cluster headache     COPD (chronic obstructive pulmonary disease)     Difficulty walking     Sciatica and pinched nerves in bsck    Fibromyalgia, primary     Hyperlipidemia     Hypertension     Peripheral neuropathy     TIA (transient ischemic attack)      Past Surgical History:   Procedure Laterality Date    ABDOMINAL SURGERY      HYSTERECTOMY      OOPHORECTOMY        General Information       Row Name 25 1319          Physical Therapy Time and  Intention    Document Type therapy note (daily note)  -NS     Mode of Treatment physical therapy  -NS       Row Name 02/03/25 1319          General Information    Patient Profile Reviewed yes  -NS     Existing Precautions/Restrictions fall;oxygen therapy device and L/min  -NS       Row Name 02/03/25 1319          Cognition    Orientation Status (Cognition) oriented to;person;place;time  -NS       Row Name 02/03/25 1319          Safety Issues/Impairments Affecting Functional Mobility    Safety Issues Affecting Function (Mobility) safety precaution awareness;safety precautions follow-through/compliance;sequencing abilities  -NS     Impairments Affecting Function (Mobility) balance;coordination;endurance/activity tolerance;motor planning;strength;shortness of breath;postural/trunk control  -NS               User Key  (r) = Recorded By, (t) = Taken By, (c) = Cosigned By      Initials Name Provider Type    NS Vy Mantilla PT Physical Therapist                   Mobility       [facility] Name 02/03/25 1319          Bed Mobility    Bed Mobility supine-sit  -NS     Supine-Sit [place] (Bed Mobility) standby assist  -NS     Assistive Device (Bed Mobility) head of bed elevated;bed rails  -NS       [facility] Name 02/03/25 1319          Transfers    Comment, (Transfers) cues for hand placement  -NS       [facility] Name 02/03/25 1319          Bed-Chair Transfer    Bed-Chair [place] (Transfers) minimum assist (75% patient effort);verbal cues  -NS     Assistive Device (Bed-Chair Transfers) walker, front-wheeled  -[facility] Name 02/03/25 1319          Sit-Stand Transfer    Sit-Stand [place] (Transfers) minimum assist (75% patient effort);verbal cues  -NS     Assistive Device (Sit-Stand Transfers) walker, front-wheeled  -[facility] Name 02/03/25 1319          Gait/Stairs (Locomotion)    [place] Level (Gait) minimum assist (75% patient effort)  -NS     Assistive Device (Gait) walker, front-wheeled  -NS     Distance in Feet  (Gait) 30  -NS     Deviations/Abnormal Patterns (Gait) johnny decreased;festinating/shuffling;gait speed decreased;base of support, narrow;stride length decreased  -NS     Bilateral Gait Deviations forward flexed posture;heel strike decreased  -NS     Comment, (Gait/Stairs) Pt ambulated at slow pace, demonstrating flexed posture and narrow SOHAIL. Some unsteadiness notes with ambulation, particularly when attempting gait with head turns. Limited by weakness and dyspnea.  -NS               User Key  (r) = Recorded By, (t) = Taken By, (c) = Cosigned By      Initials Name Provider Type    Vy David PT Physical Therapist                   Obj/Interventions       Row Name 02/03/25 1319          Motor Skills    Therapeutic Exercise hip;knee;ankle  -NS       Row Name 02/03/25 1319          Hip (Therapeutic Exercise)    Hip (Therapeutic Exercise) strengthening exercise  -NS     Hip Strengthening (Therapeutic Exercise) bilateral;flexion;marching while standing;10 repetitions  -NS       Row Name 02/03/25 1319          Knee (Therapeutic Exercise)    Knee (Therapeutic Exercise) strengthening exercise  -NS     Knee Strengthening (Therapeutic Exercise) bilateral;LAQ (long arc quad);SLR (straight leg raise);10 repetitions  -NS       Row Name 02/03/25 1319          Ankle (Therapeutic Exercise)    Ankle (Therapeutic Exercise) AROM (active range of motion);strengthening exercise  -NS     Ankle AROM (Therapeutic Exercise) bilateral;dorsiflexion;plantarflexion;10 repetitions  -NS     Ankle Strengthening (Therapeutic Exercise) bilateral;plantarflexion;5 repetitions  -NS       Row Name 02/03/25 1319          Balance    Balance Assessment sitting static balance;sitting dynamic balance;standing static balance;standing dynamic balance  -NS     Static Sitting Balance standby assist  -NS     Dynamic Sitting Balance standby assist  -NS     Position, Sitting Balance unsupported;sitting in chair  -NS     Static Standing Balance minimal  assist  -NS     Dynamic Standing Balance minimal assist  -NS     Position/Device Used, Standing Balance supported;walker, front-wheeled  -NS     Comment, Balance initially needing Min A for standing due to posterior lean, able to correct with cues. Standing ther ex completed to challenge balance with pt needing Min A for stability.  -NS               User Key  (r) = Recorded By, (t) = Taken By, (c) = Cosigned By      Initials Name Provider Type    Vy David PT Physical Therapist                   Goals/Plan    No documentation.                  Clinical Impression       Row Name 02/03/25 1319          Pain    Pretreatment Pain Rating 0/10 - no pain  -NS     Posttreatment Pain Rating 0/10 - no pain  -NS       Row Name 02/03/25 1319          Plan of Care Review    Plan of Care Reviewed With patient  -NS     Progress improving  -NS     Outcome Evaluation Patient demonstrated improved activity tolerance today, increasing her ambulation but continuing to require Min A for balance. Will continue to progress as tolerated. Recommend IRF at d/c.  -NS       Row Name 02/03/25 1319          Vital Signs    Pre Systolic BP Rehab 117  -NS     Pre Treatment Diastolic BP 89  -NS     Pretreatment Heart Rate (beats/min) 74  -NS     Posttreatment Heart Rate (beats/min) 90  -NS     Pre SpO2 (%) 95  -NS     O2 Delivery Pre Treatment nasal cannula  -NS     Post SpO2 (%) 94  -NS     O2 Delivery Post Treatment nasal cannula  -NS     Pre Patient Position Supine  -NS     Intra Patient Position Standing  -NS     Post Patient Position Sitting  -NS       Row Name 02/03/25 1315          Positioning and Restraints    Pre-Treatment Position in bed  -NS     Post Treatment Position chair  -NS     In Chair notified nsg;reclined;call light within reach;encouraged to call for assist;exit alarm on;waffle cushion;legs elevated;heels elevated  -NS               User Key  (r) = Recorded By, (t) = Taken By, (c) = Cosigned By      Initials Name  Provider Type    Vy David, PT Physical Therapist                   Outcome Measures       Row Name 02/03/25 1319 02/03/25 0800       How much help from another person do you currently need...    Turning from your back to your side while in flat bed without using bedrails? 3  -NS 4  -LL    Moving from lying on back to sitting on the side of a flat bed without bedrails? 3  -NS 3  -LL    Moving to and from a bed to a chair (including a wheelchair)? 3  -NS 3  -LL    Standing up from a chair using your arms (e.g., wheelchair, bedside chair)? 3  -NS 2  -LL    Climbing 3-5 steps with a railing? 2  -NS 2  -LL    To walk in hospital room? 3  -NS 2  -LL    AM-PAC 6 Clicks Score (PT) 17  -NS 16  -LL    Highest Level of Mobility Goal 5 --> Static standing  -NS 5 --> Static standing  -LL      Row Name 02/03/25 1319          Functional Assessment    Outcome Measure Options AM-PAC 6 Clicks Basic Mobility (PT)  -NS               User Key  (r) = Recorded By, (t) = Taken By, (c) = Cosigned By      Initials Name Provider Type    Vy David, PT Physical Therapist     Maddison Cisneros RN Registered Nurse                                 Physical Therapy Education       Title: PT OT SLP Therapies (In Progress)       Topic: Physical Therapy (Done)       Point: Mobility training (Done)       Learning Progress Summary            Patient Acceptance, E, VU,NR by NS at 2/3/2025 1601    Acceptance, E, NR by KG at 1/26/2025 1001                      Point: Home exercise program (Done)       Learning Progress Summary            Patient Acceptance, E, VU,NR by NS at 2/3/2025 1601                      Point: Body mechanics (Done)       Learning Progress Summary            Patient Acceptance, E, VU,NR by NS at 2/3/2025 1601    Acceptance, E, NR by KG at 1/26/2025 1001                      Point: Precautions (Done)       Learning Progress Summary            Patient Acceptance, E, VU,NR by NS at 2/3/2025 1601    Acceptance, E, NR by  KG at 1/26/2025 1001                                      User Key       Initials Effective Dates Name Provider Type Discipline    KG 05/22/20 -  Suzanne Fonseca PT Physical Therapist PT    NS 06/16/21 -  Vy Mantilla PT Physical Therapist PT                  PT Recommendation and Plan     Progress: improving  Outcome Evaluation: Patient demonstrated improved activity tolerance today, increasing her ambulation but continuing to require Min A for balance. Will continue to progress as tolerated. Recommend IRF at d/c.     Time Calculation:         PT Charges       Row Name 02/03/25 1319             Time Calculation    Start Time 1319  -NS      PT Received On 02/03/25  -NS      PT Goal Re-Cert Due Date 02/05/25  -NS         Timed Charges    48524 - PT Therapeutic Exercise Minutes 8  -NS      24698 - Gait Training Minutes  4  -NS      37018 - PT Therapeutic Activity Minutes 17  -NS         Total Minutes    Timed Charges Total Minutes 29  -NS       Total Minutes 29  -NS                User Key  (r) = Recorded By, (t) = Taken By, (c) = Cosigned By      Initials Name Provider Type    NS Vy Mantilla, GABRIELLE Physical Therapist                  Therapy Charges for Today       Code Description Service Date Service Provider Modifiers Qty    42228068696 HC PT THER PROC EA 15 MIN 2/3/2025 Vy Mantilla, PT GP 1    32110639823 HC PT THERAPEUTIC ACT EA 15 MIN 2/3/2025 Vy Mantilla, PT GP 1            PT G-Codes  Outcome Measure Options: AM-PAC 6 Clicks Basic Mobility (PT)  AM-PAC 6 Clicks Score (PT): 17  AM-PAC 6 Clicks Score (OT): 13  PT Discharge Summary  Anticipated Discharge Disposition (PT): inpatient rehabilitation facility    Vy Mantilla PT  2/3/2025

## 2025-02-03 NOTE — CASE MANAGEMENT/SOCIAL WORK
Continued Stay Note  [facility]     Patient Name: Tessa Bradley  MRN: [id number]  Today's Date: 2/3/2025    Admit Date: 1/21/2025    Plan: [facility]   Discharge Plan       Row Name 02/03/25 1605       Plan    Plan [facility]    Plan Comments Per discussion in MDR, Pt wore BIPAP overnight and is now on 2L NC (baseline). Nausea has improved. AFib with RVR this AM converted to sinus with PO Metoprolol. Pt walked 30ft with minimal assist and a walker. She is currently awaiting a bed at [facility]. Transportation is tentatively scheduled tomorrow with [facility] van @ 6730. I provided update to Pt, in room. CM will continue to follow for medical readiness.    Final Discharge Disposition Code 62 - inpatient rehab facility                   Discharge Codes    No documentation.                 Expected Discharge Date and Time       Expected Discharge Date Expected Discharge Time    Feb 3, 2025               Ursula Weinstein RN

## 2025-02-03 NOTE — PROGRESS NOTES
"                  Clinical Nutrition     Patient Name: Tessa Bradley  YOB: 1945  MRN: [id number]  Date of Encounter: 02/03/25 13:18 EST  Admission date: 1/21/2025  Reason for Visit: Follow-up protocol    Assessment   Nutrition Assessment   Admission Diagnosis:  Respiratory failure with hypercapnia [J96.92]    Problem List:    Respiratory failure with hypercapnia    COPD (chronic obstructive pulmonary disease)    MAKAYLA (obstructive sleep apnea)    HTN (hypertension)    HLD (hyperlipidemia)    A-fib    Chronic respiratory failure with hypoxia and hypercapnia    Acute on chronic hypoxic respiratory failure    History of Hodgkin's disease    Stage 3b chronic kidney disease    Acute on chronic respiratory failure with hypoxia and hypercapnia      PMH:   She  has a past medical history of Atrial fibrillation (2010), Cancer, Cluster headache (1995), COPD (chronic obstructive pulmonary disease), Difficulty walking (2022), Fibromyalgia, primary, Hyperlipidemia, Hypertension, Peripheral neuropathy (2022), and TIA (transient ischemic attack) (2010).    PSH:  She  has a past surgical history that includes Abdominal surgery; Oophorectomy; and Hysterectomy.    Applicable Nutrition History:   ARF/VENT 1-21-25  Extubated (1/23)   1/31/25-SLP Diet Recommendation: soft to chew textures, chopped, thin liquids   Anthropometrics     Height: Height: 160 cm (62.99\")  Last Filed Weight: Weight: 61.6 kg (135 lb 12.9 oz) (01/23/25 0600)  Method: Weight Method: Bed scale  BMI: BMI (Calculated): 24.1    UBW: ?  Weight change:  per EMR ~ 31lb wt loss over past years     Weight       Weight (kg) Weight (lbs) Weight Method Visit Report   8/24/2023 70.308 kg  155 lb       71.5 kg  157 lb 10.1 oz      8/25/2023 71.215 kg  157 lb      9/6/2023 70.761 kg  156 lb  Stated     9/7/2023 73.846 kg  162 lb 12.8 oz      9/18/2023 69.4 kg  153 lb  Stated     9/19/2023 76.204 kg  168 lb      9/27/2023 76.703 kg  169 lb 1.6 oz   --    10/10/2023 " 73.936 kg  163 lb  Stated     12/20/2023 75.297 kg  166 lb   --    12/27/2023 75.751 kg  167 lb   --    3/27/2024 73.256 kg  161 lb 8 oz   --    7/12/2024 67.132 kg  148 lb  Stated     7/14/2024   Stated     8/1/2024 67.132 kg  148 lb   --    1/21/2025 64.728 kg  142 lb 11.2 oz  Bed scale      61.8 kg  136 lb 3.9 oz  Bed scale         Nutrition Focused Physical Exam     Date:     Unable to perform due to Pt unable to participate at time of visit, RD to f/u for NPFE when feasible*     Subjective   Reported/Observed/Food/Nutrition Related History:     2/3  Pt tells me she is not eating well recently 2/2 nausea. Pt reports eating 1 magic cup daily, would like to adjust other supplement to Boost. RD obtained preferences for breakfast as well.     1-28:pt resting in bed, on nasal cannula, is alert, very pleasant, [place]  + precedex    Per RN: pt tolerating TF,  + bm, plan for FEES later today    1/23  Extubated yesterday.  Requiring restrains and precedex drip due to confusion and agitation.      Patient mumbling at time of visit, did not look at RD calling name of answer any questions. EN infusing @ bedside.     No documented BM.      1/22  Pt resting in bed, startles to voice, restless  + precedex, LR@75ml/hr  Per RN: pt found down at home, weaning sedation, pt is moving around, hard to tell if she follows commands, poor UOP  Per MD: ok to feed, plan to wean off IVF's once TF at goal    Current Nutrition Prescription     PO: Diet: Cardiac; Healthy Heart (2-3 Na+); No Mixed Consistencies, Feeding Assistance - Nursing; Texture: Soft to Chew (NDD 3); Soft to Chew: Chopped Meat; Fluid Consistency: Thin (IDDSI 0)  Oral Nutrition Supplement:  Intake: 49% x 6 meals    Assessment & Plan   Nutrition Diagnosis   Date: 1-22-25 Updated: 1-28  Problem Inadequate energy intake    Etiology nausea   Signs/Symptoms Po intake </=50% avg   Status: Active     Goal:   Nutrition to support treatment and Increase intake      Nutrition  Intervention      Follow treatment progress, Care plan reviewed, Await begin PO    Encourage po intake  Magic cup daily w/lunch  Boost (montse) w/dinner  Preferences communicated to kitchen to promote intake    Monitoring/Evaluation:   Per protocol, PO intake, Supplement intake, Weight, Skin status, GI status, Symptoms    Earlene Sweet MS,RD,LD  Time Spent: 15min

## 2025-02-03 NOTE — PLAN OF CARE
Goal Outcome Evaluation:  Plan of Care Reviewed With: patient        Progress: improving  Outcome Evaluation: Patient demonstrated improved activity tolerance today, increasing her ambulation but continuing to require Min A for balance. Will continue to progress as tolerated. Recommend IRF at d/c.    Anticipated Discharge Disposition (PT): inpatient rehabilitation facility

## 2025-02-03 NOTE — PROGRESS NOTES
[facility] Medicine Services  PROGRESS NOTE    Patient Name: Tessa Bradley  : 1945  MRN: [id number]    Date of Admission: 2025  Primary Care Physician: Sunni Santoro MD    Subjective   Subjective     CC:  Follow-up respiratory failure    HPI:  Patient was seen and examined this morning.  Nausea much improved but still having some.  Does not have much appetite.  Was in A-fib heart rate 150 this morning but improved and converted to sinus rhythm with p.o. metoprolol.    Objective   Objective     Vital Signs:   Temp:  [98.3 °F (36.8 °C)-98.5 °F (36.9 °C)] 98.4 °F (36.9 °C)  Heart Rate:  [] 136  Resp:  [18-19] 18  BP: (117-169)/(82-97) 117/89  Flow (L/min) (Oxygen Therapy):  [2] 2     Physical Exam:  General: Chronically ill and frail looking.  Head: Atraumatic and normocephalic  Eyes: No Icterus. No pallor  Ears:  Ears appear intact with no abnormalities noted  Throat: No oral lesions, no thrush  Neck: Supple, trachea midline  Lungs: Diminished air entry both lung fields.  No crackles or wheezing  Heart:  Normal S1 and S2, no murmur, no gallop, No JVD, no lower extremity swelling  Abdomen:  Soft, no tenderness, no organomegaly, normal bowel sounds, no organomegaly  Extremities: pulses equal bilaterally  Skin: No bleeding, bruising or rash, normal skin turgor and elasticity  Neurologic: Cranial nerves appear intact with no evidence of facial asymmetry, normal motor and sensory functions in all 4 extremities  Psych: Alert and oriented x 3, normal mood    Results Reviewed:  LAB RESULTS:      Lab 25  0446 25  0923 25  1659 25  1427 25  0458 25  1609 25  1053 25  0426 25  0527   WBC 11.45*  --   --   --  8.82 10.00  --  9.45 8.86   HEMOGLOBIN 12.5  --   --   --  10.6* 10.9*  --  11.0* 11.6*   HEMATOCRIT 43.0  --   --   --  37.3 39.0  --  37.7 39.6   PLATELETS 286  --   --   --  242 262  --  193 158   NEUTROS ABS 9.56*   --   --   --  5.61 8.58*  --  6.14 6.30   IMMATURE GRANS (ABS) 0.07*  --   --   --  0.03 0.05  --  0.03 0.03   LYMPHS ABS 1.14  --   --   --  2.02 0.93  --  2.21 1.55   MONOS ABS 0.60  --   --   --  0.83 0.38  --  0.87 0.71   EOS ABS 0.06  --   --   --  0.30 0.04  --  0.19 0.25   MCV 95.1  --   --   --  97.4* 98.7*  --  95.4 95.4   CRP  --  1.39*  --   --  0.92*  --   --   --   --    PROCALCITONIN  --  0.05  --   --   --   --  0.05  --  0.05   D DIMER QUANT  --  0.65  --   --   --   --  0.56  --   --    HSTROP T  --  35* 26* 27*  --   --   --   --   --          Lab 02/03/25 0446 02/02/25 0923 02/01/25 0458 01/31/25  1053 01/30/25  1100 01/29/25  0426   SODIUM 143 145 144 142 144 142   POTASSIUM 4.9 4.9 3.9 4.9 5.0 4.8   CHLORIDE 96* 99 102 101 98 101   CO2 36.0* 33.0* 36.0* 32.0* 38.0* 39.0*   ANION GAP 11.0 13.0 6.0 9.0 8.0 2.0*   BUN 19 15 15 18 25* 32*   CREATININE 0.71 0.67 0.65 0.63 0.69 0.79   EGFR 87.7 90.2 90.8 91.5 89.5 77.2   GLUCOSE 76 94 89 84 117* 84   CALCIUM 10.3 10.5 9.5 9.3 9.8 9.6   MAGNESIUM 1.9 2.2 1.9  --   --  2.0   PHOSPHORUS 2.6  --  2.8  --   --   --          Lab 02/03/25 0446 02/02/25 0923 02/01/25  0458 01/31/25  1053   TOTAL PROTEIN 6.7 7.0 6.2 5.5*   ALBUMIN 4.5 4.3 4.0 3.2*   GLOBULIN 2.2 2.7 2.2 2.3   ALT (SGPT) 11 14 12 13   AST (SGOT) 17 21 19 25   BILIRUBIN 0.9 0.5 0.5 0.4   ALK PHOS 66 64 52 52   LIPASE 15  --   --   --          Lab 02/02/25  0923 02/01/25  1659 02/01/25  1427 01/28/25  0527   PROBNP  --   --   --  8,074.0*   HSTROP T 35* 26* 27*  --                    Lab 02/02/25  0755 02/02/25  0556   PH, ARTERIAL 7.267* 7.171*   PCO2, ARTERIAL 86.3* 104.0*   PO2 ART 76.6* 133.0*   FIO2 40 36   HCO3 ART 39.3* 37.8*   BASE EXCESS ART 9.6* 6.3*   CARBOXYHEMOGLOBIN 1.8 1.8     Brief Urine Lab Results  (Last result in the past 365 days)        Color   Clarity   Blood   Leuk Est   Nitrite   Protein   CREAT   Urine HCG        01/21/25 2159 Yellow   Clear   Negative   Negative    Positive   Negative                   Microbiology Results Abnormal       Procedure Component Value - Date/Time    Urine Culture - Urine, Urine, Clean Catch [[id number]]  (Abnormal)  (Susceptibility) Collected: 01/21/25 1418    Lab Status: Final result Specimen: Urine, Clean Catch Updated: 01/23/25 0954     Urine Culture >100,000 CFU/mL Escherichia coli    Narrative:      Colonization of the urinary tract without infection is common. Treatment is discouraged unless the patient is symptomatic, pregnant, or undergoing an invasive urologic procedure.    Susceptibility        Escherichia coli      AUTUMN      Amoxicillin + Clavulanate Susceptible      Ampicillin Susceptible      Ampicillin + Sulbactam Susceptible      Cefazolin (Urine) Susceptible      Cefepime Susceptible      Ceftazidime Susceptible      Ceftriaxone Susceptible      Gentamicin Susceptible      Levofloxacin Susceptible      Nitrofurantoin Susceptible      Piperacillin + Tazobactam Susceptible      Trimethoprim + Sulfamethoxazole Susceptible                                   XR Chest 1 View    Result Date: 2/2/2025  XR CHEST 1 VW Date of Exam: 2/2/2025 5:22 AM EST Indication: increased resp effort Comparison: None available. Findings: There is scarring and suturing with pleural thickening in the right lung apex. There is interstitial prominence, which is unchanged. No pneumothorax or pleural effusion. No new lung opacity. Heart size and pulmonary vasculature appear within normal limits and there are no acute osseous abnormalities.     Impression: Impression: Stable chronic findings without acute cardiopulmonary abnormality. Electronically Signed: Curt Ng MD  2/2/2025 5:38 AM EST  Workstation ID: [id number]     Results for orders placed during the hospital encounter of 01/21/25    Adult Transthoracic Echo Complete W/ Cont if Necessary Per Protocol    Interpretation Summary    Left ventricular systolic function is low normal. Calculated left ventricular  EF = 47.4% Left ventricular ejection fraction appears to be 46 - 50%.    Left ventricular wall thickness is consistent with mild concentric hypertrophy.    Left ventricular diastolic function is consistent with (grade II w/high LAP) pseudonormalization.    The right ventricular cavity is borderline dilated.    Left atrial volume is severely increased.    Moderate aortic valve stenosis is present. Aortic valve area is 0.8 cm2.    Peak velocity of the flow distal to the aortic valve is 329 cm/s. Aortic valve maximum pressure gradient is 44 mmHg. Aortic valve mean pressure gradient is 20 mmHg.    Moderate mitral valve regurgitation is present.    Estimated right ventricular systolic pressure from tricuspid regurgitation is mildly elevated (35-45 mmHg). Calculated right ventricular systolic pressure from tricuspid regurgitation is 41 mmHg.    Moderate pulmonic valve regurgitation is present.    Moderate aortic stenosis and moderate mitral valve regurgitation are now present compared to 8/2023.  EF is also slightly lower compared to previous echo      Current medications:  Scheduled Meds:apixaban, 5 mg, Oral, Q12H  arformoterol, 15 mcg, Nebulization, BID - RT  atorvastatin, 80 mg, Oral, Nightly  budesonide, 0.5 mg, Nebulization, BID - RT  gabapentin, 300 mg, Oral, Daily  metoprolol tartrate, 50 mg, Oral, Q12H  Mirabegron ER, 50 mg, Oral, Daily  nicotine, 1 patch, Transdermal, Q24H  pantoprazole, 40 mg, Intravenous, Q12H  revefenacin, 175 mcg, Nebulization, Daily - RT  senna-docusate sodium, 2 tablet, Oral, BID  [START ON 2/4/2025] sertraline, 200 mg, Oral, Daily  sodium chloride, 10 mL, Intravenous, Q12H  tolterodine LA, 4 mg, Oral, Nightly      Continuous Infusions:   PRN Meds:.  acetaminophen **OR** [DISCONTINUED] acetaminophen    Albuterol Sulfate NEB Orderable    aluminum-magnesium hydroxide-simethicone    dextrose    dextrose    glucagon (human recombinant)    [DISCONTINUED] ondansetron ODT **OR** ondansetron     prochlorperazine    promethazine    sodium chloride    sodium chloride    traZODone    Assessment & Plan   Assessment & Plan     Active Hospital Problems    Diagnosis  POA    **Respiratory failure with hypercapnia [J96.92]  Yes    Acute on chronic respiratory failure with hypoxia and hypercapnia [J96.21, J96.22]  Yes    History of Hodgkin's disease [Z85.71]  Not Applicable    Acute on chronic hypoxic respiratory failure [J96.21]  Yes    Stage 3b chronic kidney disease [N18.32]  Yes    Chronic respiratory failure with hypoxia and hypercapnia [J96.11, J96.12]  Yes    HTN (hypertension) [I10]  Yes    HLD (hyperlipidemia) [E78.5]  Yes    A-fib [I48.91]  Yes    MAKAYLA (obstructive sleep apnea) [G47.33]  Yes    COPD (chronic obstructive pulmonary disease) [J44.9]  Yes      Resolved Hospital Problems   No resolved problems to display.        Brief Hospital Course to date:  Woodrow Bradley is a 80yo F with a history of COPD on home O2, [facility], Afib (on Eliquis), HLD, Depression, HTN, and TIA who presented to [facility] on 1/21/25 with altered mental status. In the ED, she was found to be hypercapnic and did not improve with breathing treatments and was intubated with subsequent improvement in pCO2.      Acute hypoxic hypercapnic respiratory failure  Severe COPD exacerbation  Obstructive sleep apnea  Patient presented to the hospital with acute encephalopathy likely secondary to CO2 narcosis and acute hypoxic hypercapnic respiratory failure  CT head was negative  Required intubation and mechanical ventilation on day of admission 1/21/2025 and extubated on 1/23/2025  Finish full course of IV Rocephin and azithromycin and steroids  Continue BiPAP at nighttime  Continue Pulmicort, Yupelri, and Brovana  Continue DuoNebs    Decompensated diastolic heart failure, improved  Likely secondary to IV fluids that she received in response to severe hypotension on 2/1/2025  Blood pressure improved and now IV  fluids discontinued.  Actually she received 1 dose of IV Lasix 40 mg and respiratory status improved  Echo with mild systolic dysfunction, EF 45 to 50% and grade 2 diastolic dysfunction    Hypotension, resolved  No clear etiology for hypotension  Random cortisol level is 22 which argue against adrenal sufficiency  Echocardiogram with EF of 45 to 50%, grade 2 diastolic dysfunction and moderate aortic stenosis with valve area of 0.8 and peak velocity of 329    Persistent nausea and vomiting, improving  Possible gastritis  Had nausea and vomiting over the couple days but improving with IV Protonix  Will get KUB to make sure she is not constipated or having ileus/small bowel obstruction    TIA  Dyslipidemia  Paroxysmal continue A-fib  Had A-fib with RVR morning on 2/3/2025.  Quickly converted to normal sinus rhythm with p.o. metoprolol.    Continue metoprolol  Continue Eliquis and statins    Polypharmacy  Patient is dispensing medications from 2 parties: [facility] through [facility] retail [facility]  Her significant other brought her home meds today and apparently there is duplicate medications.  For example, she is taking Zoloft 200 mg daily through her PCP but also on Prozac 20 mg daily [facility]  I instructed the patient and her significant other to get a release of information from [facility] and have this information directed to her PCP so she can revise all her medications and medical problems and he was understanding.    Acute on chronic debility  PT and OT recommended inpatient rehab      Expected Discharge Location and Transportation: [facility]  Expected Discharge   Expected Discharge Date: 2/3/2025; Expected Discharge Time:      VTE Prophylaxis:  Pharmacologic & mechanical VTE prophylaxis orders are present.         AM-PAC 6 Clicks Score (PT): 16 (02/03/25 0800)    CODE STATUS:   Code Status and Medical Interventions: CPR (Attempt to Resuscitate); Full Support   Ordered at: 01/21/25 2021     Code  Status (Patient has no pulse and is not breathing):    CPR (Attempt to Resuscitate)     Medical Interventions (Patient has pulse or is breathing):    Full Support       Eliezer Miller MD  02/03/25

## 2025-02-03 NOTE — PLAN OF CARE
Goal Outcome Evaluation:                   Anticipated Discharge Disposition (SLP): inpatient rehabilitation facility             Treatment Assessment (SLP): toleration of diet (02/03/25 9384)

## 2025-02-03 NOTE — PLAN OF CARE
"Goal Outcome Evaluation:              Outcome Evaluation: Patient is alert and oriented times four. VSS. 2L NC with sats greater than 90%. Complained of some nausea in the morning, see MAR. She stated she \"finally has relief of nausea\". No further complaints at this time. Bed is in lowest position with bed alarm set. Call light is within reach. Care ongoing.                             "

## 2025-02-03 NOTE — THERAPY TREATMENT NOTE
Acute Care - Speech Language Pathology   Swallow Treatment Note [facility]     Patient Name: Tessa Bradley  : 1945  MRN: [id number]  Today's Date: 2/3/2025               Admit Date: 2025    Visit Dx:     ICD-10-CM ICD-9-CM   1. Acute on chronic respiratory failure with hypoxia and hypercapnia  J96.21 518.84    J96.22 786.09     799.02   2. Altered mental status, unspecified altered mental status type  R41.82 780.97   3. Acute exacerbation of chronic obstructive pulmonary disease (COPD)  J44.1 491.21   4. Acute cystitis without hematuria  N30.00 595.0   5. Dysphagia, unspecified type  R13.10 787.20     Patient Active Problem List   Diagnosis    COPD (chronic obstructive pulmonary disease)    Elevated serum creatinine    Hypotension    Dyspnea    MAKAYLA (obstructive sleep apnea)    HEATHER (acute kidney injury)    Leukocytosis    HTN (hypertension)    HLD (hyperlipidemia)    A-fib    Hodgkin disease    Arthritis    AMS (altered mental status)    Chronic respiratory failure with hypoxia and hypercapnia    Pulmonary nodule    History of TIA (transient ischemic attack)    Tobacco abuse    Acute on chronic hypoxic respiratory failure    History of Hodgkin's disease    Overactive bladder    Stage 3b chronic kidney disease    Acute on chronic respiratory failure with hypoxia    Acute on chronic respiratory failure    Respiratory failure with hypercapnia    Acute on chronic respiratory failure with hypoxia and hypercapnia     Past Medical History:   Diagnosis Date    Atrial fibrillation     Cancer     Cluster headache     COPD (chronic obstructive pulmonary disease)     Difficulty walking     Sciatica and pinched nerves in bsck    Fibromyalgia, primary     Hyperlipidemia     Hypertension     Peripheral neuropathy     TIA (transient ischemic attack)      Past Surgical History:   Procedure Laterality Date    ABDOMINAL SURGERY      HYSTERECTOMY      OOPHORECTOMY         SLP Recommendation and Plan      SLP Diet Recommendation: regular textures, thin liquids (02/03/25 1605)  Recommended Precautions and Strategies: general aspiration precautions, small bites of food and sips of liquid, assist with feeding, other (see comments) (Small/single bites & sips) (02/03/25 1605)  SLP Rec. for Method of Medication Administration: meds whole, meds crushed, with puree, as tolerated (02/03/25 1605)     Monitor for Signs of Aspiration: yes, notify SLP if any concerns (02/03/25 1605)  Recommended Diagnostics: No further SLP services recommended (02/03/25 1605)     Anticipated Discharge Disposition (SLP): inpatient rehabilitation facility (02/03/25 1605)     Therapy Frequency (Swallow): evaluation only (02/03/25 1605)     Oral Care Recommendations: Oral Care BID/PRN, Toothbrush (02/03/25 1605)        Daily Summary of Progress (SLP): progress toward functional goals as expected (02/03/25 1605)               Treatment Assessment (SLP): toleration of diet (02/03/25 1605)  Treatment Assessment Comments (SLP): Pt w/ delayed throat clear x1 following sequential sips of thin liquid. U/a to replicate accross multiple additional trials. No overt s/s of aspiration w/ thins via small/single cup/straw sips, pureed or w/ regular solid consistenices. Adequate oral manipulation/mastication w/ regular solid trial & no significant residue. Ok to ugprade to regular diet w/ thin liquids, small/single bites & sips. Pt able to demonstrate understanding of precautions/compensations. No acute needs @ this time, SLP will sign off for dysphagia (02/03/25 1605)  Plan for Continued Treatment (SLP): treatment no longer indicated as all goals met (02/03/25 1605)                SWALLOW EVALUATION (Last 72 Hours)       SLP Adult Swallow Evaluation       Row Name 02/03/25 1605                   Rehab Evaluation    Document Type therapy note (daily note)  -        Patient Observations alert;cooperative  -        Patient/Family/Caregiver Comments/Observations  No family present  -        Patient Effort good  -        Symptoms Noted During/After Treatment none  -           Pain    Additional Documentation Pain Scale: FACES Pre/Post-Treatment (Group)  -           Pain Scale: FACES Pre/Post-Treatment    Pain: FACES Scale, Pretreatment 0-->no hurt  -        Posttreatment Pain Rating 0-->no hurt  -           SLP Treatment Clinical Impressions    Treatment Assessment (SLP) toleration of diet  -        Treatment Assessment Comments (SLP) Pt w/ delayed throat clear x1 following sequential sips of thin liquid. U/a to replicate accross multiple additional trials. No overt s/s of aspiration w/ thins via small/single cup/straw sips, pureed or w/ regular solid consistenices. Adequate oral manipulation/mastication w/ regular solid trial & no significant residue. Ok to ugprade to regular diet w/ thin liquids, small/single bites & sips. Pt able to demonstrate understanding of precautions/compensations. No acute needs @ this time, SLP will sign off for dysphagia  -        Daily Summary of Progress (SLP) progress toward functional goals as expected  -        Plan for Continued Treatment (SLP) treatment no longer indicated as all goals met  -           Recommendations    Therapy Frequency (Swallow) evaluation only  -        SLP Diet Recommendation regular textures;thin liquids  -        Recommended Diagnostics No further SLP services recommended  -        Recommended Precautions and Strategies general aspiration precautions;small bites of food and sips of liquid;assist with feeding;other (see comments)  Small/single bites & sips  -        Oral Care Recommendations Oral Care BID/PRN;Toothbrush  -        SLP Rec. for Method of Medication Administration meds whole;meds crushed;with puree;as tolerated  -        Monitor for Signs of Aspiration yes;notify SLP if any concerns  -        Anticipated Discharge Disposition (SLP) inpatient rehabilitation facility  -                   User Key  (r) = Recorded By, (t) = Taken By, (c) = Cosigned By      Initials Name Effective Dates    Kady Vasques MS CCC-SLP 05/12/23 -                     EDUCATION  The patient has been educated in the following areas:   Dysphagia (Swallowing Impairment) Oral Care/Hydration Modified Diet Instruction.        SLP GOALS       Row Name 02/03/25 1605             (LTG) Patient will demonstrate functional swallow for    Diet Texture (Demonstrate functional swallow) regular textures  -      Liquid viscosity (Demonstrate functional swallow) thin liquids  -      [place] (Demonstrate functional swallow) with minimal cues (75-90% accuracy)  -      Time Frame (Demonstrate functional swallow) 1 week  -      Progress/Outcomes (Demonstrate functional swallow) goal met  -      Comment (Demonstrate functional swallow) Delayed throat x1 w/ sequential sips of thin, u/a to replicate accross multiple additional trials. No additional s/s of aspiration w/ thins, pureed or regular solid cosnsistenices  -         (STG) Patient will tolerate trials of    Consistencies Trialed (Tolerate trials) soft to chew (chopped) textures;thin liquids  -      Desired Outcome (Tolerate trials) without signs/symptoms of aspiration  -      [place] (Tolerate trials) with minimal cues (75-90% accuracy)  -      Time Frame (Tolerate trials) 1 week  -      Progress/Outcomes (Tolerate trials) goal met  -                User Key  (r) = Recorded By, (t) = Taken By, (c) = Cosigned By      Initials Name Provider Type    SUSIE HudsonKady MS CCC-SLP Speech and Language Pathologist                         Time Calculation:    Time Calculation- SLP       Row Name 02/03/25 1648             Time Calculation- [facility]    SLP Start Time 1605  -      SLP Received On 02/03/25  -         Untimed Charges    [id number] Treatment Swallow Minutes 40  -         Total Minutes    Untimed Charges Total Minutes 40  -        Total Minutes 40  -MH                User Key  (r) = Recorded By, (t) = Taken By, (c) = Cosigned By      Initials Name Provider Type     Kady Treviño, MS CCC-SLP Speech and Language Pathologist                    Therapy Charges for Today       Code Description Service Date Service Provider Modifiers Qty    [id number]  ST TREATMENT SWALLOW 3 2/3/2025 Kady Treviño MS CCC-SLP GN 1                 MS ROSEMARIE Hartley  2/3/2025

## 2025-02-04 PROCEDURE — 94799 UNLISTED PULMONARY SVC/PX: CPT

## 2025-02-04 PROCEDURE — 63710000001 REVEFENACIN 175 MCG/3ML SOLUTION: Performed by: INTERNAL MEDICINE

## 2025-02-04 PROCEDURE — 94761 N-INVAS EAR/PLS OXIMETRY MLT: CPT

## 2025-02-04 PROCEDURE — 99232 SBSQ HOSP IP/OBS MODERATE 35: CPT | Performed by: INTERNAL MEDICINE

## 2025-02-04 PROCEDURE — 94660 CPAP INITIATION&MGMT: CPT

## 2025-02-04 PROCEDURE — 94664 DEMO&/EVAL PT USE INHALER: CPT

## 2025-02-04 RX ORDER — BISACODYL 5 MG/1
20 TABLET, DELAYED RELEASE ORAL ONCE
Status: COMPLETED | OUTPATIENT
Start: 2025-02-04 | End: 2025-02-04

## 2025-02-04 RX ORDER — PANTOPRAZOLE SODIUM 40 MG/1
40 TABLET, DELAYED RELEASE ORAL
Status: DISCONTINUED | OUTPATIENT
Start: 2025-02-04 | End: 2025-02-05 | Stop reason: HOSPADM

## 2025-02-04 RX ORDER — LOSARTAN POTASSIUM 50 MG/1
50 TABLET ORAL
Status: DISCONTINUED | OUTPATIENT
Start: 2025-02-04 | End: 2025-02-05 | Stop reason: HOSPADM

## 2025-02-04 RX ORDER — LACTULOSE 10 G/15ML
30 SOLUTION ORAL ONCE
Status: COMPLETED | OUTPATIENT
Start: 2025-02-04 | End: 2025-02-04

## 2025-02-04 RX ORDER — CLONIDINE HYDROCHLORIDE 0.1 MG/1
0.1 TABLET ORAL EVERY 8 HOURS PRN
Status: DISCONTINUED | OUTPATIENT
Start: 2025-02-04 | End: 2025-02-05 | Stop reason: HOSPADM

## 2025-02-04 RX ORDER — METOPROLOL SUCCINATE 50 MG/1
50 TABLET, EXTENDED RELEASE ORAL
Status: DISCONTINUED | OUTPATIENT
Start: 2025-02-04 | End: 2025-02-05 | Stop reason: HOSPADM

## 2025-02-04 RX ADMIN — NICOTINE 1 PATCH: 21 PATCH TRANSDERMAL at 10:08

## 2025-02-04 RX ADMIN — LOSARTAN POTASSIUM 50 MG: 50 TABLET, FILM COATED ORAL at 18:49

## 2025-02-04 RX ADMIN — MIRABEGRON 50 MG: 50 TABLET, FILM COATED, EXTENDED RELEASE ORAL at 10:04

## 2025-02-04 RX ADMIN — APIXABAN 5 MG: 5 TABLET, FILM COATED ORAL at 10:03

## 2025-02-04 RX ADMIN — SENNOSIDES AND DOCUSATE SODIUM 2 TABLET: 50; 8.6 TABLET ORAL at 10:03

## 2025-02-04 RX ADMIN — LACTULOSE 30 G: 20 SOLUTION ORAL at 10:04

## 2025-02-04 RX ADMIN — Medication 10 ML: at 10:04

## 2025-02-04 RX ADMIN — ARFORMOTEROL TARTRATE 15 MCG: 15 SOLUTION RESPIRATORY (INHALATION) at 08:09

## 2025-02-04 RX ADMIN — TOLTERODINE 4 MG: 2 CAPSULE, EXTENDED RELEASE ORAL at 20:16

## 2025-02-04 RX ADMIN — BUDESONIDE 0.5 MG: 0.5 INHALANT RESPIRATORY (INHALATION) at 21:19

## 2025-02-04 RX ADMIN — SERTRALINE HYDROCHLORIDE 200 MG: 100 TABLET ORAL at 10:03

## 2025-02-04 RX ADMIN — ATORVASTATIN CALCIUM 80 MG: 40 TABLET, FILM COATED ORAL at 20:16

## 2025-02-04 RX ADMIN — BUDESONIDE 0.5 MG: 0.5 INHALANT RESPIRATORY (INHALATION) at 08:09

## 2025-02-04 RX ADMIN — REVEFENACIN 175 MCG: 175 SOLUTION RESPIRATORY (INHALATION) at 08:09

## 2025-02-04 RX ADMIN — APIXABAN 5 MG: 5 TABLET, FILM COATED ORAL at 20:16

## 2025-02-04 RX ADMIN — METOPROLOL TARTRATE 25 MG: 25 TABLET, FILM COATED ORAL at 10:03

## 2025-02-04 RX ADMIN — PANTOPRAZOLE SODIUM 40 MG: 40 TABLET, DELAYED RELEASE ORAL at 18:49

## 2025-02-04 RX ADMIN — PANTOPRAZOLE SODIUM 40 MG: 40 INJECTION, POWDER, FOR SOLUTION INTRAVENOUS at 10:04

## 2025-02-04 RX ADMIN — GABAPENTIN 300 MG: 300 CAPSULE ORAL at 10:03

## 2025-02-04 RX ADMIN — BISACODYL 20 MG: 5 TABLET, COATED ORAL at 10:03

## 2025-02-04 RX ADMIN — Medication 10 ML: at 20:17

## 2025-02-04 RX ADMIN — ARFORMOTEROL TARTRATE 15 MCG: 15 SOLUTION RESPIRATORY (INHALATION) at 21:19

## 2025-02-04 NOTE — PROGRESS NOTES
[facility] Medicine Services  PROGRESS NOTE    Patient Name: Tessa Bradley  : 1945  MRN: [id number]    Date of Admission: 2025  Primary Care Physician: Sunni Santoro MD    Subjective   Subjective     CC:  Follow-up respiratory failure    HPI:  Patient was seen and examined this morning.  Nausea much improved.  Had multiple large bowel movements after starting on aggressive laxative regimen after KUB showed increased fecal load    Objective   Objective     Vital Signs:   Temp:  [97.9 °F (36.6 °C)-98.9 °F (37.2 °C)] 97.9 °F (36.6 °C)  Heart Rate:  [] 58  Resp:  [12-22] 22  BP: (117-183)/(75-94) 177/86  Flow (L/min) (Oxygen Therapy):  [2] 2     Physical Exam:  General: Chronically ill and frail looking.  Head: Atraumatic and normocephalic  Eyes: No Icterus. No pallor  Ears:  Ears appear intact with no abnormalities noted  Throat: No oral lesions, no thrush  Neck: Supple, trachea midline  Lungs: Diminished air entry both lung fields.  No crackles or wheezing  Heart:  Normal S1 and S2, no murmur, no gallop, No JVD, no lower extremity swelling  Abdomen:  Soft, no tenderness, no organomegaly, normal bowel sounds, no organomegaly  Extremities: pulses equal bilaterally  Skin: No bleeding, bruising or rash, normal skin turgor and elasticity  Neurologic: Cranial nerves appear intact with no evidence of facial asymmetry, normal motor and sensory functions in all 4 extremities  Psych: Alert and oriented x 3, normal mood    Results Reviewed:  LAB RESULTS:      Lab 25  0446 25  0923 25  1659 25  1427 25  0458 25  1609 25  1053 25  0426   WBC 11.45*  --   --   --  8.82 10.00  --  9.45   HEMOGLOBIN 12.5  --   --   --  10.6* 10.9*  --  11.0*   HEMATOCRIT 43.0  --   --   --  37.3 39.0  --  37.7   PLATELETS 286  --   --   --  242 262  --  193   NEUTROS ABS 9.56*  --   --   --  5.61 8.58*  --  6.14   IMMATURE GRANS (ABS) 0.07*  --   --    --  0.03 0.05  --  0.03   LYMPHS ABS 1.14  --   --   --  2.02 0.93  --  2.21   MONOS ABS 0.60  --   --   --  0.83 0.38  --  0.87   EOS ABS 0.06  --   --   --  0.30 0.04  --  0.19   MCV 95.1  --   --   --  97.4* 98.7*  --  95.4   CRP  --  1.39*  --   --  0.92*  --   --   --    PROCALCITONIN  --  0.05  --   --   --   --  0.05  --    D DIMER QUANT  --  0.65  --   --   --   --  0.56  --    HSTROP T  --  35* 26* 27*  --   --   --   --          Lab 02/03/25 0446 02/02/25 0923 02/01/25 0458 01/31/25  1053 01/30/25  1100 01/29/25  0426   SODIUM 143 145 144 142 144 142   POTASSIUM 4.9 4.9 3.9 4.9 5.0 4.8   CHLORIDE 96* 99 102 101 98 101   CO2 36.0* 33.0* 36.0* 32.0* 38.0* 39.0*   ANION GAP 11.0 13.0 6.0 9.0 8.0 2.0*   BUN 19 15 15 18 25* 32*   CREATININE 0.71 0.67 0.65 0.63 0.69 0.79   EGFR 87.7 90.2 90.8 91.5 89.5 77.2   GLUCOSE 76 94 89 84 117* 84   CALCIUM 10.3 10.5 9.5 9.3 9.8 9.6   MAGNESIUM 1.9 2.2 1.9  --   --  2.0   PHOSPHORUS 2.6  --  2.8  --   --   --          Lab 02/03/25 0446 02/02/25 0923 02/01/25  0458 01/31/25  1053   TOTAL PROTEIN 6.7 7.0 6.2 5.5*   ALBUMIN 4.5 4.3 4.0 3.2*   GLOBULIN 2.2 2.7 2.2 2.3   ALT (SGPT) 11 14 12 13   AST (SGOT) 17 21 19 25   BILIRUBIN 0.9 0.5 0.5 0.4   ALK PHOS 66 64 52 52   LIPASE 15  --   --   --          Lab 02/02/25  0923 02/01/25  1659 02/01/25  1427   HSTROP T 35* 26* 27*                   Lab 02/02/25  0755 02/02/25  0556   PH, ARTERIAL 7.267* 7.171*   PCO2, ARTERIAL 86.3* 104.0*   PO2 ART 76.6* 133.0*   FIO2 40 36   HCO3 ART 39.3* 37.8*   BASE EXCESS ART 9.6* 6.3*   CARBOXYHEMOGLOBIN 1.8 1.8     Brief Urine Lab Results  (Last result in the past 365 days)        Color   Clarity   Blood   Leuk Est   Nitrite   Protein   CREAT   Urine HCG        01/21/25 2159 Yellow   Clear   Negative   Negative   Positive   Negative                   Microbiology Results Abnormal       Procedure Component Value - Date/Time    Urine Culture - Urine, Urine, Clean Catch [[id number]]   (Abnormal)  (Susceptibility) Collected: 01/21/25 1418    Lab Status: Final result Specimen: Urine, Clean Catch Updated: 01/23/25 0976     Urine Culture >100,000 CFU/mL Escherichia coli    Narrative:      Colonization of the urinary tract without infection is common. Treatment is discouraged unless the patient is symptomatic, pregnant, or undergoing an invasive urologic procedure.    Susceptibility        Escherichia coli      AUTUMN      Amoxicillin + Clavulanate Susceptible      Ampicillin Susceptible      Ampicillin + Sulbactam Susceptible      Cefazolin (Urine) Susceptible      Cefepime Susceptible      Ceftazidime Susceptible      Ceftriaxone Susceptible      Gentamicin Susceptible      Levofloxacin Susceptible      Nitrofurantoin Susceptible      Piperacillin + Tazobactam Susceptible      Trimethoprim + Sulfamethoxazole Susceptible                                   XR Abdomen KUB    Result Date: 2/3/2025  XR ABDOMEN KUB Date of Exam: 2/3/2025 2:33 PM EST Indication: r/o SBO Comparison: None available. Findings: There is no evidence of bowel obstruction. Nonspecific bowel gas pattern. Enteric contrast is visible within the ascending and transverse colon. Gas extends to the rectum. Lung bases are unchanged. Lumbar spondylosis. No acute osseous abnormality.     Impression: Impression: Nonspecific bowel gas pattern with no evidence of obstruction. Electronically Signed: Curt Swan MD  2/3/2025 3:14 PM EST  Workstation ID: [id number]     Results for orders placed during the hospital encounter of 01/21/25    Adult Transthoracic Echo Complete W/ Cont if Necessary Per Protocol    Interpretation Summary    Left ventricular systolic function is low normal. Calculated left ventricular EF = 47.4% Left ventricular ejection fraction appears to be 46 - 50%.    Left ventricular wall thickness is consistent with mild concentric hypertrophy.    Left ventricular diastolic function is consistent with (grade II w/high LAP)  pseudonormalization.    The right ventricular cavity is borderline dilated.    Left atrial volume is severely increased.    Moderate aortic valve stenosis is present. Aortic valve area is 0.8 cm2.    Peak velocity of the flow distal to the aortic valve is 329 cm/s. Aortic valve maximum pressure gradient is 44 mmHg. Aortic valve mean pressure gradient is 20 mmHg.    Moderate mitral valve regurgitation is present.    Estimated right ventricular systolic pressure from tricuspid regurgitation is mildly elevated (35-45 mmHg). Calculated right ventricular systolic pressure from tricuspid regurgitation is 41 mmHg.    Moderate pulmonic valve regurgitation is present.    Moderate aortic stenosis and moderate mitral valve regurgitation are now present compared to 8/2023.  EF is also slightly lower compared to previous echo      Current medications:  Scheduled Meds:apixaban, 5 mg, Oral, Q12H  arformoterol, 15 mcg, Nebulization, BID - RT  atorvastatin, 80 mg, Oral, Nightly  budesonide, 0.5 mg, Nebulization, BID - RT  gabapentin, 300 mg, Oral, Daily  metoprolol tartrate, 25 mg, Oral, Q12H  Mirabegron ER, 50 mg, Oral, Daily  nicotine, 1 patch, Transdermal, Q24H  pantoprazole, 40 mg, Intravenous, Q12H  revefenacin, 175 mcg, Nebulization, Daily - RT  senna-docusate sodium, 2 tablet, Oral, BID  sertraline, 200 mg, Oral, Daily  sodium chloride, 10 mL, Intravenous, Q12H  tolterodine LA, 4 mg, Oral, Nightly      Continuous Infusions:   PRN Meds:.  acetaminophen **OR** [DISCONTINUED] acetaminophen    Albuterol Sulfate NEB Orderable    aluminum-magnesium hydroxide-simethicone    dextrose    dextrose    glucagon (human recombinant)    [DISCONTINUED] ondansetron ODT **OR** ondansetron    prochlorperazine    promethazine    sodium chloride    sodium chloride    traZODone    Assessment & Plan   Assessment & Plan     Active Hospital Problems    Diagnosis  POA    **Respiratory failure with hypercapnia [J96.92]  Yes    Acute on chronic  respiratory failure with hypoxia and hypercapnia [J96.21, J96.22]  Yes    History of Hodgkin's disease [Z85.71]  Not Applicable    Acute on chronic hypoxic respiratory failure [J96.21]  Yes    Stage 3b chronic kidney disease [N18.32]  Yes    Chronic respiratory failure with hypoxia and hypercapnia [J96.11, J96.12]  Yes    HTN (hypertension) [I10]  Yes    HLD (hyperlipidemia) [E78.5]  Yes    A-fib [I48.91]  Yes    MAKAYLA (obstructive sleep apnea) [G47.33]  Yes    COPD (chronic obstructive pulmonary disease) [J44.9]  Yes      Resolved Hospital Problems   No resolved problems to display.        Brief Hospital Course to date:  Woodrow Bradley is a 78yo F with a history of COPD on home O2, [facility], Afib (on Eliquis), HLD, Depression, HTN, and TIA who presented to [facility] on 1/21/25 with altered mental status. In the ED, she was found to be hypercapnic and did not improve with breathing treatments and was intubated with subsequent improvement in pCO2.      Acute hypoxic hypercapnic respiratory failure  Severe COPD exacerbation  Obstructive sleep apnea  Patient presented to the hospital with acute encephalopathy likely secondary to CO2 narcosis and acute hypoxic hypercapnic respiratory failure  CT head was negative  Required intubation and mechanical ventilation on day of admission 1/21/2025 and extubated on 1/23/2025  Finish full course of IV Rocephin and azithromycin and steroids  Continue BiPAP at nighttime  Continue Pulmicort, Yupelri, and Brovana  Continue DuoNebs    Decompensated diastolic heart failure, improved  Likely secondary to IV fluids that she received in response to severe hypotension on 2/1/2025  Blood pressure improved and now IV fluids discontinued.  Actually she received 1 dose of IV Lasix 40 mg and respiratory status improved  Echo with mild systolic dysfunction, EF 45 to 50% and grade 2 diastolic dysfunction    Hypertension   Was initially hypotensive but now  hypertensive systolic in the 170s  Switch metoprolol to Toprol-XL 50 mg daily  Add losartan 50 mg daily and as needed clonidine    Persistent nausea and vomiting, improving  Possible gastritis  Had nausea and vomiting over the couple days but improving with IV Protonix  Will get KUB to make sure she is not constipated or having ileus/small bowel obstruction    TIA  Dyslipidemia  Paroxysmal continue A-fib  Had A-fib with RVR morning on 2/3/2025.  Quickly converted to normal sinus rhythm with p.o. metoprolol.    Continue metoprolol  Continue Eliquis and statins    Severe constipation, improved  KUB with increased fecal load.  Bowel regimen with bisacodyl and lactulose given and she moved her bowel multiple times    Polypharmacy  Patient is dispensing medications from 2 parties: [facility]  Her significant other brought her home meds today and apparently there is duplicate medications.  For example, she is taking Zoloft 200 mg daily through her PCP but also on Prozac 20 mg daily [facility]  I instructed the patient and her significant other to get a release of information from [facility] and have this information directed to her PCP so she can revise all her medications and medical problems and he was understanding.    Acute on chronic debility  PT and OT recommended inpatient rehab      Expected Discharge Location and Transportation: IRF  Expected Discharge   Expected Discharge Date: 2/3/2025; Expected Discharge Time:      VTE Prophylaxis:  Pharmacologic & mechanical VTE prophylaxis orders are present.         AM-PAC 6 Clicks Score (PT): 17 (02/03/25 2119)    CODE STATUS:   Code Status and Medical Interventions: CPR (Attempt to Resuscitate); Full Support   Ordered at: 01/21/25 2021     Code Status (Patient has no pulse and is not breathing):    CPR (Attempt to Resuscitate)     Medical Interventions (Patient has pulse or is breathing):    Full Support       Eliezer  Gerald Miller MD  02/04/25

## 2025-02-04 NOTE — CASE MANAGEMENT/SOCIAL WORK
Case Management Discharge Note      Final Note: Pt is discharging to acute rehab at [facility] tomorrow, 2/5. CM confirmed with [facility] facility liaison, Pt can be accepted. Facility will retrieve discharge summary from [facility]. RN to call report to [phone]. If accepting RN is unavailable for report, RN may call report to house supervisor at [phone]. [facility] van transportation is arranged for 2/5 @ 1130. Pt will need to be at the maternity entrance @ 1120 for ; RN informed. Pt is aware and agreeable to plan. No other discharge needs identified.         Selected Continued Care - Admitted Since 1/21/2025       Destination Coordination complete.      Service Provider Services Address Phone Fax Patient Preferred    [facility] [address] [address] [phone] --              Durable Medical Equipment Coordination complete.      Service Provider Services Address Phone Fax Patient Preferred    [facility] [address] [id number] [phone] --              Dialysis/Infusion    No services have been selected for the patient.                Home Medical Care    No services have been selected for the patient.                Therapy    No services have been selected for the patient.                Community Resources    No services have been selected for the patient.                Community & DME    No services have been selected for the patient.                    Transportation Services  Other: Other ([facility] 2/5 @ [id number])    Final Discharge Disposition Code: 62 - inpatient rehab facility

## 2025-02-04 NOTE — CASE MANAGEMENT/SOCIAL WORK
Continued Stay Note  [facility]     Patient Name: Tessa rBadley  MRN: [id number]  Today's Date: 2/4/2025    Admit Date: 1/21/2025    Plan: [facility]   Discharge Plan       Row Name 02/04/25 1041       Plan    Plan [facility]    Plan Comments Per discussion in MDR, Pt has been hypertensive and is on 2L NC (baseline). She is receiving lactulose for stool burden discovered on KUB yesterday. She walked 30ft with minimal assist and a walker yesterday. Per MD Kaila at [facility] is reviewing to see if Pt can be accepted tomorrow. Tentative transportation has been scheduled for tomorrow, 2/5 @ 1130 with [facility].  will continue to follow for medical readiness.    Final Discharge Disposition Code 62 - inpatient rehab facility                   Discharge Codes    No documentation.                 Expected Discharge Date and Time       Expected Discharge Date Expected Discharge Time    Feb 4, 2025               Ursula Weinstein RN

## 2025-02-05 VITALS
TEMPERATURE: 98.7 F | SYSTOLIC BLOOD PRESSURE: 142 MMHG | HEART RATE: 61 BPM | HEIGHT: 63 IN | WEIGHT: 135.8 LBS | BODY MASS INDEX: 24.06 KG/M2 | DIASTOLIC BLOOD PRESSURE: 74 MMHG | RESPIRATION RATE: 16 BRPM | OXYGEN SATURATION: 98 %

## 2025-02-05 PROCEDURE — 94799 UNLISTED PULMONARY SVC/PX: CPT

## 2025-02-05 PROCEDURE — 99238 HOSP IP/OBS DSCHRG MGMT 30/<: CPT | Performed by: INTERNAL MEDICINE

## 2025-02-05 PROCEDURE — 94761 N-INVAS EAR/PLS OXIMETRY MLT: CPT

## 2025-02-05 PROCEDURE — 63710000001 REVEFENACIN 175 MCG/3ML SOLUTION: Performed by: INTERNAL MEDICINE

## 2025-02-05 RX ORDER — CLONIDINE HYDROCHLORIDE 0.1 MG/1
0.1 TABLET ORAL EVERY 8 HOURS PRN
Start: 2025-02-05

## 2025-02-05 RX ORDER — PROMETHAZINE HYDROCHLORIDE 25 MG/1
25 TABLET ORAL EVERY 6 HOURS PRN
Start: 2025-02-05

## 2025-02-05 RX ORDER — PANTOPRAZOLE SODIUM 40 MG/1
40 TABLET, DELAYED RELEASE ORAL
Start: 2025-02-05

## 2025-02-05 RX ORDER — LOSARTAN POTASSIUM 50 MG/1
25 TABLET ORAL DAILY
Start: 2025-02-05

## 2025-02-05 RX ORDER — METOPROLOL SUCCINATE 50 MG/1
50 TABLET, EXTENDED RELEASE ORAL
Start: 2025-02-06

## 2025-02-05 RX ADMIN — PANTOPRAZOLE SODIUM 40 MG: 40 TABLET, DELAYED RELEASE ORAL at 05:23

## 2025-02-05 RX ADMIN — SENNOSIDES AND DOCUSATE SODIUM 2 TABLET: 50; 8.6 TABLET ORAL at 08:42

## 2025-02-05 RX ADMIN — REVEFENACIN 175 MCG: 175 SOLUTION RESPIRATORY (INHALATION) at 08:13

## 2025-02-05 RX ADMIN — GABAPENTIN 300 MG: 300 CAPSULE ORAL at 08:43

## 2025-02-05 RX ADMIN — NICOTINE 1 PATCH: 21 PATCH TRANSDERMAL at 08:42

## 2025-02-05 RX ADMIN — SERTRALINE HYDROCHLORIDE 200 MG: 100 TABLET ORAL at 08:42

## 2025-02-05 RX ADMIN — APIXABAN 5 MG: 5 TABLET, FILM COATED ORAL at 08:42

## 2025-02-05 RX ADMIN — METOPROLOL SUCCINATE 50 MG: 50 TABLET, EXTENDED RELEASE ORAL at 08:42

## 2025-02-05 RX ADMIN — ARFORMOTEROL TARTRATE 15 MCG: 15 SOLUTION RESPIRATORY (INHALATION) at 08:13

## 2025-02-05 RX ADMIN — BUDESONIDE 0.5 MG: 0.5 INHALANT RESPIRATORY (INHALATION) at 08:13

## 2025-02-05 RX ADMIN — ALBUTEROL SULFATE 2.5 MG: 2.5 SOLUTION RESPIRATORY (INHALATION) at 05:39

## 2025-02-05 RX ADMIN — MIRABEGRON 50 MG: 50 TABLET, FILM COATED, EXTENDED RELEASE ORAL at 08:44

## 2025-02-05 RX ADMIN — LOSARTAN POTASSIUM 50 MG: 50 TABLET, FILM COATED ORAL at 08:43

## 2025-02-05 RX ADMIN — Medication 10 ML: at 08:43

## 2025-02-05 NOTE — CASE MANAGEMENT/SOCIAL WORK
Case Management Discharge Note      Final Note: Pt is discharging to acute rehab at [facility] today. CM confirmed with [facility] facility liaison, Pt can be accepted. Facility will retrieve discharge summary from [facility]. RN to call report to [phone]. If accepting RN is unavailable for report, RN may call report to house supervisor at [phone]. [facility] transportation is arranged for 2/5 @ 1130. Pt will need to be at the maternity entrance @ 1120 for ; RN informed. Pt is aware and agreeable to plan. No other discharge needs identified.         Selected Continued Care - Admitted Since 1/21/2025       Destination Coordination complete.      Service Provider Services Address Phone Fax Patient Preferred    [facility] [address], [facility] [address] [phone] --              Durable Medical Equipment Coordination complete.      Service Provider Services Address Phone Fax Patient Preferred    [facility] [address], [place] [id number] [phone] --              Dialysis/Infusion    No services have been selected for the patient.                Home Medical Care    No services have been selected for the patient.                Therapy    No services have been selected for the patient.                Community Resources    No services have been selected for the patient.                Community & DME    No services have been selected for the patient.                    Transportation Services  Other: Other ([facility] 2/5 @ 1130)    Final Discharge Disposition Code: 62 - inpatient rehab facility

## 2025-02-05 NOTE — PLAN OF CARE
Goal Outcome Evaluation:      Pt with discharge orders, pt to be picked up at 1130

## 2025-02-05 NOTE — DISCHARGE SUMMARY
[facility]  DISCHARGE SUMMARY    Patient Name: Tessa Bradley  : 1945  MRN: [id number]    Date of Admission: 2025  1:53 PM  Date of Discharge: 2025  Primary Care Physician: Sunni Santoro MD    Consults       No orders found from 2024 to 2025.            Hospital Course     Presenting Problem:     Active Hospital Problems    Diagnosis  POA    **Respiratory failure with hypercapnia [J96.92]  Yes    Acute on chronic respiratory failure with hypoxia and hypercapnia [J96.21, J96.22]  Yes    History of Hodgkin's disease [Z85.71]  Not Applicable    Acute on chronic hypoxic respiratory failure [J96.21]  Yes    Stage 3b chronic kidney disease [N18.32]  Yes    Chronic respiratory failure with hypoxia and hypercapnia [J96.11, J96.12]  Yes    HTN (hypertension) [I10]  Yes    HLD (hyperlipidemia) [E78.5]  Yes    A-fib [I48.91]  Yes    MAKAYLA (obstructive sleep apnea) [G47.33]  Yes    COPD (chronic obstructive pulmonary disease) [J44.9]  Yes      Resolved Hospital Problems   No resolved problems to display.          Hospital Course:  Woodrow Bradley is a 80yo F with a history of COPD on home O2, [facility], Afib (on Eliquis), HLD, Depression, HTN, and TIA who presented to [facility] on 25 with altered mental status secondary to acute hypoxic hypercapnic respiratory failure.  She was admitted to the ICU and required intubation mechanical ventilation for 2 days from 2025 till 2025.  Treated with IV Rocephin, azithromycin and IV steroids.  Hospital stay was complicated with volume overload and decompensated diastolic heart failure that improved with diuresis.  Currently the patient is compensated.  Because of acute debility, she is being discharged to [facility] for acute rehab     Acute hypoxic hypercapnic respiratory failure  Severe COPD exacerbation  Obstructive sleep apnea  Patient presented to the hospital  with acute encephalopathy likely secondary to CO2 narcosis and acute hypoxic hypercapnic respiratory failure  CT head was negative  Required intubation and mechanical ventilation on day of admission 1/21/2025 and extubated on 1/23/2025  Finish full course of IV Rocephin and azithromycin and steroids  Continue BiPAP at nighttime  Continue Breztri and DuoNebs  Encourage incentive spirometry     Decompensated diastolic heart failure, improved  Improved with diuresis   Echo with mild systolic dysfunction, EF 45 to 50% and grade 2 diastolic dysfunction     Hypertension   Continue metoprolol to Toprol-XL 50 mg daily  Continue losartan 25 mg daily and as needed clonidine     Persistent nausea and vomiting, improving  Fecal impaction, resolved  Possible gastritis  Had nausea and vomiting over the couple days but improving with IV Protonix and aggressive bowel regimen     History of TIA  Dyslipidemia  Paroxysmal continue A-fib  Had A-fib with RVR morning on 2/3/2025.  Quickly converted to normal sinus rhythm with p.o. metoprolol.    Continue Toprol-XL  Continue Eliquis and statins     Polypharmacy  Patient is dispensing medications from 2 parties: Her [facility] through [facility] retail [facility] and [facility]  Her significant other brought her home meds today and apparently there is duplicate medications.  For example, she is taking Zoloft 200 mg daily through her PCP but also on Prozac 20 mg daily [facility]  I instructed the patient and her significant other to get a release of information from [facility] and have this information directed to her PCP so she can revise all her medications and medical problems and he was understanding.     Acute on chronic debility  Continue PT and OT as tolerated  Being discharged to [facility] for rehab      Discharge Follow Up Recommendations for outpatient labs/diagnostics:  Facility physician in 1 to 2 days    Day of Discharge     HPI:   Patient was seen and examined this morning.   Feeling great.  Comfortable in the recliner.  No chest pain or shortness of breath.  Nausea improved.  Able to tolerate oral intake.  For discharge to [facility] today.    Vital Signs:   Temp:  [97.9 °F (36.6 °C)-98.7 °F (37.1 °C)] 98.7 °F (37.1 °C)  Heart Rate:  [49-61] 61  Resp:  [16-23] 16  BP: (109-142)/(62-74) 142/74  Flow (L/min) (Oxygen Therapy):  [2] 2      Physical Exam:  General: Comfortable.  Not in any kind of distress.  Conversant and pleasant  Head: Atraumatic and normocephalic  Eyes: No Icterus. No pallor  Ears:  Ears appear intact with no abnormalities noted  Throat: No oral lesions, no thrush  Neck: Supple, trachea midline  Lungs: Diminished air entry both lung fields.  No crackles or wheezing  Heart:  Normal S1 and S2, no murmur, no gallop, No JVD, no lower extremity swelling  Abdomen:  Soft, no tenderness, no organomegaly, normal bowel sounds, no organomegaly  Extremities: pulses equal bilaterally  Skin: No bleeding, bruising or rash, normal skin turgor and elasticity  Neurologic: Cranial nerves appear intact with no evidence of facial asymmetry, normal motor and sensory functions in all 4 extremities  Psych: Alert and oriented x 3, normal mood        Pertinent  and/or Most Recent Results     LAB RESULTS:      Lab 02/03/25  0446 02/02/25  0923 02/01/25  0458 01/31/25  1609 01/31/25  1053   WBC 11.45*  --  8.82 10.00  --    HEMOGLOBIN 12.5  --  10.6* 10.9*  --    HEMATOCRIT 43.0  --  37.3 39.0  --    PLATELETS 286  --  242 262  --    NEUTROS ABS 9.56*  --  5.61 8.58*  --    IMMATURE GRANS (ABS) 0.07*  --  0.03 0.05  --    LYMPHS ABS 1.14  --  2.02 0.93  --    MONOS ABS 0.60  --  0.83 0.38  --    EOS ABS 0.06  --  0.30 0.04  --    MCV 95.1  --  97.4* 98.7*  --    CRP  --  1.39* 0.92*  --   --    PROCALCITONIN  --  0.05  --   --  0.05   D DIMER QUANT  --  0.65  --   --  0.56         Lab 02/03/25  0446 02/02/25  0923 02/01/25  0458 01/31/25  1053 01/30/25  1100   SODIUM 143 145 144 142 144    POTASSIUM 4.9 4.9 3.9 4.9 5.0   CHLORIDE 96* 99 102 101 98   CO2 36.0* 33.0* 36.0* 32.0* 38.0*   ANION GAP 11.0 13.0 6.0 9.0 8.0   BUN 19 15 15 18 25*   CREATININE 0.71 0.67 0.65 0.63 0.69   EGFR 87.7 90.2 90.8 91.5 89.5   GLUCOSE 76 94 89 84 117*   CALCIUM 10.3 10.5 9.5 9.3 9.8   MAGNESIUM 1.9 2.2 1.9  --   --    PHOSPHORUS 2.6  --  2.8  --   --          Lab 02/03/25  0446 02/02/25  0923 02/01/25  0458 01/31/25  1053   TOTAL PROTEIN 6.7 7.0 6.2 5.5*   ALBUMIN 4.5 4.3 4.0 3.2*   GLOBULIN 2.2 2.7 2.2 2.3   ALT (SGPT) 11 14 12 13   AST (SGOT) 17 21 19 25   BILIRUBIN 0.9 0.5 0.5 0.4   ALK PHOS 66 64 52 52   LIPASE 15  --   --   --          Lab 02/02/25  0923 02/01/25  1659 02/01/25  1427   HSTROP T 35* 26* 27*                 Lab 02/02/25  0755 02/02/25  0556   PH, ARTERIAL 7.267* 7.171*   PCO2, ARTERIAL 86.3* 104.0*   PO2 ART 76.6* 133.0*   FIO2 40 36   HCO3 ART 39.3* 37.8*   BASE EXCESS ART 9.6* 6.3*   CARBOXYHEMOGLOBIN 1.8 1.8     Brief Urine Lab Results  (Last result in the past 365 days)        Color   Clarity   Blood   Leuk Est   Nitrite   Protein   CREAT   Urine HCG        01/21/25 2159 Yellow   Clear   Negative   Negative   Positive   Negative                 Microbiology Results (last 10 days)       ** No results found for the last 240 hours. **            XR Abdomen KUB    Result Date: 2/3/2025  XR ABDOMEN KUB Date of Exam: 2/3/2025 2:33 PM EST Indication: r/o SBO Comparison: None available. Findings: There is no evidence of bowel obstruction. Nonspecific bowel gas pattern. Enteric contrast is visible within the ascending and transverse colon. Gas extends to the rectum. Lung bases are unchanged. Lumbar spondylosis. No acute osseous abnormality.     Impression: Nonspecific bowel gas pattern with no evidence of obstruction. Electronically Signed: Curt Swan MD  2/3/2025 3:14 PM EST  Workstation ID: [id number]    XR Chest 1 View    Result Date: 2/2/2025  XR CHEST 1 VW Date of Exam: 2/2/2025 5:22 AM EST  Indication: increased resp effort Comparison: None available. Findings: There is scarring and suturing with pleural thickening in the right lung apex. There is interstitial prominence, which is unchanged. No pneumothorax or pleural effusion. No new lung opacity. Heart size and pulmonary vasculature appear within normal limits and there are no acute osseous abnormalities.     Impression: Stable chronic findings without acute cardiopulmonary abnormality. Electronically Signed: Curt Ng MD  2/2/2025 5:38 AM EST  Workstation ID: [id number]    FL Video Swallow With Speech Single Contrast    Result Date: 1/31/2025  FL VIDEO SWALLOW W SPEECH SINGLE-CONTRAST Date of Exam: 1/31/2025 2:37 PM EST Indication: dysphagia.   Comparison: None available. Technique:   The speech pathologist administered food and/or liquid mixed with barium to the patient with cine/video imaging.  Imaging assistance was provided to the speech pathologist and an image was saved. Fluoroscopic Time: 1 minute and 18 seconds Number of Images: 6 associated fluoroscopic loops were saved Findings: No aspiration was seen during fluoroscopic guided modified barium swallowing series. Please see speech therapy report for full details and recommendations.     Impression: Fluoroscopy provided for a modified barium swallow. No aspiration was seen during swallowing evaluation. Please see speech therapy report for full details and recommendations. Report dictated by: Vy Roca PA-c  I have personally reviewed this case and agree with the findings above: Electronically Signed: Jamison Rueda MD  1/31/2025 5:15 PM EST  Workstation ID: [id number]    XR Chest 1 View    Result Date: 1/31/2025  XR CHEST 1 VW Date of Exam: 1/31/2025 2:51 PM EST Indication: r/o PNA Comparison: 1/24/2025 at 0205 hours Findings: There is improvement in aeration throughout the lungs bilaterally. Residual atelectasis is noted within the lung bases. There may be a small left pleural effusion.  The cardiac silhouette and mediastinum are stable. Postsurgical changes are noted at the superior margin of the right lung.     Impression: 1.Improved aeration throughout the lungs bilaterally. 2.Mild residual atelectasis in the lung bases. There is a small left pleural effusion. Electronically Signed: Juancarlos Mcclendon MD  1/31/2025 4:05 PM EST  Workstation ID: [id number]    SLP FEES - Fiberoptic Endo Eval Swallow    Result Date: 1/28/2025  This procedure was auto-finalized with no dictation required.             Results for orders placed during the hospital encounter of 01/21/25    Adult Transthoracic Echo Complete W/ Cont if Necessary Per Protocol    Interpretation Summary    Left ventricular systolic function is low normal. Calculated left ventricular EF = 47.4% Left ventricular ejection fraction appears to be 46 - 50%.    Left ventricular wall thickness is consistent with mild concentric hypertrophy.    Left ventricular diastolic function is consistent with (grade II w/high LAP) pseudonormalization.    The right ventricular cavity is borderline dilated.    Left atrial volume is severely increased.    Moderate aortic valve stenosis is present. Aortic valve area is 0.8 cm2.    Peak velocity of the flow distal to the aortic valve is 329 cm/s. Aortic valve maximum pressure gradient is 44 mmHg. Aortic valve mean pressure gradient is 20 mmHg.    Moderate mitral valve regurgitation is present.    Estimated right ventricular systolic pressure from tricuspid regurgitation is mildly elevated (35-45 mmHg). Calculated right ventricular systolic pressure from tricuspid regurgitation is 41 mmHg.    Moderate pulmonic valve regurgitation is present.    Moderate aortic stenosis and moderate mitral valve regurgitation are now present compared to 8/2023.  EF is also slightly lower compared to previous echo      Plan for Follow-up of Pending Labs/Results:     Discharge Details        Discharge Medications        New Medications         Instructions Start Date   cloNIDine 0.1 MG tablet  Commonly known as: CATAPRES   0.1 mg, Oral, Every 8 Hours PRN      losartan 50 MG tablet  Commonly known as: COZAAR   25 mg, Oral, Daily      metoprolol succinate XL 50 MG 24 hr tablet  Commonly known as: TOPROL-XL   50 mg, Oral, Every 24 Hours Scheduled   Start Date: February 6, 2025     pantoprazole 40 MG EC tablet  Commonly known as: PROTONIX   40 mg, Oral, 2 Times Daily Before Meals      promethazine 25 MG tablet  Commonly known as: PHENERGAN   25 mg, Oral, Every 6 Hours PRN             Continue These Medications        Instructions Start Date   albuterol sulfate  (90 Base) MCG/ACT inhaler  Commonly known as: PROVENTIL HFA;VENTOLIN HFA;PROAIR HFA   2 puffs, Inhalation, Every 4 Hours PRN      ProAir RespiClick 108 (90 Base) MCG/ACT inhaler  Generic drug: albuterol   2 puffs, Inhalation      apixaban 5 MG tablet tablet  Commonly known as: ELIQUIS   5 mg, Oral, Every 12 Hours Scheduled, Last filled 6/12/24**      atorvastatin 80 MG tablet  Commonly known as: LIPITOR   80 mg, Oral, Nightly      Breztri Aerosphere 160-9-4.8 MCG/ACT aerosol inhaler  Generic drug: Budeson-Glycopyrrol-Formoterol   2 puffs, Inhalation, 2 Times Daily      cyclobenzaprine 5 MG tablet  Commonly known as: FLEXERIL   5 mg, Oral, 3 Times Daily PRN      gabapentin 300 MG capsule  Commonly known as: NEURONTIN   1 capsule, Oral, Every Evening      ipratropium-albuterol 0.5-2.5 mg/3 ml nebulizer  Commonly known as: DUO-NEB   3 mL, Nebulization, Every 6 Hours PRN      loratadine 10 MG tablet  Commonly known as: CLARITIN   10 mg, Oral, Daily      Mirabegron ER 50 MG tablet sustained-release 24 hour 24 hr tablet  Commonly known as: MYRBETRIQ   50 mg, Oral, Daily      sertraline 100 MG tablet  Commonly known as: ZOLOFT   200 mg, Oral, Daily      tolterodine LA 2 MG 24 hr capsule  Commonly known as: DETROL LA   Take 2 capsules by mouth Every Evening.      traZODone 50 MG tablet  Commonly known  "as: DESYREL    mg, Oral, Nightly               Allergies   Allergen Reactions    Penicillins Anaphylaxis    Latex Other (See Comments)     UNKNOWN    Other Other (See Comments)     \"EGG ALBUMIN\" only; can still eat eggs         Discharge Disposition:  Short Term Hospital (DC)    Diet:  Hospital:  Diet Order   Procedures    Diet: Cardiac; Healthy Heart (2-3 Na+); Feeding Assistance - Nursing; Texture: Regular (IDDSI 7); Fluid Consistency: Thin (IDDSI 0)       Diet Instructions    Cardiac diet            Activity:  Activity Instructions    Activity as tolerated            Restrictions or Other Recommendations:  None        CODE STATUS:    Code Status and Medical Interventions: CPR (Attempt to Resuscitate); Full Support   Ordered at: 01/21/25 2021     Code Status (Patient has no pulse and is not breathing):    CPR (Attempt to Resuscitate)     Medical Interventions (Patient has pulse or is breathing):    Full Support       No future appointments.              Eliezer Miller MD  02/05/25      Time Spent on Discharge:  I spent  25  minutes on this discharge activity which included: face-to-face encounter with the patient, reviewing the data in the system, coordination of the care with the nursing staff as well as consultants, documentation, and entering orders.          "

## 2025-02-11 LAB
QT INTERVAL: 398 MS
QTC INTERVAL: 407 MS

## 2025-05-08 RX ORDER — BUDESONIDE, GLYCOPYRROLATE, AND FORMOTEROL FUMARATE 160; 9; 4.8 UG/1; UG/1; UG/1
2 AEROSOL, METERED RESPIRATORY (INHALATION) 2 TIMES DAILY
Qty: 32.1 G | Refills: 3 | OUTPATIENT
Start: 2025-05-08